# Patient Record
Sex: MALE | Race: WHITE | NOT HISPANIC OR LATINO | URBAN - METROPOLITAN AREA
[De-identification: names, ages, dates, MRNs, and addresses within clinical notes are randomized per-mention and may not be internally consistent; named-entity substitution may affect disease eponyms.]

---

## 2023-08-24 ENCOUNTER — APPOINTMENT (OUTPATIENT)
Dept: CT IMAGING | Facility: HOSPITAL | Age: 58
DRG: 871 | End: 2023-08-24
Payer: COMMERCIAL

## 2023-08-24 ENCOUNTER — APPOINTMENT (OUTPATIENT)
Dept: GENERAL RADIOLOGY | Facility: HOSPITAL | Age: 58
DRG: 871 | End: 2023-08-24
Payer: COMMERCIAL

## 2023-08-24 ENCOUNTER — HOSPITAL ENCOUNTER (INPATIENT)
Facility: HOSPITAL | Age: 58
LOS: 15 days | Discharge: NURSING, REHAB OR LONG-TERM CARE | DRG: 871 | End: 2023-09-08
Attending: EMERGENCY MEDICINE | Admitting: INTERNAL MEDICINE
Payer: COMMERCIAL

## 2023-08-24 DIAGNOSIS — T85.618A SHUNT MALFUNCTION: ICD-10-CM

## 2023-08-24 DIAGNOSIS — N17.9 SEPSIS WITH ACUTE RENAL FAILURE WITHOUT SEPTIC SHOCK, DUE TO UNSPECIFIED ORGANISM, UNSPECIFIED ACUTE RENAL FAILURE TYPE: ICD-10-CM

## 2023-08-24 DIAGNOSIS — Z09 FOLLOW-UP EXAM: Primary | ICD-10-CM

## 2023-08-24 DIAGNOSIS — R65.20 SEPSIS WITH ACUTE RENAL FAILURE WITHOUT SEPTIC SHOCK, DUE TO UNSPECIFIED ORGANISM, UNSPECIFIED ACUTE RENAL FAILURE TYPE: ICD-10-CM

## 2023-08-24 DIAGNOSIS — N17.9 ACUTE KIDNEY INJURY: ICD-10-CM

## 2023-08-24 DIAGNOSIS — E87.6 HYPOKALEMIA: ICD-10-CM

## 2023-08-24 DIAGNOSIS — G91.1 OBSTRUCTIVE HYDROCEPHALUS: ICD-10-CM

## 2023-08-24 DIAGNOSIS — J96.00 ACUTE RESPIRATORY FAILURE, UNSPECIFIED WHETHER WITH HYPOXIA OR HYPERCAPNIA: ICD-10-CM

## 2023-08-24 DIAGNOSIS — Q85.83 VON HIPPEL-LINDAU SYNDROME: ICD-10-CM

## 2023-08-24 DIAGNOSIS — A41.9 SEPSIS WITH ACUTE RENAL FAILURE WITHOUT SEPTIC SHOCK, DUE TO UNSPECIFIED ORGANISM, UNSPECIFIED ACUTE RENAL FAILURE TYPE: ICD-10-CM

## 2023-08-24 DIAGNOSIS — E87.0 HYPERNATREMIA: ICD-10-CM

## 2023-08-24 DIAGNOSIS — R13.12 OROPHARYNGEAL DYSPHAGIA: ICD-10-CM

## 2023-08-24 LAB
ABO GROUP BLD: NORMAL
ALBUMIN SERPL-MCNC: 2 G/DL (ref 3.5–5.2)
ALBUMIN/GLOB SERPL: 1.1 G/DL
ALP SERPL-CCNC: 43 U/L (ref 39–117)
ALT SERPL W P-5'-P-CCNC: 9 U/L (ref 1–41)
ANION GAP SERPL CALCULATED.3IONS-SCNC: 15 MMOL/L (ref 5–15)
APTT PPP: 25.5 SECONDS (ref 22.7–35.4)
ARTERIAL PATENCY WRIST A: POSITIVE
AST SERPL-CCNC: 20 U/L (ref 1–40)
ATMOSPHERIC PRESS: 748.6 MMHG
BACTERIA UR QL AUTO: ABNORMAL /HPF
BASE EXCESS BLDA CALC-SCNC: -6.3 MMOL/L (ref 0–2)
BASOPHILS # BLD AUTO: 0.01 10*3/MM3 (ref 0–0.2)
BASOPHILS NFR BLD AUTO: 0.1 % (ref 0–1.5)
BDY SITE: ABNORMAL
BILIRUB SERPL-MCNC: 0.2 MG/DL (ref 0–1.2)
BILIRUB UR QL STRIP: NEGATIVE
BLD GP AB SCN SERPL QL: NEGATIVE
BUN SERPL-MCNC: 27 MG/DL (ref 6–20)
BUN/CREAT SERPL: 8.9 (ref 7–25)
CALCIUM SPEC-SCNC: 4.9 MG/DL (ref 8.6–10.5)
CHLORIDE SERPL-SCNC: 125 MMOL/L (ref 98–107)
CK SERPL-CCNC: 500 U/L (ref 20–200)
CLARITY UR: CLEAR
CO2 SERPL-SCNC: 10 MMOL/L (ref 22–29)
COLOR UR: YELLOW
CREAT SERPL-MCNC: 3.05 MG/DL (ref 0.76–1.27)
D-LACTATE SERPL-SCNC: 6.8 MMOL/L (ref 0.5–2)
DEPRECATED RDW RBC AUTO: 41.2 FL (ref 37–54)
EGFRCR SERPLBLD CKD-EPI 2021: 23 ML/MIN/1.73
EOSINOPHIL # BLD AUTO: 0 10*3/MM3 (ref 0–0.4)
EOSINOPHIL NFR BLD AUTO: 0 % (ref 0.3–6.2)
ERYTHROCYTE [DISTWIDTH] IN BLOOD BY AUTOMATED COUNT: 13 % (ref 12.3–15.4)
GLOBULIN UR ELPH-MCNC: 1.9 GM/DL
GLUCOSE BLDC GLUCOMTR-MCNC: 115 MG/DL (ref 70–130)
GLUCOSE SERPL-MCNC: 92 MG/DL (ref 65–99)
GLUCOSE UR STRIP-MCNC: NEGATIVE MG/DL
HCO3 BLDA-SCNC: 18.5 MMOL/L (ref 22–28)
HCT VFR BLD AUTO: 51.4 % (ref 37.5–51)
HGB BLD-MCNC: 17.6 G/DL (ref 13–17.7)
HGB UR QL STRIP.AUTO: NEGATIVE
HYALINE CASTS UR QL AUTO: ABNORMAL /LPF
IMM GRANULOCYTES # BLD AUTO: 0.04 10*3/MM3 (ref 0–0.05)
IMM GRANULOCYTES NFR BLD AUTO: 0.3 % (ref 0–0.5)
INHALED O2 CONCENTRATION: 100 %
INR PPP: 1.38 (ref 0.9–1.1)
KETONES UR QL STRIP: ABNORMAL
LEUKOCYTE ESTERASE UR QL STRIP.AUTO: NEGATIVE
LIPASE SERPL-CCNC: 8 U/L (ref 13–60)
LYMPHOCYTES # BLD AUTO: 1.1 10*3/MM3 (ref 0.7–3.1)
LYMPHOCYTES NFR BLD AUTO: 8.7 % (ref 19.6–45.3)
MAGNESIUM SERPL-MCNC: 1.3 MG/DL (ref 1.6–2.6)
MCH RBC QN AUTO: 29.8 PG (ref 26.6–33)
MCHC RBC AUTO-ENTMCNC: 34.2 G/DL (ref 31.5–35.7)
MCV RBC AUTO: 87.1 FL (ref 79–97)
MODALITY: ABNORMAL
MONOCYTES # BLD AUTO: 1.17 10*3/MM3 (ref 0.1–0.9)
MONOCYTES NFR BLD AUTO: 9.3 % (ref 5–12)
NEUTROPHILS NFR BLD AUTO: 10.31 10*3/MM3 (ref 1.7–7)
NEUTROPHILS NFR BLD AUTO: 81.6 % (ref 42.7–76)
NITRITE UR QL STRIP: NEGATIVE
NRBC BLD AUTO-RTO: 0.1 /100 WBC (ref 0–0.2)
NT-PROBNP SERPL-MCNC: 7202 PG/ML (ref 0–900)
O2 A-A PPRESDIFF RESPIRATORY: 0.1 MMHG
PCO2 BLDA: 34.2 MM HG (ref 35–45)
PEEP RESPIRATORY: 5 CM[H2O]
PH BLDA: 7.34 PH UNITS (ref 7.35–7.45)
PH UR STRIP.AUTO: 6 [PH] (ref 5–8)
PLATELET # BLD AUTO: 306 10*3/MM3 (ref 140–450)
PMV BLD AUTO: 10.4 FL (ref 6–12)
PO2 BLDA: 100.2 MM HG (ref 80–100)
POTASSIUM SERPL-SCNC: 2.9 MMOL/L (ref 3.5–5.2)
PROCALCITONIN SERPL-MCNC: 6.08 NG/ML (ref 0–0.25)
PROT SERPL-MCNC: 3.9 G/DL (ref 6–8.5)
PROT UR QL STRIP: ABNORMAL
PROTHROMBIN TIME: 17.2 SECONDS (ref 11.7–14.2)
QT INTERVAL: 282 MS
RBC # BLD AUTO: 5.9 10*6/MM3 (ref 4.14–5.8)
RBC # UR STRIP: ABNORMAL /HPF
REF LAB TEST METHOD: ABNORMAL
RH BLD: POSITIVE
SAO2 % BLDCOA: 97.4 % (ref 92–99)
SET MECH RESP RATE: 18
SODIUM SERPL-SCNC: 150 MMOL/L (ref 136–145)
SP GR UR STRIP: 1.02 (ref 1–1.03)
SQUAMOUS #/AREA URNS HPF: ABNORMAL /HPF
T&S EXPIRATION DATE: NORMAL
TOTAL RATE: 21 BREATHS/MINUTE
TROPONIN T SERPL HS-MCNC: 27 NG/L
UROBILINOGEN UR QL STRIP: ABNORMAL
VENTILATOR MODE: AC
VT ON VENT VENT: 550 ML
WBC # UR STRIP: ABNORMAL /HPF
WBC NRBC COR # BLD: 12.63 10*3/MM3 (ref 3.4–10.8)

## 2023-08-24 PROCEDURE — 25010000002 PIPERACILLIN SOD-TAZOBACTAM PER 1 G: Performed by: EMERGENCY MEDICINE

## 2023-08-24 PROCEDURE — 87040 BLOOD CULTURE FOR BACTERIA: CPT | Performed by: EMERGENCY MEDICINE

## 2023-08-24 PROCEDURE — 83605 ASSAY OF LACTIC ACID: CPT | Performed by: EMERGENCY MEDICINE

## 2023-08-24 PROCEDURE — 83690 ASSAY OF LIPASE: CPT | Performed by: EMERGENCY MEDICINE

## 2023-08-24 PROCEDURE — 71045 X-RAY EXAM CHEST 1 VIEW: CPT

## 2023-08-24 PROCEDURE — 0 POTASSIUM CHLORIDE 10 MEQ/100ML SOLUTION: Performed by: INTERNAL MEDICINE

## 2023-08-24 PROCEDURE — 93010 ELECTROCARDIOGRAM REPORT: CPT | Performed by: INTERNAL MEDICINE

## 2023-08-24 PROCEDURE — 93005 ELECTROCARDIOGRAM TRACING: CPT | Performed by: EMERGENCY MEDICINE

## 2023-08-24 PROCEDURE — 85610 PROTHROMBIN TIME: CPT | Performed by: EMERGENCY MEDICINE

## 2023-08-24 PROCEDURE — 25010000002 PROPOFOL 10 MG/ML EMULSION: Performed by: INTERNAL MEDICINE

## 2023-08-24 PROCEDURE — 82948 REAGENT STRIP/BLOOD GLUCOSE: CPT

## 2023-08-24 PROCEDURE — 5A1935Z RESPIRATORY VENTILATION, LESS THAN 24 CONSECUTIVE HOURS: ICD-10-PCS | Performed by: HOSPITALIST

## 2023-08-24 PROCEDURE — 0BH17EZ INSERTION OF ENDOTRACHEAL AIRWAY INTO TRACHEA, VIA NATURAL OR ARTIFICIAL OPENING: ICD-10-PCS | Performed by: EMERGENCY MEDICINE

## 2023-08-24 PROCEDURE — 84484 ASSAY OF TROPONIN QUANT: CPT | Performed by: EMERGENCY MEDICINE

## 2023-08-24 PROCEDURE — 71250 CT THORAX DX C-: CPT

## 2023-08-24 PROCEDURE — 94799 UNLISTED PULMONARY SVC/PX: CPT

## 2023-08-24 PROCEDURE — 36600 WITHDRAWAL OF ARTERIAL BLOOD: CPT

## 2023-08-24 PROCEDURE — 81001 URINALYSIS AUTO W/SCOPE: CPT | Performed by: EMERGENCY MEDICINE

## 2023-08-24 PROCEDURE — 85730 THROMBOPLASTIN TIME PARTIAL: CPT | Performed by: EMERGENCY MEDICINE

## 2023-08-24 PROCEDURE — 25010000002 FENTANYL CITRATE (PF) 50 MCG/ML SOLUTION: Performed by: EMERGENCY MEDICINE

## 2023-08-24 PROCEDURE — 80053 COMPREHEN METABOLIC PANEL: CPT | Performed by: EMERGENCY MEDICINE

## 2023-08-24 PROCEDURE — 82550 ASSAY OF CK (CPK): CPT | Performed by: EMERGENCY MEDICINE

## 2023-08-24 PROCEDURE — 70450 CT HEAD/BRAIN W/O DYE: CPT

## 2023-08-24 PROCEDURE — 86901 BLOOD TYPING SEROLOGIC RH(D): CPT | Performed by: EMERGENCY MEDICINE

## 2023-08-24 PROCEDURE — 82803 BLOOD GASES ANY COMBINATION: CPT

## 2023-08-24 PROCEDURE — 94002 VENT MGMT INPAT INIT DAY: CPT

## 2023-08-24 PROCEDURE — 83735 ASSAY OF MAGNESIUM: CPT | Performed by: EMERGENCY MEDICINE

## 2023-08-24 PROCEDURE — 36415 COLL VENOUS BLD VENIPUNCTURE: CPT

## 2023-08-24 PROCEDURE — 99291 CRITICAL CARE FIRST HOUR: CPT

## 2023-08-24 PROCEDURE — 84145 PROCALCITONIN (PCT): CPT | Performed by: EMERGENCY MEDICINE

## 2023-08-24 PROCEDURE — 86900 BLOOD TYPING SEROLOGIC ABO: CPT | Performed by: EMERGENCY MEDICINE

## 2023-08-24 PROCEDURE — 31500 INSERT EMERGENCY AIRWAY: CPT

## 2023-08-24 PROCEDURE — 83880 ASSAY OF NATRIURETIC PEPTIDE: CPT | Performed by: EMERGENCY MEDICINE

## 2023-08-24 PROCEDURE — 25010000002 MIDAZOLAM PER 1 MG: Performed by: EMERGENCY MEDICINE

## 2023-08-24 PROCEDURE — 85025 COMPLETE CBC W/AUTO DIFF WBC: CPT | Performed by: EMERGENCY MEDICINE

## 2023-08-24 PROCEDURE — 74176 CT ABD & PELVIS W/O CONTRAST: CPT

## 2023-08-24 PROCEDURE — 86850 RBC ANTIBODY SCREEN: CPT | Performed by: EMERGENCY MEDICINE

## 2023-08-24 PROCEDURE — 87641 MR-STAPH DNA AMP PROBE: CPT | Performed by: INTERNAL MEDICINE

## 2023-08-24 RX ORDER — FAMOTIDINE 10 MG/ML
20 INJECTION, SOLUTION INTRAVENOUS 2 TIMES DAILY
Status: DISCONTINUED | OUTPATIENT
Start: 2023-08-24 | End: 2023-08-25

## 2023-08-24 RX ORDER — ONDANSETRON 2 MG/ML
4 INJECTION INTRAMUSCULAR; INTRAVENOUS EVERY 6 HOURS PRN
Status: DISCONTINUED | OUTPATIENT
Start: 2023-08-24 | End: 2023-09-08 | Stop reason: HOSPADM

## 2023-08-24 RX ORDER — ALUMINA, MAGNESIA, AND SIMETHICONE 2400; 2400; 240 MG/30ML; MG/30ML; MG/30ML
15 SUSPENSION ORAL EVERY 6 HOURS PRN
Status: DISCONTINUED | OUTPATIENT
Start: 2023-08-24 | End: 2023-08-29

## 2023-08-24 RX ORDER — FENTANYL CITRATE 50 UG/ML
100 INJECTION, SOLUTION INTRAMUSCULAR; INTRAVENOUS ONCE
Status: COMPLETED | OUTPATIENT
Start: 2023-08-24 | End: 2023-08-24

## 2023-08-24 RX ORDER — MAGNESIUM SULFATE HEPTAHYDRATE 40 MG/ML
4 INJECTION, SOLUTION INTRAVENOUS ONCE
Status: COMPLETED | OUTPATIENT
Start: 2023-08-24 | End: 2023-08-25

## 2023-08-24 RX ORDER — SODIUM CHLORIDE 0.9 % (FLUSH) 0.9 %
10 SYRINGE (ML) INJECTION EVERY 12 HOURS SCHEDULED
Status: DISCONTINUED | OUTPATIENT
Start: 2023-08-24 | End: 2023-09-08 | Stop reason: HOSPADM

## 2023-08-24 RX ORDER — POTASSIUM CHLORIDE 7.45 MG/ML
10 INJECTION INTRAVENOUS
Status: DISPENSED | OUTPATIENT
Start: 2023-08-24 | End: 2023-08-25

## 2023-08-24 RX ORDER — SODIUM CHLORIDE 9 MG/ML
40 INJECTION, SOLUTION INTRAVENOUS AS NEEDED
Status: DISCONTINUED | OUTPATIENT
Start: 2023-08-24 | End: 2023-09-08 | Stop reason: HOSPADM

## 2023-08-24 RX ORDER — CHLORHEXIDINE GLUCONATE 0.12 MG/ML
15 RINSE ORAL EVERY 12 HOURS SCHEDULED
Status: DISCONTINUED | OUTPATIENT
Start: 2023-08-24 | End: 2023-08-26

## 2023-08-24 RX ORDER — SODIUM CHLORIDE 0.9 % (FLUSH) 0.9 %
10 SYRINGE (ML) INJECTION AS NEEDED
Status: DISCONTINUED | OUTPATIENT
Start: 2023-08-24 | End: 2023-09-08 | Stop reason: HOSPADM

## 2023-08-24 RX ORDER — FENTANYL CITRATE 50 UG/ML
50 INJECTION, SOLUTION INTRAMUSCULAR; INTRAVENOUS
Status: DISCONTINUED | OUTPATIENT
Start: 2023-08-24 | End: 2023-08-26

## 2023-08-24 RX ORDER — MIDAZOLAM HYDROCHLORIDE 1 MG/ML
2 INJECTION INTRAMUSCULAR; INTRAVENOUS ONCE
Status: COMPLETED | OUTPATIENT
Start: 2023-08-24 | End: 2023-08-24

## 2023-08-24 RX ORDER — POLYETHYLENE GLYCOL 3350 17 G/17G
17 POWDER, FOR SOLUTION ORAL DAILY PRN
Status: DISCONTINUED | OUTPATIENT
Start: 2023-08-24 | End: 2023-09-01

## 2023-08-24 RX ORDER — BISACODYL 5 MG/1
5 TABLET, DELAYED RELEASE ORAL DAILY PRN
Status: DISCONTINUED | OUTPATIENT
Start: 2023-08-24 | End: 2023-09-01

## 2023-08-24 RX ORDER — ETOMIDATE 2 MG/ML
23 INJECTION INTRAVENOUS ONCE
Status: COMPLETED | OUTPATIENT
Start: 2023-08-24 | End: 2023-08-24

## 2023-08-24 RX ORDER — BISACODYL 10 MG
10 SUPPOSITORY, RECTAL RECTAL DAILY PRN
Status: DISCONTINUED | OUTPATIENT
Start: 2023-08-24 | End: 2023-09-01

## 2023-08-24 RX ORDER — AMOXICILLIN 250 MG
2 CAPSULE ORAL 2 TIMES DAILY
Status: DISCONTINUED | OUTPATIENT
Start: 2023-08-24 | End: 2023-09-01

## 2023-08-24 RX ORDER — NITROGLYCERIN 0.4 MG/1
0.4 TABLET SUBLINGUAL
Status: DISCONTINUED | OUTPATIENT
Start: 2023-08-24 | End: 2023-09-08 | Stop reason: HOSPADM

## 2023-08-24 RX ADMIN — PIPERACILLIN SODIUM AND TAZOBACTAM SODIUM 3.38 G: 3; .375 INJECTION, SOLUTION INTRAVENOUS at 18:50

## 2023-08-24 RX ADMIN — ETOMIDATE 23 MG: 2 INJECTION, SOLUTION INTRAVENOUS at 18:03

## 2023-08-24 RX ADMIN — FENTANYL CITRATE 100 MCG: 50 INJECTION, SOLUTION INTRAMUSCULAR; INTRAVENOUS at 18:34

## 2023-08-24 RX ADMIN — POTASSIUM CHLORIDE 10 MEQ: 7.46 INJECTION, SOLUTION INTRAVENOUS at 23:50

## 2023-08-24 RX ADMIN — MIDAZOLAM 2 MG: 1 INJECTION INTRAMUSCULAR; INTRAVENOUS at 18:34

## 2023-08-24 RX ADMIN — PROPOFOL INJECTABLE EMULSION 5 MCG/KG/MIN: 10 INJECTION, EMULSION INTRAVENOUS at 22:58

## 2023-08-24 RX ADMIN — SODIUM CHLORIDE, POTASSIUM CHLORIDE, SODIUM LACTATE AND CALCIUM CHLORIDE 1000 ML: 600; 310; 30; 20 INJECTION, SOLUTION INTRAVENOUS at 18:57

## 2023-08-24 RX ADMIN — SODIUM CHLORIDE, POTASSIUM CHLORIDE, SODIUM LACTATE AND CALCIUM CHLORIDE 1000 ML: 600; 310; 30; 20 INJECTION, SOLUTION INTRAVENOUS at 18:33

## 2023-08-24 RX ADMIN — POTASSIUM CHLORIDE 10 MEQ: 7.46 INJECTION, SOLUTION INTRAVENOUS at 22:58

## 2023-08-24 NOTE — ED PROVIDER NOTES
EMERGENCY DEPARTMENT ENCOUNTER    Room Number:  40/40  PCP: Provider, No Known  Historian: EMS      HPI:  Chief Complaint: Found down  A complete HPI/ROS/PMH/PSH/SH/FH are unobtainable due to: Patient is unresponsive  Context: Braxton Wolfe is a 57 y.o. male who presents to the ED from home by EMS after being found down.  It is unknown when the patient was last normal.  A friend went to check on him today and became concerned when the patient did not come to the door.  When EMS arrived on scene, the patient was found laying in his bed.  He was unresponsive and covered in dried vomitus.  Glucose was 147.  Patient was given Narcan without change.  In route to the ED, he was breathing spontaneously but was being bagged.  He was tachycardic.  Blood pressures were somewhat low.  He reportedly has a history of previous stroke.  Other past medical history medications are unknown.            PAST MEDICAL HISTORY  Active Ambulatory Problems     Diagnosis Date Noted    No Active Ambulatory Problems     Resolved Ambulatory Problems     Diagnosis Date Noted    No Resolved Ambulatory Problems     No Additional Past Medical History         PAST SURGICAL HISTORY  No past surgical history on file.      FAMILY HISTORY  No family history on file.      SOCIAL HISTORY  Social History     Socioeconomic History    Marital status: Unknown         ALLERGIES  Patient has no allergy information on record.    REVIEW OF SYSTEMS  Unobtainable    PHYSICAL EXAM  ED Triage Vitals   Temp Pulse Resp BP SpO2   -- -- -- -- --      Temp src Heart Rate Source Patient Position BP Location FiO2 (%)   -- -- -- -- --       Physical Exam      GENERAL: Well-developed male.  Unresponsive.  There is dried vomitus on his face and chest.  HENT: NCAT, nares patent, gag reflex is absent  EYES: Pupils are 2 mm and nonreactive to light  CV: regular rhythm, tachycardic  RESPIRATORY: Shallow respirations, clear to auscultation bilaterally  ABDOMEN: soft,  nondistended  MUSCULOSKELETAL: No obvious deformities of the extremities  NEURO: Unresponsive to painful stimuli  SKIN: Cool, feet are mottled    Vital signs and nursing notes reviewed.          LAB RESULTS  Recent Results (from the past 24 hour(s))   Comprehensive Metabolic Panel    Collection Time: 08/24/23  6:05 PM    Specimen: Blood   Result Value Ref Range    Glucose 92 65 - 99 mg/dL    BUN 27 (H) 6 - 20 mg/dL    Creatinine 3.05 (H) 0.76 - 1.27 mg/dL    Sodium 150 (H) 136 - 145 mmol/L    Potassium 2.9 (L) 3.5 - 5.2 mmol/L    Chloride 125 (H) 98 - 107 mmol/L    CO2 10.0 (L) 22.0 - 29.0 mmol/L    Calcium 4.9 (C) 8.6 - 10.5 mg/dL    Total Protein 3.9 (L) 6.0 - 8.5 g/dL    Albumin 2.0 (L) 3.5 - 5.2 g/dL    ALT (SGPT) 9 1 - 41 U/L    AST (SGOT) 20 1 - 40 U/L    Alkaline Phosphatase 43 39 - 117 U/L    Total Bilirubin 0.2 0.0 - 1.2 mg/dL    Globulin 1.9 gm/dL    A/G Ratio 1.1 g/dL    BUN/Creatinine Ratio 8.9 7.0 - 25.0    Anion Gap 15.0 5.0 - 15.0 mmol/L    eGFR 23.0 (L) >60.0 mL/min/1.73   Protime-INR    Collection Time: 08/24/23  6:05 PM    Specimen: Blood   Result Value Ref Range    Protime 17.2 (H) 11.7 - 14.2 Seconds    INR 1.38 (H) 0.90 - 1.10   aPTT    Collection Time: 08/24/23  6:05 PM    Specimen: Blood   Result Value Ref Range    PTT 25.5 22.7 - 35.4 seconds   Lipase    Collection Time: 08/24/23  6:05 PM    Specimen: Blood   Result Value Ref Range    Lipase 8 (L) 13 - 60 U/L   BNP    Collection Time: 08/24/23  6:05 PM    Specimen: Blood   Result Value Ref Range    proBNP 7,202.0 (H) 0.0 - 900.0 pg/mL   Single High Sensitivity Troponin T    Collection Time: 08/24/23  6:05 PM    Specimen: Blood   Result Value Ref Range    HS Troponin T 27 (H) <15 ng/L   Lactic Acid, Plasma    Collection Time: 08/24/23  6:05 PM    Specimen: Blood   Result Value Ref Range    Lactate 6.8 (C) 0.5 - 2.0 mmol/L   Procalcitonin    Collection Time: 08/24/23  6:05 PM    Specimen: Blood   Result Value Ref Range    Procalcitonin 6.08 (H)  0.00 - 0.25 ng/mL   CBC Auto Differential    Collection Time: 08/24/23  6:05 PM    Specimen: Blood   Result Value Ref Range    WBC 12.63 (H) 3.40 - 10.80 10*3/mm3    RBC 5.90 (H) 4.14 - 5.80 10*6/mm3    Hemoglobin 17.6 13.0 - 17.7 g/dL    Hematocrit 51.4 (H) 37.5 - 51.0 %    MCV 87.1 79.0 - 97.0 fL    MCH 29.8 26.6 - 33.0 pg    MCHC 34.2 31.5 - 35.7 g/dL    RDW 13.0 12.3 - 15.4 %    RDW-SD 41.2 37.0 - 54.0 fl    MPV 10.4 6.0 - 12.0 fL    Platelets 306 140 - 450 10*3/mm3    Neutrophil % 81.6 (H) 42.7 - 76.0 %    Lymphocyte % 8.7 (L) 19.6 - 45.3 %    Monocyte % 9.3 5.0 - 12.0 %    Eosinophil % 0.0 (L) 0.3 - 6.2 %    Basophil % 0.1 0.0 - 1.5 %    Immature Grans % 0.3 0.0 - 0.5 %    Neutrophils, Absolute 10.31 (H) 1.70 - 7.00 10*3/mm3    Lymphocytes, Absolute 1.10 0.70 - 3.10 10*3/mm3    Monocytes, Absolute 1.17 (H) 0.10 - 0.90 10*3/mm3    Eosinophils, Absolute 0.00 0.00 - 0.40 10*3/mm3    Basophils, Absolute 0.01 0.00 - 0.20 10*3/mm3    Immature Grans, Absolute 0.04 0.00 - 0.05 10*3/mm3    nRBC 0.1 0.0 - 0.2 /100 WBC   Magnesium    Collection Time: 08/24/23  6:05 PM    Specimen: Blood   Result Value Ref Range    Magnesium 1.3 (L) 1.6 - 2.6 mg/dL   CK    Collection Time: 08/24/23  6:05 PM    Specimen: Blood   Result Value Ref Range    Creatine Kinase 500 (H) 20 - 200 U/L   ECG 12 Lead Altered Mental Status    Collection Time: 08/24/23  6:16 PM   Result Value Ref Range    QT Interval 282 ms   Type & Screen    Collection Time: 08/24/23  6:42 PM    Specimen: Blood   Result Value Ref Range    ABO Type A     RH type Positive     Antibody Screen Negative     T&S Expiration Date 8/27/2023 11:59:59 PM    Urinalysis With Culture If Indicated - Indwelling Urethral Catheter    Collection Time: 08/24/23  6:43 PM    Specimen: Indwelling Urethral Catheter; Urine   Result Value Ref Range    Color, UA Yellow Yellow, Straw    Appearance, UA Clear Clear    pH, UA 6.0 5.0 - 8.0    Specific Gravity, UA 1.019 1.005 - 1.030    Glucose, UA  Negative Negative    Ketones, UA 40 mg/dL (2+) (A) Negative    Bilirubin, UA Negative Negative    Blood, UA Negative Negative    Protein,  mg/dL (2+) (A) Negative    Leuk Esterase, UA Negative Negative    Nitrite, UA Negative Negative    Urobilinogen, UA 1.0 E.U./dL 0.2 - 1.0 E.U./dL   Urinalysis, Microscopic Only - Indwelling Urethral Catheter    Collection Time: 08/24/23  6:43 PM    Specimen: Indwelling Urethral Catheter; Urine   Result Value Ref Range    RBC, UA 0-2 None Seen, 0-2 /HPF    WBC, UA 3-5 (A) None Seen, 0-2 /HPF    Bacteria, UA None Seen None Seen /HPF    Squamous Epithelial Cells, UA 3-6 (A) None Seen, 0-2 /HPF    Hyaline Casts, UA 3-6 None Seen /LPF    Methodology Manual Light Microscopy    Blood Gas, Arterial -    Collection Time: 08/24/23  7:00 PM    Specimen: Arterial Blood   Result Value Ref Range    Site Arterial: right radial     Juan's Test Positive     pH, Arterial 7.341 (L) 7.350 - 7.450 pH units    pCO2, Arterial 34.2 (L) 35.0 - 45.0 mm Hg    pO2, Arterial 100.2 (H) 80.0 - 100.0 mm Hg    HCO3, Arterial 18.5 (L) 22.0 - 28.0 mmol/L    Base Excess, Arterial -6.3 (L) 0.0 - 2.0 mmol/L    O2 Saturation Calculated 97.4 92.0 - 99.0 %    A-a DO2 0.1 mmHg    Barometric Pressure for Blood Gas 748.6 mmHg    Modality Adult Vent     FIO2 100 %    Ventilator Mode AC     Set Tidal Volume 550     Set Mech Resp Rate 18     Rate 21 Breaths/minute    PEEP 5        Ordered the above labs and reviewed the results.        RADIOLOGY  XR Chest 1 View    Result Date: 8/24/2023  CHEST SINGLE VIEW  HISTORY: Respiratory failure. Patient was found down.  COMPARISON: None.  FINDINGS: There has been intubation and the ET tube tip is 2.2 cm above the melanie and this could be withdrawn 2 cm for better position. The heart size appears normal. There is no evidence for perihilar edema or pneumothorax or focal airspace disease.  There is some limitation as the lung apices, particular on the right and the right  costophrenic angle, are not included on the field-of-view. There is shunt tubing overlying the right thorax.      Limited exam demonstrates intubation with ET tube tip 2.2 cm above the melanie and this could be withdrawn 2 cm for better position. No further evidence for active disease in the chest.  This report was finalized on 8/24/2023 7:10 PM by Dr. Marcelo De M.D.       Ordered the above noted radiological studies. Reviewed by me in PACS.            PROCEDURES  Intubation    Date/Time: 8/24/2023 6:39 PM  Performed by: Antolin Zapata MD  Authorized by: Antolin Zapata MD     Consent:     Consent obtained:  Emergent situation  Universal protocol:     Patient identity confirmed:  Anonymous protocol, patient vented/unresponsive  Pre-procedure details:     Indication: failure to protect airway      Patient status:  Unresponsive    Look externally: no concerns      Obstruction: none      Neck mobility: normal      Pharmacologic strategy: sedation      Induction agents:  Etomidate    Paralytics:  None  Procedure details:     Preoxygenation:  Bag valve mask    CPR in progress: no      Intubation method:  Oral    Intubation technique: video assisted      Laryngoscope blade:  Mac 3    Bougie used: no      Tube size (mm):  7.5    Tube type:  Cuffed    Number of attempts:  1    Tube visualized through cords: yes    Placement assessment:     ETT at teeth/gumline (cm):  24    Tube secured with:  ETT resendez    Breath sounds:  Equal and absent over the epigastrium    Placement verification: chest rise, colorimetric ETCO2, CXR verification, equal breath sounds and tube exhalation      CXR findings:  Appropriate position and low    Tube repositioned: yes    Post-procedure details:     Procedure completion:  Tolerated well, no immediate complications  Critical Care  Performed by: Antolin Zapata MD  Authorized by: Antolin Zapata MD     Critical care provider statement:     Critical care time (minutes):  50     Critical care time was exclusive of:  Separately billable procedures and treating other patients    Critical care was necessary to treat or prevent imminent or life-threatening deterioration of the following conditions:  Renal failure, respiratory failure and sepsis    Critical care was time spent personally by me on the following activities:  Development of treatment plan with patient or surrogate, discussions with consultants, evaluation of patient's response to treatment, examination of patient, obtaining history from patient or surrogate, ordering and performing treatments and interventions, ordering and review of laboratory studies, ordering and review of radiographic studies, pulse oximetry, re-evaluation of patient's condition and review of old charts    I assumed direction of critical care for this patient from another provider in my specialty: no      Care discussed with: admitting provider              MEDICATIONS GIVEN IN ER  Medications   sodium chloride 0.9 % flush 10 mL (has no administration in time range)   lactated ringers bolus 1,000 mL (0 mL Intravenous Stopped 8/24/23 1834)   etomidate (AMIDATE) injection 23 mg (23 mg Intravenous Given 8/24/23 1803)   piperacillin-tazobactam (ZOSYN) 3.375 g in iso-osmotic dextrose 50 ml (premix) (0 g Intravenous Stopped 8/24/23 2024)   midazolam (VERSED) injection 2 mg (2 mg Intravenous Given 8/24/23 1834)   fentaNYL citrate (PF) (SUBLIMAZE) injection 100 mcg (100 mcg Intravenous Given 8/24/23 1834)   lactated ringers bolus 2,277 mL (1,000 mL Intravenous New Bag 8/24/23 1857)                   MEDICAL DECISION MAKING, PROGRESS, and CONSULTS    All labs have been independently reviewed by me.  All radiology studies have been reviewed by me and I have also reviewed the radiology report.   EKG's independently viewed and interpreted by me.  Discussion below represents my analysis of pertinent findings related to patient's condition, differential diagnosis, treatment  plan and final disposition.      Additional sources:  - Discussed/ obtained information from independent historians: Mercedez, charge nurse, spoke with the patient's parents.  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  I personally spoke with the patient's ex-wife here in the ED.    - External (non-ED) record review: Patient had an MRI of the brain done in April 2022 at [facility].  This showed stable postoperative changes of the posterior fossa along with multiple small enhancing bilateral cerebellar hemisphere nodules.  He has had a previous suboccipital craniectomy.  Patient has a history of von Hippel-Lindau disease.  Patient does not have any prior ED visits or admissions here.    - Chronic or social conditions impacting care: N/A          Orders placed during this visit:  Orders Placed This Encounter   Procedures    Intubation    Critical Care    Blood Culture - Blood,    Blood Culture - Blood,    CT Head Without Contrast    XR Chest 1 View    Comprehensive Metabolic Panel    Protime-INR    aPTT    Lipase    Urinalysis With Microscopic If Indicated (No Culture) - Urine, Catheter    BNP    Single High Sensitivity Troponin T    Lactic Acid, Plasma    Procalcitonin    Blood Gas, Arterial -    CBC Auto Differential    Magnesium    Urinalysis With Culture If Indicated -    STAT Lactic Acid, Reflex    CK    Blood Gas, Arterial -    Urinalysis, Microscopic Only - Urine, Clean Catch    Ethanol    Urine Drug Screen - Urine, Clean Catch    Pulse Oximetry, Continuous    Monitor Blood Pressure    Insert Indwelling Urinary Catheter    Assess Need for Indwelling Urinary Catheter - Follow Removal Protocol    Urinary Catheter Care    Pulmonology (on-call MD unless specified)    ECG 12 Lead Altered Mental Status    Type & Screen    Insert Peripheral IV    Inpatient Admission    CBC & Differential         Additional orders considered but not  ordered:  N/A        Differential diagnosis:    Sepsis, bacteremia, pneumonia, UTI, dehydration, kidney failure, stroke, intracranial hemorrhage      Independent interpretation of labs, radiology studies, and discussions with consultants:  ED Course as of 08/24/23 2128   Thu Aug 24, 2023   1808 Patient presented to the ED after being found down for an unknown amount of time.  He was orally intubated by me.  See procedure note for detail.  He is tachycardic.  He is getting a 2 L bolus of IV fluids.  He is hypotensive. [WH]   1813 Heart rate is now in the 110s.  Blood pressure is 106/71.  Temperature is 101.3.  Blood and urine cultures are pending. [WH]   1817 Patient will be given IV Zosyn for treatment of sepsis. [WH]   1817 Chest x-ray personally interpreted by me.  My personal interpretation is: ET tube is in good position.  Heart size is normal.  There are perihilar infiltrates.  No pleural effusion. [WH]   1834 WBC(!): 12.63 [WH]   1834 Hemoglobin: 17.6 [WH]   1834 INR(!): 1.38 [WH]   1834 Patient will be given a dose of fentanyl and Versed for sedation.   [WH]   1834 EKG personally interpreted by me at 1818.  My personal interpretation is:          EKG time: 1816  Rhythm/Rate: Sinus tachycardia, rate 119  P waves and CA: LAE  QRS, axis: Normal  ST and T waves: Nonspecific ST/T wave changes in the lateral and inferior leads, no ST elevation or depression    Interpreted Contemporaneously by me, independently viewed  No prior available for comparison    [WH]   1839 Per the radiologist, chest x-ray is negative acute.  The tip of the ET tube is 2.2 cm above the melanie and could be withdrawn 2 cm for better positioning. [WH]   1845 Procalcitonin(!): 6.08 [WH]   1845 proBNP(!): 7,202.0 [WH]   1845 HS Troponin T(!): 27 [WH]   1845 Lactate(!!): 6.8 [WH]   1845 BUN(!): 27 [WH]   1846 Creatinine(!): 3.05 [WH]   1846 Sodium(!): 150 [WH]   1846 Potassium(!): 2.9 [WH]   1846 CO2(!): 10.0 [WH]   1846 Glucose: 92 [WH]   1846  Labs reviewed.  Patient has acute kidney injury.  He is hypokalemic and hyponatremic.  Lactic acid is elevated. Patient will be given additional IV fluids for a total of 30 cc/kg.   [WH]   1910 I spoke with the patient's ex-wife, who is here in the ED.  She reports that the patient's friend went to pick him up to take him to a class reunion.  When the patient would not answer the door, his friend broke into the house and found the patient lying in bed unresponsive.  The last time that she knows someone talk to the patient was 2 days ago. [WH]   1949 Case discussed with Dr. Castillo, who is here in the ED at bedside.  He agrees to admit the patient [WH]   2007 Creatine Kinase(!): 500 [WH]   2126 Patient presented to the ED after being found down for an unknown amount of time.  No one had talked talk to him in the last 48 hours or so.  When EMS arrived on scene, the patient was lying in bed and was unresponsive.  He was covered with vomitus.  Upon ED arrival, he was being bagged.  He was tachycardic and hypotensive.  He was intubated for airway protection.  He was found to be febrile.  He was given IV fluids.  Blood pressure and heart rate improved.  Chest x-ray was negative acute.  Patient was found to have acute kidney failure.  He was hyponatremic and hypokalemic.  CK was 500.  Head CT is pending.  Patient was started on antibiotics for sepsis.  I suspect he may have aspiration pneumonia.  Case was discussed with the intensivist.  Patient will be admitted to the ICU.  Critical care was performed on this patient. [WH]      ED Course User Index  [WH] Antolin Zapata MD               DIAGNOSIS  Final diagnoses:   Sepsis with acute renal failure without septic shock, due to unspecified organism, unspecified acute renal failure type   Acute respiratory failure, unspecified whether with hypoxia or hypercapnia   Acute kidney injury   Hypokalemia   Hypernatremia         DISPOSITION  ADMISSION    Discussed treatment  plan and reason for admission with pt/family and admitting physician.  Pt/family voiced understanding of the plan for admission for further testing/treatment as needed.               Latest Documented Vital Signs:  As of 21:28 EDT  BP- 117/89 HR- 100 Temp- (!) 101.3 °F (38.5 °C) (Rectal) O2 sat- 97%              --    Please note that portions of this were completed with a voice recognition program.       Note Disclaimer: At [facility], we believe that sharing information builds trust and better relationships. You are receiving this note because you are receiving care at [facility] or recently visited. It is possible you will see health information before a provider has talked with you about it. This kind of information can be easy to misunderstand. To help you fully understand what it means for your health, we urge you to discuss this note with your provider.             Antolin Zapata MD  08/24/23 [id number]

## 2023-08-25 ENCOUNTER — APPOINTMENT (OUTPATIENT)
Dept: CARDIOLOGY | Facility: HOSPITAL | Age: 58
DRG: 871 | End: 2023-08-25
Payer: COMMERCIAL

## 2023-08-25 ENCOUNTER — APPOINTMENT (OUTPATIENT)
Dept: GENERAL RADIOLOGY | Facility: HOSPITAL | Age: 58
DRG: 871 | End: 2023-08-25
Payer: COMMERCIAL

## 2023-08-25 ENCOUNTER — APPOINTMENT (OUTPATIENT)
Dept: NEUROLOGY | Facility: HOSPITAL | Age: 58
DRG: 871 | End: 2023-08-25
Payer: COMMERCIAL

## 2023-08-25 LAB
ALBUMIN SERPL-MCNC: 3.2 G/DL (ref 3.5–5.2)
ALBUMIN/GLOB SERPL: 1.2 G/DL
ALP SERPL-CCNC: 69 U/L (ref 39–117)
ALT SERPL W P-5'-P-CCNC: 15 U/L (ref 1–41)
AMPHET+METHAMPHET UR QL: NEGATIVE
ANION GAP SERPL CALCULATED.3IONS-SCNC: 16 MMOL/L (ref 5–15)
ANION GAP SERPL CALCULATED.3IONS-SCNC: 19 MMOL/L (ref 5–15)
AORTIC DIMENSIONLESS INDEX: 0.4 (DI)
ARTERIAL PATENCY WRIST A: POSITIVE
AST SERPL-CCNC: 34 U/L (ref 1–40)
ATMOSPHERIC PRESS: 752.6 MMHG
BARBITURATES UR QL SCN: NEGATIVE
BASE EXCESS BLDA CALC-SCNC: -3.6 MMOL/L (ref 0–2)
BASOPHILS # BLD AUTO: 0.02 10*3/MM3 (ref 0–0.2)
BASOPHILS NFR BLD AUTO: 0.2 % (ref 0–1.5)
BDY SITE: ABNORMAL
BENZODIAZ UR QL SCN: POSITIVE
BH CV ECHO MEAS - AO MAX PG: 2.8 MMHG
BH CV ECHO MEAS - AO MEAN PG: 1.39 MMHG
BH CV ECHO MEAS - AO V2 MAX: 83.8 CM/SEC
BH CV ECHO MEAS - AO V2 VTI: 11.9 CM
BH CV ECHO MEAS - AVA(I,D): 1.38 CM2
BH CV ECHO MEAS - EDV(CUBED): 60 ML
BH CV ECHO MEAS - IVS/LVPW: 1.41 CM
BH CV ECHO MEAS - IVSD: 1.06 CM
BH CV ECHO MEAS - LV MASS(C)D: 107.4 GRAMS
BH CV ECHO MEAS - LV MAX PG: 0.68 MMHG
BH CV ECHO MEAS - LV MEAN PG: 0.31 MMHG
BH CV ECHO MEAS - LV V1 MAX: 41.1 CM/SEC
BH CV ECHO MEAS - LV V1 VTI: 4.6 CM
BH CV ECHO MEAS - LVIDD: 3.9 CM
BH CV ECHO MEAS - LVOT AREA: 3.6 CM2
BH CV ECHO MEAS - LVOT DIAM: 2.13 CM
BH CV ECHO MEAS - LVPWD: 0.75 CM
BH CV ECHO MEAS - MV A MAX VEL: 54 CM/SEC
BH CV ECHO MEAS - MV DEC SLOPE: 418.7 CM/SEC2
BH CV ECHO MEAS - MV DEC TIME: 0.29 MSEC
BH CV ECHO MEAS - MV E MAX VEL: 48.4 CM/SEC
BH CV ECHO MEAS - MV E/A: 0.9
BH CV ECHO MEAS - MV MAX PG: 1.54 MMHG
BH CV ECHO MEAS - MV MEAN PG: 0.84 MMHG
BH CV ECHO MEAS - MV P1/2T: 39.1 MSEC
BH CV ECHO MEAS - MV V2 VTI: 10.7 CM
BH CV ECHO MEAS - MVA(P1/2T): 5.6 CM2
BH CV ECHO MEAS - MVA(VTI): 1.55 CM2
BH CV ECHO MEAS - RAP SYSTOLE: 3 MMHG
BH CV ECHO MEAS - RVSP: 26 MMHG
BH CV ECHO MEAS - SV(LVOT): 16.5 ML
BH CV ECHO MEAS - TR MAX PG: 23.5 MMHG
BH CV ECHO MEAS - TR MAX VEL: 242.2 CM/SEC
BH CV VAS BP RIGHT ARM: NORMAL MMHG
BILIRUB SERPL-MCNC: 0.5 MG/DL (ref 0–1.2)
BUN SERPL-MCNC: 47 MG/DL (ref 6–20)
BUN SERPL-MCNC: 52 MG/DL (ref 6–20)
BUN/CREAT SERPL: 10 (ref 7–25)
BUN/CREAT SERPL: 13.5 (ref 7–25)
CA-I BLD-MCNC: 4.6 MG/DL (ref 4.6–5.4)
CA-I SERPL ISE-MCNC: 1.14 MMOL/L (ref 1.15–1.35)
CALCIUM SPEC-SCNC: 8.6 MG/DL (ref 8.6–10.5)
CALCIUM SPEC-SCNC: 9.2 MG/DL (ref 8.6–10.5)
CANNABINOIDS SERPL QL: NEGATIVE
CHLORIDE SERPL-SCNC: 104 MMOL/L (ref 98–107)
CHLORIDE SERPL-SCNC: 108 MMOL/L (ref 98–107)
CHLORIDE UR-SCNC: <20 MMOL/L
CK SERPL-CCNC: 1023 U/L (ref 20–200)
CO2 SERPL-SCNC: 19 MMOL/L (ref 22–29)
CO2 SERPL-SCNC: 20 MMOL/L (ref 22–29)
COCAINE UR QL: NEGATIVE
CREAT SERPL-MCNC: 3.85 MG/DL (ref 0.76–1.27)
CREAT SERPL-MCNC: 4.7 MG/DL (ref 0.76–1.27)
CREAT UR-MCNC: 245.8 MG/DL
D-LACTATE SERPL-SCNC: 1.7 MMOL/L (ref 0.5–2)
D-LACTATE SERPL-SCNC: 2.4 MMOL/L (ref 0.5–2)
DEPRECATED RDW RBC AUTO: 41.9 FL (ref 37–54)
EGFRCR SERPLBLD CKD-EPI 2021: 13.7 ML/MIN/1.73
EGFRCR SERPLBLD CKD-EPI 2021: 17.4 ML/MIN/1.73
EOSINOPHIL # BLD AUTO: 0 10*3/MM3 (ref 0–0.4)
EOSINOPHIL NFR BLD AUTO: 0 % (ref 0.3–6.2)
ERYTHROCYTE [DISTWIDTH] IN BLOOD BY AUTOMATED COUNT: 13.2 % (ref 12.3–15.4)
ETHANOL BLD-MCNC: <10 MG/DL (ref 0–10)
ETHANOL UR QL: <0.01 %
FENTANYL UR-MCNC: POSITIVE NG/ML
GLOBULIN UR ELPH-MCNC: 2.6 GM/DL
GLUCOSE BLDC GLUCOMTR-MCNC: 107 MG/DL (ref 70–130)
GLUCOSE BLDC GLUCOMTR-MCNC: 112 MG/DL (ref 70–130)
GLUCOSE BLDC GLUCOMTR-MCNC: 125 MG/DL (ref 70–130)
GLUCOSE BLDC GLUCOMTR-MCNC: 126 MG/DL (ref 70–130)
GLUCOSE BLDC GLUCOMTR-MCNC: 127 MG/DL (ref 70–130)
GLUCOSE SERPL-MCNC: 137 MG/DL (ref 65–99)
GLUCOSE SERPL-MCNC: 140 MG/DL (ref 65–99)
HCO3 BLDA-SCNC: 18.8 MMOL/L (ref 22–28)
HCT VFR BLD AUTO: 47.1 % (ref 37.5–51)
HGB BLD-MCNC: 16.1 G/DL (ref 13–17.7)
IMM GRANULOCYTES # BLD AUTO: 0.05 10*3/MM3 (ref 0–0.05)
IMM GRANULOCYTES NFR BLD AUTO: 0.4 % (ref 0–0.5)
INHALED O2 CONCENTRATION: 80 %
INR PPP: 1.32 (ref 0.9–1.1)
LYMPHOCYTES # BLD AUTO: 1.19 10*3/MM3 (ref 0.7–3.1)
LYMPHOCYTES NFR BLD AUTO: 10.2 % (ref 19.6–45.3)
MAGNESIUM SERPL-MCNC: 1.9 MG/DL (ref 1.6–2.6)
MAGNESIUM SERPL-MCNC: 3.1 MG/DL (ref 1.6–2.6)
MCH RBC QN AUTO: 30.1 PG (ref 26.6–33)
MCHC RBC AUTO-ENTMCNC: 34.2 G/DL (ref 31.5–35.7)
MCV RBC AUTO: 88.2 FL (ref 79–97)
METHADONE UR QL SCN: NEGATIVE
MODALITY: ABNORMAL
MONOCYTES # BLD AUTO: 1 10*3/MM3 (ref 0.1–0.9)
MONOCYTES NFR BLD AUTO: 8.6 % (ref 5–12)
MRSA DNA SPEC QL NAA+PROBE: NORMAL
NEUTROPHILS NFR BLD AUTO: 80.6 % (ref 42.7–76)
NEUTROPHILS NFR BLD AUTO: 9.4 10*3/MM3 (ref 1.7–7)
NRBC BLD AUTO-RTO: 0 /100 WBC (ref 0–0.2)
O2 A-A PPRESDIFF RESPIRATORY: 0.1 MMHG
OPIATES UR QL: NEGATIVE
OXYCODONE UR QL SCN: NEGATIVE
PCO2 BLDA: 27.1 MM HG (ref 35–45)
PEEP RESPIRATORY: 5 CM[H2O]
PH BLDA: 7.45 PH UNITS (ref 7.35–7.45)
PHOSPHATE SERPL-MCNC: 2.9 MG/DL (ref 2.5–4.5)
PHOSPHATE SERPL-MCNC: 3.6 MG/DL (ref 2.5–4.5)
PLATELET # BLD AUTO: 246 10*3/MM3 (ref 140–450)
PMV BLD AUTO: 10.4 FL (ref 6–12)
PO2 BLDA: 69.3 MM HG (ref 80–100)
POTASSIUM SERPL-SCNC: 3.9 MMOL/L (ref 3.5–5.2)
POTASSIUM SERPL-SCNC: 4 MMOL/L (ref 3.5–5.2)
PROT ?TM UR-MCNC: 66.8 MG/DL
PROT SERPL-MCNC: 5.8 G/DL (ref 6–8.5)
PROT/CREAT UR: 271.8 MG/G CREA (ref 0–200)
PROTHROMBIN TIME: 16.6 SECONDS (ref 11.7–14.2)
RBC # BLD AUTO: 5.34 10*6/MM3 (ref 4.14–5.8)
SAO2 % BLDCOA: 94.8 % (ref 92–99)
SET MECH RESP RATE: 20
SODIUM SERPL-SCNC: 142 MMOL/L (ref 136–145)
SODIUM SERPL-SCNC: 144 MMOL/L (ref 136–145)
SODIUM UR-SCNC: 29 MMOL/L
TOTAL RATE: 23 BREATHS/MINUTE
URATE SERPL-MCNC: 8.5 MG/DL (ref 3.4–7)
VENTILATOR MODE: AC
WBC NRBC COR # BLD: 11.66 10*3/MM3 (ref 3.4–10.8)

## 2023-08-25 PROCEDURE — 93306 TTE W/DOPPLER COMPLETE: CPT

## 2023-08-25 PROCEDURE — 80307 DRUG TEST PRSMV CHEM ANLYZR: CPT | Performed by: EMERGENCY MEDICINE

## 2023-08-25 PROCEDURE — 84100 ASSAY OF PHOSPHORUS: CPT | Performed by: INTERNAL MEDICINE

## 2023-08-25 PROCEDURE — 36600 WITHDRAWAL OF ARTERIAL BLOOD: CPT

## 2023-08-25 PROCEDURE — 25010000002 PIPERACILLIN SOD-TAZOBACTAM PER 1 G: Performed by: INTERNAL MEDICINE

## 2023-08-25 PROCEDURE — 94003 VENT MGMT INPAT SUBQ DAY: CPT

## 2023-08-25 PROCEDURE — 0 MAGNESIUM SULFATE 4 GM/100ML SOLUTION: Performed by: INTERNAL MEDICINE

## 2023-08-25 PROCEDURE — 83605 ASSAY OF LACTIC ACID: CPT | Performed by: EMERGENCY MEDICINE

## 2023-08-25 PROCEDURE — 84300 ASSAY OF URINE SODIUM: CPT | Performed by: INTERNAL MEDICINE

## 2023-08-25 PROCEDURE — 82948 REAGENT STRIP/BLOOD GLUCOSE: CPT

## 2023-08-25 PROCEDURE — 94799 UNLISTED PULMONARY SVC/PX: CPT

## 2023-08-25 PROCEDURE — 87070 CULTURE OTHR SPECIMN AEROBIC: CPT | Performed by: INTERNAL MEDICINE

## 2023-08-25 PROCEDURE — 84156 ASSAY OF PROTEIN URINE: CPT | Performed by: INTERNAL MEDICINE

## 2023-08-25 PROCEDURE — 71045 X-RAY EXAM CHEST 1 VIEW: CPT

## 2023-08-25 PROCEDURE — 82330 ASSAY OF CALCIUM: CPT | Performed by: INTERNAL MEDICINE

## 2023-08-25 PROCEDURE — 82803 BLOOD GASES ANY COMBINATION: CPT

## 2023-08-25 PROCEDURE — 80053 COMPREHEN METABOLIC PANEL: CPT | Performed by: INTERNAL MEDICINE

## 2023-08-25 PROCEDURE — 95819 EEG AWAKE AND ASLEEP: CPT | Performed by: STUDENT IN AN ORGANIZED HEALTH CARE EDUCATION/TRAINING PROGRAM

## 2023-08-25 PROCEDURE — 93306 TTE W/DOPPLER COMPLETE: CPT | Performed by: INTERNAL MEDICINE

## 2023-08-25 PROCEDURE — 95819 EEG AWAKE AND ASLEEP: CPT

## 2023-08-25 PROCEDURE — 82550 ASSAY OF CK (CPK): CPT | Performed by: INTERNAL MEDICINE

## 2023-08-25 PROCEDURE — 83735 ASSAY OF MAGNESIUM: CPT | Performed by: INTERNAL MEDICINE

## 2023-08-25 PROCEDURE — 25510000001 PERFLUTREN (DEFINITY) 8.476 MG IN SODIUM CHLORIDE (PF) 0.9 % 10 ML INJECTION: Performed by: INTERNAL MEDICINE

## 2023-08-25 PROCEDURE — 94761 N-INVAS EAR/PLS OXIMETRY MLT: CPT

## 2023-08-25 PROCEDURE — 82436 ASSAY OF URINE CHLORIDE: CPT | Performed by: INTERNAL MEDICINE

## 2023-08-25 PROCEDURE — 82077 ASSAY SPEC XCP UR&BREATH IA: CPT | Performed by: INTERNAL MEDICINE

## 2023-08-25 PROCEDURE — 94760 N-INVAS EAR/PLS OXIMETRY 1: CPT

## 2023-08-25 PROCEDURE — 85610 PROTHROMBIN TIME: CPT | Performed by: INTERNAL MEDICINE

## 2023-08-25 PROCEDURE — 25010000002 ADENOSINE PER 6 MG

## 2023-08-25 PROCEDURE — 25010000002 PROPOFOL 10 MG/ML EMULSION: Performed by: INTERNAL MEDICINE

## 2023-08-25 PROCEDURE — 82570 ASSAY OF URINE CREATININE: CPT | Performed by: INTERNAL MEDICINE

## 2023-08-25 PROCEDURE — 99223 1ST HOSP IP/OBS HIGH 75: CPT | Performed by: PSYCHIATRY & NEUROLOGY

## 2023-08-25 PROCEDURE — 02HV33Z INSERTION OF INFUSION DEVICE INTO SUPERIOR VENA CAVA, PERCUTANEOUS APPROACH: ICD-10-PCS | Performed by: HOSPITALIST

## 2023-08-25 PROCEDURE — 0 POTASSIUM CHLORIDE 10 MEQ/100ML SOLUTION: Performed by: INTERNAL MEDICINE

## 2023-08-25 PROCEDURE — 85025 COMPLETE CBC W/AUTO DIFF WBC: CPT | Performed by: INTERNAL MEDICINE

## 2023-08-25 PROCEDURE — 84550 ASSAY OF BLOOD/URIC ACID: CPT | Performed by: INTERNAL MEDICINE

## 2023-08-25 PROCEDURE — 87205 SMEAR GRAM STAIN: CPT | Performed by: INTERNAL MEDICINE

## 2023-08-25 PROCEDURE — 25010000002 CALCIUM GLUCONATE PER 10 ML: Performed by: INTERNAL MEDICINE

## 2023-08-25 RX ORDER — FAMOTIDINE 10 MG/ML
20 INJECTION, SOLUTION INTRAVENOUS DAILY
Status: DISCONTINUED | OUTPATIENT
Start: 2023-08-26 | End: 2023-09-04

## 2023-08-25 RX ORDER — NOREPINEPHRINE BITARTRATE 0.03 MG/ML
INJECTION, SOLUTION INTRAVENOUS
Status: ACTIVE
Start: 2023-08-25 | End: 2023-08-25

## 2023-08-25 RX ORDER — NOREPINEPHRINE BITARTRATE 1 MG/ML
INJECTION, SOLUTION INTRAVENOUS
Status: ACTIVE
Start: 2023-08-25 | End: 2023-08-25

## 2023-08-25 RX ORDER — ADENOSINE 3 MG/ML
INJECTION, SOLUTION INTRAVENOUS
Status: COMPLETED
Start: 2023-08-25 | End: 2023-08-25

## 2023-08-25 RX ORDER — DILTIAZEM HYDROCHLORIDE 5 MG/ML
INJECTION INTRAVENOUS
Status: DISCONTINUED
Start: 2023-08-25 | End: 2023-08-25 | Stop reason: WASHOUT

## 2023-08-25 RX ORDER — TADALAFIL 5 MG/1
5 TABLET ORAL DAILY
COMMUNITY
End: 2023-09-08 | Stop reason: HOSPADM

## 2023-08-25 RX ORDER — SODIUM CHLORIDE, SODIUM LACTATE, POTASSIUM CHLORIDE, CALCIUM CHLORIDE 600; 310; 30; 20 MG/100ML; MG/100ML; MG/100ML; MG/100ML
150 INJECTION, SOLUTION INTRAVENOUS CONTINUOUS
Status: ACTIVE | OUTPATIENT
Start: 2023-08-25 | End: 2023-08-26

## 2023-08-25 RX ADMIN — MAGNESIUM SULFATE HEPTAHYDRATE 4 G: 40 INJECTION, SOLUTION INTRAVENOUS at 00:50

## 2023-08-25 RX ADMIN — Medication 10 ML: at 08:43

## 2023-08-25 RX ADMIN — Medication 10 ML: at 21:22

## 2023-08-25 RX ADMIN — CHLORHEXIDINE GLUCONATE 15 ML: 1.2 RINSE ORAL at 21:22

## 2023-08-25 RX ADMIN — CHLORHEXIDINE GLUCONATE 15 ML: 1.2 RINSE ORAL at 00:25

## 2023-08-25 RX ADMIN — POTASSIUM CHLORIDE 10 MEQ: 7.46 INJECTION, SOLUTION INTRAVENOUS at 02:06

## 2023-08-25 RX ADMIN — PERFLUTREN 2 ML: 6.52 INJECTION, SUSPENSION INTRAVENOUS at 09:07

## 2023-08-25 RX ADMIN — CALCIUM GLUCONATE 3000 MG: 98 INJECTION, SOLUTION INTRAVENOUS at 01:09

## 2023-08-25 RX ADMIN — PIPERACILLIN SODIUM AND TAZOBACTAM SODIUM 3.38 G: 3; .375 INJECTION, SOLUTION INTRAVENOUS at 14:10

## 2023-08-25 RX ADMIN — PIPERACILLIN SODIUM AND TAZOBACTAM SODIUM 3.38 G: 3; .375 INJECTION, SOLUTION INTRAVENOUS at 07:01

## 2023-08-25 RX ADMIN — SODIUM CHLORIDE, POTASSIUM CHLORIDE, SODIUM LACTATE AND CALCIUM CHLORIDE 150 ML/HR: 600; 310; 30; 20 INJECTION, SOLUTION INTRAVENOUS at 11:11

## 2023-08-25 RX ADMIN — CHLORHEXIDINE GLUCONATE 15 ML: 1.2 RINSE ORAL at 08:44

## 2023-08-25 RX ADMIN — PROPOFOL INJECTABLE EMULSION 5 MCG/KG/MIN: 10 INJECTION, EMULSION INTRAVENOUS at 06:51

## 2023-08-25 RX ADMIN — SODIUM CHLORIDE, POTASSIUM CHLORIDE, SODIUM LACTATE AND CALCIUM CHLORIDE 150 ML/HR: 600; 310; 30; 20 INJECTION, SOLUTION INTRAVENOUS at 17:54

## 2023-08-25 RX ADMIN — ADENOSINE 6 MG: 3 INJECTION INTRAVENOUS at 06:08

## 2023-08-25 RX ADMIN — FAMOTIDINE 20 MG: 10 INJECTION INTRAVENOUS at 00:24

## 2023-08-25 RX ADMIN — ADENOSINE 6 MG: 3 INJECTION INTRAVENOUS at 06:09

## 2023-08-25 RX ADMIN — FAMOTIDINE 20 MG: 10 INJECTION INTRAVENOUS at 08:43

## 2023-08-25 RX ADMIN — PIPERACILLIN SODIUM AND TAZOBACTAM SODIUM 3.38 G: 3; .375 INJECTION, SOLUTION INTRAVENOUS at 21:36

## 2023-08-25 NOTE — CONSULTS
"Neurology Consult Note    Consult Date: 8/25/2023    Referring MD: Meek Castillo*    Reason for Consult I have been asked to see the patient in neurological consultation to render advice and opinion regarding altered mental status    Braxton Wolfe is a 57 y.o. male with past medical history of von Hippel-Lindau, multiple brain hemangioblastomas, prior craniotomy and  shunt.  Patient reportedly has some baseline hemiparesis however is functionally independent normally.  He was found down by a friend and brought to the hospital with YUDITH and severe encephalopathy requiring endotracheal intubation.  He has remained somewhat comatose overnight but this morning was waking up per nursing staff and following commands.    Past medical history:  Von Hippel-Lindau  Cerebellar hemangioblastomas   shunt    Exam  /83   Pulse 116   Temp 98.2 °F (36.8 °C) (Oral)   Resp 18   Ht 177.8 cm (70\")   Wt 75.8 kg (167 lb)   SpO2 97%   BMI 23.96 kg/m²   Gen: NAD, vitals reviewed  MS: Opens eyes to voice, follows multiple commands  CN: Pupils 3 mm, reactive, conjugate gaze  Motor: Moving all 4 extremities, slightly less brisk on the left side  Reflexes: Right plantar downgoing, left plantar mute    DATA:    Lab Results   Component Value Date    GLUCOSE 137 (H) 08/25/2023    CALCIUM 9.2 08/25/2023     08/25/2023    K 3.9 08/25/2023    CO2 20.0 (L) 08/25/2023     (H) 08/25/2023    BUN 52 (H) 08/25/2023    CREATININE 3.85 (H) 08/25/2023    BCR 13.5 08/25/2023    ANIONGAP 16.0 (H) 08/25/2023     Lab Results   Component Value Date    WBC 11.66 (H) 08/25/2023    HGB 16.1 08/25/2023    HCT 47.1 08/25/2023    MCV 88.2 08/25/2023     08/25/2023       Lab review: BUN 52, creatinine 3.85, anion gap 16, WBC 11.6    Imaging review: MRI brain without contrast ordered    Routine EEG ordered    Diagnoses:  History of cerebellar hemangioblastoma status post resection   shunt  Acute encephalopathy  Acute kidney " injury    Comment: Found down with acute encephalopathy.  Etiology unclear.  We will obtain MRI and EEG    PLAN:  MRI brain without today  Routine EEG  Limit sedation and extubate if possible.    Management discussed with Dr. Albert and nursing staff

## 2023-08-25 NOTE — CASE MANAGEMENT/SOCIAL WORK
Continued Stay Note  [facility]     Patient Name: Braxton Wolfe  MRN: [id number]  Today's Date: 8/25/2023    Admit Date: 8/24/2023    Plan: Pending   Discharge Plan       Row Name 08/25/23 1227       Plan    Plan Pending    Plan Comments Patient remains on vent. CCP will continue to follow for dc planning needs pending clinical course. JEIMY PETTIT                   Discharge Codes    No documentation.                       JEIMY Read

## 2023-08-25 NOTE — PROGRESS NOTES
Clinical Pharmacy Services: Medication History    Braxton Wolfe is a 57 y.o. male presenting to [facility] for Hypokalemia [E87.6]  Hypernatremia [E87.0]  Acute kidney injury [N17.9]  Sepsis [A41.9]  Acute respiratory failure, unspecified whether with hypoxia or hypercapnia [J96.00]  Sepsis with acute renal failure without septic shock, due to unspecified organism, unspecified acute renal failure type [A41.9, R65.20, N17.9]    He  has no past medical history on file.    Allergies as of 08/24/2023    (Not on File)       Medication information was obtained from: [facility] records, pharmacy  Pharmacy and Phone Number: [facility] pharmacy    Prior to Admission Medications       Prescriptions Last Dose Informant Patient Reported? Taking?    belzutifan (WELIREG) 40 MG tablet   Yes No    Take 1 tablet by mouth Daily.    metoprolol tartrate (LOPRESSOR) 25 MG tablet  Pharmacy Yes No    Take 1 tablet by mouth 2 (Two) Times a Day.    tadalafil (CIALIS) 5 MG tablet  Pharmacy Yes No    Take 1 tablet by mouth Daily.          Medication notes:   Note pt unable to verify accuracy of above list, but all were recently filled by pharmacy.    This medication list is complete to the best of my knowledge as of 8/25/2023    Please call if questions.    Samra Lopez, PharmD  8/25/2023 13:38 EDT

## 2023-08-25 NOTE — PAYOR COMM NOTE
"David Wolfe (57 y.o. Ma                     ATTENTION; INITIAL CLINICALS PENDING CASE REF #[id number]                   REPLY TO [facility] DEPT  [phone] OR CALL    ARTHUR EPPERSON LPRAYRAY             Date of Birth   1965    Social Security Number       Address   [address] [place] [address]    Home Phone   [phone]    MRN   [id number]       Yarsanism   Confucianist    Marital Status                               Admission Date   8/24/23    Admission Type   Emergency    Admitting Provider   Meek Castillo MD    Attending Provider   Meek Castillo MD    Department, Room/Bed   [facility] CARDIAC INTENSIVE CARE, 3006/1       Discharge Date       Discharge Disposition       Discharge Destination                                 Attending Provider: Meek Castillo MD    Allergies: Not on File    Isolation: None   Infection: None   Code Status: CPR    Ht: 177.8 cm (70\")   Wt: 75.8 kg (167 lb)    Admission Cmt: None   Principal Problem: Sepsis [A41.9]                   Active Insurance as of 8/24/2023       Primary Coverage       Payor Plan Insurance Group Employer/Plan Group    [employer]HEM BLUE CROSS ANTHEM BLUE CROSS BLUE SHIELD PPO [id number]       Payor Plan Address Payor Plan Phone Number Payor Plan Fax Number Effective Dates    [address] [phone]  1/1/2023 - None Entered    [place] [address]         Subscriber Name Subscriber Birth Date Member ID       DAVID WOLFE 1965 [id number]                     Emergency Contacts        (Rel.) Home Phone Work Phone Mobile Phone    Aby Wolfe (Mother) [phone] -- [phone]    Faizan Wolfe (Father) [phone] -- [phone]                 History & Physical        Meek Castillo MD at 08/24/23 2130          [facility]    Patient Care Team:  Provider, No Known as PCP - General    CC: Found down    HPI: Patient is a pleasant " 57-year-old white male with a previous history of VHL syndrome, CNS hemangioblastomas, previous 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  Patient was unable to protect his airway and was intubated and placed on mechanical ventilator work-up showed evidence of acute renal failure severe acidosis and mechanical ventilator were adjusted.  He was briefly hypotensive initially and was noted to be dehydrated as well and responded well to volume resuscitation.  He was given Narcan with no response as well. we have been asked to assist in the management of the patient and admit him to the ICU.    During my evaluation patient is unresponsive on the mechanical ventilator still.  Patient's friend is at bedside and reportedly his parents and ex-wife are on their way to come in.  He provided the above history and states that at baseline he has been independent other than some mild hemiparesis since previous brain surgery which has been stable and denied any recent illnesses and was upbeat and wanted to meet him today and neighbors noted that he was not seen for couple days as well and his trash was not picked up as well.    I have reviewed (if any available) last discharge summaries as well as the outpatient notes from the patients other consultants available in the [facility] system as well previous notes from our group and summarized above    Objective    I have discussed the case with ED physician     Records Reviewed  Old medical records.  Nursing notes.  Previous radiology studies.    Review of Systems:  Unable to obtain.    No past medical history on  file.    No past surgical history on file.    Prior to Admission Medications       None            Patient has no allergy information on record.    No family history on file.    Social History     Socioeconomic History    Marital status: Unknown       Physical Exam  Temp:  [101.3 °F (38.5 °C)] 101.3 °F (38.5 °C)  Heart Rate:  [] 100  Resp:  [24-27] 24  BP: ()/(26-96) 117/89  FiO2 (%):  [100 %] 100 %       Vent Settings        Resp Rate (Set): 18     FiO2 (%): 100 %  PEEP/CPAP (cm H2O): 5 cm H20  Minute Ventilation (L/min) (Obs): 15 L/min  Resp Rate (Observed) Vent: 27     I:E Ratio (Obs): 1:1.3  PIP Observed (cm H2O): 16 cm H2O          PHYSICAL EXAM   Constitutional: Middle aged pt in bed, unresponsive while on the mechanical ventilator  Head: - NCAT  Eyes: No pallor, Anicteric conjunctiva, EOMI. mild constricted pupils  ENMT: Endotracheal tube in place no oral thrush. Moist MM.   NECK: Trachea midline, No thyromegaly, no palpable cervical LNpathy  Heart: RRR, tachycardic no murmur. No pedal edema   Lungs: RAJAN +, occasional rhonchi no wheezes/ crackles heard    Abdomen: Soft.  Nonspecific distention - no guarding or rigidity. No palpable masses  Extremities: Extremities warm and well perfused. No cyanosis/ clubbing  Neuro: Unresponsive to any stimuli, breathing over the vent  Psych: Mood and affect -unable to obtain    PPE recommended per [facility] [place] infectious disease Isolation protocol for the current clinical scenario(as mentioned below) was followed.     LABS:  Results from last 7 days   Lab Units 08/24/23  1805   SODIUM mmol/L 150*   POTASSIUM mmol/L 2.9*   CHLORIDE mmol/L 125*   CO2 mmol/L 10.0*   BUN mg/dL 27*   CREATININE mg/dL 3.05*   CALCIUM mg/dL 4.9*   BILIRUBIN mg/dL 0.2   ALK PHOS U/L 43   ALT (SGPT) U/L 9   AST (SGOT) U/L 20   GLUCOSE mg/dL 92   WBC 10*3/mm3 12.63*   HEMOGLOBIN g/dL 17.6   PLATELETS 10*3/mm3 306   INR  1.38*   PROBNP pg/mL 7,202.0*   PROCALCITONIN ng/mL 6.08*        Lab Results   Component Value Date    CALCIUM 4.9 (C) 08/24/2023             Results from last 7 days   Lab Units 08/24/23  1900   PH, ARTERIAL pH units 7.341*   PO2 ART mm Hg 100.2*   PCO2, ARTERIAL mm Hg 34.2*   HCO3 ART mmol/L 18.5*        Result Review     I have personally reviewed the results from the time of this admission to 8/24/2023 21:30 EDT and agree with these findings:  [x]  Laboratory  [x]  Microbiology  [x]  Radiology  []  EKG/Telemetry   [x]  Cardiology/Vascular   []  Pathology  []  Old records  []  Other:    Assessment  Acute encephalopathy; suspected HIE  Acute hypercapnic respiratory failure s/p mechanical ventilator  Suspected aspiration pneumonia  Vomiting with abdominal distention  Acute renal failure  Hypernatremia  Hypokalemia  Severe metabolic acidosis  Elevated BNP  S/p prior  shunt  VHL s/p previous Craney with ? right hemiparesis    PLAN:  Patient's acute decompensation is of unclear etiology especially given the last known normal is on Monday.  We will need a comprehensive work-up to rule out common etiologies.  CT head is ordered and pending.  We will add CT of chest abdomen and pelvis with oral contrast especially given suspected pneumonia as well as unexplained vomiting.  We will continue therapeutic antibiotics for suspected aspiration pneumonia and continue with volume resuscitation.  Appears to be clinically dehydrated  Continue with close monitoring of renal function and urine output.  Severe hypokalemia and hypocalcemia noted and we will replete accordingly  Unclear etiology for elevated BNP with no known previous cardiac issues.  We will get an echocardiogram  N.p.o. for now  Very guarded prognosis  Mechanical DVT/GI prophylaxis    CC-45-minute    I have discussed my findings and recommendations with family and nursing staff.     Meek Castillo MD  8/24/2023  21:30 EDT    Electronically signed by Meek Castillo MD at 08/24/23 2904          Emergency  Department Notes        Antolin Zapata MD at 08/24/23 1803        Procedure Orders    1. Intubation [[id number]] ordered by Antolin Zapata MD    2. Critical Care [[id number]] ordered by Antolin Zapata MD                  EMERGENCY DEPARTMENT ENCOUNTER    Room Number:  40/40  PCP: Provider, No Known  Historian: EMS      HPI:  Chief Complaint: Found down  A complete HPI/ROS/PMH/PSH/SH/FH are unobtainable due to: Patient is unresponsive  Context: Braxton Wolfe is a 57 y.o. male who presents to the ED from home by EMS after being found down.  It is unknown when the patient was last normal.  A friend went to check on him today and became concerned when the patient did not come to the door.  When EMS arrived on scene, the patient was found laying in his bed.  He was unresponsive and covered in dried vomitus.  Glucose was 147.  Patient was given Narcan without change.  In route to the ED, he was breathing spontaneously but was being bagged.  He was tachycardic.  Blood pressures were somewhat low.  He reportedly has a history of previous stroke.  Other past medical history medications are unknown.            PAST MEDICAL HISTORY  Active Ambulatory Problems     Diagnosis Date Noted    No Active Ambulatory Problems     Resolved Ambulatory Problems     Diagnosis Date Noted    No Resolved Ambulatory Problems     No Additional Past Medical History         PAST SURGICAL HISTORY  No past surgical history on file.      FAMILY HISTORY  No family history on file.      SOCIAL HISTORY  Social History     Socioeconomic History    Marital status: Unknown         ALLERGIES  Patient has no allergy information on record.    REVIEW OF SYSTEMS  Unobtainable    PHYSICAL EXAM  ED Triage Vitals   Temp Pulse Resp BP SpO2   -- -- -- -- --      Temp src Heart Rate Source Patient Position BP Location FiO2 (%)   -- -- -- -- --       Physical Exam      GENERAL: Well-developed male.  Unresponsive.  There is dried vomitus on his face and  chest.  HENT: NCAT, nares patent, gag reflex is absent  EYES: Pupils are 2 mm and nonreactive to light  CV: regular rhythm, tachycardic  RESPIRATORY: Shallow respirations, clear to auscultation bilaterally  ABDOMEN: soft, nondistended  MUSCULOSKELETAL: No obvious deformities of the extremities  NEURO: Unresponsive to painful stimuli  SKIN: Cool, feet are mottled    Vital signs and nursing notes reviewed.          LAB RESULTS  Recent Results (from the past 24 hour(s))   Comprehensive Metabolic Panel    Collection Time: 08/24/23  6:05 PM    Specimen: Blood   Result Value Ref Range    Glucose 92 65 - 99 mg/dL    BUN 27 (H) 6 - 20 mg/dL    Creatinine 3.05 (H) 0.76 - 1.27 mg/dL    Sodium 150 (H) 136 - 145 mmol/L    Potassium 2.9 (L) 3.5 - 5.2 mmol/L    Chloride 125 (H) 98 - 107 mmol/L    CO2 10.0 (L) 22.0 - 29.0 mmol/L    Calcium 4.9 (C) 8.6 - 10.5 mg/dL    Total Protein 3.9 (L) 6.0 - 8.5 g/dL    Albumin 2.0 (L) 3.5 - 5.2 g/dL    ALT (SGPT) 9 1 - 41 U/L    AST (SGOT) 20 1 - 40 U/L    Alkaline Phosphatase 43 39 - 117 U/L    Total Bilirubin 0.2 0.0 - 1.2 mg/dL    Globulin 1.9 gm/dL    A/G Ratio 1.1 g/dL    BUN/Creatinine Ratio 8.9 7.0 - 25.0    Anion Gap 15.0 5.0 - 15.0 mmol/L    eGFR 23.0 (L) >60.0 mL/min/1.73   Protime-INR    Collection Time: 08/24/23  6:05 PM    Specimen: Blood   Result Value Ref Range    Protime 17.2 (H) 11.7 - 14.2 Seconds    INR 1.38 (H) 0.90 - 1.10   aPTT    Collection Time: 08/24/23  6:05 PM    Specimen: Blood   Result Value Ref Range    PTT 25.5 22.7 - 35.4 seconds   Lipase    Collection Time: 08/24/23  6:05 PM    Specimen: Blood   Result Value Ref Range    Lipase 8 (L) 13 - 60 U/L   BNP    Collection Time: 08/24/23  6:05 PM    Specimen: Blood   Result Value Ref Range    proBNP 7,202.0 (H) 0.0 - 900.0 pg/mL   Single High Sensitivity Troponin T    Collection Time: 08/24/23  6:05 PM    Specimen: Blood   Result Value Ref Range    HS Troponin T 27 (H) <15 ng/L   Lactic Acid, Plasma    Collection Time:  08/24/23  6:05 PM    Specimen: Blood   Result Value Ref Range    Lactate 6.8 (C) 0.5 - 2.0 mmol/L   Procalcitonin    Collection Time: 08/24/23  6:05 PM    Specimen: Blood   Result Value Ref Range    Procalcitonin 6.08 (H) 0.00 - 0.25 ng/mL   CBC Auto Differential    Collection Time: 08/24/23  6:05 PM    Specimen: Blood   Result Value Ref Range    WBC 12.63 (H) 3.40 - 10.80 10*3/mm3    RBC 5.90 (H) 4.14 - 5.80 10*6/mm3    Hemoglobin 17.6 13.0 - 17.7 g/dL    Hematocrit 51.4 (H) 37.5 - 51.0 %    MCV 87.1 79.0 - 97.0 fL    MCH 29.8 26.6 - 33.0 pg    MCHC 34.2 31.5 - 35.7 g/dL    RDW 13.0 12.3 - 15.4 %    RDW-SD 41.2 37.0 - 54.0 fl    MPV 10.4 6.0 - 12.0 fL    Platelets 306 140 - 450 10*3/mm3    Neutrophil % 81.6 (H) 42.7 - 76.0 %    Lymphocyte % 8.7 (L) 19.6 - 45.3 %    Monocyte % 9.3 5.0 - 12.0 %    Eosinophil % 0.0 (L) 0.3 - 6.2 %    Basophil % 0.1 0.0 - 1.5 %    Immature Grans % 0.3 0.0 - 0.5 %    Neutrophils, Absolute 10.31 (H) 1.70 - 7.00 10*3/mm3    Lymphocytes, Absolute 1.10 0.70 - 3.10 10*3/mm3    Monocytes, Absolute 1.17 (H) 0.10 - 0.90 10*3/mm3    Eosinophils, Absolute 0.00 0.00 - 0.40 10*3/mm3    Basophils, Absolute 0.01 0.00 - 0.20 10*3/mm3    Immature Grans, Absolute 0.04 0.00 - 0.05 10*3/mm3    nRBC 0.1 0.0 - 0.2 /100 WBC   Magnesium    Collection Time: 08/24/23  6:05 PM    Specimen: Blood   Result Value Ref Range    Magnesium 1.3 (L) 1.6 - 2.6 mg/dL   CK    Collection Time: 08/24/23  6:05 PM    Specimen: Blood   Result Value Ref Range    Creatine Kinase 500 (H) 20 - 200 U/L   ECG 12 Lead Altered Mental Status    Collection Time: 08/24/23  6:16 PM   Result Value Ref Range    QT Interval 282 ms   Type & Screen    Collection Time: 08/24/23  6:42 PM    Specimen: Blood   Result Value Ref Range    ABO Type A     RH type Positive     Antibody Screen Negative     T&S Expiration Date 8/27/2023 11:59:59 PM    Urinalysis With Culture If Indicated - Indwelling Urethral Catheter    Collection Time: 08/24/23  6:43 PM     Specimen: Indwelling Urethral Catheter; Urine   Result Value Ref Range    Color, UA Yellow Yellow, Straw    Appearance, UA Clear Clear    pH, UA 6.0 5.0 - 8.0    Specific Gravity, UA 1.019 1.005 - 1.030    Glucose, UA Negative Negative    Ketones, UA 40 mg/dL (2+) (A) Negative    Bilirubin, UA Negative Negative    Blood, UA Negative Negative    Protein,  mg/dL (2+) (A) Negative    Leuk Esterase, UA Negative Negative    Nitrite, UA Negative Negative    Urobilinogen, UA 1.0 E.U./dL 0.2 - 1.0 E.U./dL   Urinalysis, Microscopic Only - Indwelling Urethral Catheter    Collection Time: 08/24/23  6:43 PM    Specimen: Indwelling Urethral Catheter; Urine   Result Value Ref Range    RBC, UA 0-2 None Seen, 0-2 /HPF    WBC, UA 3-5 (A) None Seen, 0-2 /HPF    Bacteria, UA None Seen None Seen /HPF    Squamous Epithelial Cells, UA 3-6 (A) None Seen, 0-2 /HPF    Hyaline Casts, UA 3-6 None Seen /LPF    Methodology Manual Light Microscopy    Blood Gas, Arterial -    Collection Time: 08/24/23  7:00 PM    Specimen: Arterial Blood   Result Value Ref Range    Site Arterial: right radial     Juan's Test Positive     pH, Arterial 7.341 (L) 7.350 - 7.450 pH units    pCO2, Arterial 34.2 (L) 35.0 - 45.0 mm Hg    pO2, Arterial 100.2 (H) 80.0 - 100.0 mm Hg    HCO3, Arterial 18.5 (L) 22.0 - 28.0 mmol/L    Base Excess, Arterial -6.3 (L) 0.0 - 2.0 mmol/L    O2 Saturation Calculated 97.4 92.0 - 99.0 %    A-a DO2 0.1 mmHg    Barometric Pressure for Blood Gas 748.6 mmHg    Modality Adult Vent     FIO2 100 %    Ventilator Mode AC     Set Tidal Volume 550     Set Mech Resp Rate 18     Rate 21 Breaths/minute    PEEP 5        Ordered the above labs and reviewed the results.        RADIOLOGY  XR Chest 1 View    Result Date: 8/24/2023  CHEST SINGLE VIEW  HISTORY: Respiratory failure. Patient was found down.  COMPARISON: None.  FINDINGS: There has been intubation and the ET tube tip is 2.2 cm above the melanie and this could be withdrawn 2 cm for  better position. The heart size appears normal. There is no evidence for perihilar edema or pneumothorax or focal airspace disease.  There is some limitation as the lung apices, particular on the right and the right costophrenic angle, are not included on the field-of-view. There is shunt tubing overlying the right thorax.      Limited exam demonstrates intubation with ET tube tip 2.2 cm above the melanie and this could be withdrawn 2 cm for better position. No further evidence for active disease in the chest.  This report was finalized on 8/24/2023 7:10 PM by Dr. Marcelo De M.D.       Ordered the above noted radiological studies. Reviewed by me in PACS.            PROCEDURES  Intubation    Date/Time: 8/24/2023 6:39 PM  Performed by: Antolin Zapata MD  Authorized by: Antolin Zapata MD     Consent:     Consent obtained:  Emergent situation  Universal protocol:     Patient identity confirmed:  Anonymous protocol, patient vented/unresponsive  Pre-procedure details:     Indication: failure to protect airway      Patient status:  Unresponsive    Look externally: no concerns      Obstruction: none      Neck mobility: normal      Pharmacologic strategy: sedation      Induction agents:  Etomidate    Paralytics:  None  Procedure details:     Preoxygenation:  Bag valve mask    CPR in progress: no      Intubation method:  Oral    Intubation technique: video assisted      Laryngoscope blade:  Mac 3    Bougie used: no      Tube size (mm):  7.5    Tube type:  Cuffed    Number of attempts:  1    Tube visualized through cords: yes    Placement assessment:     ETT at teeth/gumline (cm):  24    Tube secured with:  ETT resendez    Breath sounds:  Equal and absent over the epigastrium    Placement verification: chest rise, colorimetric ETCO2, CXR verification, equal breath sounds and tube exhalation      CXR findings:  Appropriate position and low    Tube repositioned: yes    Post-procedure details:     Procedure  completion:  Tolerated well, no immediate complications  Critical Care  Performed by: Antolin Zapata MD  Authorized by: Antolin Zapata MD     Critical care provider statement:     Critical care time (minutes):  50    Critical care time was exclusive of:  Separately billable procedures and treating other patients    Critical care was necessary to treat or prevent imminent or life-threatening deterioration of the following conditions:  Renal failure, respiratory failure and sepsis    Critical care was time spent personally by me on the following activities:  Development of treatment plan with patient or surrogate, discussions with consultants, evaluation of patient's response to treatment, examination of patient, obtaining history from patient or surrogate, ordering and performing treatments and interventions, ordering and review of laboratory studies, ordering and review of radiographic studies, pulse oximetry, re-evaluation of patient's condition and review of old charts    I assumed direction of critical care for this patient from another provider in my specialty: no      Care discussed with: admitting provider              MEDICATIONS GIVEN IN ER  Medications   sodium chloride 0.9 % flush 10 mL (has no administration in time range)   lactated ringers bolus 1,000 mL (0 mL Intravenous Stopped 8/24/23 1834)   etomidate (AMIDATE) injection 23 mg (23 mg Intravenous Given 8/24/23 1803)   piperacillin-tazobactam (ZOSYN) 3.375 g in iso-osmotic dextrose 50 ml (premix) (0 g Intravenous Stopped 8/24/23 2024)   midazolam (VERSED) injection 2 mg (2 mg Intravenous Given 8/24/23 1834)   fentaNYL citrate (PF) (SUBLIMAZE) injection 100 mcg (100 mcg Intravenous Given 8/24/23 1834)   lactated ringers bolus 2,277 mL (1,000 mL Intravenous New Bag 8/24/23 1857)                   MEDICAL DECISION MAKING, PROGRESS, and CONSULTS    All labs have been independently reviewed by me.  All radiology studies have been reviewed by me  and I have also reviewed the radiology report.   EKG's independently viewed and interpreted by me.  Discussion below represents my analysis of pertinent findings related to patient's condition, differential diagnosis, treatment plan and final disposition.      Additional sources:  - Discussed/ obtained information from independent historians: [place]ced[place], charge nurse, spoke with the patient's parents.  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  I personally spoke with the patient's ex-wife here in the ED.    - External (non-ED) record review: Patient had an MRI of the brain done in April 2022 at [facility].  This showed stable postoperative changes of the posterior fossa along with multiple small enhancing bilateral cerebellar hemisphere nodules.  He has had a previous suboccipital craniectomy.  Patient has a history of von Hippel-Lindau disease.  Patient does not have any prior ED visits or admissions here.    - Chronic or social conditions impacting care: N/A          Orders placed during this visit:  Orders Placed This Encounter   Procedures    Intubation    Critical Care    Blood Culture - Blood,    Blood Culture - Blood,    CT Head Without Contrast    XR Chest 1 View    Comprehensive Metabolic Panel    Protime-INR    aPTT    Lipase    Urinalysis With Microscopic If Indicated (No Culture) - Urine, Catheter    BNP    Single High Sensitivity Troponin T    Lactic Acid, Plasma    Procalcitonin    Blood Gas, Arterial -    CBC Auto Differential    Magnesium    Urinalysis With Culture If Indicated -    STAT Lactic Acid, Reflex    CK    Blood Gas, Arterial -    Urinalysis, Microscopic Only - Urine, Clean Catch    Ethanol    Urine Drug Screen - Urine, Clean Catch    Pulse Oximetry, Continuous    Monitor Blood Pressure    Insert Indwelling Urinary Catheter    Assess Need for Indwelling Urinary Catheter - Follow Removal Protocol    Urinary Catheter Care     Pulmonology (on-call MD unless specified)    ECG 12 Lead Altered Mental Status    Type & Screen    Insert Peripheral IV    Inpatient Admission    CBC & Differential         Additional orders considered but not ordered:  N/A        Differential diagnosis:    Sepsis, bacteremia, pneumonia, UTI, dehydration, kidney failure, stroke, intracranial hemorrhage      Independent interpretation of labs, radiology studies, and discussions with consultants:  ED Course as of 08/24/23 2128   Thu Aug 24, 2023   1808 Patient presented to the ED after being found down for an unknown amount of time.  He was orally intubated by me.  See procedure note for detail.  He is tachycardic.  He is getting a 2 L bolus of IV fluids.  He is hypotensive. [WH]   1813 Heart rate is now in the 110s.  Blood pressure is 106/71.  Temperature is 101.3.  Blood and urine cultures are pending. [WH]   1817 Patient will be given IV Zosyn for treatment of sepsis. [WH]   1817 Chest x-ray personally interpreted by me.  My personal interpretation is: ET tube is in good position.  Heart size is normal.  There are perihilar infiltrates.  No pleural effusion. [WH]   1834 WBC(!): 12.63 [WH]   1834 Hemoglobin: 17.6 [WH]   1834 INR(!): 1.38 [WH]   1834 Patient will be given a dose of fentanyl and Versed for sedation.   [WH]   1834 EKG personally interpreted by me at 1818.  My personal interpretation is:          EKG time: 1816  Rhythm/Rate: Sinus tachycardia, rate 119  P waves and [place]: LAE  QRS, axis: Normal  ST and T waves: Nonspecific ST/T wave changes in the lateral and inferior leads, no ST elevation or depression    Interpreted Contemporaneously by me, independently viewed  No prior available for comparison    [WH]   1839 Per the radiologist, chest x-ray is negative acute.  The tip of the ET tube is 2.2 cm above the melanie and could be withdrawn 2 cm for better positioning. [WH]   1845 Procalcitonin(!): 6.08 [WH]   1845 proBNP(!): 7,202.0 [WH]   1845 HS Troponin  T(!): 27 [WH]   1845 Lactate(!!): 6.8 [WH]   1845 BUN(!): 27 [WH]   1846 Creatinine(!): 3.05 [WH]   1846 Sodium(!): 150 [WH]   1846 Potassium(!): 2.9 [WH]   1846 CO2(!): 10.0 [WH]   1846 Glucose: 92 [WH]   1846 Labs reviewed.  Patient has acute kidney injury.  He is hypokalemic and hyponatremic.  Lactic acid is elevated. Patient will be given additional IV fluids for a total of 30 cc/kg.   [WH]   1910 I spoke with the patient's ex-wife, who is here in the ED.  She reports that the patient's friend went to pick him up to take him to a class reunion.  When the patient would not answer the door, his friend broke into the house and found the patient lying in bed unresponsive.  The last time that she knows someone talk to the patient was 2 days ago. [WH]   1949 Case discussed with Dr. Castillo, who is here in the ED at bedside.  He agrees to admit the patient [WH]   2007 Creatine Kinase(!): 500 [WH]   2126 Patient presented to the ED after being found down for an unknown amount of time.  No one had talked talk to him in the last 48 hours or so.  When EMS arrived on scene, the patient was lying in bed and was unresponsive.  He was covered with vomitus.  Upon ED arrival, he was being bagged.  He was tachycardic and hypotensive.  He was intubated for airway protection.  He was found to be febrile.  He was given IV fluids.  Blood pressure and heart rate improved.  Chest x-ray was negative acute.  Patient was found to have acute kidney failure.  He was hyponatremic and hypokalemic.  CK was 500.  Head CT is pending.  Patient was started on antibiotics for sepsis.  I suspect he may have aspiration pneumonia.  Case was discussed with the intensivist.  Patient will be admitted to the ICU.  Critical care was performed on this patient. [WH]      ED Course User Index  [WH] Antolin Zapata MD               DIAGNOSIS  Final diagnoses:   Sepsis with acute renal failure without septic shock, due to unspecified organism,  unspecified acute renal failure type   Acute respiratory failure, unspecified whether with hypoxia or hypercapnia   Acute kidney injury   Hypokalemia   Hypernatremia         DISPOSITION  ADMISSION    Discussed treatment plan and reason for admission with pt/family and admitting physician.  Pt/family voiced understanding of the plan for admission for further testing/treatment as needed.               Latest Documented Vital Signs:  As of 21:28 EDT  BP- 117/89 HR- 100 Temp- (!) 101.3 °F (38.5 °C) (Rectal) O2 sat- 97%              --    Please note that portions of this were completed with a voice recognition program.       Note Disclaimer: At [facility], we believe that sharing information builds trust and better relationships. You are receiving this note because you are receiving care at [facility] or recently visited. It is possible you will see health information before a provider has talked with you about it. This kind of information can be easy to misunderstand. To help you fully understand what it means for your health, we urge you to discuss this note with your provider.             Antolin Zapata MD  08/24/23 2128      Electronically signed by Antolin Zapata MD at 08/24/23 2128       Vital Signs (last day)       Date/Time Temp Temp src Pulse Resp BP Patient Position SpO2    08/25/23 1507 98.4 (36.9) -- -- -- -- -- --    08/25/23 1500 98.4 (36.9) -- 92 -- 115/94 -- 100    08/25/23 1400 98.8 (37.1) -- 112 -- 124/98 -- 96    08/25/23 1306 99 (37.2) -- 105 22 -- -- 94    08/25/23 1300 99 (37.2) -- 108 -- 113/89 -- 96    08/25/23 1200 99 (37.2) -- 104 -- 108/90 -- 96    08/25/23 1109 99 (37.2) -- -- -- -- -- --    08/25/23 1100 99 (37.2) -- 115 -- 115/93 -- 96    08/25/23 1038 99 (37.2) -- 105 18 -- -- 95    08/25/23 1000 99.1 (37.3) -- 105 -- 109/80 -- 95    08/25/23 0900 -- -- 109 -- 108/84 -- 96    08/25/23 0800 -- -- 116 -- 101/83 -- 97    08/25/23 0734 98.2 (36.8) Oral -- -- -- -- --     08/25/23 0700 -- -- 130 -- 102/82 -- 96 08/25/23 0644 -- -- 115 -- -- -- 96 08/25/23 0643 -- -- 115 -- -- -- 96 08/25/23 0642 -- -- 115 -- -- -- 96 08/25/23 0641 -- -- 113 -- -- -- --    08/25/23 0640 -- -- 118 -- 101/85 -- --    08/25/23 0639 -- -- 112 -- -- -- --    08/25/23 0638 -- -- 111 -- -- -- --    08/25/23 0637 -- -- 110 -- -- -- 96 08/25/23 0636 -- -- 110 -- -- -- 96 08/25/23 0635 -- -- 111 -- 113/89 -- 95 08/25/23 0634 -- -- 109 -- -- -- 96 08/25/23 0633 -- -- 111 -- -- -- 96 08/25/23 0632 -- -- 111 -- -- -- 96 08/25/23 0631 -- -- 112 -- -- -- --    08/25/23 0630 -- -- 109 -- 113/86 -- --    08/25/23 0629 -- -- 111 -- -- -- 97 08/25/23 0628 -- -- 106 -- -- -- 96 08/25/23 0627 -- -- 106 -- 104/84 -- 95    08/25/23 0626 -- -- 107 -- -- -- --    08/25/23 0625 -- -- 109 -- -- -- --    08/25/23 0624 -- -- 107 -- -- -- --    08/25/23 0623 -- -- 109 -- -- -- --    08/25/23 0622 -- -- 107 -- -- -- --    08/25/23 0621 -- -- 108 -- -- -- --    08/25/23 0620 -- -- 107 -- -- -- --    08/25/23 0619 -- -- 107 -- -- -- --    08/25/23 0618 -- -- 107 -- -- -- --    08/25/23 0617 -- -- 109 -- -- -- --    08/25/23 0616 -- -- 109 -- -- -- --    08/25/23 0615 -- -- 110 -- 114/84 -- --    08/25/23 0614 -- -- 108 -- -- -- --    08/25/23 0613 -- -- 109 -- -- -- --    08/25/23 0612 -- -- 109 -- -- -- --    08/25/23 0611 -- -- 110 -- -- -- --    08/25/23 0610 -- -- 107 -- 115/90 -- --    08/25/23 0609 -- -- 109 -- -- -- --    08/25/23 0608 -- -- 111 -- 115/93 -- --    08/25/23 0607 -- -- 108 -- -- -- --    08/25/23 0606 -- -- 108 -- -- -- --    08/25/23 0605 -- -- 108 -- -- -- --    08/25/23 0604 -- -- 108 -- -- -- --    08/25/23 0603 -- -- 106 -- 131/96 -- --    08/25/23 0602 -- -- 108 -- -- -- --    08/25/23 0601 -- -- 109 -- -- -- --    08/25/23 0600 -- -- 193 -- 84/67 -- --    08/25/23 0500 -- -- 114 -- 115/75 -- 98    08/25/23 0412 97.7 (36.5) Oral -- -- -- -- --    08/25/23 0400 -- -- 106  -- 117/88 -- 95    08/25/23 0341 -- -- 114 18 -- -- 97    08/25/23 0330 -- -- 114 -- 117/93 -- 98    08/25/23 0300 -- -- 109 -- -- -- 95    08/25/23 0200 -- -- 108 -- 92/75 -- 95    08/25/23 0100 -- -- 111 -- 93/70 -- 93    08/25/23 0000 -- -- 117 -- 106/89 -- 98    08/24/23 2324 -- -- 125 24 -- -- 97    08/24/23 2300 97.7 (36.5) Oral 117 -- 108/85 -- 95    08/24/23 2200 -- -- 105 -- -- -- --    08/24/23 2135 -- -- 103 -- -- -- 96    08/24/23 2131 -- -- 101 -- 114/96 -- 96    08/24/23 2126 -- -- 105 -- 122/94 -- 97    08/24/23 2123 -- -- 100 -- 117/89 -- 97    08/24/23 2100 -- -- 107 -- 123/102 -- 97    08/24/23 2045 -- -- 98 -- 135/96 -- 96    08/24/23 2040 -- -- 97 -- 131/92 -- 97    08/24/23 2035 -- -- 101 -- 122/92 -- 96    08/24/23 2019 -- -- 109 -- 112/90 -- 95    08/24/23 2000 -- -- 105 -- -- -- 94    08/24/23 1900 -- -- 116 -- -- -- 98    08/24/23 1856 -- -- 117 -- 122/89 -- 98    08/24/23 18:11:27 101.3 (38.5) Rectal 117 24 106/71 Lying 97    08/24/23 1806 -- -- 160 27 54/26 -- 97          Oxygen Therapy (last day)       Date/Time SpO2 Device (Oxygen Therapy) Flow (L/min) Oxygen Concentration (%) ETCO2 (mmHg)    08/25/23 1500 100 -- -- -- --    08/25/23 1400 96 -- -- -- --    08/25/23 1306 94 nasal cannula 4 -- --    08/25/23 1300 96 -- -- -- 19 08/25/23 1200 96 -- -- -- 20 08/25/23 1100 96 -- -- -- 19 08/25/23 1038 95 ventilator -- 40 18 08/25/23 1037 -- -- -- -- 19 08/25/23 1000 95 -- -- -- 18 08/25/23 0900 96 -- -- -- 18 08/25/23 0816 -- -- -- -- 18 08/25/23 0800 97 -- -- -- 18 08/25/23 0700 96 -- -- -- 17 08/25/23 0644 96 -- -- -- --    08/25/23 0643 96 -- -- -- --    08/25/23 0642 96 -- -- -- --    08/25/23 0637 96 -- -- -- --    08/25/23 0636 96 -- -- -- --    08/25/23 0635 95 -- -- -- --    08/25/23 0634 96 -- -- -- --    08/25/23 0633 96 -- -- -- --    08/25/23 0632 96 -- -- -- --    08/25/23 0629 97 -- -- -- --    08/25/23 0628 96 -- -- -- --    08/25/23 0627 95 -- --  -- --    08/25/23 0500 98 -- -- -- --    08/25/23 0400 95 ventilator -- 80 --    08/25/23 0341 97 ventilator -- 80 --    08/25/23 0330 98 -- -- -- --    08/25/23 0300 95 -- -- -- --    08/25/23 0200 95 -- -- -- --    08/25/23 0100 93 -- -- -- --    08/25/23 0000 98 ventilator -- 80 --    08/24/23 2324 97 ventilator -- 100 --    08/24/23 2300 95 -- -- -- --    08/24/23 2200 -- ventilator -- 60 --    08/24/23 2135 96 -- -- -- --    08/24/23 2131 96 -- -- -- --    08/24/23 2126 97 -- -- -- --    08/24/23 2123 97 -- -- -- --    08/24/23 2100 97 -- -- -- --    08/24/23 2045 96 -- -- -- --    08/24/23 2040 97 -- -- -- --    08/24/23 2035 96 -- -- -- --    08/24/23 2019 95 -- -- -- --    08/24/23 2000 94 -- -- -- --    08/24/23 1900 98 -- -- -- --    08/24/23 1856 98 -- -- -- --    08/24/23 18:11:27 97 ventilator -- -- --    08/24/23 1806 97 ventilator -- -- --          Lines, Drains & Airways       Active LDAs       Name Placement date Placement time Site Days    CVC Quadruple Lumen 08/25/23 Right Internal jugular 08/25/23  0626  Internal jugular  less than 1    Urethral Catheter 16 Fr. 08/24/23 2200  -- less than 1              Inactive LDAs       Name Placement date Placement time Removal date Removal time Site Days    [REMOVED] Peripheral IV 08/24/23 1755 Anterior;Left;Proximal Forearm 08/24/23  1755  08/25/23  0210  Forearm  less than 1    [REMOVED] Peripheral IV 08/24/23 1850 Right Hand 08/24/23  1850  08/25/23  0210  Hand  less than 1    [REMOVED] Peripheral IV 08/25/23 0020 Posterior;Right Forearm 08/25/23  0020  08/25/23  0210  Forearm  less than 1    [REMOVED] Peripheral IV 08/25/23 0325 Anterior;Distal;Right;Upper Antecubital 08/25/23  0325  08/25/23  0340  Antecubital  less than 1    [REMOVED] ETT  08/24/23  --  08/25/23  1305  -- 1                  Facility-Administered Medications as of 8/25/2023   Medication Dose Route Frequency Provider Last Rate Last Admin    [COMPLETED] adenosine (ADENOCARD) 6 MG/2ML  injection  - ADS Override Pull        6 mg at 08/25/23 0609    [COMPLETED] adenosine (ADENOCARD) 6 MG/2ML injection  - ADS Override Pull        6 mg at 08/25/23 0608    aluminum-magnesium hydroxide-simethicone (MAALOX MAX) 400-400-40 MG/5ML suspension 15 mL  15 mL Oral Q6H PRN Meek Castillo MD        sennosides-docusate (PERICOLACE) 8.6-50 MG per tablet 2 tablet  2 tablet Oral BID Meek Castillo MD        And    polyethylene glycol (MIRALAX) packet 17 g  17 g Oral Daily PRN Meek Castillo MD        And    bisacodyl (DULCOLAX) EC tablet 5 mg  5 mg Oral Daily PRN Meek Castillo MD        And    bisacodyl (DULCOLAX) suppository 10 mg  10 mg Rectal Daily PRN Meek Castillo MD        [COMPLETED] calcium gluconate 3,000 mg in sodium chloride 0.9 % 100 mL IVPB  3,000 mg Intravenous Once Meek Castillo MD   3,000 mg at 08/25/23 0109    chlorhexidine (PERIDEX) 0.12 % solution 15 mL  15 mL Mouth/Throat Q12H Meek Castillo MD   15 mL at 08/25/23 0844    [COMPLETED] etomidate (AMIDATE) injection 23 mg  23 mg Intravenous Once Antolin Zapata MD   23 mg at 08/24/23 1803    [START ON 8/26/2023] famotidine (PEPCID) injection 20 mg  20 mg Intravenous Daily Meek Castillo MD        [COMPLETED] fentaNYL citrate (PF) (SUBLIMAZE) injection 100 mcg  100 mcg Intravenous Once Antolin Zapata MD   100 mcg at 08/24/23 1834    fentaNYL citrate (PF) (SUBLIMAZE) injection 50 mcg  50 mcg Intravenous Q1H PRN Meek Castillo MD        [COMPLETED] lactated ringers bolus 1,000 mL  1,000 mL Intravenous Once Antolin Zapata MD   Stopped at 08/24/23 1834    [COMPLETED] lactated ringers bolus 2,277 mL  30 mL/kg Intravenous Once Antolin Zapata MD 2,277 mL/hr at 08/24/23 1857 1,000 mL at 08/24/23 1857    lactated ringers infusion  150 mL/hr Intravenous Continuous Bienvenido Albert  mL/hr at 08/25/23 1111 150 mL/hr at 08/25/23 1111     [COMPLETED] magnesium sulfate 4g/100mL (PREMIX) infusion  4 g Intravenous Once Meek Castillo MD   4 g at 23 0050    [COMPLETED] midazolam (VERSED) injection 2 mg  2 mg Intravenous Once Antolin Zapata MD   2 mg at 23 1834    nitroglycerin (NITROSTAT) SL tablet 0.4 mg  0.4 mg Sublingual Q5 Min PRN Meek Castillo MD        norepinephrine (LEVOPHED) 1 MG/ML injection  - ADS Override Pull             Norepinephrine-Sodium Chloride (LEVOPHED) 8-0.9 MG/250ML-% infusion solution  - ADS Override Pull             ondansetron (ZOFRAN) injection 4 mg  4 mg Intravenous Q6H PRN Meek Castillo MD        [COMPLETED] perflutren (DEFINITY) 8.476 mg in Sodium Chloride (PF) 0.9 % 10 mL injection  2 mL Intravenous Once in imaging Meek Castillo MD   2 mL at 23 0907    [COMPLETED] piperacillin-tazobactam (ZOSYN) 3.375 g in iso-osmotic dextrose 50 ml (premix)  3.375 g Intravenous Once Antolin Zapata MD   Stopped at 23 2024    piperacillin-tazobactam (ZOSYN) 3.375 g in iso-osmotic dextrose 50 ml (premix)  3.375 g Intravenous Q8H Meek Castillo MD   3.375 g at 23 1410    [] potassium chloride 10 mEq in 100 mL IVPB  10 mEq Intravenous Q1H Meek Castillo  mL/hr at 23 0206 10 mEq at 23 0206    propofol (DIPRIVAN) infusion 10 mg/mL 100 mL  5-50 mcg/kg/min Intravenous Titrated Meek Castillo MD   Stopped at 23 1015    sodium chloride 0.9 % flush 10 mL  10 mL Intravenous PRN Antolin Zapata MD        sodium chloride 0.9 % flush 10 mL  10 mL Intravenous Q12H Meek Castillo MD   10 mL at 23 0843    sodium chloride 0.9 % flush 10 mL  10 mL Intravenous PRN Meek Castillo MD        sodium chloride 0.9 % infusion 40 mL  40 mL Intravenous PRN Meek Castillo MD         Orders (last 24 hrs)        Start     Ordered    23 0900  famotidine (PEPCID) injection 20 mg  Daily          08/25/23 1509    08/26/23 0430  CBC & Differential  Morning Draw         08/25/23 0904    08/26/23 0430  Uric Acid  Morning Draw         08/25/23 0904    08/26/23 0430  Comprehensive Metabolic Panel  Morning Draw         08/25/23 0904    08/26/23 0430  CK  Morning Draw         08/25/23 0904    08/26/23 0430  Magnesium  Morning Draw         08/25/23 0904    08/26/23 0430  Phosphorus  Morning Draw         08/25/23 0904    08/25/23 1520  POC Glucose Once  PROCEDURE ONCE        Comments: Complete no more than 45 minutes prior to patient eating      08/25/23 1518    08/25/23 1313  Extubation  Once         08/25/23 1313    08/25/23 1139  POC Glucose Once  PROCEDURE ONCE        Comments: Complete no more than 45 minutes prior to patient eating      08/25/23 1137    08/25/23 1130  lactated ringers infusion  Continuous         08/25/23 1036    08/25/23 1000  perflutren (DEFINITY) 8.476 mg in Sodium Chloride (PF) 0.9 % 10 mL injection  Once in Imaging         08/25/23 0907    08/25/23 0905  Magnesium  Once         08/25/23 0904    08/25/23 0905  Phosphorus  Once         08/25/23 0904    08/25/23 0905  Uric Acid  Once         08/25/23 0904    08/25/23 0905  CK  Once         08/25/23 0904    08/25/23 0904  Comprehensive Metabolic Panel  Once         08/25/23 0904    08/25/23 0904  Protein / Creatinine Ratio, Urine - Indwelling Urethral Catheter  Once         08/25/23 0904    08/25/23 0904  Sodium, Urine, Random - Indwelling Urethral Catheter  Once         08/25/23 0904    08/25/23 0904  Chloride, Urine, Random - Indwelling Urethral Catheter  Once         08/25/23 0904    08/25/23 0800  Weaning Parameters  Every Morning,   Status:  Canceled       08/24/23 2139    08/25/23 0733  POC Glucose Once  PROCEDURE ONCE        Comments: Complete no more than 45 minutes prior to patient eating      08/25/23 0730    08/25/23 0656  MRI Brain Without Contrast  1 Time Imaging         08/25/23 0656    08/25/23 0656  EEG  Once          08/25/23 0656    08/25/23 0646  Inpatient Nephrology Consult  Once        Specialty:  Nephrology  Provider:  Angel Cerda MD    08/25/23 0645    08/25/23 0646  Target Arousal Level RASS 0 to -1  Continuous         08/25/23 0645    08/25/23 0644  Insert Central Line At Bedside  Once        Comments: This order was created via procedure documentation    08/25/23 0644    08/25/23 0602  XR Chest 1 View  1 Time Imaging         08/25/23 0602    08/25/23 0600  Basic Metabolic Panel  Daily       08/24/23 2139 08/25/23 0600  CBC & Differential  Daily       08/24/23 2139 08/25/23 0600  Magnesium  Daily       08/24/23 2139 08/25/23 0600  Phosphorus  Daily       08/24/23 2139 08/25/23 0600  Protime-INR  Daily       08/24/23 2139 08/25/23 0600  Blood Gas, Arterial -  Daily      Comments: While On Ventilator      08/24/23 2139 08/25/23 0600  XR Chest 1 View  Daily       08/24/23 2139 08/25/23 0600  CBC Auto Differential  PROCEDURE ONCE         08/24/23 2205 08/25/23 0600  Calcium, Ionized  Daily       08/24/23 2213 08/25/23 0558  dilTIAZem (CARDIZEM) 25 MG/5ML injection  - ADS Override Pull  Status:  Discontinued        Note to Pharmacy: Created by cabinet override    08/25/23 0558    08/25/23 0557  adenosine (ADENOCARD) 6 MG/2ML injection  - ADS Override Pull        Note to Pharmacy: Created by cabinet override    08/25/23 0557    08/25/23 0555  Norepinephrine-Sodium Chloride (LEVOPHED) 8-0.9 MG/250ML-% infusion solution  - ADS Override Pull        Note to Pharmacy: Created by cabinet override    08/25/23 0555    08/25/23 0554  adenosine (ADENOCARD) 6 MG/2ML injection  - ADS Override Pull        Note to Pharmacy: Created by cabinet override    08/25/23 0554    08/25/23 0553  norepinephrine (LEVOPHED) 1 MG/ML injection  - ADS Override Pull        Note to Pharmacy: Created by cabinet override    08/25/23 0553    08/25/23 0526  Per Pulmonary, Dr Thanh Ramirez for RN to attempt peripheral IV in  feet. RN attempted x2 and was unsuccessful.  Nursing Communication  Once        Comments: Per Pulmonary, Dr Moreno- Ok for RN to attempt peripheral IV in feet. RN attempted x2 and was unsuccessful.    08/25/23 0527    08/25/23 0430  POC Glucose Once  PROCEDURE ONCE        Comments: Complete no more than 45 minutes prior to patient eating      08/25/23 0428    08/25/23 0402  STAT Lactic Acid, Reflex  PROCEDURE ONCE         08/25/23 0200    08/25/23 0334  Blood Gas, Arterial -  PROCEDURE ONCE         08/25/23 0330    08/25/23 0200  Spontaneous Breathing Trial  Daily - SBT,   Status:  Canceled       08/24/23 2139 08/25/23 0000  Oral Care & Teeth Brushing  Every 4 Hours      Comments: [place] Teeth at Least 2x/day    08/24/23 2139 08/25/23 0000  POC Glucose Q4H  Every 4 Hours       08/24/23 2139 08/24/23 2315  propofol (DIPRIVAN) infusion 10 mg/mL 100 mL  Titrated         08/24/23 2221 08/24/23 2300  potassium chloride 10 mEq in 100 mL IVPB  Every 1 Hour         08/24/23 2211 08/24/23 2300  magnesium sulfate 4g/100mL (PREMIX) infusion  Once         08/24/23 2211 08/24/23 2300  calcium gluconate 3,000 mg in sodium chloride 0.9 % 100 mL IVPB  Once         08/24/23 2211 08/24/23 2300  lactated ringers bolus 2,000 mL  Once,   Status:  Discontinued         08/24/23 2211 08/24/23 2300  piperacillin-tazobactam (ZOSYN) 3.375 g in iso-osmotic dextrose 50 ml (premix)  Every 8 Hours         08/24/23 2214 08/24/23 2220  fentaNYL citrate (PF) (SUBLIMAZE) injection 50 mcg  Every 1 Hour PRN         08/24/23 2221 08/24/23 2215  MRSA Screen, PCR (Inpatient) - Swab, Nares  Once         08/24/23 2214 08/24/23 2214  Inpatient Neurology Consult General  Once        Specialty:  Neurology  Provider:  Ajit Sequeira MD    08/24/23 2213    08/24/23 2214  Insert Indwelling Urinary Catheter  Once        Comments: Follow Protocol As Outlined in Process Instructions (Open Order Report to View Full  Instructions)   See Hyperspace for full Linked Orders Report.    08/24/23 2213 08/24/23 2214  Assess Need for Indwelling Urinary Catheter - Follow Removal Protocol  Continuous        Comments: Follow Protocol As Outlined in Process Instructions (Open Order Report to View Full Instructions)   See Hyperspace for full Linked Orders Report.    08/24/23 2213 08/24/23 2214  Urinary Catheter Care  Every Shift      See Hyperspace for full Linked Orders Report.    08/24/23 2213 08/24/23 2213  Adult Transthoracic Echo Complete W/ Cont if Necessary Per Protocol  Once         08/24/23 2212 08/24/23 2213  Basic Metabolic Panel  Once,   Status:  Canceled         08/24/23 2212 08/24/23 2204  Restraints Non-Violent or Non-Self Destructive  Calendar Day,   Status:  Canceled         08/25/23 0247 08/24/23 2200  famotidine (PEPCID) injection 20 mg  2 Times Daily,   Status:  Discontinued         08/24/23 2139 08/24/23 2200  POC Glucose Once  PROCEDURE ONCE        Comments: Complete no more than 45 minutes prior to patient eating      08/24/23 2158 08/24/23 2155  chlorhexidine (PERIDEX) 0.12 % solution 15 mL  Every 12 Hours Scheduled         08/24/23 2139 08/24/23 2155  sodium chloride 0.9 % flush 10 mL  Every 12 Hours Scheduled         08/24/23 2139 08/24/23 2155  sennosides-docusate (PERICOLACE) 8.6-50 MG per tablet 2 tablet  2 Times Daily        See Hyperspace for full Linked Orders Report.    08/24/23 2139 08/24/23 2140  Daily Weights  Daily       08/24/23 2139 08/24/23 2140  Intake and Output  Every Hour       08/24/23 2139 08/24/23 2139  Code Status and Medical Interventions:  Continuous         08/24/23 2139 08/24/23 2139  Place Sequential Compression Device  Once         08/24/23 2139 08/24/23 2139  Maintain Sequential Compression Device  Continuous         08/24/23 2139 08/24/23 2135  Ethanol  Once         08/24/23 2134 08/24/23 2133  Ventilator - Vent Mode: AC/PC; FiO2:  Titrate Per SpO2; Titrate Oxygen for SpO2: 90 - 95%  Continuous,   Status:  Canceled         08/24/23 2139 08/24/23 2133  Capnography (ETCO2) Monitoring  Once,   Status:  Canceled         08/24/23 2139 08/24/23 2133  Elevate HOB  Continuous         08/24/23 2139 08/24/23 2133  Target Arousal Level RASS -1 to -2  Continuous,   Status:  Canceled         08/24/23 2139 08/24/23 2133  NPO Diet NPO Type: Strict NPO  Diet Effective Now         08/24/23 2139 08/24/23 2133  RN May Place Order For ABG As Needed for Respiratory Distress  Continuous         08/24/23 2139 08/24/23 2133  Vital Signs Every Hour and Per Hospital Policy Based on Patient Condition  Per Hospital Policy         08/24/23 2139 08/24/23 2133  Continuous Cardiac Monitoring  Continuous        Comments: Follow Standing Orders As Outlined in Process Instructions (Open Order Report to View Full Instructions)    08/24/23 2139 08/24/23 2133  Telemetry - Maintain IV Access  Continuous,   Status:  Canceled         08/24/23 2139 08/24/23 2133  Telemetry - Place Orders & Notify Provider of Results When Patient Experiences Acute Chest Pain, Dysrhythmia or Respiratory Distress  Until Discontinued         08/24/23 2139 08/24/23 2133  Continuous Pulse Oximetry  Continuous         08/24/23 2139 08/24/23 2133  Height & Weight  Once         08/24/23 2139 08/24/23 2133  Oral Care - Patient Not on NPPV & Not Intubated  Every Shift       08/24/23 2139 08/24/23 2133  Target Arousal Level RASS -1 to -2  Continuous,   Status:  Canceled         08/24/23 2139 08/24/23 2133  If Patient has BG Less Than 80 & is Symptomatic But Not on IV Insulin Protocol - Use Adult Hypoglycemia Treatment Orders  Continuous         08/24/23 2139 08/24/23 2133  POC Glucose Once  Once        Comments: On Arrival to Unit. If Greater Than 140, Call Admitting Provider for Orders      08/24/23 2139 08/24/23 2133  Tobacco Cessation Education  Once          08/24/23 2139 08/24/23 2133  Saline Lock  Continuous,   Status:  Canceled        Comments: For standing ICU/CCU standing orders    08/24/23 2139 08/24/23 2133  Place Order to Consult Intensivist For Critical Care Management (If Patient Not Admitted to Cardiology for Primary Cardiology Condition)  Continuous        Comments: For standing ICU/CCU standing orders    08/24/23 2139 08/24/23 2133  Notify All Physicians When Patient is Transferred  Once        Comments: For standing ICU/CCU standing orders    08/24/23 2139 08/24/23 2133  Strict Bed Rest  Until Discontinued         08/24/23 2139 08/24/23 2133  Use Mobility Guidelines for Advancement of Activity  Continuous         08/24/23 2139 08/24/23 2133  Urine Drug Screen -  STAT,   Status:  Canceled         08/24/23 2139 08/24/23 2133  Respiratory Culture - Sputum, Cough  Once         08/24/23 2139 08/24/23 2133  Insert Peripheral IV  Once         08/24/23 2139 08/24/23 2133  Saline Lock & Maintain IV Access  Continuous         08/24/23 2139 08/24/23 2132  sodium chloride 0.9 % flush 10 mL  As Needed         08/24/23 2139 08/24/23 2132  sodium chloride 0.9 % infusion 40 mL  As Needed         08/24/23 2139 08/24/23 2132  aluminum-magnesium hydroxide-simethicone (MAALOX MAX) 400-400-40 MG/5ML suspension 15 mL  Every 6 Hours PRN         08/24/23 2139 08/24/23 2132  polyethylene glycol (MIRALAX) packet 17 g  Daily PRN        See Hyperspace for full Linked Orders Report.    08/24/23 2139 08/24/23 2132  bisacodyl (DULCOLAX) EC tablet 5 mg  Daily PRN        See Hyperspace for full Linked Orders Report.    08/24/23 2139 08/24/23 2132  bisacodyl (DULCOLAX) suppository 10 mg  Daily PRN        See Hyperspace for full Linked Orders Report.    08/24/23 2139 08/24/23 2132  ondansetron (ZOFRAN) injection 4 mg  Every 6 Hours PRN         08/24/23 2139 08/24/23 2132  nitroglycerin (NITROSTAT) SL tablet 0.4 mg  Every 5 Minutes PRN          08/24/23 2139 08/24/23 2132  CT Chest Without Contrast Diagnostic  1 Time Imaging         08/24/23 2132 08/24/23 2132  CT Abdomen Pelvis Without Contrast  1 Time Imaging         08/24/23 2132 08/24/23 2130  I attest that I reassessed tissue perfusion after fluid resuscitation was completed  Once        Comments: I attest that I reassessed tissue perfusion after fluid resuscitation was completed    08/24/23 2129 08/24/23 2125  Critical Care  Once        Comments: This order was created via procedure documentation    08/24/23 2124 08/24/23 2105  STAT Lactic Acid, Reflex  PROCEDURE ONCE         08/24/23 1840    08/24/23 1951  Inpatient Admission  Once         08/24/23 1950    08/24/23 1951  Ethanol  Once,   Status:  Canceled         08/24/23 1950    08/24/23 1951  Urine Drug Screen - Urine, Clean Catch  Once         08/24/23 1950    08/24/23 1912  Urinalysis, Microscopic Only - Indwelling Urethral Catheter  Once         08/24/23 1911 08/24/23 1903  Blood Gas, Arterial -  PROCEDURE ONCE         08/24/23 1900    08/24/23 1901  lactated ringers bolus 2,277 mL  Once         08/24/23 1845    08/24/23 1856  Pulmonology (on-call MD unless specified)  Once        Specialty:  Pulmonary Disease  Provider:  (Not yet assigned)    08/24/23 1855    08/24/23 1856  CK  Once         08/24/23 1855    08/24/23 1844  midazolam (VERSED) injection 2 mg  Once         08/24/23 1828    08/24/23 1844  fentaNYL citrate (PF) (SUBLIMAZE) injection 100 mcg  Once         08/24/23 1828    08/24/23 1840  Intubation  Once        Comments: This order was created via procedure documentation    08/24/23 1839    08/24/23 1833  piperacillin-tazobactam (ZOSYN) 3.375 g in iso-osmotic dextrose 50 ml (premix)  Once         08/24/23 1817 08/24/23 1818  lactated ringers bolus 1,000 mL  Once         08/24/23 1802    08/24/23 1818  etomidate (AMIDATE) injection 23 mg  Once         08/24/23 1802    08/24/23 1814  Urinalysis With Culture If  Indicated - Indwelling Urethral Catheter  Once         08/24/23 1813    08/24/23 1812  Magnesium  Once         08/24/23 1812    08/24/23 1807  Type & Screen  STAT         08/24/23 1806    08/24/23 1804  XR Chest 1 View  1 Time Imaging         08/24/23 1803    08/24/23 1803  CBC & Differential  Once         08/24/23 1802    08/24/23 1803  Comprehensive Metabolic Panel  Once         08/24/23 1802    08/24/23 1803  Protime-INR  Once         08/24/23 1802    08/24/23 1803  aPTT  Once         08/24/23 1802    08/24/23 1803  Lipase  Once         08/24/23 1802    08/24/23 1803  Urinalysis With Microscopic If Indicated (No Culture) - Urine, Catheter  Once,   Status:  Canceled         08/24/23 1802    08/24/23 1803  BNP  Once         08/24/23 1802    08/24/23 1803  Single High Sensitivity Troponin T  Once         08/24/23 1802    08/24/23 1803  Blood Culture - Blood, Arm, Left  Once         08/24/23 1802    08/24/23 1803  Blood Culture - Blood, Arm, Right  Once         08/24/23 1802    08/24/23 1803  Lactic Acid, Plasma  Once         08/24/23 1802    08/24/23 1803  Procalcitonin  Once         08/24/23 1802    08/24/23 1803  ECG 12 Lead Altered Mental Status  Once         08/24/23 1802    08/24/23 1803  CT Head Without Contrast  1 Time Imaging         08/24/23 1802    08/24/23 1803  Insert Peripheral IV  Once        See Hyperspace for full Linked Orders Report.    08/24/23 1802    08/24/23 1803  Cardiac Monitoring  Continuous,   Status:  Canceled        Comments: Follow Standing Orders As Outlined in Process Instructions (Open Order Report to View Full Instructions)    08/24/23 1802    08/24/23 1803  Pulse Oximetry, Continuous  Continuous,   Status:  Canceled         08/24/23 1802    08/24/23 1803  Monitor Blood Pressure  Per Hospital Policy         08/24/23 1802    08/24/23 1803  Blood Gas, Arterial -  Once         08/24/23 1802    08/24/23 1803  CBC Auto Differential  PROCEDURE ONCE         08/24/23 1802    08/24/23 1806   Insert Indwelling Urinary Catheter  Once,   Status:  Canceled        Comments: Follow Protocol As Outlined in Process Instructions (Open Order Report to View Full Instructions)   See Hyperspace for full Linked Orders Report.    08/24/23 1803    08/24/23 1803  Assess Need for Indwelling Urinary Catheter - Follow Removal Protocol  Continuous,   Status:  Canceled        Comments: Follow Protocol As Outlined in Process Instructions (Open Order Report to View Full Instructions)   See Hyperspace for full Linked Orders Report.    08/24/23 1803    08/24/23 1803  Urinary Catheter Care  Every Shift,   Status:  Canceled      See Hyperspace for full Linked Orders Report.    08/24/23 1803    08/24/23 1802  sodium chloride 0.9 % flush 10 mL  As Needed        See Hyperspace for full Linked Orders Report.    08/24/23 1802    Unscheduled  Subglottic Suctioning Must Be Done Every 6 Hours  As Needed       08/24/23 2139    Unscheduled  Spontaneous Awakening Trial  Daily - SAT       08/24/23 2139    --  belzutifan (WELIREG) 40 MG tablet  Daily         08/25/23 1329    --  metoprolol tartrate (LOPRESSOR) 25 MG tablet  2 Times Daily         08/25/23 1329    --  tadalafil (CIALIS) 5 MG tablet  Daily         08/25/23 1329                     Physician Progress Notes (last 24 hours)        Bienvenido Albert MD at 08/25/23 0710            Daily Progress Note.   [facility] CARDIAC INTENSIVE CARE  8/25/2023    Patient:  Name:  Braxton Wolfe  MRN:  [id number]  1965  57 y.o.  male         CC: Found down    Interval History:  Intubated on mechanical ventilation sedated at present time however does awaken follow commands when sedation lifted per nursing report and discussion with neurologist    Physical Exam:  /85   Pulse 115   Temp 97.7 °F (36.5 °C) (Oral)   Resp 18   Wt 75.9 kg (167 lb 5.3 oz)   SpO2 96%   There is no height or weight on file to calculate BMI.    Intake/Output Summary (Last 24 hours) at  8/25/2023 0710  Last data filed at 8/24/2023 2024  Gross per 24 hour   Intake 1050 ml   Output --   Net 1050 ml     General appearance: Ill on mechanical ventilation sedated  Eyes: anicteric sclerae, moist conjunctivae;  HENT: Atraumatic; oropharynx limited by ET tube  Lungs: Bilateral air entry without wheeze or rhonchi, with normal respiratory effort and no intercostal retractions  CV: Tachycardia regular no rub, no rub   Abdomen: Nonrigid bowel sounds are positive  Extremities: No peripheral edema o  Skin: WWP  Psych/neuro sedated on mechanical ventilation    Data Review:  Notable Labs:  Results from last 7 days   Lab Units 08/25/23  0010 08/24/23  1805   WBC 10*3/mm3 11.66* 12.63*   HEMOGLOBIN g/dL 16.1 17.6   PLATELETS 10*3/mm3 246 306     Results from last 7 days   Lab Units 08/25/23  0010 08/24/23  1805   SODIUM mmol/L 142 150*   POTASSIUM mmol/L 4.0 2.9*   CHLORIDE mmol/L 104 125*   CO2 mmol/L 19.0* 10.0*   BUN mg/dL 47* 27*   CREATININE mg/dL 4.70* 3.05*   GLUCOSE mg/dL 140* 92   CALCIUM mg/dL 8.6 4.9*   MAGNESIUM mg/dL 1.9 1.3*   PHOSPHORUS mg/dL 2.9  --    CrCl cannot be calculated (Unknown ideal weight.).    Results from last 7 days   Lab Units 08/25/23  0102 08/25/23  0010 08/24/23  1805   AST (SGOT) U/L  --   --  20   ALT (SGPT) U/L  --   --  9   PROCALCITONIN ng/mL  --   --  6.08*   LACTATE mmol/L 2.4*  --  6.8*   PLATELETS 10*3/mm3  --  246 306       Results from last 7 days   Lab Units 08/25/23  0330 08/24/23  1900   PH, ARTERIAL pH units 7.449 7.341*   PCO2, ARTERIAL mm Hg 27.1* 34.2*   PO2 ART mm Hg 69.3* 100.2*   HCO3 ART mmol/L 18.8* 18.5*       Imaging:  Reviewed chest images personally from past 3 days    ASSESSMENT  /  PLAN:  Acute encephalopathy; suspected HIE  Acute hypercapnic respiratory failure s/p mechanical ventilator  Pneumonia  Vomiting with abdominal distention  Acute renal failure  Hypernatremia  Severe Hypokalemia  Severe metabolic acidosis  Hypocalcemia imrpoved  Lactic  acidosis  Elevated BNP  S/p prior  shunt  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  SVT s/p cnversion  +UDS for benzo and fentanly after these were given in hospital so less likely to be causative  8mm lung nodule - outpatient follow up with serial imaging recommended      Await tte  Abg vent reviewed adjustments as appropriate  Mental status improved sbt  Neurology consultation dw Dr Sequeira, MRI vs eeg,   Electrolyte repletion, ivfs  Nephrology consultation appreciated serial bmp  ivfs  +UDS for benzo and fentanly after these were given in hospital    per hour, cpk elevated - trend     Total critical care time was 40 minutes, excluding any separately billable procedure time.  Time did not overlap with any other provider.    Electronically signed by Bienvenido Albert MD, 08/25/23, 10:41 AM EDT.         Electronically signed by Bienvenido Albert MD at 08/25/23 1041       Meek Castillo MD at 08/25/23 3917                                      Meek Castillo MD                          [phone]            Patient ID:    Name:  Braxton Wolfe    MRN:  [id number]    1965   57 y.o.  male            Patient Care Team:  Provider, No Known as PCP - General    CC/ Reason for visit: Found down, aspiration pneumonia, acute and cephalopathy    Subjective: Pt seen and examined this AM.  Remains on the mechanical ventilator opening eyes and following commands but drowsy - having some fevers copious thick secretions aspirated through the ET tube.  Lost multiple IVs and unable to have any venous access at this point.  Electrolyte replacement had to be held due to loss of access.  Mother was contacted and consent obtained for central line.    Centerline done uneventfully but patient had episode of SVT post procedure and had heart rate in the 200s consistently with borderline hypotension at which point he was given adenosine 6 mg x 1 with no response followed by 12 mg with  resolution of SVT not requiring any pressor support.  Restarted electrolyte replacements and IV fluid hydration    ROS: uto    Objective     Vital Signs past 24hrs    BP range: BP: ()/() 115/75  Pulse range: Heart Rate:  [] 114  Resp rate range: Resp:  [18-27] 18  Temp range: Temp (24hrs), Av.9 °F (37.2 °C), Min:97.7 °F (36.5 °C), Max:101.3 °F (38.5 °C)      Ventilator/Non-Invasive Ventilation Settings (From admission, onward)       Start     Ordered    23  Ventilator - Vent Mode: AC/PC; FiO2: Titrate Per SpO2; Titrate Oxygen for SpO2: 90 - 95%  Continuous        Question Answer Comment   Vent Mode AC/PC    FiO2 Titrate Per SpO2    Titrate Oxygen for SpO2 90 - 95%        23                    Vent Settings        Resp Rate (Set): 16 (rate decreased due to ABG results)  Pressure Support (cm H2O): 0 cm H20  FiO2 (%): 80 %  PEEP/CPAP (cm H2O): 5 cm H20  Minute Ventilation (L/min) (Obs): 14 L/min  Resp Rate (Observed) Vent: 19  I:E Ratio (Set): 1:3.17  I:E Ratio (Obs): 1:3  PIP Observed (cm H2O): 21 cm H2O  Plateau Pressure (cm H2O): 0 cm H2O  Driving Pressure (cm H2O): -4.6 cm H2O  Device (Oxygen Therapy): ventilator FiO2 (%): 80 %     75.9 kg (167 lb 5.3 oz); There is no height or weight on file to calculate BMI.      Intake/Output Summary (Last 24 hours) at 2023 0635  Last data filed at 2023  Gross per 24 hour   Intake 1050 ml   Output --   Net 1050 ml       PHYSICAL EXAM   Constitutional: Middle aged pt in bed, responsive while on the mechanical ventilator  Head: - NCAT  Eyes: No pallor, Anicteric conjunctiva, EOMI. mild constricted pupils  ENMT: Endotracheal tube in place no oral thrush. Moist MM.   NECK: Trachea midline, No thyromegaly, no palpable cervical LNpathy  Heart: RRR, tachycardic no murmur. No pedal edema   Lungs: RAJAN +, occasional rhonchi no wheezes/ crackles heard    Abdomen: Soft.  Nonspecific distention - no guarding or rigidity. No palpable  masses  Extremities: Extremities warm and well perfused. No cyanosis/ clubbing  Neuro: Awake, drowsy, moving extremities to command  Psych: Mood and affect -unable to obtain    PPE recommended per [facility] infectious disease Isolation protocol for the current clinical scenario (as mentioned below) was followed.     Scheduled meds:  norepinephrine, , ,   Norepinephrine-Sodium Chloride, , ,   chlorhexidine, 15 mL, Mouth/Throat, Q12H  famotidine, 20 mg, Intravenous, BID  lactated ringers, 2,000 mL, Intravenous, Once  piperacillin-tazobactam, 3.375 g, Intravenous, Q8H  senna-docusate sodium, 2 tablet, Oral, BID  sodium chloride, 10 mL, Intravenous, Q12H        IV meds:                      propofol, 5-50 mcg/kg/min, Last Rate: Stopped (08/25/23 0210)        Data Review:      Results from last 7 days   Lab Units 08/25/23  0010 08/24/23  1805   SODIUM mmol/L 142 150*   POTASSIUM mmol/L 4.0 2.9*   CHLORIDE mmol/L 104 125*   CO2 mmol/L 19.0* 10.0*   BUN mg/dL 47* 27*   CREATININE mg/dL 4.70* 3.05*   CALCIUM mg/dL 8.6 4.9*   BILIRUBIN mg/dL  --  0.2   ALK PHOS U/L  --  43   ALT (SGPT) U/L  --  9   AST (SGOT) U/L  --  20   GLUCOSE mg/dL 140* 92   WBC 10*3/mm3 11.66* 12.63*   HEMOGLOBIN g/dL 16.1 17.6   PLATELETS 10*3/mm3 246 306   INR  1.32* 1.38*   PROBNP pg/mL  --  7,202.0*   PROCALCITONIN ng/mL  --  6.08*       Lab Results   Component Value Date    CALCIUM 8.6 08/25/2023    PHOS 2.9 08/25/2023             Results from last 7 days   Lab Units 08/25/23  0330 08/24/23  1900   PH, ARTERIAL pH units 7.449 7.341*   PO2 ART mm Hg 69.3* 100.2*   PCO2, ARTERIAL mm Hg 27.1* 34.2*   HCO3 ART mmol/L 18.8* 18.5*        I have personally reviewed the results from the time of this admission to 8/25/2023 06:35 EDT and agree with these findings:  [x]  Laboratory  [x]  Microbiology  [x]  Radiology  []  EKG/Telemetry   [x]  Cardiology/Vascular   []  Pathology  []  Old records    ASSESSMENT   Acute encephalopathy; suspected HIE  Acute  hypercapnic respiratory failure s/p mechanical ventilator  B/L Aspiration pneumonia  Vomiting with abdominal distention  Acute SVT  Acute renal failure  Hypernatremia  Hypokalemia  Severe hypocalcemia  Severe metabolic acidosis  Elevated BNP  Incidental lung nodules-needs outpatient follow  S/p prior  shunt  VHL s/p previous [place] with ? right hemiparesis     PLAN:  Patient remains on mechanical ventilator and settings have been adjusted as appropriate.  Improvement in oxygen support noted.  CT chest confirms severe bilateral lower lobe pneumonia suspected aspiration.  Continue broad-spectrum antibiotics and narrow down as tolerated.  We will send respiratory cultures.  Patient is encephalopathy is improving and he is opening eyes and following commands even though still drowsy.  CT of the head is pending and await neurology evaluation given previous baseline issues.  SVT felt to be related to severe electrolyte abnormalities which could not be corrected fully due to loss of IV access and would continue to work on the same.  Echocardiogram is already ordered.  No obvious etiology for vomiting based on the CT of the abdomen.  Would consider contrast evaluation if having any recurrent issues.  Renal function continues to get worse but patient does have some urine output.  Will get nephrology to assist in the management of the patient.  Suspect patient will respond to current medical management.  Unfortunately remains high risk for dialysis.  We will continue with IV fluid hydration as patient remains dehydrated.  N.p.o. for now.  Would start tube feeds  Very guarded prognosis  Mechanical DVT/GI prophylaxis     CC-45-minute not including procedures    Meek Castillo MD  8/25/2023    Electronically signed by Meek Castillo MD at 08/25/23 0643          Consult Notes (last 24 hours)        Ajit Sequeira MD at 08/25/23 1013        Consult Orders    1. Inpatient Neurology Consult General  "[[id number]] ordered by Meek Castillo MD at 08/24/23 2213                 Neurology Consult Note    Consult Date: 8/25/2023    Referring MD: Meek Castillo*    Reason for Consult I have been asked to see the patient in neurological consultation to render advice and opinion regarding altered mental status    Braxton Wolfe is a 57 y.o. male with past medical history of von Hippel-Lindau, multiple brain hemangioblastomas, prior craniotomy and  shunt.  Patient reportedly has some baseline hemiparesis however is functionally independent normally.  He was found down by a friend and brought to the hospital with YUDITH and severe encephalopathy requiring endotracheal intubation.  He has remained somewhat comatose overnight but this morning was waking up per nursing staff and following commands.    Past medical history:  Von Hippel-Lindau  Cerebellar hemangioblastomas   shunt    Exam  /83   Pulse 116   Temp 98.2 °F (36.8 °C) (Oral)   Resp 18   Ht 177.8 cm (70\")   Wt 75.8 kg (167 lb)   SpO2 97%   BMI 23.96 kg/m²   Gen: NAD, vitals reviewed  MS: Opens eyes to voice, follows multiple commands  CN: Pupils 3 mm, reactive, conjugate gaze  Motor: Moving all 4 extremities, slightly less brisk on the left side  Reflexes: Right plantar downgoing, left plantar mute    DATA:    Lab Results   Component Value Date    GLUCOSE 137 (H) 08/25/2023    CALCIUM 9.2 08/25/2023     08/25/2023    K 3.9 08/25/2023    CO2 20.0 (L) 08/25/2023     (H) 08/25/2023    BUN 52 (H) 08/25/2023    CREATININE 3.85 (H) 08/25/2023    BCR 13.5 08/25/2023    ANIONGAP 16.0 (H) 08/25/2023     Lab Results   Component Value Date    WBC 11.66 (H) 08/25/2023    HGB 16.1 08/25/2023    HCT 47.1 08/25/2023    MCV 88.2 08/25/2023     08/25/2023       Lab review: BUN 52, creatinine 3.85, anion gap 16, WBC 11.6    Imaging review: MRI brain without contrast ordered    Routine EEG ordered    Diagnoses:  History of cerebellar " hemangioblastoma status post resection   shunt  Acute encephalopathy  Acute kidney injury    Comment: Found down with acute encephalopathy.  Etiology unclear.  We will obtain MRI and EEG    PLAN:  MRI brain without today  Routine EEG  Limit sedation and extubate if possible.    Management discussed with Dr. Albert and nursing staff        Electronically signed by Ajit Sequeira MD at 23 1016       Angel Cerda MD at 23 0850        Consult Orders    1. Inpatient Nephrology Consult [[id number]] ordered by Meek Castillo MD at 23 0645                   [facility] Consult Note      Patient Name: Braxton Wolfe  : 1965  MRN: [id number]  Primary Care Physician:  Provider, No Known  Referring Physician: Meek Castillo,*  Date of admission: 2023    Subjective     Reason for Consult: Acute kidney injury    HPI:   Braxton Wolfe is a 57 y.o. male patient was admitted on 2023, he was found unresponsive at home he is currently on the ventilator he had transient drop in blood pressure but not required any pressors.  He has history of von Hippel-Lindau, and he had CNS hemangioblastoma with previous craniotomy he had apparently left with some right hemiparesis but is unable to assess at this point.  His CPK was 500 yesterday.  Upon arrival to the emergency department creatinine was 3.05 and it was up to 4.7 at midnight.  His prior creatinine in the chart and in Care Everywhere was within normal range but the last one was in .  He had CTA of the chest and abdomen revealed evidence of pulmonary infiltrate and his imaging of the kidneys and other organs other than diffuse cysts in the pancreas was within acceptable range.      Review of Systems:   14 point review of systems is otherwise negative except for mentioned above on HPI    Personal History     History reviewed. No pertinent past medical history.    Past Surgical  History:   Procedure Laterality Date    BRAIN SURGERY         Family History: family history is not on file.    Social History:  reports that he has never smoked. He has never used smokeless tobacco. He reports that he does not drink alcohol and does not use drugs.    Home Medications:  Prior to Admission medications    Not on File       Allergies:  Not on File    Objective     Vitals:   Temp:  [97.7 °F (36.5 °C)-101.3 °F (38.5 °C)] 98.2 °F (36.8 °C)  Heart Rate:  [] 116  Resp:  [18-27] 18  BP: ()/() 101/83  FiO2 (%):  [45 %-100 %] 45 %    Intake/Output Summary (Last 24 hours) at 8/25/2023 0850  Last data filed at 8/25/2023 0701  Gross per 24 hour   Intake 1050 ml   Output 500 ml   Net 550 ml       Physical Exam:   Constitutional: On the ventilator, sedated, appears to be chronically ill, no acute distress  HEENT: Sclera anicteric, no conjunctival injection, orally intubated  Neck: No JVD  Respiratory: Bilateral rhonchi, nonlabored respiration  Cardiovascular: RRR, no murmurs, no rubs or gallops, no carotid bruit  Gastrointestinal: Positive bowel sounds, abdomen is soft, no guarding and nondistended  : Valladares catheter anchored in  Musculoskeletal: No edema, no clubbing or cyanosis  Psychiatric: Unable to assess  Neurologic: Unable to assess  Skin: Warm and dry       Scheduled Meds:     chlorhexidine, 15 mL, Mouth/Throat, Q12H  famotidine, 20 mg, Intravenous, BID  lactated ringers, 2,000 mL, Intravenous, Once  norepinephrine, , ,   Norepinephrine-Sodium Chloride, , ,   piperacillin-tazobactam, 3.375 g, Intravenous, Q8H  senna-docusate sodium, 2 tablet, Oral, BID  sodium chloride, 10 mL, Intravenous, Q12H      IV Meds:   propofol, 5-50 mcg/kg/min, Last Rate: 15 mcg/kg/min (08/25/23 0714)        Results Reviewed:   I have personally reviewed the results from the time of this admission to 8/25/2023 08:50 EDT     Lab Results   Component Value Date    GLUCOSE 140 (H) 08/25/2023    CALCIUM 8.6 08/25/2023      08/25/2023    K 4.0 08/25/2023    CO2 19.0 (L) 08/25/2023     08/25/2023    BUN 47 (H) 08/25/2023    CREATININE 4.70 (H) 08/25/2023    BCR 10.0 08/25/2023    ANIONGAP 19.0 (H) 08/25/2023      Lab Results   Component Value Date    MG 1.9 08/25/2023    PHOS 2.9 08/25/2023    ALBUMIN 2.0 (L) 08/24/2023           Assessment / Plan       Sepsis      ASSESSMENT:  Acute kidney injury he had transient hypotension he was down at home for unknown period of time his CPK upon arrival was 500 does not explain evidence of rhabdomyolysis, I am not certain if the patient was on any medication we will try to obtain information to determine if he was on any medication would explain this picture.  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  Acute respiratory failure on the ventilator  Positive drug screen for fentanyl and benzodiazepines  Mixed metabolic acidosis and respiratory alkalosis    PLAN:  Check random urine for sodium, chloride and protein to creatinine ratio  Check CPK this morning and check chemistry and make adjustment in the treatment as needed  I currently agree with the present treatment and will make adjustment based on changes in his labs  No indication for dialysis at this time but would watch very closely  Surveillance labs    I discussed the case with the patient's nurse  I reviewed the chart and other providers note, I reviewed the imaging personally and lab data.      Thank you for involving us in the care of Braxton Wolfe.  Please feel free to call with any questions.    Angel Cerda MD  08/25/23  08:50 EDT    [facility]  [phone]      Please note that portions of this note were completed with a voice recognition program.    Electronically signed by Angel Cerda MD at 08/25/23 5409

## 2023-08-25 NOTE — PLAN OF CARE
Goal Outcome Evaluation:      Pt arrived to CICU last night. Pt lost IV access around 2am, multiple nurses attempted to gain access, but unsuccessful. MD made aware, CVL placed after obtaining consent. Electrolyte replacement restarted and propofol restarted.

## 2023-08-25 NOTE — H&P
[facility]    Patient Care Team:  Provider, No Known as PCP - General    CC: Found down    HPI: Patient is a pleasant 57-year-old white male with a previous history of VHL syndrome, CNS hemangioblastomas, previous 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  Patient was unable to protect his airway and was intubated and placed on mechanical ventilator work-up showed evidence of acute renal failure severe acidosis and mechanical ventilator were adjusted.  He was briefly hypotensive initially and was noted to be dehydrated as well and responded well to volume resuscitation.  He was given Narcan with no response as well. we have been asked to assist in the management of the patient and admit him to the ICU.    During my evaluation patient is unresponsive on the mechanical ventilator still.  Patient's friend is at bedside and reportedly his parents and ex-wife are on their way to come in.  He provided the above history and states that at baseline he has been independent other than some mild hemiparesis since previous brain surgery which has been stable and denied any recent illnesses and was upbeat and wanted to meet him today and neighbors noted that he was not seen for couple days as well and his trash was not picked up as well.    I have reviewed (if any available) last discharge summaries as well as the outpatient notes from the patients other consultants available in the [facility] system as well previous notes from our group and summarized above    Objective     I have discussed the case with ED physician     Records  Reviewed  Old medical records.  Nursing notes.  Previous radiology studies.    Review of Systems:  Unable to obtain.    No past medical history on file.    No past surgical history on file.    Prior to Admission Medications       None            Patient has no allergy information on record.    No family history on file.    Social History     Socioeconomic History    Marital status: Unknown       Physical Exam  Temp:  [101.3 °F (38.5 °C)] 101.3 °F (38.5 °C)  Heart Rate:  [] 100  Resp:  [24-27] 24  BP: ()/(26-96) 117/89  FiO2 (%):  [100 %] 100 %       Vent Settings        Resp Rate (Set): 18     FiO2 (%): 100 %  PEEP/CPAP (cm H2O): 5 cm H20  Minute Ventilation (L/min) (Obs): 15 L/min  Resp Rate (Observed) Vent: 27     I:E Ratio (Obs): 1:1.3  PIP Observed (cm H2O): 16 cm H2O          PHYSICAL EXAM   Constitutional: Middle aged pt in bed, unresponsive while on the mechanical ventilator  Head: - NCAT  Eyes: No pallor, Anicteric conjunctiva, EOMI. mild constricted pupils  ENMT: Endotracheal tube in place no oral thrush. Moist MM.   NECK: Trachea midline, No thyromegaly, no palpable cervical LNpathy  Heart: RRR, tachycardic no murmur. No pedal edema   Lungs: RAJAN +, occasional rhonchi no wheezes/ crackles heard    Abdomen: Soft.  Nonspecific distention - no guarding or rigidity. No palpable masses  Extremities: Extremities warm and well perfused. No cyanosis/ clubbing  Neuro: Unresponsive to any stimuli, breathing over the vent  Psych: Mood and affect -unable to obtain    PPE recommended per [facility] infectious disease Isolation protocol for the current clinical scenario(as mentioned below) was followed.     LABS:  Results from last 7 days   Lab Units 08/24/23  1805   SODIUM mmol/L 150*   POTASSIUM mmol/L 2.9*   CHLORIDE mmol/L 125*   CO2 mmol/L 10.0*   BUN mg/dL 27*   CREATININE mg/dL 3.05*   CALCIUM mg/dL 4.9*   BILIRUBIN mg/dL 0.2   ALK PHOS U/L 43   ALT (SGPT) U/L 9   AST (SGOT) U/L 20   GLUCOSE  mg/dL 92   WBC 10*3/mm3 12.63*   HEMOGLOBIN g/dL 17.6   PLATELETS 10*3/mm3 306   INR  1.38*   PROBNP pg/mL 7,202.0*   PROCALCITONIN ng/mL 6.08*       Lab Results   Component Value Date    CALCIUM 4.9 (C) 08/24/2023             Results from last 7 days   Lab Units 08/24/23  1900   PH, ARTERIAL pH units 7.341*   PO2 ART mm Hg 100.2*   PCO2, ARTERIAL mm Hg 34.2*   HCO3 ART mmol/L 18.5*        Result Review      I have personally reviewed the results from the time of this admission to 8/24/2023 21:30 EDT and agree with these findings:  [x]  Laboratory  [x]  Microbiology  [x]  Radiology  []  EKG/Telemetry   [x]  Cardiology/Vascular   []  Pathology  []  Old records  []  Other:    Assessment   Acute encephalopathy; suspected HIE  Acute hypercapnic respiratory failure s/p mechanical ventilator  Suspected aspiration pneumonia  Vomiting with abdominal distention  Acute renal failure  Hypernatremia  Hypokalemia  Severe metabolic acidosis  Elevated BNP  S/p prior  shunt  VHL s/p previous Craney with ? right hemiparesis    PLAN:  Patient's acute decompensation is of unclear etiology especially given the last known normal is on Monday.  We will need a comprehensive work-up to rule out common etiologies.  CT head is ordered and pending.  We will add CT of chest abdomen and pelvis with oral contrast especially given suspected pneumonia as well as unexplained vomiting.  We will continue therapeutic antibiotics for suspected aspiration pneumonia and continue with volume resuscitation.  Appears to be clinically dehydrated  Continue with close monitoring of renal function and urine output.  Severe hypokalemia and hypocalcemia noted and we will replete accordingly  Unclear etiology for elevated BNP with no known previous cardiac issues.  We will get an echocardiogram  N.p.o. for now  Very guarded prognosis  Mechanical DVT/GI prophylaxis    CC-45-minute    I have discussed my findings and recommendations with family and nursing staff.      Meek Castillo MD  8/24/2023  21:30 EDT

## 2023-08-25 NOTE — CONSULTS
[facility] Consult Note      Patient Name: Braxton Wolfe  : 1965  MRN: [id number]  Primary Care Physician:  Provider, No Known  Referring Physician: Meek Castillo,*  Date of admission: 2023    Subjective     Reason for Consult: Acute kidney injury    HPI:   Braxton Wolfe is a 57 y.o. male patient was admitted on 2023, he was found unresponsive at home he is currently on the ventilator he had transient drop in blood pressure but not required any pressors.  He has history of von Hippel-Lindau, and he had CNS hemangioblastoma with previous craniotomy he had apparently left with some right hemiparesis but is unable to assess at this point.  His CPK was 500 yesterday.  Upon arrival to the emergency department creatinine was 3.05 and it was up to 4.7 at midnight.  His prior creatinine in the chart and in Care Everywhere was within normal range but the last one was in .  He had CTA of the chest and abdomen revealed evidence of pulmonary infiltrate and his imaging of the kidneys and other organs other than diffuse cysts in the pancreas was within acceptable range.      Review of Systems:   14 point review of systems is otherwise negative except for mentioned above on HPI    Personal History     History reviewed. No pertinent past medical history.    Past Surgical History:   Procedure Laterality Date    BRAIN SURGERY         Family History: family history is not on file.    Social History:  reports that he has never smoked. He has never used smokeless tobacco. He reports that he does not drink alcohol and does not use drugs.    Home Medications:  Prior to Admission medications    Not on File       Allergies:  Not on File    Objective     Vitals:   Temp:  [97.7 °F (36.5 °C)-101.3 °F (38.5 °C)] 98.2 °F (36.8 °C)  Heart Rate:  [] 116  Resp:  [18-27] 18  BP: ()/() 101/83  FiO2 (%):  [45 %-100 %] 45 %    Intake/Output Summary (Last 24 hours) at  8/25/2023 0850  Last data filed at 8/25/2023 0701  Gross per 24 hour   Intake 1050 ml   Output 500 ml   Net 550 ml       Physical Exam:   Constitutional: On the ventilator, sedated, appears to be chronically ill, no acute distress  HEENT: Sclera anicteric, no conjunctival injection, orally intubated  Neck: No JVD  Respiratory: Bilateral rhonchi, nonlabored respiration  Cardiovascular: RRR, no murmurs, no rubs or gallops, no carotid bruit  Gastrointestinal: Positive bowel sounds, abdomen is soft, no guarding and nondistended  : Valladares catheter anchored in  Musculoskeletal: No edema, no clubbing or cyanosis  Psychiatric: Unable to assess  Neurologic: Unable to assess  Skin: Warm and dry       Scheduled Meds:     chlorhexidine, 15 mL, Mouth/Throat, Q12H  famotidine, 20 mg, Intravenous, BID  lactated ringers, 2,000 mL, Intravenous, Once  norepinephrine, , ,   Norepinephrine-Sodium Chloride, , ,   piperacillin-tazobactam, 3.375 g, Intravenous, Q8H  senna-docusate sodium, 2 tablet, Oral, BID  sodium chloride, 10 mL, Intravenous, Q12H      IV Meds:   propofol, 5-50 mcg/kg/min, Last Rate: 15 mcg/kg/min (08/25/23 0714)        Results Reviewed:   I have personally reviewed the results from the time of this admission to 8/25/2023 08:50 EDT     Lab Results   Component Value Date    GLUCOSE 140 (H) 08/25/2023    CALCIUM 8.6 08/25/2023     08/25/2023    K 4.0 08/25/2023    CO2 19.0 (L) 08/25/2023     08/25/2023    BUN 47 (H) 08/25/2023    CREATININE 4.70 (H) 08/25/2023    BCR 10.0 08/25/2023    ANIONGAP 19.0 (H) 08/25/2023      Lab Results   Component Value Date    MG 1.9 08/25/2023    PHOS 2.9 08/25/2023    ALBUMIN 2.0 (L) 08/24/2023           Assessment / Plan       Sepsis      ASSESSMENT:  Acute kidney injury he had transient hypotension he was down at home for unknown period of time his CPK upon arrival was 500 does not explain evidence of rhabdomyolysis, I am not certain if the patient was on any medication we  will try to obtain information to determine if he was on any medication would explain this picture.  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  Acute respiratory failure on the ventilator  Positive drug screen for fentanyl and benzodiazepines  Mixed metabolic acidosis and respiratory alkalosis    PLAN:  Check random urine for sodium, chloride and protein to creatinine ratio  Check CPK this morning and check chemistry and make adjustment in the treatment as needed  I currently agree with the present treatment and will make adjustment based on changes in his labs  No indication for dialysis at this time but would watch very closely  Surveillance labs    I discussed the case with the patient's nurse  I reviewed the chart and other providers note, I reviewed the imaging personally and lab data.      Thank you for involving us in the care of Braxton Wolfe.  Please feel free to call with any questions.    Angel Cerda MD  08/25/23  08:50 EDT    [facility]  [phone]      Please note that portions of this note were completed with a voice recognition program.

## 2023-08-25 NOTE — PROGRESS NOTES
Meek Castillo MD                          [phone]            Patient ID:    Name:  Braxton Wolfe    MRN:  [id number]    1965   57 y.o.  male            Patient Care Team:  Provider, No Known as PCP - General    CC/ Reason for visit: Found down, aspiration pneumonia, acute and cephalopathy    Subjective: Pt seen and examined this AM.  Remains on the mechanical ventilator opening eyes and following commands but drowsy - having some fevers copious thick secretions aspirated through the ET tube.  Lost multiple IVs and unable to have any venous access at this point.  Electrolyte replacement had to be held due to loss of access.  Mother was contacted and consent obtained for central line.    Centerline done uneventfully but patient had episode of SVT post procedure and had heart rate in the 200s consistently with borderline hypotension at which point he was given adenosine 6 mg x 1 with no response followed by 12 mg with resolution of SVT not requiring any pressor support.  Restarted electrolyte replacements and IV fluid hydration    ROS: uto    Objective     Vital Signs past 24hrs    BP range: BP: ()/() 115/75  Pulse range: Heart Rate:  [] 114  Resp rate range: Resp:  [18-27] 18  Temp range: Temp (24hrs), Av.9 °F (37.2 °C), Min:97.7 °F (36.5 °C), Max:101.3 °F (38.5 °C)      Ventilator/Non-Invasive Ventilation Settings (From admission, onward)       Start     Ordered    23  Ventilator - Vent Mode: AC/PC; FiO2: Titrate Per SpO2; Titrate Oxygen for SpO2: 90 - 95%  Continuous        Question Answer Comment   Vent Mode AC/PC    FiO2 Titrate Per SpO2    Titrate Oxygen for SpO2 90 - 95%        23                    Vent Settings        Resp Rate (Set): 16 (rate decreased due to ABG results)  Pressure Support (cm H2O): 0 cm H20  FiO2 (%): 80 %  PEEP/CPAP (cm H2O): 5 cm H20  Minute Ventilation (L/min) (Obs): 14 L/min  Resp Rate  (Observed) Vent: 19  I:E Ratio (Set): 1:3.17  I:E Ratio (Obs): 1:3  PIP Observed (cm H2O): 21 cm H2O  Plateau Pressure (cm H2O): 0 cm H2O  Driving Pressure (cm H2O): -4.6 cm H2O  Device (Oxygen Therapy): ventilator FiO2 (%): 80 %     75.9 kg (167 lb 5.3 oz); There is no height or weight on file to calculate BMI.      Intake/Output Summary (Last 24 hours) at 8/25/2023 0635  Last data filed at 8/24/2023 2024  Gross per 24 hour   Intake 1050 ml   Output --   Net 1050 ml       PHYSICAL EXAM   Constitutional: Middle aged pt in bed, responsive while on the mechanical ventilator  Head: - NCAT  Eyes: No pallor, Anicteric conjunctiva, EOMI. mild constricted pupils  ENMT: Endotracheal tube in place no oral thrush. Moist MM.   NECK: Trachea midline, No thyromegaly, no palpable cervical LNpathy  Heart: RRR, tachycardic no murmur. No pedal edema   Lungs: RAJAN +, occasional rhonchi no wheezes/ crackles heard    Abdomen: Soft.  Nonspecific distention - no guarding or rigidity. No palpable masses  Extremities: Extremities warm and well perfused. No cyanosis/ clubbing  Neuro: Awake, drowsy, moving extremities to command  Psych: Mood and affect -unable to obtain    PPE recommended per [facility] infectious disease Isolation protocol for the current clinical scenario (as mentioned below) was followed.     Scheduled meds:  norepinephrine, , ,   Norepinephrine-Sodium Chloride, , ,   chlorhexidine, 15 mL, Mouth/Throat, Q12H  famotidine, 20 mg, Intravenous, BID  lactated ringers, 2,000 mL, Intravenous, Once  piperacillin-tazobactam, 3.375 g, Intravenous, Q8H  senna-docusate sodium, 2 tablet, Oral, BID  sodium chloride, 10 mL, Intravenous, Q12H        IV meds:                      propofol, 5-50 mcg/kg/min, Last Rate: Stopped (08/25/23 0210)        Data Review:      Results from last 7 days   Lab Units 08/25/23  0010 08/24/23  1805   SODIUM mmol/L 142 150*   POTASSIUM mmol/L 4.0 2.9*   CHLORIDE mmol/L 104 125*   CO2 mmol/L 19.0* 10.0*    BUN mg/dL 47* 27*   CREATININE mg/dL 4.70* 3.05*   CALCIUM mg/dL 8.6 4.9*   BILIRUBIN mg/dL  --  0.2   ALK PHOS U/L  --  43   ALT (SGPT) U/L  --  9   AST (SGOT) U/L  --  20   GLUCOSE mg/dL 140* 92   WBC 10*3/mm3 11.66* 12.63*   HEMOGLOBIN g/dL 16.1 17.6   PLATELETS 10*3/mm3 246 306   INR  1.32* 1.38*   PROBNP pg/mL  --  7,202.0*   PROCALCITONIN ng/mL  --  6.08*       Lab Results   Component Value Date    CALCIUM 8.6 08/25/2023    PHOS 2.9 08/25/2023             Results from last 7 days   Lab Units 08/25/23  0330 08/24/23  1900   PH, ARTERIAL pH units 7.449 7.341*   PO2 ART mm Hg 69.3* 100.2*   PCO2, ARTERIAL mm Hg 27.1* 34.2*   HCO3 ART mmol/L 18.8* 18.5*        I have personally reviewed the results from the time of this admission to 8/25/2023 06:35 EDT and agree with these findings:  [x]  Laboratory  [x]  Microbiology  [x]  Radiology  []  EKG/Telemetry   [x]  Cardiology/Vascular   []  Pathology  []  Old records    ASSESSMENT   Acute encephalopathy; suspected HIE  Acute hypercapnic respiratory failure s/p mechanical ventilator  B/L Aspiration pneumonia  Vomiting with abdominal distention  Acute SVT  Acute renal failure  Hypernatremia  Hypokalemia  Severe hypocalcemia  Severe metabolic acidosis  Elevated BNP  Incidental lung nodules-needs outpatient follow  S/p prior  shunt  VHL s/p previous Craney with ? right hemiparesis     PLAN:  Patient remains on mechanical ventilator and settings have been adjusted as appropriate.  Improvement in oxygen support noted.  CT chest confirms severe bilateral lower lobe pneumonia suspected aspiration.  Continue broad-spectrum antibiotics and narrow down as tolerated.  We will send respiratory cultures.  Patient is encephalopathy is improving and he is opening eyes and following commands even though still drowsy.  CT of the head is pending and await neurology evaluation given previous baseline issues.  SVT felt to be related to severe electrolyte abnormalities which could not be  corrected fully due to loss of IV access and would continue to work on the same.  Echocardiogram is already ordered.  No obvious etiology for vomiting based on the CT of the abdomen.  Would consider contrast evaluation if having any recurrent issues.  Renal function continues to get worse but patient does have some urine output.  Will get nephrology to assist in the management of the patient.  Suspect patient will respond to current medical management.  Unfortunately remains high risk for dialysis.  We will continue with IV fluid hydration as patient remains dehydrated.  N.p.o. for now.  Would start tube feeds  Very guarded prognosis  Mechanical DVT/GI prophylaxis     CC-45-minute not including procedures    Meek Castillo MD  8/25/2023

## 2023-08-25 NOTE — PROCEDURES
"Insert Central Line At Bedside    Date/Time: 8/25/2023 6:44 AM  Performed by: Meek Castillo MD  Authorized by: Meek Castillo MD   Consent: Verbal consent obtained. Written consent obtained.  Risks and benefits: risks, benefits and alternatives were discussed  Consent given by: guardian  Procedure consent: procedure consent matches procedure scheduled  Relevant documents: relevant documents present and verified  Test results: test results available and properly labeled  Site marked: the operative site was marked  Imaging studies: imaging studies available  Required items: required blood products, implants, devices, and special equipment available  Patient identity confirmed: arm band and hospital-assigned identification number  Time out: Immediately prior to procedure a \"time out\" was called to verify the correct patient, procedure, equipment, support staff and site/side marked as required.  Indications: vascular access  Anesthesia: see MAR for details    Sedation:  Patient sedated: no    Preparation: skin prepped with ChloraPrep  Skin prep agent dried: skin prep agent completely dried prior to procedure  Sterile barriers: all five maximum sterile barriers used - cap, mask, sterile gown, sterile gloves, and large sterile sheet  Hand hygiene: hand hygiene performed prior to central venous catheter insertion  Location details: right internal jugular  Patient position: flat  Catheter type: triple lumen  Pre-procedure: landmarks identified  Ultrasound guidance: yes  Sterile ultrasound techniques: sterile gel and sterile probe covers were used  Number of attempts: 1  Successful placement: yes  Post-procedure: line sutured and dressing applied  Assessment: blood return through all ports, free fluid flow, placement verified by x-ray and no pneumothorax on x-ray  Patient tolerance: patient tolerated the procedure well with no immediate complications      "
decreased strength

## 2023-08-25 NOTE — PLAN OF CARE
Goal Outcome Evaluation:  Plan of Care Reviewed With: patient, family        Progress: improving  Outcome Evaluation: Pt extubated today, contiues to cough up thick brown secrections. Encourged to cough/deep breathe, frequent oral care provided. VSS, 400cc UOP. Generalized weakness,PT consulted for tomorrow. Family at bedside, supportive of pt.

## 2023-08-26 ENCOUNTER — APPOINTMENT (OUTPATIENT)
Dept: GENERAL RADIOLOGY | Facility: HOSPITAL | Age: 58
DRG: 871 | End: 2023-08-26
Payer: COMMERCIAL

## 2023-08-26 LAB
ALBUMIN SERPL-MCNC: 2.8 G/DL (ref 3.5–5.2)
ALBUMIN/GLOB SERPL: 1.1 G/DL
ALP SERPL-CCNC: 58 U/L (ref 39–117)
ALT SERPL W P-5'-P-CCNC: 16 U/L (ref 1–41)
ANION GAP SERPL CALCULATED.3IONS-SCNC: 11.3 MMOL/L (ref 5–15)
ANISOCYTOSIS BLD QL: ABNORMAL
AST SERPL-CCNC: 37 U/L (ref 1–40)
BILIRUB SERPL-MCNC: 0.7 MG/DL (ref 0–1.2)
BUN SERPL-MCNC: 44 MG/DL (ref 6–20)
BUN/CREAT SERPL: 17.4 (ref 7–25)
CA-I BLD-MCNC: 4.8 MG/DL (ref 4.6–5.4)
CA-I SERPL ISE-MCNC: 1.21 MMOL/L (ref 1.15–1.35)
CALCIUM SPEC-SCNC: 8.2 MG/DL (ref 8.6–10.5)
CHLORIDE SERPL-SCNC: 111 MMOL/L (ref 98–107)
CK SERPL-CCNC: 1208 U/L (ref 20–200)
CO2 SERPL-SCNC: 24.7 MMOL/L (ref 22–29)
CREAT SERPL-MCNC: 2.53 MG/DL (ref 0.76–1.27)
DEPRECATED RDW RBC AUTO: 41.3 FL (ref 37–54)
EGFRCR SERPLBLD CKD-EPI 2021: 28.8 ML/MIN/1.73
ERYTHROCYTE [DISTWIDTH] IN BLOOD BY AUTOMATED COUNT: 13 % (ref 12.3–15.4)
GLOBULIN UR ELPH-MCNC: 2.6 GM/DL
GLUCOSE BLDC GLUCOMTR-MCNC: 107 MG/DL (ref 70–130)
GLUCOSE BLDC GLUCOMTR-MCNC: 76 MG/DL (ref 70–130)
GLUCOSE BLDC GLUCOMTR-MCNC: 77 MG/DL (ref 70–130)
GLUCOSE BLDC GLUCOMTR-MCNC: 87 MG/DL (ref 70–130)
GLUCOSE SERPL-MCNC: 109 MG/DL (ref 65–99)
HCT VFR BLD AUTO: 34.5 % (ref 37.5–51)
HGB BLD-MCNC: 12 G/DL (ref 13–17.7)
INR PPP: 1.35 (ref 0.9–1.1)
LYMPHOCYTES # BLD MANUAL: 0.42 10*3/MM3 (ref 0.7–3.1)
LYMPHOCYTES NFR BLD MANUAL: 7.1 % (ref 5–12)
MAGNESIUM SERPL-MCNC: 2.8 MG/DL (ref 1.6–2.6)
MCH RBC QN AUTO: 30.4 PG (ref 26.6–33)
MCHC RBC AUTO-ENTMCNC: 34.8 G/DL (ref 31.5–35.7)
MCV RBC AUTO: 87.3 FL (ref 79–97)
MICROCYTES BLD QL: ABNORMAL
MONOCYTES # BLD: 0.75 10*3/MM3 (ref 0.1–0.9)
NEUTROPHILS # BLD AUTO: 9.41 10*3/MM3 (ref 1.7–7)
NEUTROPHILS NFR BLD MANUAL: 88.9 % (ref 42.7–76)
PHOSPHATE SERPL-MCNC: 2.2 MG/DL (ref 2.5–4.5)
PLAT MORPH BLD: NORMAL
PLATELET # BLD AUTO: 139 10*3/MM3 (ref 140–450)
PMV BLD AUTO: 10.3 FL (ref 6–12)
POIKILOCYTOSIS BLD QL SMEAR: ABNORMAL
POLYCHROMASIA BLD QL SMEAR: ABNORMAL
POTASSIUM SERPL-SCNC: 3.6 MMOL/L (ref 3.5–5.2)
PROCALCITONIN SERPL-MCNC: 1.48 NG/ML (ref 0–0.25)
PROT SERPL-MCNC: 5.4 G/DL (ref 6–8.5)
PROTHROMBIN TIME: 16.9 SECONDS (ref 11.7–14.2)
RBC # BLD AUTO: 3.95 10*6/MM3 (ref 4.14–5.8)
SODIUM SERPL-SCNC: 147 MMOL/L (ref 136–145)
URATE SERPL-MCNC: 4.8 MG/DL (ref 3.4–7)
VARIANT LYMPHS NFR BLD MANUAL: 4 % (ref 19.6–45.3)
WBC MORPH BLD: NORMAL
WBC NRBC COR # BLD: 10.59 10*3/MM3 (ref 3.4–10.8)

## 2023-08-26 PROCEDURE — 25010000002 CEFTRIAXONE PER 250 MG: Performed by: INTERNAL MEDICINE

## 2023-08-26 PROCEDURE — 82330 ASSAY OF CALCIUM: CPT | Performed by: INTERNAL MEDICINE

## 2023-08-26 PROCEDURE — 82550 ASSAY OF CK (CPK): CPT | Performed by: INTERNAL MEDICINE

## 2023-08-26 PROCEDURE — 99233 SBSQ HOSP IP/OBS HIGH 50: CPT | Performed by: PSYCHIATRY & NEUROLOGY

## 2023-08-26 PROCEDURE — 84550 ASSAY OF BLOOD/URIC ACID: CPT | Performed by: INTERNAL MEDICINE

## 2023-08-26 PROCEDURE — 83735 ASSAY OF MAGNESIUM: CPT | Performed by: INTERNAL MEDICINE

## 2023-08-26 PROCEDURE — 71045 X-RAY EXAM CHEST 1 VIEW: CPT

## 2023-08-26 PROCEDURE — 85610 PROTHROMBIN TIME: CPT | Performed by: INTERNAL MEDICINE

## 2023-08-26 PROCEDURE — 84100 ASSAY OF PHOSPHORUS: CPT | Performed by: INTERNAL MEDICINE

## 2023-08-26 PROCEDURE — 85025 COMPLETE CBC W/AUTO DIFF WBC: CPT | Performed by: INTERNAL MEDICINE

## 2023-08-26 PROCEDURE — 25010000002 PIPERACILLIN SOD-TAZOBACTAM PER 1 G: Performed by: INTERNAL MEDICINE

## 2023-08-26 PROCEDURE — 92610 EVALUATE SWALLOWING FUNCTION: CPT

## 2023-08-26 PROCEDURE — 80053 COMPREHEN METABOLIC PANEL: CPT | Performed by: INTERNAL MEDICINE

## 2023-08-26 PROCEDURE — 82948 REAGENT STRIP/BLOOD GLUCOSE: CPT

## 2023-08-26 PROCEDURE — 84145 PROCALCITONIN (PCT): CPT | Performed by: INTERNAL MEDICINE

## 2023-08-26 PROCEDURE — 97162 PT EVAL MOD COMPLEX 30 MIN: CPT

## 2023-08-26 PROCEDURE — 85007 BL SMEAR W/DIFF WBC COUNT: CPT | Performed by: INTERNAL MEDICINE

## 2023-08-26 RX ORDER — SODIUM CHLORIDE, SODIUM LACTATE, POTASSIUM CHLORIDE, CALCIUM CHLORIDE 600; 310; 30; 20 MG/100ML; MG/100ML; MG/100ML; MG/100ML
100 INJECTION, SOLUTION INTRAVENOUS CONTINUOUS
Status: DISCONTINUED | OUTPATIENT
Start: 2023-08-26 | End: 2023-08-27

## 2023-08-26 RX ORDER — DEXTROSE MONOHYDRATE 25 G/50ML
25 INJECTION, SOLUTION INTRAVENOUS
Status: DISCONTINUED | OUTPATIENT
Start: 2023-08-26 | End: 2023-09-08 | Stop reason: HOSPADM

## 2023-08-26 RX ORDER — IBUPROFEN 600 MG/1
1 TABLET ORAL
Status: DISCONTINUED | OUTPATIENT
Start: 2023-08-26 | End: 2023-09-08 | Stop reason: HOSPADM

## 2023-08-26 RX ORDER — NICOTINE POLACRILEX 4 MG
15 LOZENGE BUCCAL
Status: DISCONTINUED | OUTPATIENT
Start: 2023-08-26 | End: 2023-09-08 | Stop reason: HOSPADM

## 2023-08-26 RX ADMIN — CEFTRIAXONE SODIUM 1000 MG: 1 INJECTION, POWDER, FOR SOLUTION INTRAMUSCULAR; INTRAVENOUS at 12:09

## 2023-08-26 RX ADMIN — SODIUM CHLORIDE, POTASSIUM CHLORIDE, SODIUM LACTATE AND CALCIUM CHLORIDE 100 ML/HR: 600; 310; 30; 20 INJECTION, SOLUTION INTRAVENOUS at 19:59

## 2023-08-26 RX ADMIN — FAMOTIDINE 20 MG: 10 INJECTION INTRAVENOUS at 08:07

## 2023-08-26 RX ADMIN — PIPERACILLIN SODIUM AND TAZOBACTAM SODIUM 3.38 G: 3; .375 INJECTION, SOLUTION INTRAVENOUS at 05:55

## 2023-08-26 RX ADMIN — SODIUM CHLORIDE, POTASSIUM CHLORIDE, SODIUM LACTATE AND CALCIUM CHLORIDE 150 ML/HR: 600; 310; 30; 20 INJECTION, SOLUTION INTRAVENOUS at 00:42

## 2023-08-26 RX ADMIN — Medication 10 ML: at 08:08

## 2023-08-26 RX ADMIN — SODIUM CHLORIDE, POTASSIUM CHLORIDE, SODIUM LACTATE AND CALCIUM CHLORIDE 150 ML/HR: 600; 310; 30; 20 INJECTION, SOLUTION INTRAVENOUS at 07:35

## 2023-08-26 NOTE — THERAPY EVALUATION
Acute Care - Speech Language Pathology   Swallow Initial Evaluation [facility]     Patient Name: Braxton Wolfe  : 1965  MRN: [id number]  Today's Date: 2023               Admit Date: 2023    Visit Dx:     ICD-10-CM ICD-9-CM   1. Sepsis with acute renal failure without septic shock, due to unspecified organism, unspecified acute renal failure type  A41.9 038.9    R65.20 995.92    N17.9 584.9   2. Acute respiratory failure, unspecified whether with hypoxia or hypercapnia  J96.00 518.81   3. Acute kidney injury  N17.9 584.9   4. Hypokalemia  E87.6 276.8   5. Hypernatremia  E87.0 276.0     Patient Active Problem List   Diagnosis    Sepsis     History reviewed. No pertinent past medical history.  Past Surgical History:   Procedure Laterality Date    BRAIN SURGERY         SLP Recommendation and Plan     SLP Diet Recommendation: NPO (23)     SLP Rec. for Method of Medication Administration: meds via alternate route (23)        Recommended Diagnostics: VFSS (MBS) (23)     Anticipated Discharge Disposition (SLP): unknown (23)     Therapy Frequency (Swallow): PRN (23)  Predicted Duration Therapy Intervention (Days): until discharge (23)                                                     SWALLOW EVALUATION (last 72 hours)       SLP Adult Swallow Evaluation       Row Name 23                   Rehab Evaluation    Document Type evaluation  -LS        Patient Effort good  -LS           General Information    Patient Profile Reviewed yes  -LS        Pertinent History Of Current Problem Patient is a pleasant 57-year-old white male with a previous history of VHL syndrome, CNS hemangioblastomas, previous Craney with ?right hemiparesis who was last known to be normal on Monday which was reportedly his birthday when he spoke to his family and friend and was unable to be contacted ever since and his friend drove in from [place] to pick him  up and noted unanswered male and I am not opening the door at which point EMS was contacted and patient was found to be unresponsive and has place with old dried blood all over his body.  Patient was unable to protect his airway and was intubated and placed on mechanical ventilator work-up showed evidence of acute renal failure severe acidosis and mechanical ventilator were adjusted.  He was briefly hypotensive initially and was noted to be dehydrated as well and responded well to volume resuscitation.  He was given Narcan with no response as well. we have been asked to assist in the management of the patient and admit him to the ICU.  -LS        Current Method of Nutrition NPO  extubated 8.25.23.  -LS        Precautions/Limitations, Vision WFL;for purposes of eval  -LS        Precautions/Limitations, Hearing WFL;for purposes of eval  -LS        Prior Level of Function-Communication WFL  -LS        Prior Level of Function-Swallowing no diet consistency restrictions  -LS        Plans/Goals Discussed with patient  -LS        Barriers to Rehab none identified  -LS           Oral Motor Structure and Function    Oral Lesions or Structural Abnormalities and/or variants Pt presented with a hoarse vocal quality when SLP entered the room.  -LS        Dentition Assessment natural, present and adequate  -LS        Secretion Management WNL/WFL  -LS           Oral Musculature and Cranial Nerve Assessment    Oral Motor General Assessment generalized oral motor weakness  -LS           Clinical Swallow Eval    Oral Prep Phase WFL  -LS        Oral Transit impaired  -LS        Oral Residue WFL  -LS        Pharyngeal Phase suspected pharyngeal impairment  -LS        Clinical Swallow Evaluation Summary Clinical bedside evaluation completed this date.  Pt presented with a hoarse vocal quality when SLP entered.  Pt complained of burning in his throat wth all the consistencies presented ( thin, nectar , honey, and puree). Coughing noted  with thin liquids.  At this time pt is not a candidate for Po intake.  SLP recommends NPOat this time.  Alternate methods of feeding  and medication recommended at this time.  VFSS recommended.  -LS           Pharyngeal Phase Concerns    Pharyngeal Phase Concerns cough;other (see comments)  burning in throat during swallowing.  -LS        Cough thin  -LS        Pharyngeal Phase Concerns, Comment pt complained of burning in throat area upon swallowing.  -LS           Recommendations    Therapy Frequency (Swallow) PRN  -LS        Predicted Duration Therapy Intervention (Days) until discharge  -        SLP Diet Recommendation NPO  -LS        Recommended Diagnostics VFSS (MBS)  -        SLP Rec. for Method of Medication Administration meds via alternate route  -LS        Anticipated Discharge Disposition (SLP) unknown  -LS                  User Key  (r) = Recorded By, (t) = Taken By, (c) = Cosigned By      Initials Name Effective Dates    LS Claudio Valenzuela MS CCC-SLP 06/16/21 -                     EDUCATION  The patient has been educated in the following areas:   Dysphagia (Swallowing Impairment).              Time Calculation:       Therapy Charges for Today       Code Description Service Date Service Provider Modifiers Qty    [id number]  ST EVAL ORAL PHARYNG SWALLOW 5 8/26/2023 Claudio Valenzuela MS CCC-SLP GN 1                 MS LEVI WesleySLP  8/26/2023

## 2023-08-26 NOTE — PROGRESS NOTES
"DOS: 2023  NAME: Braxton Wolfe   : 1965  PCP: Provider, No Known  Chief Complaint   Patient presents with    Altered Mental Status       Chief complaint: altered mental status  Subjective: Extubated yesterday.  He complains of generalized weakness.  He reports his left hemiparesis is new but family members had suggested that this was a chronic deficit.  No witnessed seizures.    Patient has no memory of the events of the last several days and cannot tell me why he was down at home.  No history of epilepsy in the past.    Objective:  Vital signs: /85   Pulse 87   Temp 97.5 °F (36.4 °C)   Resp 22   Ht 177.8 cm (70\")   Wt 79.8 kg (175 lb 14.8 oz)   SpO2 98%   BMI 25.24 kg/m²    Gen: NAD, vitals reviewed  MS: Oriented, memory impaired, normal attention/concentration, language intact, neglect  CN: visual acuity grossly normal, PERRL, EOMI with bilateral direction changing nystagmus, mild left facial droop, moderate dysarthria  Motor: 4+/5 in lower extremity, 4/5 left upper and lower extremity, tone throughout    Laboratory results:  Lab Results   Component Value Date    GLUCOSE 109 (H) 2023    CALCIUM 8.2 (L) 2023     (H) 2023    K 3.6 2023    CO2 24.7 2023     (H) 2023    BUN 44 (H) 2023    CREATININE 2.53 (H) 2023    BCR 17.4 2023    ANIONGAP 11.3 2023     Lab Results   Component Value Date    WBC 10.59 2023    HGB 12.0 (L) 2023    HCT 34.5 (L) 2023    MCV 87.3 2023     (L) 2023     Lab Results   Component Value Date     (H) 2020    LDL 89 2019    LDL 88 2018            Review of labs: Sodium 147, Cr 2.5    Review and interpretation of imaging: Contrasted MRI of the brain ordered    EEG showed generalized slowing, triphasic waves    Diagnoses:  Encephalopathy, improved  Left hemiparesis  Von Hippel-Lindau with cerebellar hemangioblastoma    Comment: " Neurologically improving.  Still unclear why he was down.  Patient reports his left hemiparesis is new or worse than normal.  We will obtain contrasted brain MRI when able.  Respiratory status is likely to delay that    Plan:  1.  Contrasted brain MRI when able  2.  Check ammonia given EEG findings    Management discussed with Dr. Castillo

## 2023-08-26 NOTE — PLAN OF CARE
Problem: Adult Inpatient Plan of Care  Goal: Plan of Care Review  Outcome: Ongoing, Progressing   Goal Outcome Evaluation:         Clinical bedside evaluation completed this date. Pt presented with a hoarse vocal quality when SLP entered. Pt complained of burning in his throat wth all the consistencies presented ( thin, nectar , honey, and puree). Coughing noted with thin liquids. At this time pt is not a candidate for Po intake. SLP recommends NPO. Alternate methods of feeding and medication  is also recommended.  VFSS is recommended..

## 2023-08-26 NOTE — PROGRESS NOTES
Meek Castillo MD                          [phone]            Patient ID:    Name:  Braxton Wolfe    MRN:  [id number]    1965   57 y.o.  male            Patient Care Team:  Provider, No Known as PCP - General    CC/ Reason for visit: Found down, aspiration pneumonia, acute encephalopathy    Subjective: Pt seen and examined this AM.  Overnight events noted.  Patient successfully extubated and still little confused but overall able to answer questions states that he does not remember what exactly happened.  States that he had previous left-sided weakness which seems to be a little worse per his report.  Tells me that he had a plan to meet his friend and understands that he brought him to the hospital as well.  Appreciated the care    ROS: uto more than above    Objective     Vital Signs past 24hrs    BP range: BP: (108-135)/(73-98) 128/85  Pulse range: Heart Rate:  [] 87  Resp rate range: Resp:  [18-22] 22  Temp range: Temp (24hrs), Av.4 °F (36.9 °C), Min:97.2 °F (36.2 °C), Max:99 °F (37.2 °C)      Ventilator/Non-Invasive Ventilation Settings (From admission, onward)       Start     Ordered    23  Ventilator - Vent Mode: AC/PC; FiO2: Titrate Per SpO2; Titrate Oxygen for SpO2: 90 - 95%  Continuous        Question Answer Comment   Vent Mode AC/PC    FiO2 Titrate Per SpO2    Titrate Oxygen for SpO2 90 - 95%        23                    Vent Settings        Resp Rate (Set): 18  Pressure Support (cm H2O): 8 cm H20  FiO2 (%): 40 %  PEEP/CPAP (cm H2O): 5 cm H20  Minute Ventilation (L/min) (Obs): 12 L/min  Resp Rate (Observed) Vent: 16  I:E Ratio (Set): 1:2.7  I:E Ratio (Obs): 1:1.6  PIP Observed (cm H2O): 14 cm H2O  Plateau Pressure (cm H2O): 0 cm H2O  Driving Pressure (cm H2O): -4.5 cm H2O  Device (Oxygen Therapy): room air FiO2 (%): 40 %     79.8 kg (175 lb 14.8 oz); Body mass index is 25.24 kg/m².      Intake/Output Summary (Last 24 hours) at  8/26/2023 1026  Last data filed at 8/26/2023 0319  Gross per 24 hour   Intake 2850 ml   Output 925 ml   Net 1925 ml       PHYSICAL EXAM   Constitutional: Middle aged pt in bed, + accessory muscle use but no acute distress  Head: - NCAT  Eyes: No pallor, Anicteric conjunctiva, EOMI. mild constricted pupils  ENMT:  Mallampati 4 no oral thrush. Moist MM.   NECK: Trachea midline, No thyromegaly, no palpable cervical LNpathy  Heart: RRR, tachycardic no murmur. No pedal edema   Lungs: RAJAN +, occasional rhonchi no wheezes/ crackles heard    Abdomen: Soft.  Nonspecific distention - no guarding or rigidity. No palpable masses  Extremities: Extremities warm and well perfused. No cyanosis/ clubbing  Neuro: Awake, drowsy, mild left hemiparesis  Psych: Mood and affect -appropriate    PPE recommended per [facility] infectious disease Isolation protocol for the current clinical scenario (as mentioned below) was followed.     Scheduled meds:  famotidine, 20 mg, Intravenous, Daily  piperacillin-tazobactam, 3.375 g, Intravenous, Q8H  senna-docusate sodium, 2 tablet, Oral, BID  sodium chloride, 10 mL, Intravenous, Q12H        IV meds:                      lactated ringers, 150 mL/hr, Last Rate: 150 mL/hr (08/26/23 0735)  lactated ringers, 100 mL/hr        Data Review:      Results from last 7 days   Lab Units 08/26/23  0329 08/25/23  0917 08/25/23  0010 08/24/23  1805   SODIUM mmol/L 147* 144 142 150*   POTASSIUM mmol/L 3.6 3.9 4.0 2.9*   CHLORIDE mmol/L 111* 108* 104 125*   CO2 mmol/L 24.7 20.0* 19.0* 10.0*   BUN mg/dL 44* 52* 47* 27*   CREATININE mg/dL 2.53* 3.85* 4.70* 3.05*   CALCIUM mg/dL 8.2* 9.2 8.6 4.9*   BILIRUBIN mg/dL 0.7 0.5  --  0.2   ALK PHOS U/L 58 69  --  43   ALT (SGPT) U/L 16 15  --  9   AST (SGOT) U/L 37 34  --  20   GLUCOSE mg/dL 109* 137* 140* 92   WBC 10*3/mm3 10.59  --  11.66* 12.63*   HEMOGLOBIN g/dL 12.0*  --  16.1 17.6   PLATELETS 10*3/mm3 139*  --  246 306   INR  1.35*  --  1.32* 1.38*   PROBNP pg/mL   --   --   --  7,202.0*   PROCALCITONIN ng/mL  --   --   --  6.08*       Lab Results   Component Value Date    CALCIUM 8.2 (L) 08/26/2023    PHOS 2.2 (L) 08/26/2023       Results from last 7 days   Lab Units 08/24/23  1848 08/24/23  1842   BLOODCX  No growth at 24 hours No growth at 24 hours       Results from last 7 days   Lab Units 08/25/23  0330 08/24/23  1900   PH, ARTERIAL pH units 7.449 7.341*   PO2 ART mm Hg 69.3* 100.2*   PCO2, ARTERIAL mm Hg 27.1* 34.2*   HCO3 ART mmol/L 18.8* 18.5*        I have personally reviewed the results from the time of this admission to 8/26/2023 10:26 EDT and agree with these findings:  [x]  Laboratory  [x]  Microbiology  [x]  Radiology  []  EKG/Telemetry   [x]  Cardiology/Vascular   []  Pathology  []  Old records    ASSESSMENT   Acute encephalopathy; suspected HIE  Acute hypercapnic respiratory failure s/p vent till 8/25  B/L Aspiration pneumonia  Vomiting; unclear etiology  Acute SVT  Acute renal failure  Hypernatremia  Hypokalemia  Severe hypocalcemia  Severe metabolic acidosis  Elevated BNP  Incidental lung nodules-needs outpatient follow  S/p prior  shunt  VHL s/p previous Craney with ? Left hemiparesis     PLAN:   Patient successfully extubated and is not requiring any supplemental oxygen.  Tolerating current antibiotic plan for severe aspiration pneumonia.  Cultures are Negative so far.  Will narrow down as tolerated  Appreciate neurology input and noted plan for MRI given some concern for worsening left hemiparesis.  Defer management to them given previous complex cerebellar issues requiring surgery.  EEG noted.  Appreciate nephrology input and agree with the plan and patient making progress.  Defer all electrolyte abnormalities    SVT felt to be related to severe electrolyte abnormalities and echocardiogram does not show any acute concerns.  No obvious etiology for vomiting based on the CT of the abdomen -patient with no symptoms currently.  We will get SLP evaluation  and advance diet as tolerated  Out of bed and physical therapy/Occupational Therapy   Guarded prognosis  Mechanical DVT/GI prophylaxis     Patient will be transferred to the floor and we will consult hospitalist service to take over the patient's care as primary.  We will be available for any ongoing pulmonary as well as new critical care issues. Appreciate their assistance.    Meek Castillo MD  8/26/2023

## 2023-08-26 NOTE — PLAN OF CARE
Goal Outcome Evaluation:  Plan of Care Reviewed With: patient (family stepped out during session and pt gave permission to speak with them post session but did not see family)              Patient is a 57 y.o. male who was found down at home with AMS. He was admitted with sepsis, hypokalemia, hypernatremia, and acute respiratory failure (intubated from 8/24-8/26). PMHx includes  shunt, crani, von hippel lindau. Pt is Aox2 today with confusion noted. Patient is ind at baseline without use of AD per pt reports and lives alone per chart but tells me he lives with his parents.Today, patient performed supine<>sitting EOB requiring max-depA and then maxA-depA for sitting balance with R lateral lean. Pt unable to correct with VC and tactile cues. Balance, postural control, strength, activity tolerance deficits noted. Pt with L foot drop and L sided  strength decreased compared to right. Per chart review pt states this is new but family states chronic deficit. Patient may benefit from skilled PT services to address functional deficits and improve level of independence prior to discharge. Anticipate SNF vs acute rehab upon DC.        Anticipated Discharge Disposition (PT): sub acute care setting, skilled nursing facility, inpatient rehabilitation facility

## 2023-08-26 NOTE — THERAPY EVALUATION
Patient Name: Braxton Wolfe  : 1965    MRN: [id number]                              Today's Date: 2023       Admit Date: 2023    Visit Dx:     ICD-10-CM ICD-9-CM   1. Sepsis with acute renal failure without septic shock, due to unspecified organism, unspecified acute renal failure type  A41.9 038.9    R65.20 995.92    N17.9 584.9   2. Acute respiratory failure, unspecified whether with hypoxia or hypercapnia  J96.00 518.81   3. Acute kidney injury  N17.9 584.9   4. Hypokalemia  E87.6 276.8   5. Hypernatremia  E87.0 276.0     Patient Active Problem List   Diagnosis    Sepsis     History reviewed. No pertinent past medical history.  Past Surgical History:   Procedure Laterality Date    BRAIN SURGERY        General Information       Row Name 23 155          Physical Therapy Time and Intention    Document Type evaluation  -CB     Mode of Treatment individual therapy;physical therapy  -CB       Row Name 23 155          General Information    Patient Profile Reviewed yes  -CB     Prior Level of Function independent:;gait;transfer;bed mobility  pt denies use of AD but keeps asking for cane; family reports chronic L sided weakness but pt states is new per chart review  -CB     Existing Precautions/Restrictions fall  -CB     Barriers to Rehab medically complex  -CB       Row Name 23 155          Living Environment    People in Home alone  pt reports he lives with his mom and dad but per RN and chart review pt lives alone  -CB       Row Name 23 155          Home Main Entrance    Number of Stairs, Main Entrance two  -CB     Stair Railings, Main Entrance railings safe and in good condition  -CB       Row Name 23 922          Cognition    Orientation Status (Cognition) --  AOx2 with confusion noted throughout  -CB       Row Name 23 209          Safety Issues, Functional Mobility    Safety Issues Affecting Function (Mobility) awareness of need for assistance;insight  into deficits/self-awareness;judgment;problem-solving  -CB     Impairments Affecting Function (Mobility) balance;cognition;endurance/activity tolerance;grasp;strength;range of motion (ROM);postural/trunk control  -CB               User Key  (r) = Recorded By, (t) = Taken By, (c) = Cosigned By      Initials Name Provider Type    CB Luh Etienne PT Physical Therapist                   Mobility       Row Name 08/26/23 1554          Bed Mobility    Bed Mobility supine-sit;sit-supine;scooting/bridging  -CB     Scooting/Bridging [place] (Bed Mobility) dependent (less than 25% patient effort);2 person assist;verbal cues  -CB     Supine-Sit [place] (Bed Mobility) maximum assist (25% patient effort);dependent (less than 25% patient effort);verbal cues  -CB     Sit-Supine [place] (Bed Mobility) maximum assist (25% patient effort);dependent (less than 25% patient effort);verbal cues  -CB     Assistive Device (Bed Mobility) head of bed elevated;bed rails;leg   -CB       Row Name 08/26/23 1554          Bed-Chair Transfer    Bed-Chair [place] (Transfers) unable to assess  -CB       Row Name 08/26/23 1554          Sit-Stand Transfer    Sit-Stand [place] (Transfers) unable to assess  -CB       Row Name 08/26/23 1554          Gait/Stairs (Locomotion)    [place] Level (Gait) unable to assess  -CB               User Key  (r) = Recorded By, (t) = Taken By, (c) = Cosigned By      Initials Name Provider Type    CB Luh Etienne, PT Physical Therapist                   Obj/Interventions       Row Name 08/26/23 1554          Range of Motion Comprehensive    Comment, General Range of Motion BLE WFL except L ankle foot drop  -CB       Row Name 08/26/23 1554          Strength Comprehensive (MMT)    Comment, General Manual Muscle Testing (MMT) Assessment BLE 3+/5 and generalized whole body weakness noted; L  strength decreased; L DF 0/5  -CB       Row Name 08/26/23 1554          Balance    Balance  Assessment sitting static balance  -CB     Static Sitting Balance dependent;verbal cues  -CB     Position, Sitting Balance sitting edge of bed  -CB     Comment, Balance R lateral lean in sitting and despite VC and tacile cues pt unable to correct.  -CB               User Key  (r) = Recorded By, (t) = Taken By, (c) = Cosigned By      Initials Name Provider Type    CB Luh Etienne, PT Physical Therapist                   Goals/Plan       Row Name 08/26/23 9967          Bed Mobility Goal 1 (PT)    Activity/Assistive Device (Bed Mobility Goal 1, PT) bed mobility activities, all  -CB     [place] Level/Cues Needed (Bed Mobility Goal 1, PT) minimum assist (75% or more patient effort)  -CB     Time Frame (Bed Mobility Goal 1, PT) long term goal (LTG);2 weeks  -CB       Row Name 08/26/23 7477          Transfer Goal 1 (PT)    Activity/Assistive Device (Transfer Goal 1, PT) sit-to-stand/stand-to-sit;bed-to-chair/chair-to-bed;walker, rolling  -CB     [place] Level/Cues Needed (Transfer Goal 1, PT) minimum assist (75% or more patient effort)  -CB     Time Frame (Transfer Goal 1, PT) long term goal (LTG);2 weeks  -CB       Row Name 08/26/23 7089          Gait Training Goal 1 (PT)    Activity/Assistive Device (Gait Training Goal 1, PT) gait (walking locomotion);assistive device use;improve balance and speed;increase endurance/gait distance;decrease fall risk;diminish gait deviation;walker, rolling  -CB     [place] Level (Gait Training Goal 1, PT) moderate assist (50-74% patient effort)  -CB     Distance (Gait Training Goal 1, PT) 25ft  -CB     Time Frame (Gait Training Goal 1, PT) long term goal (LTG);2 weeks  -CB       Row Name 08/26/23 9703          Therapy Assessment/Plan (PT)    Planned Therapy Interventions (PT) balance training;bed mobility training;gait training;home exercise program;patient/family education;transfer training;strengthening  -CB               User Key  (r) = Recorded By, (t) = Taken By, (c)  = Cosigned By      Initials Name Provider Type    CB Luh Etienne, PT Physical Therapist                   Clinical Impression       Row Name 08/26/23 2190          Pain    Pretreatment Pain Rating 0/10 - no pain  -CB     Posttreatment Pain Rating 0/10 - no pain  -CB       Row Name 08/26/23 4054          Plan of Care Review    Plan of Care Reviewed With patient  family stepped out during session and pt gave permission to speak with them post session but did not see family  -CB     Outcome Evaluation Patient is a 57 y.o. male who was found down at home with AMS. He was admitted with sepsis, hypokalemia, hypernatremia, and acute respiratory failure (intubated from 8/24-8/26). PMHx includes  shunt, crani, von hippel lindau. Pt is Aox2 today with confusion noted. Patient is ind at baseline without use of AD per pt reports and lives alone per chart but tells me he lives with his parents.Today, patient performed supine<>sitting EOB requiring max-depA and then maxA for sitting balance with R lateral lean. Pt unable to correct with VC and tactile cues. Balance, postural control, strength, activity tolerance deficits noted. Pt with L foot drop and L sided  strength decreased compared to right. Per chart review pt states this is new but family states chronic deficit. Patient may benefit from skilled PT services to address functional deficits and improve level of independence prior to discharge. Anticipate SNF vs acute rehab upon DC.  -CB       Row Name 08/26/23 8370          Therapy Assessment/Plan (PT)    Rehab Potential (PT) good, to achieve stated therapy goals  -CB     Criteria for Skilled Interventions Met (PT) yes  -CB     Therapy Frequency (PT) 5 times/wk  -CB       Row Name 08/26/23 2098          Vital Signs    Post SpO2 (%) 97  -CB     O2 Delivery Post Treatment room air  -CB       Row Name 08/26/23 1555          Positioning and Restraints    Pre-Treatment Position in bed  -CB     Post Treatment Position bed   -CB     In Bed notified nsg;fowlers;call light within reach;encouraged to call for assist;exit alarm on;side rails up x3  -CB               User Key  (r) = Recorded By, (t) = Taken By, (c) = Cosigned By      Initials Name Provider Type    Luh Griffin, BRAD Physical Therapist                   Outcome Measures       Row Name 08/26/23 1558          How much help from another person do you currently need...    Turning from your back to your side while in flat bed without using bedrails? 2  -CB     Moving from lying on back to sitting on the side of a flat bed without bedrails? 1  -CB     Moving to and from a bed to a chair (including a wheelchair)? 1  -CB     Standing up from a chair using your arms (e.g., wheelchair, bedside chair)? 1  -CB     Climbing 3-5 steps with a railing? 1  -CB     To walk in hospital room? 1  -CB     AM-PAC 6 Clicks Score (PT) 7  -CB     Highest level of mobility 2 --> Bed activities/dependent transfer  -CB       Row Name 08/26/23 1558          Modified [place] Scale    Modified [place] Scale 5 - Severe disability.  Bedridden, incontinent, and requiring constant nursing care and attention.  -CB       Row Name 08/26/23 1558          Functional Assessment    Outcome Measure Options AM-PAC 6 Clicks Basic Mobility (PT);Modified [place]  -CB               User Key  (r) = Recorded By, (t) = Taken By, (c) = Cosigned By      Initials Name Provider Type    Luh Griffin, PT Physical Therapist                                 Physical Therapy Education       Title: PT OT SLP Therapies (In Progress)       Topic: Physical Therapy (In Progress)       Point: Mobility training (In Progress)       Learning Progress Summary             Patient Acceptance, E,TB, NR by DANGELO at 8/26/2023 1468                         Point: Home exercise program (Not Started)       Learner Progress:  Not documented in this visit.              Point: Body mechanics (In Progress)       Learning Progress Summary             Patient  Acceptance, E,TB, NR by CB at 8/26/2023 1558                         Point: Precautions (In Progress)       Learning Progress Summary             Patient Acceptance, E,TB, NR by  at 8/26/2023 1558                                         User Key       Initials Effective Dates Name Provider Type Discipline     10/22/21 -  Luh Etienne, PT Physical Therapist PT                  PT Recommendation and Plan  Planned Therapy Interventions (PT): balance training, bed mobility training, gait training, home exercise program, patient/family education, transfer training, strengthening  Plan of Care Reviewed With: patient (family stepped out during session and pt gave permission to speak with them post session but did not see family)  Outcome Evaluation: Patient is a 57 y.o. male who was found down at home with AMS. He was admitted with sepsis, hypokalemia, hypernatremia, and acute respiratory failure (intubated from 8/24-8/26). PMHx includes  shunt, crani, von hippel lindau. Pt is Aox2 today with confusion noted. Patient is ind at baseline without use of AD per pt reports and lives alone per chart but tells me he lives with his parents.Today, patient performed supine<>sitting EOB requiring max-depA and then maxA for sitting balance with R lateral lean. Pt unable to correct with VC and tactile cues. Balance, postural control, strength, activity tolerance deficits noted. Pt with L foot drop and L sided  strength decreased compared to right. Per chart review pt states this is new but family states chronic deficit. Patient may benefit from skilled PT services to address functional deficits and improve level of independence prior to discharge. Anticipate SNF vs acute rehab upon DC.     Time Calculation:         PT Charges       Row Name 08/26/23 1600             Time Calculation    Start Time 1537  -CB      Stop Time 1550  -CB      Time Calculation (min) 13 min  -CB      PT Received On 08/26/23  -CB      PT - Next  Appointment 08/27/23  -DANGELO      PT Goal Re-Cert Due Date 09/09/23  -DANGELO                User Key  (r) = Recorded By, (t) = Taken By, (c) = Cosigned By      Initials Name Provider Type    Luh Griffin, PT Physical Therapist                  Therapy Charges for Today       Code Description Service Date Service Provider Modifiers Qty    70263920254 HC PT EVAL MOD COMPLEXITY 4 8/26/2023 Luh Etienne, PT GP 1            PT G-Codes  Outcome Measure Options: AM-PAC 6 Clicks Basic Mobility (PT), Modified [place]  AM-PAC 6 Clicks Score (PT): 7  Modified [place] Scale: 5 - Severe disability.  Bedridden, incontinent, and requiring constant nursing care and attention.  PT Discharge Summary  Anticipated Discharge Disposition (PT): sub acute care setting, skilled nursing facility, inpatient rehabilitation facility    Luh Etienne PT  8/26/2023

## 2023-08-26 NOTE — PROGRESS NOTES
[facility] Progress Note      Patient Name: Braxton Wolfe  : 1965  MRN: [id number]  Primary Care Physician:  Provider, No Known  Date of admission: 2023    Subjective     Interval History:   F/u kelly  Found down  Awake and alert with expressive aphasia, denies discomfort, +thirsty, no soa    Review of Systems:   14 point review of systems is otherwise negative except for mentioned above on HPI    Objective     Vitals:   Temp:  [97.2 °F (36.2 °C)-99.1 °F (37.3 °C)] 97.5 °F (36.4 °C)  Heart Rate:  [] 69  Resp:  [18-22] 22  BP: (108-135)/(73-98) 117/94  Flow (L/min):  [2-4] 2  FiO2 (%):  [40 %] 40 %    Intake/Output Summary (Last 24 hours) at 2023 0927  Last data filed at 2023 0319  Gross per 24 hour   Intake 2850 ml   Output 925 ml   Net 1925 ml       Physical Exam:    General Appearance: alert, oriented x 3, no acute distress   Skin: warm and dry  HEENT: oral mucosa normal, nonicteric sclera  Neck: supple, no JVD  Lungs: CTA  Heart: RRR, normal S1 and S2  Abdomen: soft, nontender, nondistended  : no palpable bladder  Extremities: no edema, cyanosis or clubbing  Neuro: normal speech and mental status     Scheduled Meds:     famotidine, 20 mg, Intravenous, Daily  piperacillin-tazobactam, 3.375 g, Intravenous, Q8H  senna-docusate sodium, 2 tablet, Oral, BID  sodium chloride, 10 mL, Intravenous, Q12H      IV Meds:   lactated ringers, 150 mL/hr, Last Rate: 150 mL/hr (23 0735)  lactated ringers, 100 mL/hr        Results Reviewed:   I have personally reviewed the results from the time of this admission to 2023 09:27 EDT     Results from last 7 days   Lab Units 23  0329 23  0917 23  0010 23  1805   SODIUM mmol/L 147* 144 142 150*   POTASSIUM mmol/L 3.6 3.9 4.0 2.9*   CHLORIDE mmol/L 111* 108* 104 125*   CO2 mmol/L 24.7 20.0* 19.0* 10.0*   BUN mg/dL 44* 52* 47* 27*   CREATININE mg/dL 2.53* 3.85* 4.70* 3.05*   CALCIUM mg/dL 8.2*  9.2 8.6 4.9*   BILIRUBIN mg/dL 0.7 0.5  --  0.2   ALK PHOS U/L 58 69  --  43   ALT (SGPT) U/L 16 15  --  9   AST (SGOT) U/L 37 34  --  20   GLUCOSE mg/dL 109* 137* 140* 92     Estimated Creatinine Clearance: 36.4 mL/min (A) (by C-G formula based on SCr of 2.53 mg/dL (H)).  Results from last 7 days   Lab Units 08/26/23 0329 08/25/23  0917 08/25/23  0010   MAGNESIUM mg/dL 2.8* 3.1* 1.9   PHOSPHORUS mg/dL 2.2* 3.6 2.9     Results from last 7 days   Lab Units 08/26/23 0329 08/25/23  0917   URIC ACID mg/dL 4.8 8.5*     Results from last 7 days   Lab Units 08/26/23  0329 08/25/23  0010 08/24/23  1805   WBC 10*3/mm3 10.59 11.66* 12.63*   HEMOGLOBIN g/dL 12.0* 16.1 17.6   PLATELETS 10*3/mm3 139* 246 306     Results from last 7 days   Lab Units 08/26/23  0329 08/25/23  0010 08/24/23  1805   INR  1.35* 1.32* 1.38*       Assessment / Plan     ASSESSMENT:  Oziel-found down at home, brought here, hydrated, renal function improving with hydration and supportive care  Hypovolemia-due to decreased intake and third spacing  Metabolic acidosis-resolving nicely with supportive care  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  Please feel free to call with any questions.    Guanaco Llanes MD  08/26/23  09:27 EDT    [facility]  [phone]

## 2023-08-27 ENCOUNTER — APPOINTMENT (OUTPATIENT)
Dept: GENERAL RADIOLOGY | Facility: HOSPITAL | Age: 58
DRG: 871 | End: 2023-08-27
Payer: COMMERCIAL

## 2023-08-27 LAB
AMMONIA BLD-SCNC: <10 UMOL/L (ref 16–60)
ANION GAP SERPL CALCULATED.3IONS-SCNC: 9 MMOL/L (ref 5–15)
BACTERIA SPEC RESP CULT: NORMAL
BASOPHILS # BLD AUTO: 0.01 10*3/MM3 (ref 0–0.2)
BASOPHILS NFR BLD AUTO: 0.1 % (ref 0–1.5)
BUN SERPL-MCNC: 30 MG/DL (ref 6–20)
BUN/CREAT SERPL: 21 (ref 7–25)
CA-I BLD-MCNC: 4.7 MG/DL (ref 4.6–5.4)
CA-I SERPL ISE-MCNC: 1.18 MMOL/L (ref 1.15–1.35)
CALCIUM SPEC-SCNC: 8 MG/DL (ref 8.6–10.5)
CHLORIDE SERPL-SCNC: 112 MMOL/L (ref 98–107)
CO2 SERPL-SCNC: 27 MMOL/L (ref 22–29)
CREAT SERPL-MCNC: 1.43 MG/DL (ref 0.76–1.27)
DEPRECATED RDW RBC AUTO: 43.3 FL (ref 37–54)
EGFRCR SERPLBLD CKD-EPI 2021: 57.2 ML/MIN/1.73
EOSINOPHIL # BLD AUTO: 0.03 10*3/MM3 (ref 0–0.4)
EOSINOPHIL NFR BLD AUTO: 0.4 % (ref 0.3–6.2)
ERYTHROCYTE [DISTWIDTH] IN BLOOD BY AUTOMATED COUNT: 13.1 % (ref 12.3–15.4)
GLUCOSE BLDC GLUCOMTR-MCNC: 129 MG/DL (ref 70–130)
GLUCOSE BLDC GLUCOMTR-MCNC: 69 MG/DL (ref 70–130)
GLUCOSE BLDC GLUCOMTR-MCNC: 78 MG/DL (ref 70–130)
GLUCOSE BLDC GLUCOMTR-MCNC: 81 MG/DL (ref 70–130)
GLUCOSE BLDC GLUCOMTR-MCNC: 81 MG/DL (ref 70–130)
GLUCOSE BLDC GLUCOMTR-MCNC: 93 MG/DL (ref 70–130)
GLUCOSE SERPL-MCNC: 186 MG/DL (ref 65–99)
GRAM STN SPEC: NORMAL
HCT VFR BLD AUTO: 33.4 % (ref 37.5–51)
HGB BLD-MCNC: 11.3 G/DL (ref 13–17.7)
IMM GRANULOCYTES # BLD AUTO: 0.05 10*3/MM3 (ref 0–0.05)
IMM GRANULOCYTES NFR BLD AUTO: 0.6 % (ref 0–0.5)
INR PPP: 1.54 (ref 0.9–1.1)
LYMPHOCYTES # BLD AUTO: 1.19 10*3/MM3 (ref 0.7–3.1)
LYMPHOCYTES NFR BLD AUTO: 15 % (ref 19.6–45.3)
MAGNESIUM SERPL-MCNC: 2.8 MG/DL (ref 1.6–2.6)
MCH RBC QN AUTO: 30.5 PG (ref 26.6–33)
MCHC RBC AUTO-ENTMCNC: 33.8 G/DL (ref 31.5–35.7)
MCV RBC AUTO: 90.3 FL (ref 79–97)
MONOCYTES # BLD AUTO: 0.27 10*3/MM3 (ref 0.1–0.9)
MONOCYTES NFR BLD AUTO: 3.4 % (ref 5–12)
NEUTROPHILS NFR BLD AUTO: 6.38 10*3/MM3 (ref 1.7–7)
NEUTROPHILS NFR BLD AUTO: 80.5 % (ref 42.7–76)
NRBC BLD AUTO-RTO: 0 /100 WBC (ref 0–0.2)
PHOSPHATE SERPL-MCNC: 2.4 MG/DL (ref 2.5–4.5)
PLATELET # BLD AUTO: 131 10*3/MM3 (ref 140–450)
PMV BLD AUTO: 9.8 FL (ref 6–12)
POTASSIUM SERPL-SCNC: 3.2 MMOL/L (ref 3.5–5.2)
PROTHROMBIN TIME: 18.7 SECONDS (ref 11.7–14.2)
RBC # BLD AUTO: 3.7 10*6/MM3 (ref 4.14–5.8)
SODIUM SERPL-SCNC: 148 MMOL/L (ref 136–145)
WBC NRBC COR # BLD: 7.93 10*3/MM3 (ref 3.4–10.8)

## 2023-08-27 PROCEDURE — 25010000002 THIAMINE PER 100 MG: Performed by: HOSPITALIST

## 2023-08-27 PROCEDURE — 82948 REAGENT STRIP/BLOOD GLUCOSE: CPT

## 2023-08-27 PROCEDURE — 85025 COMPLETE CBC W/AUTO DIFF WBC: CPT | Performed by: INTERNAL MEDICINE

## 2023-08-27 PROCEDURE — 99233 SBSQ HOSP IP/OBS HIGH 50: CPT | Performed by: NURSE PRACTITIONER

## 2023-08-27 PROCEDURE — 85610 PROTHROMBIN TIME: CPT | Performed by: INTERNAL MEDICINE

## 2023-08-27 PROCEDURE — 80048 BASIC METABOLIC PNL TOTAL CA: CPT | Performed by: INTERNAL MEDICINE

## 2023-08-27 PROCEDURE — 83735 ASSAY OF MAGNESIUM: CPT | Performed by: INTERNAL MEDICINE

## 2023-08-27 PROCEDURE — 25010000002 CEFTRIAXONE PER 250 MG: Performed by: INTERNAL MEDICINE

## 2023-08-27 PROCEDURE — 82330 ASSAY OF CALCIUM: CPT | Performed by: INTERNAL MEDICINE

## 2023-08-27 PROCEDURE — 84100 ASSAY OF PHOSPHORUS: CPT | Performed by: INTERNAL MEDICINE

## 2023-08-27 PROCEDURE — 82140 ASSAY OF AMMONIA: CPT | Performed by: INTERNAL MEDICINE

## 2023-08-27 PROCEDURE — 36415 COLL VENOUS BLD VENIPUNCTURE: CPT | Performed by: INTERNAL MEDICINE

## 2023-08-27 PROCEDURE — 74018 RADEX ABDOMEN 1 VIEW: CPT

## 2023-08-27 PROCEDURE — 97530 THERAPEUTIC ACTIVITIES: CPT

## 2023-08-27 RX ORDER — DEXTROSE MONOHYDRATE 50 MG/ML
100 INJECTION, SOLUTION INTRAVENOUS CONTINUOUS
Status: ACTIVE | OUTPATIENT
Start: 2023-08-27 | End: 2023-08-27

## 2023-08-27 RX ORDER — BENZONATATE 100 MG/1
100 CAPSULE ORAL 3 TIMES DAILY PRN
Status: DISCONTINUED | OUTPATIENT
Start: 2023-08-27 | End: 2023-09-08 | Stop reason: HOSPADM

## 2023-08-27 RX ORDER — THIAMINE HYDROCHLORIDE 100 MG/ML
200 INJECTION, SOLUTION INTRAMUSCULAR; INTRAVENOUS DAILY
Status: DISCONTINUED | OUTPATIENT
Start: 2023-08-27 | End: 2023-08-28

## 2023-08-27 RX ADMIN — CEFTRIAXONE SODIUM 1000 MG: 1 INJECTION, POWDER, FOR SOLUTION INTRAMUSCULAR; INTRAVENOUS at 12:00

## 2023-08-27 RX ADMIN — DEXTROSE 100 ML/HR: 5 SOLUTION INTRAVENOUS at 12:00

## 2023-08-27 RX ADMIN — SODIUM CHLORIDE, POTASSIUM CHLORIDE, SODIUM LACTATE AND CALCIUM CHLORIDE 100 ML/HR: 600; 310; 30; 20 INJECTION, SOLUTION INTRAVENOUS at 04:35

## 2023-08-27 RX ADMIN — FAMOTIDINE 20 MG: 10 INJECTION INTRAVENOUS at 08:17

## 2023-08-27 RX ADMIN — THIAMINE HYDROCHLORIDE 200 MG: 100 INJECTION, SOLUTION INTRAMUSCULAR; INTRAVENOUS at 17:59

## 2023-08-27 RX ADMIN — DEXTROSE MONOHYDRATE 25 G: 25 INJECTION, SOLUTION INTRAVENOUS at 06:30

## 2023-08-27 NOTE — PLAN OF CARE
Goal Outcome Evaluation:      Pt alert and agreeable today and in the middle of attempt to get to bsc with CNA. Pt mod /max asst for sup to sit today. Right lean in sitting. Able to partially correct to min with sit balance with verbal cues. Pt transfer to to bsc with mod asst with LLE blocked. Pt with right lean in sitting on bsc. STS with mod asst again and stood with mod asst for CNA to perform hygiene and switch of bsc to recliner. Recliner repositioned and pt performed STS again for static standing with hand on bed rail. Min asst for standing balance occaisional mod with vc for midline. Pt appears improved today but remains with significant decline from baseline. Daughter present and will stay with pt until nsg asst back to bed. Pt requesting to ambulate but is not able to maintain midline with sitting balance consistently.

## 2023-08-27 NOTE — THERAPY TREATMENT NOTE
Patient Name: Braxton Wolfe  : 1965    MRN: [id number]                              Today's Date: 2023       Admit Date: 2023    Visit Dx:     ICD-10-CM ICD-9-CM   1. Sepsis with acute renal failure without septic shock, due to unspecified organism, unspecified acute renal failure type  A41.9 038.9    R65.20 995.92    N17.9 584.9   2. Acute respiratory failure, unspecified whether with hypoxia or hypercapnia  J96.00 518.81   3. Acute kidney injury  N17.9 584.9   4. Hypokalemia  E87.6 276.8   5. Hypernatremia  E87.0 276.0     Patient Active Problem List   Diagnosis    Sepsis     History reviewed. No pertinent past medical history.  Past Surgical History:   Procedure Laterality Date    BRAIN SURGERY        General Information       Row Name 23 1400          Physical Therapy Time and Intention    Document Type therapy note (daily note)  -SV     Mode of Treatment individual therapy;physical therapy  -SV       Row Name 23 1400          General Information    Patient Profile Reviewed yes  -SV               User Key  (r) = Recorded By, (t) = Taken By, (c) = Cosigned By      Initials Name Provider Type    SV Sujata Camarillo, PT Physical Therapist                   Mobility       Row Name 23          Bed Mobility    Supine-Sit [place] (Bed Mobility) moderate assist (50% patient effort);maximum assist (25% patient effort)  -SV     Sit-Supine [place] (Bed Mobility) not tested  -SV     Assistive Device (Bed Mobility) bed rails;draw sheet;head of bed elevated  -SV     Comment, (Bed Mobility) right lean during mobility, sit bal initially min/mod asst with partial correction with vc  -SV       Row Name 23          Bed-Chair Transfer    Bed-Chair [place] (Transfers) moderate assist (50% patient effort);1 person assist;1 person to manage equipment  to [facility] today  -SV     Comment, (Bed-Chair Transfer) JACQUELINE MNOTES blocked  -SV       Row Name 23 0298           Sit-Stand Transfer    Sit-Stand [place] (Transfers) moderate assist (50% patient effort)  -SV     Comment, (Sit-Stand Transfer) HHA from bs, LLE blocked, stood with min/mod asst vc for midline, for 1 -2 minutes for hygiene and switch from bsc to recliner  -SV       Row Name 08/27/23 1652          Gait/Stairs (Locomotion)    Comment, (Gait/Stairs) Pt requesting to ambulate but unable to sit erect without cues/asst  -SV               User Key  (r) = Recorded By, (t) = Taken By, (c) = Cosigned By      Initials Name Provider Type    SV Sujata Camarillo, PT Physical Therapist                   Obj/Interventions    No documentation.                  Goals/Plan    No documentation.                  Clinical Impression       Row Name 08/27/23 1656          Pain    Pretreatment Pain Rating 0/10 - no pain  -SV     Posttreatment Pain Rating 0/10 - no pain  -SV       Row Name 08/27/23 1656          Vital Signs    O2 Delivery Pre Treatment room air  -SV     O2 Delivery Intra Treatment room air  -SV     O2 Delivery Post Treatment room air  -SV     Pre Patient Position Supine  -SV     Intra Patient Position Standing  -SV     Post Patient Position Sitting  -SV       Row Name 08/27/23 1656          Positioning and Restraints    Pre-Treatment Position in bed  -SV     Post Treatment Position chair  -SV     In Chair reclined;call light within reach;with family/caregiver;exit alarm on;encouraged to call for assist  -SV               User Key  (r) = Recorded By, (t) = Taken By, (c) = Cosigned By      Initials Name Provider Type    SV Sujata Camarillo, PT Physical Therapist                   Outcome Measures       Row Name 08/27/23 1657          How much help from another person do you currently need...    Turning from your back to your side while in flat bed without using bedrails? 2  -SV     Moving from lying on back to sitting on the side of a flat bed without bedrails? 2  -SV     Moving to and from a bed to a chair (including a  wheelchair)? 2  -SV     Standing up from a chair using your arms (e.g., wheelchair, bedside chair)? 2  -SV     Climbing 3-5 steps with a railing? 1  -SV     To walk in hospital room? 1  -SV     AM-PAC 6 Clicks Score (PT) 10  -SV     Highest level of mobility 4 --> Transferred to chair/commode  -SV               User Key  (r) = Recorded By, (t) = Taken By, (c) = Cosigned By      Initials Name Provider Type    SV Sujata Camarillo, PT Physical Therapist                                 Physical Therapy Education       Title: PT OT SLP Therapies (In Progress)       Topic: Physical Therapy (In Progress)       Point: Mobility training (In Progress)       Learning Progress Summary             Patient Acceptance, E,TB, NR by CB at 8/26/2023 1558                         Point: Home exercise program (Not Started)       Learner Progress:  Not documented in this visit.              Point: Body mechanics (In Progress)       Learning Progress Summary             Patient Acceptance, E,TB, NR by CB at 8/26/2023 1558                         Point: Precautions (In Progress)       Learning Progress Summary             Patient Acceptance, E,TB, NR by CB at 8/26/2023 1558                                         User Key       Initials Effective Dates Name Provider Type Discipline    CB 10/22/21 -  Luh Etienne, PT Physical Therapist PT                  PT Recommendation and Plan           Time Calculation:         PT Charges       Row Name 08/27/23 1704             Time Calculation    Start Time 1400  -SV      Stop Time 1431  -SV      Time Calculation (min) 31 min  -SV      PT Received On 08/27/23  -SV      PT - Next Appointment 08/28/23  -SV                User Key  (r) = Recorded By, (t) = Taken By, (c) = Cosigned By      Initials Name Provider Type    Sujata Lopez PT Physical Therapist                  Therapy Charges for Today       Code Description Service Date Service Provider Modifiers Qty    73980463863  PT  THERAPEUTIC ACT EA 15 MIN 8/27/2023 Sujata Camarillo, PT GP 2    [id number]  PT THER SUPP EA 15 MIN 8/27/2023 Sujata Camarillo, PT GP 1            PT G-Codes  Outcome Measure Options: AM-PAC 6 Clicks Basic Mobility (PT), Modified [place]  AM-PAC 6 Clicks Score (PT): 10  Modified [place] Scale: 5 - Severe disability.  Bedridden, incontinent, and requiring constant nursing care and attention.       Sujata Camarillo, PT  8/27/2023

## 2023-08-27 NOTE — PROGRESS NOTES
[facility]     LOS: 3 days     Subjective:    CC:Altered Mental Status    DIET:  Diet Order   Procedures    NPO Diet NPO Type: Strict NPO     No cp  No soa    Objective:    Vital Signs:  Temp:  [97.5 °F (36.4 °C)-99.1 °F (37.3 °C)] 98.2 °F (36.8 °C)  Heart Rate:  [57-63] 58  Resp:  [18-20] 18  BP: (111-119)/(81-87) 111/87    SpO2:  [96 %-97 %] 96 %  on   ;   Device (Oxygen Therapy): room air  Body mass index is 25.24 kg/m².    Physical Exam  Constitutional:       Appearance: Normal appearance.   HENT:      Head: Normocephalic and atraumatic.   Cardiovascular:      Rate and Rhythm: Normal rate.      Heart sounds: No murmur heard.    No friction rub.   Pulmonary:      Effort: Pulmonary effort is normal.      Breath sounds: Normal breath sounds.   Abdominal:      General: Bowel sounds are normal. There is no distension.      Palpations: Abdomen is soft.      Tenderness: There is no abdominal tenderness.   Skin:     General: Skin is warm and dry.   Neurological:      Mental Status: He is alert.      Comments: Mild left sided weakness   Psychiatric:         Mood and Affect: Mood normal.         Behavior: Behavior normal.       Results Review:    Glucose   Date Value Ref Range Status   08/27/2023 186 (H) 65 - 99 mg/dL Final   08/26/2023 109 (H) 65 - 99 mg/dL Final   08/25/2023 137 (H) 65 - 99 mg/dL Final   08/25/2023 140 (H) 65 - 99 mg/dL Final   08/24/2023 92 65 - 99 mg/dL Final     Results from last 7 days   Lab Units 08/27/23  0648   WBC 10*3/mm3 7.93   HEMOGLOBIN g/dL 11.3*   HEMATOCRIT % 33.4*   PLATELETS 10*3/mm3 131*     Results from last 7 days   Lab Units 08/27/23  0648 08/26/23  0329   SODIUM mmol/L 148* 147*   POTASSIUM mmol/L 3.2* 3.6   CHLORIDE mmol/L 112* 111*   CO2 mmol/L 27.0 24.7   BUN mg/dL 30* 44*   CREATININE mg/dL 1.43* 2.53*   CALCIUM mg/dL 8.0* 8.2*   BILIRUBIN mg/dL  --  0.7   ALK PHOS U/L  --  58   ALT (SGPT) U/L  --  16   AST (SGOT) U/L  --  37   GLUCOSE mg/dL 186*  109*     Results from last 7 days   Lab Units 08/27/23  0648 08/25/23  0010 08/24/23  1805   INR  1.54*   < > 1.38*   APTT seconds  --   --  25.5    < > = values in this interval not displayed.     Results from last 7 days   Lab Units 08/27/23  0648   MAGNESIUM mg/dL 2.8*     Results from last 7 days   Lab Units 08/26/23  0329 08/25/23  0917 08/24/23  1805   CK TOTAL U/L 1,208* 1,023* 500*   HSTROP T ng/L  --   --  27*     Cultures:  Blood Culture   Date Value Ref Range Status   08/24/2023 No growth at 2 days  Preliminary   08/24/2023 No growth at 2 days  Preliminary     Respiratory Culture   Date Value Ref Range Status   08/25/2023   Final    Rare Normal respiratory arti. No S. aureus or Pseudomonas aeruginosa detected. Final report.       I have reviewed daily medications and changes in CPOE    Scheduled meds  cefTRIAXone, 1,000 mg, Intravenous, Q24H  famotidine, 20 mg, Intravenous, Daily  senna-docusate sodium, 2 tablet, Oral, BID  sodium chloride, 10 mL, Intravenous, Q12H        dextrose, 100 mL/hr      PRN meds    aluminum-magnesium hydroxide-simethicone    senna-docusate sodium **AND** polyethylene glycol **AND** bisacodyl **AND** bisacodyl    dextrose    dextrose    glucagon (human recombinant)    nitroglycerin    ondansetron    [COMPLETED] Insert Peripheral IV **AND** sodium chloride    sodium chloride    sodium chloride        Sepsis        Assessment/Plan:  Acute encephalopathy; suspected HIE  -Patient's mental status is dramatically improved from admission, patient's voice is stronger today versus yesterday according to the family     Acute hypercapnic respiratory failure s/p vent till 8/25  -Patient is currently on room air with excellent oxygen saturations    B/L Aspiration pneumonia  -Continue with Rocephin    Vomiting; unclear etiology  -He is tolerating oral intake and not having any more vomiting    Acute renal failure  -Creatinine on admission was 3.0 and it increased to 3.8 and today it is 1.4    -Patient is being followed by nephrology    hypernatremia  -improved    Hypokalemia  -replace    Severe hypocalcemia  -better    Severe metabolic acidosis  -better    Elevated BNP    Incidental lung nodules-needs outpatient follow  S/p prior  shunt  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  He has been off of this medication now for a week        Heath Hooper MD  08/27/23  15:09 EDT

## 2023-08-27 NOTE — PROGRESS NOTES
"DOS: 2023  NAME: Braxton Wolfe   : 1965  PCP: Provider, No Known  Chief Complaint   Patient presents with    Altered Mental Status     Neurology    Subjective: Patient is confused, nurse reports family was concerned that patient was hallucinating overnight.  He reports difficulty with his left side but is unsure if this is at baseline or worse than usual. No h/a. +cough.  Pt seen in follow up today, however the problem is new to the examiner.      Objective:  Vital signs: /81 (BP Location: Right arm, Patient Position: Lying)   Pulse 63   Temp 99.1 °F (37.3 °C) (Oral)   Resp 18   Ht 177.8 cm (70\")   Wt 79.8 kg (175 lb 14.8 oz)   SpO2 97%   BMI 25.24 kg/m²       General appearance: Well developed, well nourished, alert and cooperative.   HEENT: Normocephalic.   Cardiac: Regular rate and rhythm. No murmurs.   Peripheral Vasculature: Radial pulses are equal and symmetric.  Chest Exam: Clear to auscultation bilaterally, no wheezes, no rhonchi.  Extremities: Normal, no edema.   Skin: No rashes or birthmarks.     Higher integrative function: Awake and alert, [place] that [place] 2023.  Language intact.  No neglect.  Cranial nerves: Visual fields intact.  Extraocular movements intact, mildly disconjugate gaze.  Pupils equal, round, reactive to light.  Normal facial sensation, face symmetric.  Tongue midline.  No dysarthria.  Motor: Right arm and leg 5 out of 5, very mild weakness in the left upper extremity and with left dorsi and plantarflexion.  No fasciculations, rigidity, spasticity or abnormal movements.   Sensation: Intact/symmetric to light touch in all extremities.  Station and gait: Deferred.  Coordination: Dysmetria with left finger-to-nose,  No dysmetria on the right.    Scheduled Meds:cefTRIAXone, 1,000 mg, Intravenous, Q24H  famotidine, 20 mg, Intravenous, Daily  senna-docusate sodium, 2 tablet, Oral, BID  sodium chloride, 10 mL, Intravenous, " Q12H      Continuous Infusions:dextrose, 100 mL/hr      PRN Meds:.  aluminum-magnesium hydroxide-simethicone    senna-docusate sodium **AND** polyethylene glycol **AND** bisacodyl **AND** bisacodyl    dextrose    dextrose    glucagon (human recombinant)    nitroglycerin    ondansetron    [COMPLETED] Insert Peripheral IV **AND** sodium chloride    sodium chloride    sodium chloride    Laboratory results:  Lab Results   Component Value Date    GLUCOSE 186 (H) 08/27/2023    CALCIUM 8.0 (L) 08/27/2023     (H) 08/27/2023    K 3.2 (L) 08/27/2023    CO2 27.0 08/27/2023     (H) 08/27/2023    BUN 30 (H) 08/27/2023    CREATININE 1.43 (H) 08/27/2023    BCR 21.0 08/27/2023    ANIONGAP 9.0 08/27/2023     Lab Results   Component Value Date    WBC 7.93 08/27/2023    HGB 11.3 (L) 08/27/2023    HCT 33.4 (L) 08/27/2023    MCV 90.3 08/27/2023     (L) 08/27/2023     No results found for: CHOL  Lab Results   Component Value Date    HDL 45 06/03/2020    HDL 54 01/11/2019    HDL 43 04/17/2018     Lab Results   Component Value Date     (H) 06/03/2020    LDL 89 01/11/2019    LDL 88 04/17/2018     Lab Results   Component Value Date    TRIG 88 06/03/2020    TRIG 55 01/11/2019    TRIG 58 04/17/2018          Review and interpretation of imaging:  Adult Transthoracic Echo Complete W/ Cont if Necessary Per Protocol    Result Date: 8/25/2023    The study is technically difficult for diagnosis.   Left ventricular ejection fraction appears to be 56 - 60%.   Left ventricular diastolic function was indeterminate.   Estimated right ventricular systolic pressure from tricuspid regurgitation is normal (<35 mmHg).   There is a trivial pericardial effusion. There is no evidence of cardiac tamponade.     EEG    Result Date: 8/25/2023  Date of onset: 8/25/23 Date of offset: 8/25/23 Indication:Found down, comatose Technical description:  This is a 21 channel digital EEG recording that began on 0959 and ended on 1026.  Background:   The background consists of a posteriorly dominant rhythm that is notably absent.  Anteriorly, there is diffuse theta and delta activity.  There is fair spontaneous variability.  Hyperventilation was not performed and photic stimulation was performed and did not induce an abnormal driving response there is no focal slowing.  There are no interictal epileptiform discharges and no electrographic seizures noted during the study.  EKG demonstrates tachycardia between 100 and 120 bpm for majority of the study.  Several triphasic waves are seen during the study.  Impression: This is an abnormal routine EEG study due to the presence of diffuse delta and theta slowing.  There are no interictal epileptiform discharges and no electrographic seizures.  These electrophysiologic findings are indicative of moderately severe encephalopathy.  There are additionally triphasic waves seen. Triphasic waves are a nonspecific finding seen with encephalopathies, more often with hepatic and/or renal derangements.       CT Abdomen Pelvis Without Contrast    Result Date: 8/24/2023  CT OF THE CHEST WITHOUT CONTRAST; CT OF THE ABDOMEN AND PELVIS WITHOUT CONTRAST  HISTORY: Found down. Concern for aspiration.  COMPARISON: None available.  TECHNIQUE: Axial CT imaging was obtained from the thoracic inlet through the symphysis pubis. No IV contrast was administered.  FINDINGS: CT OF THE CHEST: Endotracheal tube terminates in satisfactory position. Thyroid gland is unremarkable. Thoracic aorta is normal in caliber. Mediastinal lymph nodes do not appear pathologically enlarged. There is a subpleural nodule noted within the right upper lobe, measuring up to 8 mm. There are some reticular nodular infiltrates which are noted within the right upper lobe. There is dense dependent consolidation with air bronchograms noted within both lower lobes. There may be trace bilateral pleural effusions. No acute osseous abnormalities are seen. The patient does have a  right-sided ventriculoperitoneal shunt.  CT OF THE ABDOMEN AND PELVIS: The stomach, duodenum, spleen, adrenal glands, and gallbladder are all grossly unremarkable. There are low-attenuation lesions which are identified within the pancreas. They are poorly assessed on this unenhanced exam. 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 They were described on prior report from June 17, 2020, but again are poorly assessed on this exam. No suspicious hepatic lesions are seen. No hydronephrosis is seen on either side. I do not see any renal masses on this unenhanced exam. No distal ureteral or bladder stones are seen. Prostate gland is within normal limits. There is no bowel obstruction. Ventricular peritoneal shunt terminates within the left lower quadrant. The appendix is normal. No acute osseous abnormalities are seen. There is mild colonic diverticulosis.       1. Dense dependent consolidation bilaterally., Given history, aspiration is a concern. Patient is also noted to have some mild reticulonodular infiltrates within the right upper lobe, as well as an 8 mm subpleural nodule within the right upper lobe. CT follow-up in 3 months is recommended. 2. 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 However, they are incompletely assessed in the absence of contrast material and prior studies for comparison.    Radiation dose reduction techniques were utilized, including automated exposure control and exposure modulation based on body size.   This report was finalized on 8/24/2023 10:34 PM by Dr. Charlee Medina M.D.      CT Head Without Contrast    Result Date: 8/24/2023  CT OF THE HEAD WITHOUT CONTRAST  HISTORY: Unresponsiveness  COMPARISON: None available.  TECHNIQUE: Axial CT imaging was obtained through the brain. No IV contrast was administered.  FINDINGS: Unfortunately, patient's prior imaging is  not available for comparison. The patient has a right frontal ventriculoperitoneal shunt, which terminates within the right lateral ventricle. There is encephalomalacia noted within the right frontal lobe, adjacent to the course of the catheter. There is ventricular dilatation. Patient may have some additional mild encephalomalacia within the right parietal lobe. There is some decreased attenuation surrounding the posterior and temporal horns of the lateral ventricles. Some of this may be related to small vessel disease. Given dilatation of the ventricular system, some transependymal flow of CSF is not excluded. There are areas of encephalomalacia noted within both cerebellar hemispheres. The patient is also noted to have marked dilatation of the fourth ventricle, which has been described on prior imaging. The patient is status post suboccipital craniectomy. There is partial opacification of the mastoid air cells bilaterally. Mucosal thickening is noted within the ethmoid and sphenoid sinuses. There are air-fluid levels within the sphenoid sinuses. Correlation with any evidence of sinusitis is recommended.       1. No acute intracranial hemorrhage. 2. Unfortunately, this patient's prior imaging is not available for comparison. There is ventriculomegaly. I'm uncertain if this has increased when compared to prior imaging. There is some decreased attenuation surrounding the posterior and temporal horns of the lateral ventricles bilaterally. Some transependymal flow of CSF cannot be excluded..  Radiation dose reduction techniques were utilized, including automated exposure control and exposure modulation based on body size.   This report was finalized on 8/24/2023 10:21 PM by Dr. Charlee Medina M.D.      CT Chest Without Contrast Diagnostic    Result Date: 8/24/2023  CT OF THE CHEST WITHOUT CONTRAST; CT OF THE ABDOMEN AND PELVIS WITHOUT CONTRAST  HISTORY: Found down. Concern for aspiration.  COMPARISON: None  available.  TECHNIQUE: Axial CT imaging was obtained from the thoracic inlet through the symphysis pubis. No IV contrast was administered.  FINDINGS: CT OF THE CHEST: Endotracheal tube terminates in satisfactory position. Thyroid gland is unremarkable. Thoracic aorta is normal in caliber. Mediastinal lymph nodes do not appear pathologically enlarged. There is a subpleural nodule noted within the right upper lobe, measuring up to 8 mm. There are some reticular nodular infiltrates which are noted within the right upper lobe. There is dense dependent consolidation with air bronchograms noted within both lower lobes. There may be trace bilateral pleural effusions. No acute osseous abnormalities are seen. The patient does have a right-sided ventriculoperitoneal shunt.  CT OF THE ABDOMEN AND PELVIS: The stomach, duodenum, spleen, adrenal glands, and gallbladder are all grossly unremarkable. There are low-attenuation lesions which are identified within the pancreas. They are poorly assessed on this unenhanced exam. 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 They were described on prior report from June 17, 2020, but again are poorly assessed on this exam. No suspicious hepatic lesions are seen. No hydronephrosis is seen on either side. I do not see any renal masses on this unenhanced exam. No distal ureteral or bladder stones are seen. Prostate gland is within normal limits. There is no bowel obstruction. Ventricular peritoneal shunt terminates within the left lower quadrant. The appendix is normal. No acute osseous abnormalities are seen. There is mild colonic diverticulosis.       1. Dense dependent consolidation bilaterally., Given history, aspiration is a concern. Patient is also noted to have some mild reticulonodular infiltrates within the right upper lobe, as well as an 8 mm subpleural nodule within the right upper lobe. CT follow-up in 3 months is recommended. 2. Multiple  low-attenuation lesions noted throughout the pancreas, favored to be pancreatic cysts, given history of von Hippel-Lindau. However, they are incompletely assessed in the absence of contrast material and prior studies for comparison.    Radiation dose reduction techniques were utilized, including automated exposure control and exposure modulation based on body size.   This report was finalized on 8/24/2023 10:34 PM by Dr. Charlee Medina M.D.      XR Chest 1 View    Result Date: 8/25/2023  XR CHEST 1 VW-8/25/2023  HISTORY: Central line placement.  Right internal jugular central venous catheter seen with its tip overlying the SVC. No pneumothorax is seen. Endotracheal tube is seen in good position. Heart size is within normal limits. There is some mild patchy atelectasis or infiltrate in the right lower lung. Left lung appears clear.      1. Central venous catheter tip overlying the SVC. 2. No pneumothorax is seen.   This report was finalized on 8/25/2023 6:41 AM by Dr. Sriram Ray M.D.      XR Chest 1 View    Result Date: 8/25/2023  SINGLE VIEW OF THE CHEST  HISTORY: Respiratory failure  COMPARISON: August 24, 2023  FINDINGS: Endotracheal tube terminates in satisfactory position. There is a right-sided ventriculoperitoneal shunt. Heart size is within normal limits. Bibasilar consolidation is noted. No pneumothorax is seen. Trace left pleural effusion is suspected.      Persistent bibasilar consolidation.  This report was finalized on 8/25/2023 5:17 AM by Dr. Charlee Medina M.D.      XR Chest 1 View    Result Date: 8/24/2023  CHEST SINGLE VIEW  HISTORY: Respiratory failure. Patient was found down.  COMPARISON: None.  FINDINGS: There has been intubation and the ET tube tip is 2.2 cm above the melanie and this could be withdrawn 2 cm for better position. The heart size appears normal. There is no evidence for perihilar edema or pneumothorax or focal airspace disease.  There is some limitation as the lung  apices, particular on the right and the right costophrenic angle, are not included on the field-of-view. There is shunt tubing overlying the right thorax.      Limited exam demonstrates intubation with ET tube tip 2.2 cm above the melanie and this could be withdrawn 2 cm for better position. No further evidence for active disease in the chest.  This report was finalized on 8/24/2023 7:10 PM by Dr. Marcelo De M.D.       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  He was admitted to the ICU on 8/24 after he was found down by a friend and brought to the hospital with YUDITH, severe encephalopathy requiring endotracheal intubation, and suspected aspiration pneumonia.  He was extubated and moved out of the ICU yesterday.  He continues to have confusion and has mild weakness on his left side and is unsure if this is baseline.    Work-up:  CT head: No acute intracranial hemorrhage.  Ventriculomegaly with decreased attenuation surrounding posterior temporal horns noted and transependymal flow of CSF cannot be excluded.  Labs: CK level 500 on admission, Blood culture negative to date, magnesium 1.3 on admission, alcohol level not elevated on admission, UDS positive for benzos and fentanyl however had received these here in the hospital prior to UDS.  CK level up to 1208 yesterday.ammonia level less than 10 today, sodium 148 today  EEG completed yesterday showed triphasic waves but no epileptiform abnormalities.    Diagnoses:  Acute encephalopathy  Left hemiparesis/ataxia  Von Hippel-Lindau with cerebellar hemangioblastoma  YUDITH (improving), now with hypernatremia  Bilateral aspiration pneumonia    Plan:  MRI brain with contrast when able to tolerate from a pulmonary/coughing standpoint  Recommend delirium precautions (see below)  Discussed with Dr. Sequeira today.  Neurology will  follow.

## 2023-08-27 NOTE — PLAN OF CARE
Goal Outcome Evaluation:  Plan of Care Reviewed With: patient        Progress: no change  Outcome Evaluation: Monitor pain,labs,and vitals. VSS. Room air. Frequent rounding. Bed alarm. Incontinent of urine-brief. No s/s or c/o pain noted on current shift. NPO. Will continue to monitor.

## 2023-08-27 NOTE — PROGRESS NOTES
[facility] Progress Note      Patient Name: Braxton Wolfe  : 1965  MRN: [id number]  Primary Care Physician:  Provider, No Known  Date of admission: 2023    Subjective     Interval History:   F/u kelly  Found down  Awake and alert with expressive aphasia, denies discomfort, still thirsty, no soa    Review of Systems:   14 point review of systems is otherwise negative except for mentioned above on HPI    Objective     Vitals:   Temp:  [97.4 °F (36.3 °C)-99.1 °F (37.3 °C)] 99.1 °F (37.3 °C)  Heart Rate:  [57-67] 63  Resp:  [18-20] 18  BP: (113-129)/(80-88) 119/81    Intake/Output Summary (Last 24 hours) at 2023 1019  Last data filed at 2023 0148  Gross per 24 hour   Intake 0 ml   Output 800 ml   Net -800 ml       Physical Exam:    General Appearance: alert, oriented x 3, no acute distress   Skin: warm and dry  HEENT: oral mucosa normal, nonicteric sclera  Neck: supple, no JVD  Lungs: CTA  Heart: RRR, normal S1 and S2  Abdomen: soft, nontender, nondistended  : no palpable bladder  Extremities: no edema, cyanosis or clubbing  Neuro: normal speech and mental status     Scheduled Meds:     cefTRIAXone, 1,000 mg, Intravenous, Q24H  famotidine, 20 mg, Intravenous, Daily  senna-docusate sodium, 2 tablet, Oral, BID  sodium chloride, 10 mL, Intravenous, Q12H      IV Meds:   lactated ringers, 100 mL/hr, Last Rate: 100 mL/hr (23 0435)        Results Reviewed:   I have personally reviewed the results from the time of this admission to 2023 10:19 EDT     Results from last 7 days   Lab Units 23  0648 23  0329 23  0917 23  0010 23  1805   SODIUM mmol/L 148* 147* 144   < > 150*   POTASSIUM mmol/L 3.2* 3.6 3.9   < > 2.9*   CHLORIDE mmol/L 112* 111* 108*   < > 125*   CO2 mmol/L 27.0 24.7 20.0*   < > 10.0*   BUN mg/dL 30* 44* 52*   < > 27*   CREATININE mg/dL 1.43* 2.53* 3.85*   < > 3.05*   CALCIUM mg/dL 8.0* 8.2* 9.2   < > 4.9*   BILIRUBIN  mg/dL  --  0.7 0.5  --  0.2   ALK PHOS U/L  --  58 69  --  43   ALT (SGPT) U/L  --  16 15  --  9   AST (SGOT) U/L  --  37 34  --  20   GLUCOSE mg/dL 186* 109* 137*   < > 92    < > = values in this interval not displayed.     Estimated Creatinine Clearance: 64.3 mL/min (A) (by C-G formula based on SCr of 1.43 mg/dL (H)).  Results from last 7 days   Lab Units 08/27/23  0648 08/26/23  0329 08/25/23  0917   MAGNESIUM mg/dL 2.8* 2.8* 3.1*   PHOSPHORUS mg/dL 2.4* 2.2* 3.6     Results from last 7 days   Lab Units 08/26/23  0329 08/25/23  0917   URIC ACID mg/dL 4.8 8.5*     Results from last 7 days   Lab Units 08/27/23  0648 08/26/23  0329 08/25/23  0010 08/24/23  1805   WBC 10*3/mm3 7.93 10.59 11.66* 12.63*   HEMOGLOBIN g/dL 11.3* 12.0* 16.1 17.6   PLATELETS 10*3/mm3 131* 139* 246 306     Results from last 7 days   Lab Units 08/27/23  0648 08/26/23  0329 08/25/23  0010 08/24/23  1805   INR  1.54* 1.35* 1.32* 1.38*       Assessment / Plan     ASSESSMENT:  Oziel-found down at home, brought here, hydrated, renal function improving with hydration and supportive care  Hypovolemia-due to decreased intake and third spacing  Metabolic acidosis-resolving nicely with supportive care  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  Please feel free to call with any questions.    Guanaco Llanes MD  08/27/23  10:19 EDT    [facility]  [phone]              Melolabial Transposition Flap Text: The defect edges were debeveled with a #15 scalpel blade.  Given the location of the defect and the proximity to free margins a melolabial flap was deemed most appropriate.  Using a sterile surgical marker, an appropriate melolabial transposition flap was drawn incorporating the defect.    The area thus outlined was incised deep to adipose tissue with a #15 scalpel blade.  The skin margins were undermined to an appropriate distance in all directions utilizing iris scissors.

## 2023-08-27 NOTE — PROGRESS NOTES
[facility]  [phone]  Dr. Mookie Osuna     Subjective:  LOS: 3    Chief Complaint:   Found down, aspiration pneumonia, acute encephalopathy     Patient was sitting up in chair on my evaluation.  Denies any concerns or complaints.  However he does appear to still have confusion and did have to reasked lots of questions.  Also not moving his left side which she states has been having difficulty with but unclear how long.  Also discussed with daughter at bedside and questions were answered.    Objective   Vital Signs past 24hrs  Temp range: Temp (24hrs), Av.1 °F (36.7 °C), Min:97.5 °F (36.4 °C), Max:99.1 °F (37.3 °C)    BP range: BP: (111-119)/(81-87) 111/87  Pulse range: Heart Rate:  [57-63] 58  Resp rate range: Resp:  [18-20] 18  Device (Oxygen Therapy): room air   Oxygen range:SpO2:  [96 %-97 %] 96 %     Physical Exam  Constitutional:       Appearance: Normal appearance.   HENT:      Head: Normocephalic and atraumatic.   Eyes:      Extraocular Movements: Extraocular movements intact.      Pupils: Pupils are equal, round, and reactive to light.   Cardiovascular:      Rate and Rhythm: Normal rate and regular rhythm.      Heart sounds: No murmur heard.  Pulmonary:      Effort: Pulmonary effort is normal. No respiratory distress.      Breath sounds: Normal breath sounds. No wheezing, rhonchi or rales.   Abdominal:      General: Abdomen is flat.      Palpations: Abdomen is soft.      Tenderness: There is no abdominal tenderness.   Musculoskeletal:         General: No swelling or tenderness.   Skin:     General: Skin is warm and dry.      Findings: No rash.   Neurological:      Mental Status: He is alert. He is disoriented and confused.   Psychiatric:         Mood and Affect: Mood normal.         Behavior: Behavior normal.     Results Review:    I have reviewed the laboratory and imaging data since the last note by [facility] physician.  My annotations are noted in assessment and plan.      Result  Review:  I have personally reviewed the results from last note by [facility] physician to 8/27/2023 15:22 EDT and agree with these findings:  [x]  Laboratory list / accordion  [x]  Microbiology  [x]  Radiology  [x]  EKG/Telemetry   [x]  Cardiology/Vascular   [x]  Pathology  [x]  Old records  []  Other:    Medication Review:  I have reviewed the current MAR.  My annotations are noted in assessment and plan.    cefTRIAXone, 1,000 mg, Intravenous, Q24H  famotidine, 20 mg, Intravenous, Daily  senna-docusate sodium, 2 tablet, Oral, BID  sodium chloride, 10 mL, Intravenous, Q12H        dextrose, 100 mL/hr      Lines, Drains & Airways       Active LDAs       Name Placement date Placement time Site Days    CVC Quadruple Lumen 08/25/23 Right Internal jugular 08/25/23  0626  Internal jugular  2                  No active isolations  Diet Orders (active) (From admission, onward)       Start     Ordered    08/26/23 1700  NPO Diet NPO Type: Strict NPO  Diet Effective Now         08/26/23 1700                      Assessment  Acute hypercapnic respiratory failure s/p vent till 8/25  B/L Aspiration pneumonia  Vomiting; unclear etiology  Acute encephalopathy; suspected HIE (resolved)  Acute SVT  Acute renal failure  Hypernatremia  Hypokalemia  Severe hypocalcemia and metabolic acidosis  Incidental lung nodules-needs outpatient follow  S/p prior  shunt  VHL syndrome with CNS hemangioblastomas  s/p previous Craney with ? Left hemiparesis    Plan  - overall clinical picture is still unclear why he was found down in old dried blood but he appears to be improving with just supportive care, fluids and antibiotics.   - intubated 8/24 PTA, extubated 8/25  - continue Rocephin for aspiration pna for 5 days total therapy  - oxygen weaned to room air  - hospitalist team consulted to assume primary  - neurology following and planning MRI brain   -Patient is okay from pulmonary perspective to go down for MRI.  I will add on as needed  antitussives.      THESE ARE NEW MEDICAL PROBLEMS TO ME.      Mookie Osuna DO   08/27/23  15:22 EDT      Part of this note may be an electronic transcription/translation of spoken language to printed text using the Dragon Dictation System.

## 2023-08-28 ENCOUNTER — APPOINTMENT (OUTPATIENT)
Dept: GENERAL RADIOLOGY | Facility: HOSPITAL | Age: 58
DRG: 871 | End: 2023-08-28
Payer: COMMERCIAL

## 2023-08-28 ENCOUNTER — APPOINTMENT (OUTPATIENT)
Dept: OTHER | Facility: HOSPITAL | Age: 58
DRG: 871 | End: 2023-08-28
Payer: COMMERCIAL

## 2023-08-28 ENCOUNTER — APPOINTMENT (OUTPATIENT)
Dept: MRI IMAGING | Facility: HOSPITAL | Age: 58
DRG: 871 | End: 2023-08-28
Payer: COMMERCIAL

## 2023-08-28 LAB
ANION GAP SERPL CALCULATED.3IONS-SCNC: 10 MMOL/L (ref 5–15)
BASOPHILS # BLD AUTO: 0.02 10*3/MM3 (ref 0–0.2)
BASOPHILS NFR BLD AUTO: 0.3 % (ref 0–1.5)
BUN SERPL-MCNC: 14 MG/DL (ref 6–20)
BUN/CREAT SERPL: 13.9 (ref 7–25)
CA-I BLD-MCNC: 4.4 MG/DL (ref 4.6–5.4)
CA-I SERPL ISE-MCNC: 1.09 MMOL/L (ref 1.15–1.35)
CALCIUM SPEC-SCNC: 8.2 MG/DL (ref 8.6–10.5)
CHLORIDE SERPL-SCNC: 111 MMOL/L (ref 98–107)
CO2 SERPL-SCNC: 26 MMOL/L (ref 22–29)
CREAT SERPL-MCNC: 1.01 MG/DL (ref 0.76–1.27)
DEPRECATED RDW RBC AUTO: 40.6 FL (ref 37–54)
EGFRCR SERPLBLD CKD-EPI 2021: 86.7 ML/MIN/1.73
EOSINOPHIL # BLD AUTO: 0.13 10*3/MM3 (ref 0–0.4)
EOSINOPHIL NFR BLD AUTO: 1.8 % (ref 0.3–6.2)
ERYTHROCYTE [DISTWIDTH] IN BLOOD BY AUTOMATED COUNT: 12.7 % (ref 12.3–15.4)
GLUCOSE BLDC GLUCOMTR-MCNC: 100 MG/DL (ref 70–130)
GLUCOSE BLDC GLUCOMTR-MCNC: 110 MG/DL (ref 70–130)
GLUCOSE BLDC GLUCOMTR-MCNC: 117 MG/DL (ref 70–130)
GLUCOSE BLDC GLUCOMTR-MCNC: 131 MG/DL (ref 70–130)
GLUCOSE BLDC GLUCOMTR-MCNC: 95 MG/DL (ref 70–130)
GLUCOSE BLDC GLUCOMTR-MCNC: 96 MG/DL (ref 70–130)
GLUCOSE SERPL-MCNC: 98 MG/DL (ref 65–99)
HCT VFR BLD AUTO: 37 % (ref 37.5–51)
HGB BLD-MCNC: 12.7 G/DL (ref 13–17.7)
IMM GRANULOCYTES # BLD AUTO: 0.11 10*3/MM3 (ref 0–0.05)
IMM GRANULOCYTES NFR BLD AUTO: 1.5 % (ref 0–0.5)
INR PPP: 1.45 (ref 0.9–1.1)
LYMPHOCYTES # BLD AUTO: 1.14 10*3/MM3 (ref 0.7–3.1)
LYMPHOCYTES NFR BLD AUTO: 15.8 % (ref 19.6–45.3)
MAGNESIUM SERPL-MCNC: 2.2 MG/DL (ref 1.6–2.6)
MCH RBC QN AUTO: 30.2 PG (ref 26.6–33)
MCHC RBC AUTO-ENTMCNC: 34.3 G/DL (ref 31.5–35.7)
MCV RBC AUTO: 88.1 FL (ref 79–97)
MONOCYTES # BLD AUTO: 0.74 10*3/MM3 (ref 0.1–0.9)
MONOCYTES NFR BLD AUTO: 10.2 % (ref 5–12)
NEUTROPHILS NFR BLD AUTO: 5.09 10*3/MM3 (ref 1.7–7)
NEUTROPHILS NFR BLD AUTO: 70.4 % (ref 42.7–76)
NRBC BLD AUTO-RTO: 0.1 /100 WBC (ref 0–0.2)
PHOSPHATE SERPL-MCNC: 2.1 MG/DL (ref 2.5–4.5)
PLATELET # BLD AUTO: 150 10*3/MM3 (ref 140–450)
PMV BLD AUTO: 9.5 FL (ref 6–12)
POTASSIUM SERPL-SCNC: 3.3 MMOL/L (ref 3.5–5.2)
PROTHROMBIN TIME: 17.9 SECONDS (ref 11.7–14.2)
RBC # BLD AUTO: 4.2 10*6/MM3 (ref 4.14–5.8)
SODIUM SERPL-SCNC: 147 MMOL/L (ref 136–145)
WBC NRBC COR # BLD: 7.23 10*3/MM3 (ref 3.4–10.8)

## 2023-08-28 PROCEDURE — 0 GADOBENATE DIMEGLUMINE 529 MG/ML SOLUTION: Performed by: INTERNAL MEDICINE

## 2023-08-28 PROCEDURE — 99232 SBSQ HOSP IP/OBS MODERATE 35: CPT

## 2023-08-28 PROCEDURE — 82948 REAGENT STRIP/BLOOD GLUCOSE: CPT

## 2023-08-28 PROCEDURE — 92611 MOTION FLUOROSCOPY/SWALLOW: CPT

## 2023-08-28 PROCEDURE — 82330 ASSAY OF CALCIUM: CPT | Performed by: INTERNAL MEDICINE

## 2023-08-28 PROCEDURE — 74018 RADEX ABDOMEN 1 VIEW: CPT

## 2023-08-28 PROCEDURE — 84100 ASSAY OF PHOSPHORUS: CPT | Performed by: INTERNAL MEDICINE

## 2023-08-28 PROCEDURE — 80048 BASIC METABOLIC PNL TOTAL CA: CPT | Performed by: INTERNAL MEDICINE

## 2023-08-28 PROCEDURE — 25010000002 THIAMINE HCL 200 MG/2ML SOLUTION: Performed by: HOSPITALIST

## 2023-08-28 PROCEDURE — 74230 X-RAY XM SWLNG FUNCJ C+: CPT

## 2023-08-28 PROCEDURE — 25010000002 CEFTRIAXONE PER 250 MG: Performed by: INTERNAL MEDICINE

## 2023-08-28 PROCEDURE — 25010000002 THIAMINE HCL 200 MG/2ML SOLUTION 2 ML VIAL: Performed by: HOSPITALIST

## 2023-08-28 PROCEDURE — 83735 ASSAY OF MAGNESIUM: CPT | Performed by: INTERNAL MEDICINE

## 2023-08-28 PROCEDURE — 70553 MRI BRAIN STEM W/O & W/DYE: CPT

## 2023-08-28 PROCEDURE — 94799 UNLISTED PULMONARY SVC/PX: CPT

## 2023-08-28 PROCEDURE — 36415 COLL VENOUS BLD VENIPUNCTURE: CPT | Performed by: INTERNAL MEDICINE

## 2023-08-28 PROCEDURE — 85610 PROTHROMBIN TIME: CPT | Performed by: INTERNAL MEDICINE

## 2023-08-28 PROCEDURE — 85025 COMPLETE CBC W/AUTO DIFF WBC: CPT | Performed by: INTERNAL MEDICINE

## 2023-08-28 PROCEDURE — A9577 INJ MULTIHANCE: HCPCS | Performed by: INTERNAL MEDICINE

## 2023-08-28 RX ORDER — DEXTROSE MONOHYDRATE 50 MG/ML
50 INJECTION, SOLUTION INTRAVENOUS CONTINUOUS
Status: DISCONTINUED | OUTPATIENT
Start: 2023-08-28 | End: 2023-08-31

## 2023-08-28 RX ORDER — THIAMINE HYDROCHLORIDE 100 MG/ML
200 INJECTION, SOLUTION INTRAMUSCULAR; INTRAVENOUS EVERY 8 HOURS SCHEDULED
Status: DISCONTINUED | OUTPATIENT
Start: 2023-08-31 | End: 2023-09-04

## 2023-08-28 RX ADMIN — BARIUM SULFATE 4 ML: 980 POWDER, FOR SUSPENSION ORAL at 09:30

## 2023-08-28 RX ADMIN — FAMOTIDINE 20 MG: 10 INJECTION INTRAVENOUS at 08:27

## 2023-08-28 RX ADMIN — DEXTROSE MONOHYDRATE 50 ML/HR: 50 INJECTION, SOLUTION INTRAVENOUS at 00:16

## 2023-08-28 RX ADMIN — Medication 10 ML: at 08:27

## 2023-08-28 RX ADMIN — FOLIC ACID 1 MG: 5 INJECTION, SOLUTION INTRAMUSCULAR; INTRAVENOUS; SUBCUTANEOUS at 15:03

## 2023-08-28 RX ADMIN — CEFTRIAXONE SODIUM 1000 MG: 1 INJECTION, POWDER, FOR SOLUTION INTRAMUSCULAR; INTRAVENOUS at 12:48

## 2023-08-28 RX ADMIN — THIAMINE HYDROCHLORIDE 200 MG: 100 INJECTION, SOLUTION INTRAMUSCULAR; INTRAVENOUS at 08:28

## 2023-08-28 RX ADMIN — BARIUM SULFATE 55 ML: 0.81 POWDER, FOR SUSPENSION ORAL at 09:30

## 2023-08-28 RX ADMIN — THIAMINE HYDROCHLORIDE 500 MG: 100 INJECTION, SOLUTION INTRAMUSCULAR; INTRAVENOUS at 16:44

## 2023-08-28 RX ADMIN — THIAMINE HYDROCHLORIDE 500 MG: 100 INJECTION, SOLUTION INTRAMUSCULAR; INTRAVENOUS at 21:57

## 2023-08-28 RX ADMIN — BARIUM SULFATE 50 ML: 400 SUSPENSION ORAL at 09:30

## 2023-08-28 RX ADMIN — BARIUM SULFATE 1 TEASPOON(S): 0.6 CREAM ORAL at 09:30

## 2023-08-28 RX ADMIN — GADOBENATE DIMEGLUMINE 16 ML: 529 INJECTION, SOLUTION INTRAVENOUS at 13:52

## 2023-08-28 RX ADMIN — Medication 10 ML: at 21:58

## 2023-08-28 NOTE — THERAPY EVALUATION
Acute Care - Speech Language Pathology   Swallow Initial Evaluation [facility]     Patient Name: Braxton Wolfe  : 1965  MRN: [id number]  Today's Date: 2023               Admit Date: 2023    Visit Dx:     ICD-10-CM ICD-9-CM   1. Follow-up exam  Z09 V67.9   2. Sepsis with acute renal failure without septic shock, due to unspecified organism, unspecified acute renal failure type  A41.9 038.9    R65.20 995.92    N17.9 584.9   3. Acute respiratory failure, unspecified whether with hypoxia or hypercapnia  J96.00 518.81   4. Acute kidney injury  N17.9 584.9   5. Hypokalemia  E87.6 276.8   6. Hypernatremia  E87.0 276.0     Patient Active Problem List   Diagnosis    Sepsis     History reviewed. No pertinent past medical history.  Past Surgical History:   Procedure Laterality Date    BRAIN SURGERY         SLP Recommendation and Plan  SLP Swallowing Diagnosis: moderate, oral dysphagia, pharyngeal dysphagia (23 1000)  SLP Diet Recommendation: NPO, temporary alternate methods of nutrition/hydration, other (see comments) (ice and water per free water protocol) (23 1000)  Recommended Precautions and Strategies: assist with feeding, general aspiration precautions, 1:1 supervision (23 1000)  SLP Rec. for Method of Medication Administration: meds via alternate route (23 1000)     Monitor for Signs of Aspiration: yes, notify SLP if any concerns (23 1000)     Swallow Criteria for Skilled Therapeutic Interventions Met: demonstrates skilled criteria (23 1000)  Anticipated Discharge Disposition (SLP): unknown (23 1000)  Rehab Potential/Prognosis, Swallowing: good, to achieve stated therapy goals (23 1000)  Therapy Frequency (Swallow): PRN (23 1000)  Predicted Duration Therapy Intervention (Days): until discharge (23 1000)  Oral Care Recommendations: Oral Care BID/PRN (23 1000)                                      Oral Care Recommendations: Oral Care  "BID/PRN (08/28/23 1000)    Plan of Care Reviewed With: patient  Outcome Evaluation: VFSS completed this date demonstrating impaired oropharyngeal swallow function and increased risk of aspiration with all consistencies. recommend NPO, good oral care, alternative means of nutrition/medication. Pt may have ice and water per free water protocol.      SWALLOW EVALUATION (last 72 hours)       SLP Adult Swallow Evaluation       Row Name 08/28/23 1000 08/26/23 1400                Rehab Evaluation    Document Type evaluation  -HF evaluation  -LS       Subjective Information no complaints  -HF --       Patient Observations alert;cooperative  -HF --       Patient Effort good  -HF good  -LS       Symptoms Noted During/After Treatment none  -HF --          General Information    Patient Profile Reviewed yes  -HF yes  -LS       Pertinent History Of Current Problem \"Patient is a pleasant 57-year-old white male with a previous history of VHL syndrome, CNS hemangioblastomas, previous 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  Patient was unable to protect his airway and was intubated and placed on mechanical ventilator work-up showed evidence of acute renal failure severe acidosis and mechanical ventilator were adjusted.  He was briefly hypotensive initially and was noted to be dehydrated as well and responded well to volume resuscitation.  He was given Narcan with no response as well. we have been asked to assist in the management of the patient and admit him to the ICU.\"  -HF Patient is a pleasant 57-year-old white male with a previous history of VHL " syndrome, CNS hemangioblastomas, previous 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  Patient was unable to protect his airway and was intubated and placed on mechanical ventilator work-up showed evidence of acute renal failure severe acidosis and mechanical ventilator were adjusted.  He was briefly hypotensive initially and was noted to be dehydrated as well and responded well to volume resuscitation.  He was given Narcan with no response as well. we have been asked to assist in the management of the patient and admit him to the ICU.  -LS       Current Method of Nutrition NPO  -HF NPO  extubated 8.25.23.  -LS       Precautions/Limitations, Vision -- WFL;for purposes of eval  -LS       Precautions/Limitations, Hearing -- WFL;for purposes of eval  -LS       Prior Level of Function-Communication -- WFL  -LS       Prior Level of Function-Swallowing -- no diet consistency restrictions  -LS       Plans/Goals Discussed with patient;agreed upon  -HF patient  -LS       Barriers to Rehab -- none identified  -LS          Pain    Additional Documentation Pain Scale: FACES Pre/Post-Treatment (Group)  -HF --          Pain Scale: FACES Pre/Post-Treatment    Pain: FACES Scale, Pretreatment 0-->no hurt  -HF --          Oral Motor Structure and Function    Oral Lesions or Structural Abnormalities and/or variants -- Pt presented with a hoarse vocal quality when SLP entered the room.  -LS       Dentition Assessment natural, present and adequate  -HF natural, present and adequate  -LS       Secretion Management wet vocal quality  requires verbal cues to cough,  mucus expectorated  -HF WNL/WFL  -LS          Oral Musculature and Cranial Nerve Assessment    Oral Motor General Assessment generalized oral motor weakness  -HF generalized oral motor weakness  -LS          Clinical Swallow Eval    Oral Prep Phase -- WFL  -LS       Oral Transit -- impaired  -LS       Oral Residue -- WFL  -LS       Pharyngeal Phase -- suspected pharyngeal impairment  -LS       Clinical Swallow Evaluation Summary -- Clinical bedside evaluation completed this date.  Pt presented with a hoarse vocal quality when SLP entered.  Pt complained of burning in his throat wth all the consistencies presented ( thin, nectar , honey, and puree). Coughing noted with thin liquids.  At this time pt is not a candidate for Po intake.  SLP recommends NPOat this time.  Alternate methods of feeding  and medication recommended at this time.  VFSS recommended.  -LS          Pharyngeal Phase Concerns    Pharyngeal Phase Concerns -- cough;other (see comments)  burning in throat during swallowing.  -LS       Cough -- thin  -LS       Pharyngeal Phase Concerns, Comment -- pt complained of burning in throat area upon swallowing.  -LS          MBS/VFSS Interpretation    VFSS Summary Pt seated 90 degrees upright and viewed in the lateral projection for VFSS. Pt able to self feed with intermittent assistance. Pt presents with wet vocal quality that clears with cued cough resulting in expectoration of mucus into emesis bag. He adequately accepted p.o. trials of barium coated thin liquid via straw, nectar thick liquid via straw, honey via cup, puree, mixed/soft solid, and regular solid. functional labial seal, no anterior spillage, able to extract liquid from straw. Prolonged and some decreased mastication marked by oral residue with peaches and solid. Poor bolus control and prolonged a-p transit up to 3-4 seconds. Impaired BOT retraction marked by significant BOT and vallecula residue with all consistencies unable to be fully  cleared with liquid wash and re-swallow. Premature spillage noted to the level of the pyriforms with thin liquid, just past the level of the vallecula with nectar and honey thick liquids, and to the vallecula with puree and solid. Occasional piecemeal deglutition noted. Upon swallow initiation, adequate anterior hyoid excursion, functional laryngeale elevation, and complete however sluggish epiglottic inversion impacting airway protection.  High nontranisent penetration with thin liquids during the swallow with rollover deep nontransient penetration to the cords during re-swallow, suspect material drops below the cords resulting in silent aspiration however view obstructed d/t pt movement. High nontransient penetration visualized w/ nectar thick liquid during the swallow, and during subsequent re swallow. Rollover trace nontransinet penetration with puree and honey thick liquids 2/2 BOT residual. Pharyngeal stripping wave and UES emptying WFL. Overall moderately impaired oropharyngeal swallow. Pt is at increased of aspiration with all consistencies 2/2 residual material that pt is unable to sense and successfully clear from BOT, vocal cords, and laryngeal vestibule. Pt also intermittently presents with wet vocal quality throughout study that he does not sense and requires cues to clear. Therefore would recommend NPO with alternative means of nutrition/ medication and good oral care. Pt may have ice and water via free water protocol. Suspect pt would benefit from repeat instrumental via FEES 2/2 hoarse and wet vocal quality. Will continue to f/u for treatment.  -HF --          SLP Communication to Radiology    Summary Statement Radiologist present Dr. Chavez. Deep nontransient penetration to the vocal cords with thin liquids. Nontransient penetration intermittently with nectar thick liquid, honey thick liquid, and puree.  -HF --          SLP Evaluation Clinical Impression    SLP Swallowing Diagnosis moderate;oral  dysphagia;pharyngeal dysphagia  -HF --       Functional Impact risk of aspiration/pneumonia;risk of malnutrition;risk of dehydration  -HF --       Rehab Potential/Prognosis, Swallowing good, to achieve stated therapy goals  -HF --       Swallow Criteria for Skilled Therapeutic Interventions Met demonstrates skilled criteria  -HF --          Recommendations    Therapy Frequency (Swallow) PRN  -HF PRN  -LS       Predicted Duration Therapy Intervention (Days) until discharge  -HF until discharge  -       SLP Diet Recommendation NPO;temporary alternate methods of nutrition/hydration;other (see comments)  ice and water per free water protocol  -HF NPO  -LS       Recommended Diagnostics -- VFSS (MBS)  -       Recommended Precautions and Strategies assist with feeding;general aspiration precautions;1:1 supervision  -HF --       Oral Care Recommendations Oral Care BID/PRN  -HF --       SLP Rec. for Method of Medication Administration meds via alternate route  -HF meds via alternate route  -       Monitor for Signs of Aspiration yes;notify SLP if any concerns  -HF --       Anticipated Discharge Disposition (SLP) -- unknown  -          Swallow Goals (SLP)    Swallow LTGs Patient will demonstrate functional swallow for  -HF --                 User Key  (r) = Recorded By, (t) = Taken By, (c) = Cosigned By      Initials Name Effective Dates     Claudio Valenzuela, MS CCC-SLP 06/16/21 -     Wanda Nesbitt SLP 08/01/23 -                     EDUCATION  The patient has been educated in the following areas:   Dysphagia (Swallowing Impairment) NPO rationale.              Time Calculation:    Time Calculation- SLP       Row Name 08/28/23 1047             Time Calculation- SLP    SLP Start Time 0900  -HF      SLP Received On 08/28/23  -HF         Untimed Charges    SLP Eval/Re-eval  ST Eval Oral Pharyng Swallow - 92775  -HF                User Key  (r) = Recorded By, (t) = Taken By, (c) = Cosigned By      Initials Name  Provider Type     Wanda Pressley SLP Speech and Language Pathologist                    Therapy Charges for Today       Code Description Service Date Service Provider Modifiers Qty    81111677015 HC ST MOTION FLUORO EVAL SWALLOW 5 8/28/2023 Wanda Pressley SLP GN 1                 JOSE Catherine  8/28/2023

## 2023-08-28 NOTE — CASE MANAGEMENT/SOCIAL WORK
Discharge Planning Assessment  [facility]     Patient Name: Braxton Wolfe  MRN: [id number]  Today's Date: 8/28/2023    Admit Date: 8/24/2023    Plan: Rehab   Discharge Needs Assessment       Row Name 08/28/23 1641       Living Environment    People in Home alone    Current Living Arrangements home    Potentially Unsafe Housing Conditions none    Primary Care Provided by self    Provides Primary Care For no one    Family Caregiver if Needed child(caitlyn), adult    Quality of Family Relationships supportive       Transition Planning    Patient/Family Anticipates Transition to long-term care facility       Discharge Needs Assessment    Discharge Facility/Level of Care Needs rehabilitation facility                   Discharge Plan       Row Name 08/28/23 1642       Plan    Plan Rehab    Patient/Family in Agreement with Plan yes    Plan Comments Met with pt and family at bedside. Introduced self, explained CCP role, facesheet verified. Prior to hospitalization, pt was living alone and independent with ADLs.  No known DME, HH, or SNF.  Will likely need rehab at discharge but level of care needed undetermined at this time.  List of rehab facilities provided to family.  CCP will follow for needs.  ZHOU Li RN                  Continued Care and Services - Admitted Since 8/24/2023    Coordination has not been started for this encounter.          Demographic Summary       Row Name 08/28/23 1630       General Information    Admission Type inpatient    Arrived From home    Referral Source admission list    Reason for Consult discharge planning    Preferred Language English       Contact Information    Permission Granted to Share Info With family/designee  Aby Alves (mother) [phone]                   Functional Status    No documentation.                  Psychosocial    No documentation.                  Abuse/Neglect    No documentation.                  Legal    No documentation.                  Substance  Abuse    No documentation.                  Patient Forms    No documentation.                     Yazmin Li RN

## 2023-08-28 NOTE — PROGRESS NOTES
"                                              LOS: 4 days   Patient Care Team:  Provider, No Known as PCP - General    Chief Complaint:  F/up aspiration pneumonia, respiratory failure.    Subjective   Interval History  I reviewed the admission note, progress notes, PMH, PSH, Family hx, social history, imagings and prior records on this admission, summarized the finding in my note and formulated a transition of care plan.      He pulled out his core track and central line last night.  He does not recall that.  His daughter was at bedside confirmed this.  He reported no cough but his daughter stated that he coughs intermittently but does not appear to be producing phlegm.  He is currently on RA.    REVIEW OF SYSTEMS:   Constitutional: No fever or chills  Gastrointestinal: No nausea, vomiting or diarrhea  Cardiovascular: No chest pain or palpitation.      Physical Exam:     Vital Signs  Temp:  [97.3 °F (36.3 °C)-99.6 °F (37.6 °C)] 98.3 °F (36.8 °C)  Heart Rate:  [51-79] 62  Resp:  [16-18] 17  BP: (128-149)/(75-97) 141/76    Intake/Output Summary (Last 24 hours) at 8/28/2023 1425  Last data filed at 8/28/2023 0828  Gross per 24 hour   Intake 410.33 ml   Output --   Net 410.33 ml     Flowsheet Rows      Flowsheet Row First Filed Value   Admission Height 177.8 cm (70\") Documented at 08/25/2023 0906   Admission Weight 75.9 kg (167 lb 5.3 oz) Documented at 08/24/2023 1802            PPE used per hospital policy    General Appearance:   Alert, cooperative, in no acute distress   ENMT:  Mallampati score 2, moist mucous membrane   Eyes:  Pupils equal and reactive to light. EOMI   Neck:   Trachea midline. No thyromegaly.   Lungs:   Bilateral rhonchi.  No wheezing.    Heart:   Regular rhythm and normal rate, normal S1 and S2, no         murmur   Skin:   No rash or ecchymosis   Abdomen:    Soft. No tenderness. No HSM.   Neuro/psych:  Conscious, alert, oriented x3. Strength 5/5 in upper and lower  ext.  Appropriate mood and affect "   Extremities:  No cyanosis, clubbing or edema.  Warm extremities and well-perfused          Results Review:        Results from last 7 days   Lab Units 08/27/23  0648 08/26/23 0329 08/25/23  0917   SODIUM mmol/L 148* 147* 144   POTASSIUM mmol/L 3.2* 3.6 3.9   CHLORIDE mmol/L 112* 111* 108*   CO2 mmol/L 27.0 24.7 20.0*   BUN mg/dL 30* 44* 52*   CREATININE mg/dL 1.43* 2.53* 3.85*   GLUCOSE mg/dL 186* 109* 137*   CALCIUM mg/dL 8.0* 8.2* 9.2     Results from last 7 days   Lab Units 08/26/23  0329 08/25/23  0917 08/24/23  1805   CK TOTAL U/L 1,208* 1,023* 500*   HSTROP T ng/L  --   --  27*     Results from last 7 days   Lab Units 08/27/23  0648 08/26/23  0329 08/25/23  0010   WBC 10*3/mm3 7.93 10.59 11.66*   HEMOGLOBIN g/dL 11.3* 12.0* 16.1   HEMATOCRIT % 33.4* 34.5* 47.1   PLATELETS 10*3/mm3 131* 139* 246     Results from last 7 days   Lab Units 08/27/23  0648 08/26/23  0329 08/25/23  0010 08/24/23  1805   INR  1.54* 1.35* 1.32* 1.38*   APTT seconds  --   --   --  25.5     Results from last 7 days   Lab Units 08/24/23  1805   PROBNP pg/mL 7,202.0*                   Results from last 7 days   Lab Units 08/25/23  0330 08/24/23  1900   PH, ARTERIAL pH units 7.449 7.341*   PCO2, ARTERIAL mm Hg 27.1* 34.2*   PO2 ART mm Hg 69.3* 100.2*   MODALITY  Adult Vent Adult Vent   O2 SATURATION CALC % 94.8 97.4         I reviewed the patient's new clinical results.        Medication Review:   cefTRIAXone, 1,000 mg, Intravenous, Q24H  famotidine, 20 mg, Intravenous, Daily  folic acid (FOLVITE) IVPB, 1 mg, Intravenous, Daily  senna-docusate sodium, 2 tablet, Oral, BID  sodium chloride, 10 mL, Intravenous, Q12H  thiamine (B-1) IV, 500 mg, Intravenous, Q8H   Followed by  [START ON 8/31/2023] thiamine (B-1) IV, 200 mg, Intravenous, Q8H   Followed by  [START ON 9/4/2023] thiamine, 100 mg, Oral, Daily        dextrose, 50 mL/hr, Last Rate: 50 mL/hr (08/28/23 0828)        Diagnostic imaging:  I personally and independently reviewed the  following images:  CXR 8/25/2023: Central line and ET tube in good position.  Increased interstitial pulmonary infiltrates on the right.    Assessment     Acute hypercapnic respiratory failure s/p vent 8/24-8/25  B/L Aspiration pneumonia  Vomiting; unclear etiology  Acute encephalopathy; suspected HIE (resolved)  Acute SVT  Acute renal failure  Hypernatremia  Hypokalemia  Severe hypocalcemia and metabolic acidosis  Incidental lung nodules-needs outpatient follow  S/p prior  shunt  VHL syndrome with CNS hemangioblastomas  s/p previous [place] with ? Left hemiparesis    All problems new to me    Plan     - continue Rocephin for aspiration pna for 5 days total therapy  - oxygen weaned to room air  - Initiate flutter  - D50 for hypoglycemia and hypernatremia.  - Enteral feeding per primary          Fran Carbone MD  08/28/23  14:25 EDT        This note was dictated utilizing [employer] dictation

## 2023-08-28 NOTE — PLAN OF CARE
Problem: Adult Inpatient Plan of Care  Goal: Plan of Care Review  Outcome: Ongoing, Progressing  Flowsheets (Taken 8/28/2023 1041)  Plan of Care Reviewed With: patient  Outcome Evaluation: VFSS completed this date demonstrating impaired oropharyngeal swallow function and increased risk of aspiration with all consistencies. recommend NPO, good oral care, alternative means of nutrition/medication. Pt may have ice and water per free water protocol.   Goal Outcome Evaluation:  Plan of Care Reviewed With: patient           Outcome Evaluation: VFSS completed this date demonstrating impaired oropharyngeal swallow function and increased risk of aspiration with all consistencies. recommend NPO, good oral care, alternative means of nutrition/medication. Pt may have ice and water per free water protocol.

## 2023-08-28 NOTE — NURSING NOTE
Patient has pulled out feeding tube twice in the last 5 hours despite wrist restraints.  Right AC 22g remains patent at present.  YAMILKA Winter updated.

## 2023-08-28 NOTE — PROGRESS NOTES
"DOS: 2023  NAME: Braxton Wolfe   : 1965  PCP: Provider, No Known  Chief Complaint   Patient presents with    Altered Mental Status     Stroke    Subjective:     Interval History  Pt seen in follow up today, however the problem is new to the examiner.  Patient Complaints:  No acute events overnight.  Patient has just returned from having swallow study completed.  No cough during exam.  No family at bedside.  History taken from: patient chart RN    Objective:  Vital signs: /97 (BP Location: Right arm, Patient Position: Lying)   Pulse 61   Temp 98.2 °F (36.8 °C) (Oral)   Resp 17   Ht 177.8 cm (70\")   Wt 76.6 kg (168 lb 14 oz)   SpO2 93%   BMI 24.23 kg/m²       Physical Exam:  GENERAL: NAD, alert and cooperative  HEENT: Normocephalic, atraumatic   Resp: Even and unlabored, no audible wheezing.  Extremities: No signs of distal embolization. Normal joint ROM  Skin: Warm and dry, no rashes or birth marks.   Psychiatric: Normal mood and affect.    Neurological:   MS: Awake and alert, says he is at [facility], says it is , does not know the month.  Normal attention/concentration, language intact, no neglect   CN: visual acuity grossly normal, PERRL, EOMI, no nystagmus, no facial droop, no dysarthria, hearing symmetric, tongue midline  Motor: 5/5 strength RUE/RLE. 4+/5 LUE/LLE.  strength decreased left side.  Normal tone.  No tremor  Sensory: Intact to light touch in all four ext.  Coordination:  Dysmetria finger to nose test on left, no dysmetria on right.  Rapid alternating movements: Normal finger to thumb tap  Gait and station: Deferred  Reflexes: 1+ patellar, achilles. Down-going toes    Results Review:    I reviewed the patient's new clinical results.  I reviewed the patient's new imaging results and agree with the interpretation.  I reviewed the patient's other test results and agree with the interpretation  Discussed with Dr. Clifford    Current Medications:  Scheduled " Medications:cefTRIAXone, 1,000 mg, Intravenous, Q24H  famotidine, 20 mg, Intravenous, Daily  folic acid (FOLVITE) IVPB, 1 mg, Intravenous, Daily  senna-docusate sodium, 2 tablet, Oral, BID  sodium chloride, 10 mL, Intravenous, Q12H  thiamine (B-1) IV, 500 mg, Intravenous, Q8H   Followed by  [START ON 8/31/2023] thiamine (B-1) IV, 200 mg, Intravenous, Q8H   Followed by  [START ON 9/4/2023] thiamine, 100 mg, Oral, Daily      Infusions: dextrose, 50 mL/hr, Last Rate: 50 mL/hr (08/28/23 0828)      PRN Medications:    aluminum-magnesium hydroxide-simethicone    benzonatate    senna-docusate sodium **AND** polyethylene glycol **AND** bisacodyl **AND** bisacodyl    dextrose    dextrose    glucagon (human recombinant)    nitroglycerin    ondansetron    [COMPLETED] Insert Peripheral IV **AND** sodium chloride    sodium chloride    sodium chloride    Laboratory results:  Lab Results   Component Value Date    GLUCOSE 186 (H) 08/27/2023    CALCIUM 8.0 (L) 08/27/2023     (H) 08/27/2023    K 3.2 (L) 08/27/2023    CO2 27.0 08/27/2023     (H) 08/27/2023    BUN 30 (H) 08/27/2023    CREATININE 1.43 (H) 08/27/2023    BCR 21.0 08/27/2023    ANIONGAP 9.0 08/27/2023     Lab Results   Component Value Date    WBC 7.93 08/27/2023    HGB 11.3 (L) 08/27/2023    HCT 33.4 (L) 08/27/2023    MCV 90.3 08/27/2023     (L) 08/27/2023          Lab Results   Component Value Date    INR 1.54 (H) 08/27/2023    INR 1.35 (H) 08/26/2023    INR 1.32 (H) 08/25/2023    PROTIME 18.7 (H) 08/27/2023    PROTIME 16.9 (H) 08/26/2023    PROTIME 16.6 (H) 08/25/2023     Lab Results   Component Value Date    TSH 1.540 06/03/2020     No components found for: LDLCALC  Lab Results   Component Value Date    HGBA1C 5.5 01/11/2019     No components found for: B12    Review and interpretation of imaging:   Adult Transthoracic Echo Complete W/ Cont if Necessary Per Protocol    Result Date: 8/25/2023    The study is technically difficult for diagnosis.   Left  ventricular ejection fraction appears to be 56 - 60%.   Left ventricular diastolic function was indeterminate.   Estimated right ventricular systolic pressure from tricuspid regurgitation is normal (<35 mmHg).   There is a trivial pericardial effusion. There is no evidence of cardiac tamponade.     EEG    Result Date: 8/25/2023  Date of onset: 8/25/23 Date of offset: 8/25/23 Indication:Found down, comatose Technical description:  This is a 21 channel digital EEG recording that began on 0959 and ended on 1026.  Background:  The background consists of a posteriorly dominant rhythm that is notably absent.  Anteriorly, there is diffuse theta and delta activity.  There is fair spontaneous variability.  Hyperventilation was not performed and photic stimulation was performed and did not induce an abnormal driving response there is no focal slowing.  There are no interictal epileptiform discharges and no electrographic seizures noted during the study.  EKG demonstrates tachycardia between 100 and 120 bpm for majority of the study.  Several triphasic waves are seen during the study.  Impression: This is an abnormal routine EEG study due to the presence of diffuse delta and theta slowing.  There are no interictal epileptiform discharges and no electrographic seizures.  These electrophysiologic findings are indicative of moderately severe encephalopathy.  There are additionally triphasic waves seen. Triphasic waves are a nonspecific finding seen with encephalopathies, more often with hepatic and/or renal derangements.       CT Abdomen Pelvis Without Contrast    Result Date: 8/24/2023  CT OF THE CHEST WITHOUT CONTRAST; CT OF THE ABDOMEN AND PELVIS WITHOUT CONTRAST  HISTORY: Found down. Concern for aspiration.  COMPARISON: None available.  TECHNIQUE: Axial CT imaging was obtained from the thoracic inlet through the symphysis pubis. No IV contrast was administered.  FINDINGS: CT OF THE CHEST: Endotracheal tube terminates in  satisfactory position. Thyroid gland is unremarkable. Thoracic aorta is normal in caliber. Mediastinal lymph nodes do not appear pathologically enlarged. There is a subpleural nodule noted within the right upper lobe, measuring up to 8 mm. There are some reticular nodular infiltrates which are noted within the right upper lobe. There is dense dependent consolidation with air bronchograms noted within both lower lobes. There may be trace bilateral pleural effusions. No acute osseous abnormalities are seen. The patient does have a right-sided ventriculoperitoneal shunt.  CT OF THE ABDOMEN AND PELVIS: The stomach, duodenum, spleen, adrenal glands, and gallbladder are all grossly unremarkable. There are low-attenuation lesions which are identified within the pancreas. They are poorly assessed on this unenhanced exam. 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 They were described on prior report from June 17, 2020, but again are poorly assessed on this exam. No suspicious hepatic lesions are seen. No hydronephrosis is seen on either side. I do not see any renal masses on this unenhanced exam. No distal ureteral or bladder stones are seen. Prostate gland is within normal limits. There is no bowel obstruction. Ventricular peritoneal shunt terminates within the left lower quadrant. The appendix is normal. No acute osseous abnormalities are seen. There is mild colonic diverticulosis.       1. Dense dependent consolidation bilaterally., Given history, aspiration is a concern. Patient is also noted to have some mild reticulonodular infiltrates within the right upper lobe, as well as an 8 mm subpleural nodule within the right upper lobe. CT follow-up in 3 months is recommended. 2. 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 However, they are incompletely assessed in the absence of contrast material and  prior studies for comparison.    Radiation dose reduction techniques were utilized, including automated exposure control and exposure modulation based on body size.   This report was finalized on 8/24/2023 10:34 PM by Dr. Charlee Medina M.D.      CT Head Without Contrast    Result Date: 8/24/2023  CT OF THE HEAD WITHOUT CONTRAST  HISTORY: Unresponsiveness  COMPARISON: None available.  TECHNIQUE: Axial CT imaging was obtained through the brain. No IV contrast was administered.  FINDINGS: Unfortunately, patient's prior imaging is not available for comparison. The patient has a right frontal ventriculoperitoneal shunt, which terminates within the right lateral ventricle. There is encephalomalacia noted within the right frontal lobe, adjacent to the course of the catheter. There is ventricular dilatation. Patient may have some additional mild encephalomalacia within the right parietal lobe. There is some decreased attenuation surrounding the posterior and temporal horns of the lateral ventricles. Some of this may be related to small vessel disease. Given dilatation of the ventricular system, some transependymal flow of CSF is not excluded. There are areas of encephalomalacia noted within both cerebellar hemispheres. The patient is also noted to have marked dilatation of the fourth ventricle, which has been described on prior imaging. The patient is status post suboccipital craniectomy. There is partial opacification of the mastoid air cells bilaterally. Mucosal thickening is noted within the ethmoid and sphenoid sinuses. There are air-fluid levels within the sphenoid sinuses. Correlation with any evidence of sinusitis is recommended.       1. No acute intracranial hemorrhage. 2. Unfortunately, this patient's prior imaging is not available for comparison. There is ventriculomegaly. I'm uncertain if this has increased when compared to prior imaging. There is some decreased attenuation surrounding the posterior and  temporal horns of the lateral ventricles bilaterally. Some transependymal flow of CSF cannot be excluded..  Radiation dose reduction techniques were utilized, including automated exposure control and exposure modulation based on body size.   This report was finalized on 8/24/2023 10:21 PM by Dr. Charlee Medina M.D.      CT Chest Without Contrast Diagnostic    Result Date: 8/24/2023  CT OF THE CHEST WITHOUT CONTRAST; CT OF THE ABDOMEN AND PELVIS WITHOUT CONTRAST  HISTORY: Found down. Concern for aspiration.  COMPARISON: None available.  TECHNIQUE: Axial CT imaging was obtained from the thoracic inlet through the symphysis pubis. No IV contrast was administered.  FINDINGS: CT OF THE CHEST: Endotracheal tube terminates in satisfactory position. Thyroid gland is unremarkable. Thoracic aorta is normal in caliber. Mediastinal lymph nodes do not appear pathologically enlarged. There is a subpleural nodule noted within the right upper lobe, measuring up to 8 mm. There are some reticular nodular infiltrates which are noted within the right upper lobe. There is dense dependent consolidation with air bronchograms noted within both lower lobes. There may be trace bilateral pleural effusions. No acute osseous abnormalities are seen. The patient does have a right-sided ventriculoperitoneal shunt.  CT OF THE ABDOMEN AND PELVIS: The stomach, duodenum, spleen, adrenal glands, and gallbladder are all grossly unremarkable. There are low-attenuation lesions which are identified within the pancreas. They are poorly assessed on this unenhanced exam. 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 They were described on prior report from June 17, 2020, but again are poorly assessed on this exam. No suspicious hepatic lesions are seen. No hydronephrosis is seen on either side. I do not see any renal masses on this unenhanced exam. No distal ureteral or bladder stones are seen. Prostate gland is within  normal limits. There is no bowel obstruction. Ventricular peritoneal shunt terminates within the left lower quadrant. The appendix is normal. No acute osseous abnormalities are seen. There is mild colonic diverticulosis.       1. Dense dependent consolidation bilaterally., Given history, aspiration is a concern. Patient is also noted to have some mild reticulonodular infiltrates within the right upper lobe, as well as an 8 mm subpleural nodule within the right upper lobe. CT follow-up in 3 months is recommended. 2. 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 However, they are incompletely assessed in the absence of contrast material and prior studies for comparison.    Radiation dose reduction techniques were utilized, including automated exposure control and exposure modulation based on body size.   This report was finalized on 8/24/2023 10:34 PM by Dr. Charlee Medina M.D.      XR Chest 1 View    Result Date: 8/25/2023  XR CHEST 1 VW-8/25/2023  HISTORY: Central line placement.  Right internal jugular central venous catheter seen with its tip overlying the SVC. No pneumothorax is seen. Endotracheal tube is seen in good position. Heart size is within normal limits. There is some mild patchy atelectasis or infiltrate in the right lower lung. Left lung appears clear.      1. Central venous catheter tip overlying the SVC. 2. No pneumothorax is seen.   This report was finalized on 8/25/2023 6:41 AM by Dr. Sriram Ray M.D.      XR Chest 1 View    Result Date: 8/25/2023  SINGLE VIEW OF THE CHEST  HISTORY: Respiratory failure  COMPARISON: August 24, 2023  FINDINGS: Endotracheal tube terminates in satisfactory position. There is a right-sided ventriculoperitoneal shunt. Heart size is within normal limits. Bibasilar consolidation is noted. No pneumothorax is seen. Trace left pleural effusion is suspected.      Persistent bibasilar  consolidation.  This report was finalized on 8/25/2023 5:17 AM by Dr. Charlee Medina M.D.      XR Chest 1 View    Result Date: 8/24/2023  CHEST SINGLE VIEW  HISTORY: Respiratory failure. Patient was found down.  COMPARISON: None.  FINDINGS: There has been intubation and the ET tube tip is 2.2 cm above the melanie and this could be withdrawn 2 cm for better position. The heart size appears normal. There is no evidence for perihilar edema or pneumothorax or focal airspace disease.  There is some limitation as the lung apices, particular on the right and the right costophrenic angle, are not included on the field-of-view. There is shunt tubing overlying the right thorax.      Limited exam demonstrates intubation with ET tube tip 2.2 cm above the melanie and this could be withdrawn 2 cm for better position. No further evidence for active disease in the chest.  This report was finalized on 8/24/2023 7:10 PM by Dr. Marcelo De M.D.      XR Abdomen KUB    Result Date: 8/28/2023  Patient: DAVID CERNA  Time Out: 05:41 Exam(s): XR ABDOMEN EXAM:   XR Abdomen, 2 Views CLINICAL HISTORY:    Reason for exam: FEEDING TUBE PLACEMENT. TECHNIQUE:   Frontal view of the abdomen pelvis with upright view of the abdomen. COMPARISON:   No relevant prior studies available. FINDINGS:   Lower thorax:  Bibasilar infiltrates noted.   Intraperitoneal space:  No free air.   Gastrointestinal tract:  Unremarkable.  No dilation.   Bones joints:  Unremarkable.   Tubes, lines and devices:  Distal end of an enteric tube tip overlies the region of the gastric antrum proximal duodenum.  The visualized bowel gas is unremarkable-without evidence of obstruction. IMPRESSION:     1.  Enteric tube tip at the region of the distal stomach proximal duodenum. 2.  Bibasilar infiltrates noted.     Electronically signed by Norma Jaramillo MD on 08-28-23 at 0541    XR Abdomen KUB    Result Date: 8/27/2023  KUB  HISTORY: Feeding tube placement.  COMPARISON: CT  abdomen and pelvis 08/24/2020.  FINDINGS: Feeding tube has been placed and the tip is in the gastric fundus. Bowel gas pattern in the upper abdomen is nonobstructive. The lower abdomen and pelvis are not included on the field-of-view.      Feeding tube tip is in the stomach.   This report was finalized on 8/27/2023 11:50 PM by Dr. Marcelo De M.D.       Work-up to date:  CTH: No acute intracranial hemorrhage.  Ventriculomegaly noted decreased attenuation surrounding posterior and temporal horns of lateral ventricles bilaterally.  Some transependymal flow of CSF cannot be excluded.  rEEG: Abnormal due to presence of diffuse delta and theta slowing.  Triphasic waves seen.  No interictal epileptiform discharges or electrographic seizures noted.  Findings indicative of moderately severe encephalopathy.  2D Echo: EF 56-60%, all left ventricular wall segments contract normally.  Left atrium not well visualized.  Quality of study limited due to limited views obtained and patient being intubated.    Lab Review: No labs from today to review. CK 1208 (8/26), Na+ 148, K+ 3.2, Ca 8.0, Mg 2.8 all from yesterday, Phos 2.1 today, ammonia <10, blood cx no growth at 3 days    Diagnoses:   Von Hippel-Lindau with cerebellar hemangioblastoma  Acute encephalopathy  Left hemiparesis/ataxia  Bilateral aspiration pneumonia  YUDITH (improving), now with hypernatremia     Impression: Mr. 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  He was originally admitted to [facility] 8/24/2023 to the ICU with YUDITH and severe encephalopathy requiring endotracheal intubation.  He was living independently at home and was found down by a friend.  He was extubated and moved out of the ICU on 8/26.  His confusion persists and he does have very mild weakness on his left side, unsure if this is baseline.  On exam today he does  not seem in any type of respiratory distress, there is no cough.  He remains in soft wrist restraints.  Apparently he has removed several IVs and his cortrak overnight.  He is on gentle hydration intravenously.  He is neurologically stable.  We will continue to follow with you for MRI results.  Call RRT for acute neurologic change or concern.    Plan:  Await brain MRI with contrast imaging  Delirium precautions  Therapies as written  CCP for discharge planning    I have discussed the above with the patient and Dr. Clifford who are in agreement with the plan.     Evidence-based delirium precautions include:     1. Frequent reorientation, reminding patient not questioning patient   2. Shades up during day/down at night   3. TV off except for soft music only   4. Familiar objects at bedside   5. Encourage friends/family to spend the night   6. Minimize anticholingeric medications   7. Minimize polypharmacy   8. Minimize restraints, includes lines and/or foleys and/or feeding tubes as able   9. Minimize overnight checks/vitals to encourage restful consistent sleep patterns   10. Out of bed during day as much as possible       JUD Scott  08/28/23  11:54 EDT    Diagnoses:    Sepsis

## 2023-08-28 NOTE — NURSING NOTE
Patient on edge of bed attempting to stand with sheets and leads tangled, despite repeated reminders to stay in bed and not pull at wires.  Cooperative but forgetful and unable to remember instructions.  Patient pulled out Central IV access at beginning of shift.  Wrist restraints initiated.

## 2023-08-28 NOTE — PROGRESS NOTES
[facility]     LOS: 4 days     Subjective:    CC:Altered Mental Status    DIET:  Diet Order   Procedures    NPO Diet NPO Type: Strict NPO   Confusion overnight  Failed swallow test  NG placed x2 and pulled them out  Pulled out his right IJ  Able to get peripheral IV    Objective:    Vital Signs:  Temp:  [97.3 °F (36.3 °C)-99.6 °F (37.6 °C)] 98.4 °F (36.9 °C)  Heart Rate:  [51-79] 61  Resp:  [16-18] 18  BP: (128-149)/(76-97) 141/85    SpO2:  [91 %-94 %] 92 %  on   ;   Device (Oxygen Therapy): room air  Body mass index is 24.23 kg/m².    Physical Exam  Constitutional:       Appearance: Normal appearance.   HENT:      Head: Normocephalic and atraumatic.   Cardiovascular:      Rate and Rhythm: Normal rate.      Heart sounds: No murmur heard.    No friction rub.   Pulmonary:      Effort: Pulmonary effort is normal.      Breath sounds: Normal breath sounds.   Abdominal:      General: Bowel sounds are normal. There is no distension.      Palpations: Abdomen is soft.      Tenderness: There is no abdominal tenderness.   Skin:     General: Skin is warm and dry.   Neurological:      Mental Status: He is alert.      Comments: Mild left sided weakness   Psychiatric:         Mood and Affect: Mood normal.         Behavior: Behavior normal.       Results Review:    Glucose   Date Value Ref Range Status   08/28/2023 98 65 - 99 mg/dL Final   08/27/2023 186 (H) 65 - 99 mg/dL Final   08/26/2023 109 (H) 65 - 99 mg/dL Final     Results from last 7 days   Lab Units 08/28/23  1626   WBC 10*3/mm3 7.23   HEMOGLOBIN g/dL 12.7*   HEMATOCRIT % 37.0*   PLATELETS 10*3/mm3 150       Results from last 7 days   Lab Units 08/28/23  1626 08/27/23  0648 08/26/23  0329   SODIUM mmol/L 147*   < > 147*   POTASSIUM mmol/L 3.3*   < > 3.6   CHLORIDE mmol/L 111*   < > 111*   CO2 mmol/L 26.0   < > 24.7   BUN mg/dL 14   < > 44*   CREATININE mg/dL 1.01   < > 2.53*   CALCIUM mg/dL 8.2*   < > 8.2*   BILIRUBIN mg/dL  --   --  0.7   ALK  PHOS U/L  --   --  58   ALT (SGPT) U/L  --   --  16   AST (SGOT) U/L  --   --  37   GLUCOSE mg/dL 98   < > 109*    < > = values in this interval not displayed.       Results from last 7 days   Lab Units 08/28/23  1626 08/25/23  0010 08/24/23  1805   INR  1.45*   < > 1.38*   APTT seconds  --   --  25.5    < > = values in this interval not displayed.       Results from last 7 days   Lab Units 08/28/23  1626   MAGNESIUM mg/dL 2.2       Results from last 7 days   Lab Units 08/26/23  0329 08/25/23  0917 08/24/23  1805   CK TOTAL U/L 1,208* 1,023* 500*   HSTROP T ng/L  --   --  27*       Cultures:  Blood Culture   Date Value Ref Range Status   08/24/2023 No growth at 2 days  Preliminary   08/24/2023 No growth at 2 days  Preliminary     Respiratory Culture   Date Value Ref Range Status   08/25/2023   Final    Rare Normal respiratory arti. No S. aureus or Pseudomonas aeruginosa detected. Final report.       I have reviewed daily medications and changes in CPOE    Scheduled meds  cefTRIAXone, 1,000 mg, Intravenous, Q24H  famotidine, 20 mg, Intravenous, Daily  folic acid (FOLVITE) IVPB, 1 mg, Intravenous, Daily  senna-docusate sodium, 2 tablet, Oral, BID  sodium chloride, 10 mL, Intravenous, Q12H  thiamine (B-1) IV, 500 mg, Intravenous, Q8H   Followed by  [START ON 8/31/2023] thiamine (B-1) IV, 200 mg, Intravenous, Q8H   Followed by  [START ON 9/4/2023] thiamine, 100 mg, Oral, Daily        dextrose, 50 mL/hr, Last Rate: 50 mL/hr (08/28/23 0828)      PRN meds    aluminum-magnesium hydroxide-simethicone    benzonatate    senna-docusate sodium **AND** polyethylene glycol **AND** bisacodyl **AND** bisacodyl    dextrose    dextrose    glucagon (human recombinant)    nitroglycerin    ondansetron    [COMPLETED] Insert Peripheral IV **AND** sodium chloride    sodium chloride    sodium chloride        Sepsis        Assessment/Plan:  Acute encephalopathy; suspected HIE  -Mental status continues to improve, voice is stronger today than  compared to yesterday    Acute hypercapnic respiratory failure s/p vent till 8/25  -Patient is currently on room air with excellent oxygen saturations    B/L Aspiration pneumonia  -Continue with Rocephin    Vomiting; unclear etiology  -Patient is currently n.p.o.    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  He has been off of this medication now for a week    Attempting to get information concerning his  shunt for possible MRI    Heath Hooper MD  08/28/23  17:12 EDT

## 2023-08-28 NOTE — PROGRESS NOTES
Pulmonary/critical care cross coverage note    Called overnight because patient pulled out his central line.  Nursing staff and IV therapy attempted to obtain access multiple times without success.    Reviewed patient's chart, central line was placed due to patient's poor vascular access.  Patient is currently requiring vascular access for dextrose containing IV fluids (hypoglycemia) and antibiotics.    Until able to attain vascular access, Prasanna ordered to administer dextrose for possible hypoglycemia. Frequency of blood sugar checks increased.    Went to bedside to assess patient- PIV access attempted x 3, with eventual success in attaining 22 gauge in RAC.  Recommended frequent checks of IV site due to frequent losses of PIV access.  Patient placed in bilateral upper extremity restraints to prevent pulling of lines/ tubes.    D5W order modified to 50mL/hr.  Q4hour accu-checks ordered.  KUB confirmed small bowel feeding tube placement- okay to give dextrose via tube if necessary.    Will need to figure out plan for more stable vascular access.

## 2023-08-28 NOTE — PLAN OF CARE
Goal Outcome Evaluation:  Plan of Care Reviewed With: patient      Progress: improving  Outcome Evaluation: VSS, denies pain, VFSS completed, DC restraints, MRI brain completed, pt slept well during shift, family at bedside, pt more alert & orientated x 2 as day progressed, verbalized needs to leave IV alone

## 2023-08-28 NOTE — PLAN OF CARE
Goal Outcome Evaluation:   Wrist restraints remain to protect IV with d5w drip.  Unable to keep feeding tube in place despite restraints.  Patient is calm but confused, forgetful and unable to remember instructions. Encouraging cough and deep breathing.

## 2023-08-28 NOTE — NURSING NOTE
[facility] medical records dept who reports placement of  shunt March 2012 is past their retention records. Unable to verify if pt's shunt is programmable or not.

## 2023-08-28 NOTE — PAYOR COMM NOTE
"Braxton Wolfe (57 y.o. Male)     PLEASE SEE ATTACHED FOR CONTINUED INPT STAY DAYS    REF #   [id number]     PLEASE CALL VILMA PICHARDO RN/ DEPT @ [phone]   OR [phone]    THANK YOU  VILMA PICHARDO RN  [facility]        Date of Birth   1965    Social Security Number       Address   [address] [place] [address]    Home Phone   [phone]    MRN   [id number]       Anabaptism   Hoahaoism    Marital Status                               Admission Date   8/24/23    Admission Type   Emergency    Admitting Provider   Meek Castillo MD    Attending Provider   Heath Hooper MD    Department, Room/Bed   [address], E568/1       Discharge Date       Discharge Disposition       Discharge Destination                                 Attending Provider: Heath Hooper MD    Allergies: Not on File    Isolation: None   Infection: None   Code Status: CPR    Ht: 177.8 cm (70\")   Wt: 76.6 kg (168 lb 14 oz)    Admission Cmt: None   Principal Problem: Sepsis [A41.9]                   Active Insurance as of 8/24/2023       Primary Coverage       Payor Plan Insurance Group Employer/Plan Group    ANTHEM BLUE CROSS ANTHEM BLUE CROSS BLUE SHIELD PPO [id number]       Payor Plan Address Payor Plan Phone Number Payor Plan Fax Number Effective Dates    [address] [phone]  1/1/2023 - None Entered    [place] [address]         Subscriber Name Subscriber Birth Date Member ID       BRAXTON WOLFE 1965 [id number]                     Emergency Contacts        (Rel.) Home Phone Work Phone Mobile Phone    Aby Wolfe (Mother) [phone] -- [phone]    Faizan Wolfe (Father) [phone] -- [phone]    Yamilet Wolfe (Daughter) -- -- [phone]              [place]: NPI [id number]  Tax ID [id number]     Physician Progress Notes (last 72 hours)        Heath Hooper MD at 08/28/23 1712            "   [facility]     LOS: 4 days     Subjective:    CC:Altered Mental Status    DIET:  Diet Order   Procedures    NPO Diet NPO Type: Strict NPO   Confusion overnight  Failed swallow test  NG placed x2 and pulled them out  Pulled out his right IJ  Able to get peripheral IV    Objective:    Vital Signs:  Temp:  [97.3 °F (36.3 °C)-99.6 °F (37.6 °C)] 98.4 °F (36.9 °C)  Heart Rate:  [51-79] 61  Resp:  [16-18] 18  BP: (128-149)/(76-97) 141/85    SpO2:  [91 %-94 %] 92 %  on   ;   Device (Oxygen Therapy): room air  Body mass index is 24.23 kg/m².    Physical Exam  Constitutional:       Appearance: Normal appearance.   HENT:      Head: Normocephalic and atraumatic.   Cardiovascular:      Rate and Rhythm: Normal rate.      Heart sounds: No murmur heard.    No friction rub.   Pulmonary:      Effort: Pulmonary effort is normal.      Breath sounds: Normal breath sounds.   Abdominal:      General: Bowel sounds are normal. There is no distension.      Palpations: Abdomen is soft.      Tenderness: There is no abdominal tenderness.   Skin:     General: Skin is warm and dry.   Neurological:      Mental Status: He is alert.      Comments: Mild left sided weakness   Psychiatric:         Mood and Affect: Mood normal.         Behavior: Behavior normal.       Results Review:    Glucose   Date Value Ref Range Status   08/28/2023 98 65 - 99 mg/dL Final   08/27/2023 186 (H) 65 - 99 mg/dL Final   08/26/2023 109 (H) 65 - 99 mg/dL Final     Results from last 7 days   Lab Units 08/28/23  1626   WBC 10*3/mm3 7.23   HEMOGLOBIN g/dL 12.7*   HEMATOCRIT % 37.0*   PLATELETS 10*3/mm3 150       Results from last 7 days   Lab Units 08/28/23  1626 08/27/23  0648 08/26/23  0329   SODIUM mmol/L 147*   < > 147*   POTASSIUM mmol/L 3.3*   < > 3.6   CHLORIDE mmol/L 111*   < > 111*   CO2 mmol/L 26.0   < > 24.7   BUN mg/dL 14   < > 44*   CREATININE mg/dL 1.01   < > 2.53*   CALCIUM mg/dL 8.2*   < > 8.2*   BILIRUBIN mg/dL  --   --  0.7   ALK  PHOS U/L  --   --  58   ALT (SGPT) U/L  --   --  16   AST (SGOT) U/L  --   --  37   GLUCOSE mg/dL 98   < > 109*    < > = values in this interval not displayed.       Results from last 7 days   Lab Units 08/28/23  1626 08/25/23  0010 08/24/23  1805   INR  1.45*   < > 1.38*   APTT seconds  --   --  25.5    < > = values in this interval not displayed.       Results from last 7 days   Lab Units 08/28/23  1626   MAGNESIUM mg/dL 2.2       Results from last 7 days   Lab Units 08/26/23  0329 08/25/23  0917 08/24/23  1805   CK TOTAL U/L 1,208* 1,023* 500*   HSTROP T ng/L  --   --  27*       Cultures:  Blood Culture   Date Value Ref Range Status   08/24/2023 No growth at 2 days  Preliminary   08/24/2023 No growth at 2 days  Preliminary     Respiratory Culture   Date Value Ref Range Status   08/25/2023   Final    Rare Normal respiratory arti. No S. aureus or Pseudomonas aeruginosa detected. Final report.       I have reviewed daily medications and changes in CPOE    Scheduled meds  cefTRIAXone, 1,000 mg, Intravenous, Q24H  famotidine, 20 mg, Intravenous, Daily  folic acid (FOLVITE) IVPB, 1 mg, Intravenous, Daily  senna-docusate sodium, 2 tablet, Oral, BID  sodium chloride, 10 mL, Intravenous, Q12H  thiamine (B-1) IV, 500 mg, Intravenous, Q8H   Followed by  [START ON 8/31/2023] thiamine (B-1) IV, 200 mg, Intravenous, Q8H   Followed by  [START ON 9/4/2023] thiamine, 100 mg, Oral, Daily        dextrose, 50 mL/hr, Last Rate: 50 mL/hr (08/28/23 0828)      PRN meds    aluminum-magnesium hydroxide-simethicone    benzonatate    senna-docusate sodium **AND** polyethylene glycol **AND** bisacodyl **AND** bisacodyl    dextrose    dextrose    glucagon (human recombinant)    nitroglycerin    ondansetron    [COMPLETED] Insert Peripheral IV **AND** sodium chloride    sodium chloride    sodium chloride        Sepsis        Assessment/Plan:  Acute encephalopathy; suspected HIE  -Mental status continues to improve, voice is stronger today than  compared to yesterday    Acute hypercapnic respiratory failure s/p vent till 8/25  -Patient is currently on room air with excellent oxygen saturations    B/L Aspiration pneumonia  -Continue with Rocephin    Vomiting; unclear etiology  -Patient is currently n.p.o.    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  He has been off of this medication now for a week    Attempting to get information concerning his  shunt for possible MRI    Heath Hooper MD  08/28/23  17:12 EDT        Electronically signed by Heath Hooper MD at 08/28/23 4687       Fran Carbone MD at 08/28/23 1428                                                        LOS: 4 days   Patient Care Team:  Provider, No Known as PCP - General    Chief Complaint:  F/up aspiration pneumonia, respiratory failure.    Subjective   Interval History  I reviewed the admission note, progress notes, PMH, PSH, Family hx, social history, imagings and prior records on this admission, summarized the finding in my note and formulated a transition of care plan.      He pulled out his core track and central line last night.  He does not recall that.  His daughter was at bedside confirmed this.  He reported no cough but his daughter stated that he coughs intermittently but does not appear to be producing phlegm.  He is currently on RA.    REVIEW OF SYSTEMS:   Constitutional: No fever or chills  Gastrointestinal: No nausea, vomiting or diarrhea  Cardiovascular: No chest pain or palpitation.      Physical Exam:     Vital  "Signs  Temp:  [97.3 °F (36.3 °C)-99.6 °F (37.6 °C)] 98.3 °F (36.8 °C)  Heart Rate:  [51-79] 62  Resp:  [16-18] 17  BP: (128-149)/(75-97) 141/76    Intake/Output Summary (Last 24 hours) at 8/28/2023 1425  Last data filed at 8/28/2023 0828  Gross per 24 hour   Intake 410.33 ml   Output --   Net 410.33 ml     Flowsheet Rows      Flowsheet Row First Filed Value   Admission Height 177.8 cm (70\") Documented at 08/25/2023 0906   Admission Weight 75.9 kg (167 lb 5.3 oz) Documented at 08/24/2023 1802            PPE used per hospital policy    General Appearance:   Alert, cooperative, in no acute distress   ENMT:  Mallampati score 2, moist mucous membrane   Eyes:  Pupils equal and reactive to light. EOMI   Neck:   Trachea midline. No thyromegaly.   Lungs:   Bilateral rhonchi.  No wheezing.    Heart:   Regular rhythm and normal rate, normal S1 and S2, no         murmur   Skin:   No rash or ecchymosis   Abdomen:    Soft. No tenderness. No HSM.   Neuro/psych:  Conscious, alert, oriented x3. Strength 5/5 in upper and lower  ext.  Appropriate mood and affect   Extremities:  No cyanosis, clubbing or edema.  Warm extremities and well-perfused          Results Review:        Results from last 7 days   Lab Units 08/27/23  0648 08/26/23  0329 08/25/23  0917   SODIUM mmol/L 148* 147* 144   POTASSIUM mmol/L 3.2* 3.6 3.9   CHLORIDE mmol/L 112* 111* 108*   CO2 mmol/L 27.0 24.7 20.0*   BUN mg/dL 30* 44* 52*   CREATININE mg/dL 1.43* 2.53* 3.85*   GLUCOSE mg/dL 186* 109* 137*   CALCIUM mg/dL 8.0* 8.2* 9.2     Results from last 7 days   Lab Units 08/26/23  0329 08/25/23  0917 08/24/23  1805   CK TOTAL U/L 1,208* 1,023* 500*   HSTROP T ng/L  --   --  27*     Results from last 7 days   Lab Units 08/27/23  0648 08/26/23  0329 08/25/23  0010   WBC 10*3/mm3 7.93 10.59 11.66*   HEMOGLOBIN g/dL 11.3* 12.0* 16.1   HEMATOCRIT % 33.4* 34.5* 47.1   PLATELETS 10*3/mm3 131* 139* 246     Results from last 7 days   Lab Units 08/27/23  0648 08/26/23  0329 " 08/25/23  0010 08/24/23  1805   INR  1.54* 1.35* 1.32* 1.38*   APTT seconds  --   --   --  25.5     Results from last 7 days   Lab Units 08/24/23  1805   PROBNP pg/mL 7,202.0*                   Results from last 7 days   Lab Units 08/25/23  0330 08/24/23  1900   PH, ARTERIAL pH units 7.449 7.341*   PCO2, ARTERIAL mm Hg 27.1* 34.2*   PO2 ART mm Hg 69.3* 100.2*   MODALITY  Adult Vent Adult Vent   O2 SATURATION CALC % 94.8 97.4         I reviewed the patient's new clinical results.        Medication Review:   cefTRIAXone, 1,000 mg, Intravenous, Q24H  famotidine, 20 mg, Intravenous, Daily  folic acid (FOLVITE) IVPB, 1 mg, Intravenous, Daily  senna-docusate sodium, 2 tablet, Oral, BID  sodium chloride, 10 mL, Intravenous, Q12H  thiamine (B-1) IV, 500 mg, Intravenous, Q8H   Followed by  [START ON 8/31/2023] thiamine (B-1) IV, 200 mg, Intravenous, Q8H   Followed by  [START ON 9/4/2023] thiamine, 100 mg, Oral, Daily        dextrose, 50 mL/hr, Last Rate: 50 mL/hr (08/28/23 0828)        Diagnostic imaging:  I personally and independently reviewed the following images:  CXR 8/25/2023: Central line and ET tube in good position.  Increased interstitial pulmonary infiltrates on the right.    Assessment     Acute hypercapnic respiratory failure s/p vent 8/24-8/25  B/L Aspiration pneumonia  Vomiting; unclear etiology  Acute encephalopathy; suspected HIE (resolved)  Acute SVT  Acute renal failure  Hypernatremia  Hypokalemia  Severe hypocalcemia and metabolic acidosis  Incidental lung nodules-needs outpatient follow  S/p prior  shunt  VHL syndrome with CNS hemangioblastomas  s/p previous [place] with ? Left hemiparesis    All problems new to me    Plan     - continue Rocephin for aspiration pna for 5 days total therapy  - oxygen weaned to room air  - Initiate flutter  - D50 for hypoglycemia and hypernatremia.  - Enteral feeding per primary          Fran Carbone MD  08/28/23  14:25 EDT        This note was dictated utilizing Stormy  "dictation     Electronically signed by Fran Carbone MD at 23 1454       Donna Chavarria APRN at 23 0837          DOS: 2023  NAME: Braxton Wolfe   : 1965  PCP: Provider, No Known  Chief Complaint   Patient presents with    Altered Mental Status     Stroke    Subjective:     Interval History  Pt seen in follow up today, however the problem is new to the examiner.  Patient Complaints:  No acute events overnight.  Patient has just returned from having swallow study completed.  No cough during exam.  No family at bedside.  History taken from: patient chart RN    Objective:  Vital signs: /97 (BP Location: Right arm, Patient Position: Lying)   Pulse 61   Temp 98.2 °F (36.8 °C) (Oral)   Resp 17   Ht 177.8 cm (70\")   Wt 76.6 kg (168 lb 14 oz)   SpO2 93%   BMI 24.23 kg/m²       Physical Exam:  GENERAL: NAD, alert and cooperative  HEENT: Normocephalic, atraumatic   Resp: Even and unlabored, no audible wheezing.  Extremities: No signs of distal embolization. Normal joint ROM  Skin: Warm and dry, no rashes or birth marks.   Psychiatric: Normal mood and affect.    Neurological:   MS: Awake and alert, says he is at [facility], says it is , does not know the month.  Normal attention/concentration, language intact, no neglect   CN: visual acuity grossly normal, PERRL, EOMI, no nystagmus, no facial droop, no dysarthria, hearing symmetric, tongue midline  Motor: 5/5 strength RUE/RLE. 4+/5 LUE/LLE.  strength decreased left side.  Normal tone.  No tremor  Sensory: Intact to light touch in all four ext.  Coordination:  Dysmetria finger to nose test on left, no dysmetria on right.  Rapid alternating movements: Normal finger to thumb tap  Gait and station: Deferred  Reflexes: 1+ patellar, achilles. Down-going toes    Results Review:    I reviewed the patient's new clinical results.  I reviewed the patient's new imaging results and agree with the interpretation.  I reviewed the " patient's other test results and agree with the interpretation  Discussed with Dr. Clifford    Current Medications:  Scheduled Medications:cefTRIAXone, 1,000 mg, Intravenous, Q24H  famotidine, 20 mg, Intravenous, Daily  folic acid (FOLVITE) IVPB, 1 mg, Intravenous, Daily  senna-docusate sodium, 2 tablet, Oral, BID  sodium chloride, 10 mL, Intravenous, Q12H  thiamine (B-1) IV, 500 mg, Intravenous, Q8H   Followed by  [START ON 8/31/2023] thiamine (B-1) IV, 200 mg, Intravenous, Q8H   Followed by  [START ON 9/4/2023] thiamine, 100 mg, Oral, Daily      Infusions: dextrose, 50 mL/hr, Last Rate: 50 mL/hr (08/28/23 0828)      PRN Medications:    aluminum-magnesium hydroxide-simethicone    benzonatate    senna-docusate sodium **AND** polyethylene glycol **AND** bisacodyl **AND** bisacodyl    dextrose    dextrose    glucagon (human recombinant)    nitroglycerin    ondansetron    [COMPLETED] Insert Peripheral IV **AND** sodium chloride    sodium chloride    sodium chloride    Laboratory results:  Lab Results   Component Value Date    GLUCOSE 186 (H) 08/27/2023    CALCIUM 8.0 (L) 08/27/2023     (H) 08/27/2023    K 3.2 (L) 08/27/2023    CO2 27.0 08/27/2023     (H) 08/27/2023    BUN 30 (H) 08/27/2023    CREATININE 1.43 (H) 08/27/2023    BCR 21.0 08/27/2023    ANIONGAP 9.0 08/27/2023     Lab Results   Component Value Date    WBC 7.93 08/27/2023    HGB 11.3 (L) 08/27/2023    HCT 33.4 (L) 08/27/2023    MCV 90.3 08/27/2023     (L) 08/27/2023          Lab Results   Component Value Date    INR 1.54 (H) 08/27/2023    INR 1.35 (H) 08/26/2023    INR 1.32 (H) 08/25/2023    PROTIME 18.7 (H) 08/27/2023    PROTIME 16.9 (H) 08/26/2023    PROTIME 16.6 (H) 08/25/2023     Lab Results   Component Value Date    TSH 1.540 06/03/2020     No components found for: LDLCALC  Lab Results   Component Value Date    HGBA1C 5.5 01/11/2019     No components found for: B12    Review and interpretation of imaging:   Adult Transthoracic Echo  Complete W/ Cont if Necessary Per Protocol    Result Date: 8/25/2023    The study is technically difficult for diagnosis.   Left ventricular ejection fraction appears to be 56 - 60%.   Left ventricular diastolic function was indeterminate.   Estimated right ventricular systolic pressure from tricuspid regurgitation is normal (<35 mmHg).   There is a trivial pericardial effusion. There is no evidence of cardiac tamponade.     EEG    Result Date: 8/25/2023  Date of onset: 8/25/23 Date of offset: 8/25/23 Indication:Found down, comatose Technical description:  This is a 21 channel digital EEG recording that began on 0959 and ended on 1026.  Background:  The background consists of a posteriorly dominant rhythm that is notably absent.  Anteriorly, there is diffuse theta and delta activity.  There is fair spontaneous variability.  Hyperventilation was not performed and photic stimulation was performed and did not induce an abnormal driving response there is no focal slowing.  There are no interictal epileptiform discharges and no electrographic seizures noted during the study.  EKG demonstrates tachycardia between 100 and 120 bpm for majority of the study.  Several triphasic waves are seen during the study.  Impression: This is an abnormal routine EEG study due to the presence of diffuse delta and theta slowing.  There are no interictal epileptiform discharges and no electrographic seizures.  These electrophysiologic findings are indicative of moderately severe encephalopathy.  There are additionally triphasic waves seen. Triphasic waves are a nonspecific finding seen with encephalopathies, more often with hepatic and/or renal derangements.       CT Abdomen Pelvis Without Contrast    Result Date: 8/24/2023  CT OF THE CHEST WITHOUT CONTRAST; CT OF THE ABDOMEN AND PELVIS WITHOUT CONTRAST  HISTORY: Found down. Concern for aspiration.  COMPARISON: None available.  TECHNIQUE: Axial CT imaging was obtained from the thoracic  inlet through the symphysis pubis. No IV contrast was administered.  FINDINGS: CT OF THE CHEST: Endotracheal tube terminates in satisfactory position. Thyroid gland is unremarkable. Thoracic aorta is normal in caliber. Mediastinal lymph nodes do not appear pathologically enlarged. There is a subpleural nodule noted within the right upper lobe, measuring up to 8 mm. There are some reticular nodular infiltrates which are noted within the right upper lobe. There is dense dependent consolidation with air bronchograms noted within both lower lobes. There may be trace bilateral pleural effusions. No acute osseous abnormalities are seen. The patient does have a right-sided ventriculoperitoneal shunt.  CT OF THE ABDOMEN AND PELVIS: The stomach, duodenum, spleen, adrenal glands, and gallbladder are all grossly unremarkable. There are low-attenuation lesions which are identified within the pancreas. They are poorly assessed on this unenhanced exam. 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 They were described on prior report from June 17, 2020, but again are poorly assessed on this exam. No suspicious hepatic lesions are seen. No hydronephrosis is seen on either side. I do not see any renal masses on this unenhanced exam. No distal ureteral or bladder stones are seen. Prostate gland is within normal limits. There is no bowel obstruction. Ventricular peritoneal shunt terminates within the left lower quadrant. The appendix is normal. No acute osseous abnormalities are seen. There is mild colonic diverticulosis.       1. Dense dependent consolidation bilaterally., Given history, aspiration is a concern. Patient is also noted to have some mild reticulonodular infiltrates within the right upper lobe, as well as an 8 mm subpleural nodule within the right upper lobe. CT follow-up in 3 months is recommended. 2. 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 However, they are incompletely assessed in the absence of contrast material and prior studies for comparison.    Radiation dose reduction techniques were utilized, including automated exposure control and exposure modulation based on body size.   This report was finalized on 8/24/2023 10:34 PM by Dr. Charlee Medina M.D.      CT Head Without Contrast    Result Date: 8/24/2023  CT OF THE HEAD WITHOUT CONTRAST  HISTORY: Unresponsiveness  COMPARISON: None available.  TECHNIQUE: Axial CT imaging was obtained through the brain. No IV contrast was administered.  FINDINGS: Unfortunately, patient's prior imaging is not available for comparison. The patient has a right frontal ventriculoperitoneal shunt, which terminates within the right lateral ventricle. There is encephalomalacia noted within the right frontal lobe, adjacent to the course of the catheter. There is ventricular dilatation. Patient may have some additional mild encephalomalacia within the right parietal lobe. There is some decreased attenuation surrounding the posterior and temporal horns of the lateral ventricles. Some of this may be related to small vessel disease. Given dilatation of the ventricular system, some transependymal flow of CSF is not excluded. There are areas of encephalomalacia noted within both cerebellar hemispheres. The patient is also noted to have marked dilatation of the fourth ventricle, which has been described on prior imaging. The patient is status post suboccipital craniectomy. There is partial opacification of the mastoid air cells bilaterally. Mucosal thickening is noted within the ethmoid and sphenoid sinuses. There are air-fluid levels within the sphenoid sinuses. Correlation with any evidence of sinusitis is recommended.       1. No acute intracranial hemorrhage. 2. Unfortunately, this patient's prior imaging is not available for comparison. There is ventriculomegaly. I'm uncertain  if this has increased when compared to prior imaging. There is some decreased attenuation surrounding the posterior and temporal horns of the lateral ventricles bilaterally. Some transependymal flow of CSF cannot be excluded..  Radiation dose reduction techniques were utilized, including automated exposure control and exposure modulation based on body size.   This report was finalized on 8/24/2023 10:21 PM by Dr. Charlee Medina M.D.      CT Chest Without Contrast Diagnostic    Result Date: 8/24/2023  CT OF THE CHEST WITHOUT CONTRAST; CT OF THE ABDOMEN AND PELVIS WITHOUT CONTRAST  HISTORY: Found down. Concern for aspiration.  COMPARISON: None available.  TECHNIQUE: Axial CT imaging was obtained from the thoracic inlet through the symphysis pubis. No IV contrast was administered.  FINDINGS: CT OF THE CHEST: Endotracheal tube terminates in satisfactory position. Thyroid gland is unremarkable. Thoracic aorta is normal in caliber. Mediastinal lymph nodes do not appear pathologically enlarged. There is a subpleural nodule noted within the right upper lobe, measuring up to 8 mm. There are some reticular nodular infiltrates which are noted within the right upper lobe. There is dense dependent consolidation with air bronchograms noted within both lower lobes. There may be trace bilateral pleural effusions. No acute osseous abnormalities are seen. The patient does have a right-sided ventriculoperitoneal shunt.  CT OF THE ABDOMEN AND PELVIS: The stomach, duodenum, spleen, adrenal glands, and gallbladder are all grossly unremarkable. There are low-attenuation lesions which are identified within the pancreas. They are poorly assessed on this unenhanced exam. 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 They were described on prior report from June 17, 2020, but again are poorly assessed on this exam. No suspicious hepatic lesions are seen. No hydronephrosis is seen on either side. I do not see  any renal masses on this unenhanced exam. No distal ureteral or bladder stones are seen. Prostate gland is within normal limits. There is no bowel obstruction. Ventricular peritoneal shunt terminates within the left lower quadrant. The appendix is normal. No acute osseous abnormalities are seen. There is mild colonic diverticulosis.       1. Dense dependent consolidation bilaterally., Given history, aspiration is a concern. Patient is also noted to have some mild reticulonodular infiltrates within the right upper lobe, as well as an 8 mm subpleural nodule within the right upper lobe. CT follow-up in 3 months is recommended. 2. 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 However, they are incompletely assessed in the absence of contrast material and prior studies for comparison.    Radiation dose reduction techniques were utilized, including automated exposure control and exposure modulation based on body size.   This report was finalized on 8/24/2023 10:34 PM by Dr. Charlee Medina M.D.      XR Chest 1 View    Result Date: 8/25/2023  XR CHEST 1 VW-8/25/2023  HISTORY: Central line placement.  Right internal jugular central venous catheter seen with its tip overlying the SVC. No pneumothorax is seen. Endotracheal tube is seen in good position. Heart size is within normal limits. There is some mild patchy atelectasis or infiltrate in the right lower lung. Left lung appears clear.      1. Central venous catheter tip overlying the SVC. 2. No pneumothorax is seen.   This report was finalized on 8/25/2023 6:41 AM by Dr. Sriram Ray M.D.      XR Chest 1 View    Result Date: 8/25/2023  SINGLE VIEW OF THE CHEST  HISTORY: Respiratory failure  COMPARISON: August 24, 2023  FINDINGS: Endotracheal tube terminates in satisfactory position. There is a right-sided ventriculoperitoneal shunt. Heart size is within normal limits. Bibasilar  consolidation is noted. No pneumothorax is seen. Trace left pleural effusion is suspected.      Persistent bibasilar consolidation.  This report was finalized on 8/25/2023 5:17 AM by Dr. Charlee Medina M.D.      XR Chest 1 View    Result Date: 8/24/2023  CHEST SINGLE VIEW  HISTORY: Respiratory failure. Patient was found down.  COMPARISON: None.  FINDINGS: There has been intubation and the ET tube tip is 2.2 cm above the melanie and this could be withdrawn 2 cm for better position. The heart size appears normal. There is no evidence for perihilar edema or pneumothorax or focal airspace disease.  There is some limitation as the lung apices, particular on the right and the right costophrenic angle, are not included on the field-of-view. There is shunt tubing overlying the right thorax.      Limited exam demonstrates intubation with ET tube tip 2.2 cm above the melanie and this could be withdrawn 2 cm for better position. No further evidence for active disease in the chest.  This report was finalized on 8/24/2023 7:10 PM by Dr. Marcelo De M.D.      XR Abdomen KUB    Result Date: 8/28/2023  Patient: DAVID CERNA  Time Out: 05:41 Exam(s): XR ABDOMEN EXAM:   XR Abdomen, 2 Views CLINICAL HISTORY:    Reason for exam: FEEDING TUBE PLACEMENT. TECHNIQUE:   Frontal view of the abdomen pelvis with upright view of the abdomen. COMPARISON:   No relevant prior studies available. FINDINGS:   Lower thorax:  Bibasilar infiltrates noted.   Intraperitoneal space:  No free air.   Gastrointestinal tract:  Unremarkable.  No dilation.   Bones joints:  Unremarkable.   Tubes, lines and devices:  Distal end of an enteric tube tip overlies the region of the gastric antrum proximal duodenum.  The visualized bowel gas is unremarkable-without evidence of obstruction. IMPRESSION:     1.  Enteric tube tip at the region of the distal stomach proximal duodenum. 2.  Bibasilar infiltrates noted.     Electronically signed by Norma Jaramillo MD on  08-28-23 at 0541    XR Abdomen KUB    Result Date: 8/27/2023  KUB  HISTORY: Feeding tube placement.  COMPARISON: CT abdomen and pelvis 08/24/2020.  FINDINGS: Feeding tube has been placed and the tip is in the gastric fundus. Bowel gas pattern in the upper abdomen is nonobstructive. The lower abdomen and pelvis are not included on the field-of-view.      Feeding tube tip is in the stomach.   This report was finalized on 8/27/2023 11:50 PM by Dr. Marcelo De M.D.       Work-up to date:  CTH: No acute intracranial hemorrhage.  Ventriculomegaly noted decreased attenuation surrounding posterior and temporal horns of lateral ventricles bilaterally.  Some transependymal flow of CSF cannot be excluded.  rEEG: Abnormal due to presence of diffuse delta and theta slowing.  Triphasic waves seen.  No interictal epileptiform discharges or electrographic seizures noted.  Findings indicative of moderately severe encephalopathy.  2D Echo: EF 56-60%, all left ventricular wall segments contract normally.  Left atrium not well visualized.  Quality of study limited due to limited views obtained and patient being intubated.    Lab Review: No labs from today to review. CK 1208 (8/26), Na+ 148, K+ 3.2, Ca 8.0, Mg 2.8 all from yesterday, Phos 2.1 today, ammonia <10, blood cx no growth at 3 days    Diagnoses:   Von Hippel-Lindau with cerebellar hemangioblastoma  Acute encephalopathy  Left hemiparesis/ataxia  Bilateral aspiration pneumonia  YUDITH (improving), now with hypernatremia     Impression: Mr. 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  He was originally admitted to [facility] 8/24/2023 to the ICU with YUDITH and severe encephalopathy requiring endotracheal intubation.  He was living independently at home and was found down by a friend.  He was extubated and moved out of the ICU on 8/26.  His  confusion persists and he does have very mild weakness on his left side, unsure if this is baseline.  On exam today he does not seem in any type of respiratory distress, there is no cough.  He remains in soft wrist restraints.  Apparently he has removed several IVs and his cortrak overnight.  He is on gentle hydration intravenously.  He is neurologically stable.  We will continue to follow with you for MRI results.  Call RRT for acute neurologic change or concern.    Plan:  Await brain MRI with contrast imaging  Delirium precautions  Therapies as written  CCP for discharge planning    I have discussed the above with the patient and Dr. Clifford who are in agreement with the plan.     Evidence-based delirium precautions include:     1. Frequent reorientation, reminding patient not questioning patient   2. Shades up during day/down at night   3. TV off except for soft music only   4. Familiar objects at bedside   5. Encourage friends/family to spend the night   6. Minimize anticholingeric medications   7. Minimize polypharmacy   8. Minimize restraints, includes lines and/or foleys and/or feeding tubes as able   9. Minimize overnight checks/vitals to encourage restful consistent sleep patterns   10. Out of bed during day as much as possible       JUD Scott  23  11:54 EDT    Diagnoses:    Sepsis      Electronically signed by Donna Chavarria APRN at 23 2029       Joel Gilliam MD at 23 0683              [facility] Progress Note      Patient Name: Braxton Wolfe  : 1965  MRN: [id number]  Primary Care Physician:  Provider, No Known  Date of admission: 2023    Subjective     Interval History:   F/u YUDITH    Review of Systems:   Lost IV access and seen by PULM overnight, ultimately able to place PIV  Cortrack ordered  D5W reduced 50 cc/hr   UOP NR (void x3)  Mentation clearer this AM, denies dyspnea or nausea    Objective     Vitals:   Temp:   [97.3 °F (36.3 °C)-99.6 °F (37.6 °C)] 99.6 °F (37.6 °C)  Heart Rate:  [51-69] 69  Resp:  [16-18] 16  BP: (111-149)/(75-97) 149/85    Intake/Output Summary (Last 24 hours) at 8/28/2023 0625  Last data filed at 8/28/2023 0500  Gross per 24 hour   Intake 237 ml   Output --   Net 237 ml       Physical Exam:    General Appearance: ill appearing pleasant WM getting labs drawn, alert, conversant, no distress  Neck: supple, no JVD  Lungs: CTA bilat no rales  Heart: RRR, normal S1 and S2  Abdomen: soft, nontender, nondistended  Extremities: no edema, cyanosis or clubbing    Scheduled Meds:     cefTRIAXone, 1,000 mg, Intravenous, Q24H  famotidine, 20 mg, Intravenous, Daily  senna-docusate sodium, 2 tablet, Oral, BID  sodium chloride, 10 mL, Intravenous, Q12H  thiamine (B-1) IV, 200 mg, Intravenous, Daily      IV Meds:   dextrose, 50 mL/hr, Last Rate: 50 mL/hr (08/28/23 0016)        Results Reviewed:   I have personally reviewed the results from the time of this admission to 8/28/2023 06:25 EDT     Results from last 7 days   Lab Units 08/27/23  0648 08/26/23  0329 08/25/23  0917 08/25/23  0010 08/24/23  1805   SODIUM mmol/L 148* 147* 144   < > 150*   POTASSIUM mmol/L 3.2* 3.6 3.9   < > 2.9*   CHLORIDE mmol/L 112* 111* 108*   < > 125*   CO2 mmol/L 27.0 24.7 20.0*   < > 10.0*   BUN mg/dL 30* 44* 52*   < > 27*   CREATININE mg/dL 1.43* 2.53* 3.85*   < > 3.05*   CALCIUM mg/dL 8.0* 8.2* 9.2   < > 4.9*   BILIRUBIN mg/dL  --  0.7 0.5  --  0.2   ALK PHOS U/L  --  58 69  --  43   ALT (SGPT) U/L  --  16 15  --  9   AST (SGOT) U/L  --  37 34  --  20   GLUCOSE mg/dL 186* 109* 137*   < > 92    < > = values in this interval not displayed.     Estimated Creatinine Clearance: 61.8 mL/min (A) (by C-G formula based on SCr of 1.43 mg/dL (H)).  Results from last 7 days   Lab Units 08/27/23  0648 08/26/23  0329 08/25/23  0917   MAGNESIUM mg/dL 2.8* 2.8* 3.1*   PHOSPHORUS mg/dL 2.4* 2.2* 3.6     Results from last 7 days   Lab Units 08/26/23  0329  08/25/23  0917   URIC ACID mg/dL 4.8 8.5*     Results from last 7 days   Lab Units 08/27/23  0648 08/26/23  0329 08/25/23  0010 08/24/23  1805   WBC 10*3/mm3 7.93 10.59 11.66* 12.63*   HEMOGLOBIN g/dL 11.3* 12.0* 16.1 17.6   PLATELETS 10*3/mm3 131* 139* 246 306     Results from last 7 days   Lab Units 08/27/23  0648 08/26/23  0329 08/25/23  0010 08/24/23  1805   INR  1.54* 1.35* 1.32* 1.38*       Assessment / Plan     ASSESSMENT:  Non olig YUDITH - prerenal azotemia due to vol depletion, improving with IVF, Cr down to 1.4 yesterday, labs pending today  Hypokalemia - mild, K 3.2 yesterday, replaced   Hypernatremia - Na 148 yesterday, poor oral intake recently (and NPO now pending swallow eval), on D5W, rate reduced 50 cc/hr.  Alb low 2.8  Von Hippel Lindau syndrome with previous craniotogy  Acute encephalopathy - neurology following; note plan for MRI   Aspiration PNA - on abx, PULM following     PLAN:  Continue D5W 50 cc/hr pending repeat labs and swallow eval - if passes this and diet resumed can stop IVF and encourage oral intake       Joel Gilliam MD  08/28/23  06:25 EDT    [facility]  [phone]    Electronically signed by Joel Gilliam MD at 08/28/23 0636       Roseline Fong APRN at 08/28/23 0017          Pulmonary/critical care cross coverage note    Called overnight because patient pulled out his central line.  Nursing staff and IV therapy attempted to obtain access multiple times without success.    Reviewed patient's chart, central line was placed due to patient's poor vascular access.  Patient is currently requiring vascular access for dextrose containing IV fluids (hypoglycemia) and antibiotics.    Until able to attain vascular access, Prasanna ordered to administer dextrose for possible hypoglycemia. Frequency of blood sugar checks increased.    Went to bedside to assess patient- PIV access attempted x 3, with eventual success in attaining 22 gauge in  RAC.  Recommended frequent checks of IV site due to frequent losses of PIV access.  Patient placed in bilateral upper extremity restraints to prevent pulling of lines/ tubes.    D5W order modified to 50mL/hr.  Q4hour accu-checks ordered.  KUB confirmed small bowel feeding tube placement- okay to give dextrose via tube if necessary.    Will need to figure out plan for more stable vascular access.       Electronically signed by Roseline Fong APRN at 08/28/23 5170       Heath Hooper MD at 08/27/23 0148              [facility]     LOS: 3 days     Subjective:    CC:Altered Mental Status    DIET:  Diet Order   Procedures    NPO Diet NPO Type: Strict NPO     No cp  No soa    Objective:    Vital Signs:  Temp:  [97.5 °F (36.4 °C)-99.1 °F (37.3 °C)] 98.2 °F (36.8 °C)  Heart Rate:  [57-63] 58  Resp:  [18-20] 18  BP: (111-119)/(81-87) 111/87    SpO2:  [96 %-97 %] 96 %  on   ;   Device (Oxygen Therapy): room air  Body mass index is 25.24 kg/m².    Physical Exam  Constitutional:       Appearance: Normal appearance.   HENT:      Head: Normocephalic and atraumatic.   Cardiovascular:      Rate and Rhythm: Normal rate.      Heart sounds: No murmur heard.    No friction rub.   Pulmonary:      Effort: Pulmonary effort is normal.      Breath sounds: Normal breath sounds.   Abdominal:      General: Bowel sounds are normal. There is no distension.      Palpations: Abdomen is soft.      Tenderness: There is no abdominal tenderness.   Skin:     General: Skin is warm and dry.   Neurological:      Mental Status: He is alert.      Comments: Mild left sided weakness   Psychiatric:         Mood and Affect: Mood normal.         Behavior: Behavior normal.       Results Review:    Glucose   Date Value Ref Range Status   08/27/2023 186 (H) 65 - 99 mg/dL Final   08/26/2023 109 (H) 65 - 99 mg/dL Final   08/25/2023 137 (H) 65 - 99 mg/dL Final   08/25/2023 140 (H) 65 - 99 mg/dL Final   08/24/2023 92 65 - 99 mg/dL Final      Results from last 7 days   Lab Units 08/27/23  0648   WBC 10*3/mm3 7.93   HEMOGLOBIN g/dL 11.3*   HEMATOCRIT % 33.4*   PLATELETS 10*3/mm3 131*     Results from last 7 days   Lab Units 08/27/23  0648 08/26/23  0329   SODIUM mmol/L 148* 147*   POTASSIUM mmol/L 3.2* 3.6   CHLORIDE mmol/L 112* 111*   CO2 mmol/L 27.0 24.7   BUN mg/dL 30* 44*   CREATININE mg/dL 1.43* 2.53*   CALCIUM mg/dL 8.0* 8.2*   BILIRUBIN mg/dL  --  0.7   ALK PHOS U/L  --  58   ALT (SGPT) U/L  --  16   AST (SGOT) U/L  --  37   GLUCOSE mg/dL 186* 109*     Results from last 7 days   Lab Units 08/27/23  0648 08/25/23  0010 08/24/23  1805   INR  1.54*   < > 1.38*   APTT seconds  --   --  25.5    < > = values in this interval not displayed.     Results from last 7 days   Lab Units 08/27/23  0648   MAGNESIUM mg/dL 2.8*     Results from last 7 days   Lab Units 08/26/23  0329 08/25/23  0917 08/24/23  1805   CK TOTAL U/L 1,208* 1,023* 500*   HSTROP T ng/L  --   --  27*     Cultures:  Blood Culture   Date Value Ref Range Status   08/24/2023 No growth at 2 days  Preliminary   08/24/2023 No growth at 2 days  Preliminary     Respiratory Culture   Date Value Ref Range Status   08/25/2023   Final    Rare Normal respiratory arti. No S. aureus or Pseudomonas aeruginosa detected. Final report.       I have reviewed daily medications and changes in CPOE    Scheduled meds  cefTRIAXone, 1,000 mg, Intravenous, Q24H  famotidine, 20 mg, Intravenous, Daily  senna-docusate sodium, 2 tablet, Oral, BID  sodium chloride, 10 mL, Intravenous, Q12H        dextrose, 100 mL/hr      PRN meds    aluminum-magnesium hydroxide-simethicone    senna-docusate sodium **AND** polyethylene glycol **AND** bisacodyl **AND** bisacodyl    dextrose    dextrose    glucagon (human recombinant)    nitroglycerin    ondansetron    [COMPLETED] Insert Peripheral IV **AND** sodium chloride    sodium chloride    sodium chloride        Sepsis        Assessment/Plan:  Acute encephalopathy; suspected  HIE  -Patient's mental status is dramatically improved from admission, patient's voice is stronger today versus yesterday according to the family     Acute hypercapnic respiratory failure s/p vent till   -Patient is currently on room air with excellent oxygen saturations    B/L Aspiration pneumonia  -Continue with Rocephin    Vomiting; unclear etiology  -He is tolerating oral intake and not having any more vomiting    Acute renal failure  -Creatinine on admission was 3.0 and it increased to 3.8 and today it is 1.4   -Patient is being followed by nephrology    hypernatremia  -improved    Hypokalemia  -replace    Severe hypocalcemia  -better    Severe metabolic acidosis  -better    Elevated BNP    Incidental lung nodules-needs outpatient follow  S/p prior  shunt  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  He has been off of this medication now for a week        Heath Hooper MD  23  15:09 EDT        Electronically signed by Heath Hooper MD at 23 2116       Mookie Osuna DO at 23 1942              [facility]  [phone]  Dr. Mookie Osuna     Subjective:  LOS: 3    Chief Complaint:   Found down, aspiration pneumonia, acute encephalopathy     Patient was sitting up in chair on my evaluation.  Denies any concerns or complaints.  However he does appear to still have confusion and did have to reasked lots of questions.  Also not moving his left side which she states has been having difficulty with but unclear how long.  Also discussed with daughter at bedside and questions were answered.    Objective   Vital Signs past 24hrs  Temp range: Temp (24hrs), Av.1 °F (36.7 °C), Min:97.5 °F (36.4 °C), Max:99.1 °F (37.3 °C)    BP range: BP: (111-119)/(81-87) 111/87  Pulse range: Heart Rate:  [57-63] 58  Resp rate range: Resp:  [18-20] 18  Device (Oxygen Therapy): room air    Oxygen range:SpO2:  [96 %-97 %] 96 %     Physical Exam  Constitutional:       Appearance: Normal appearance.   HENT:      Head: Normocephalic and atraumatic.   Eyes:      Extraocular Movements: Extraocular movements intact.      Pupils: Pupils are equal, round, and reactive to light.   Cardiovascular:      Rate and Rhythm: Normal rate and regular rhythm.      Heart sounds: No murmur heard.  Pulmonary:      Effort: Pulmonary effort is normal. No respiratory distress.      Breath sounds: Normal breath sounds. No wheezing, rhonchi or rales.   Abdominal:      General: Abdomen is flat.      Palpations: Abdomen is soft.      Tenderness: There is no abdominal tenderness.   Musculoskeletal:         General: No swelling or tenderness.   Skin:     General: Skin is warm and dry.      Findings: No rash.   Neurological:      Mental Status: He is alert. He is disoriented and confused.   Psychiatric:         Mood and Affect: Mood normal.         Behavior: Behavior normal.     Results Review:    I have reviewed the laboratory and imaging data since the last note by [facility] physician.  My annotations are noted in assessment and plan.      Result Review:  I have personally reviewed the results from last note by [facility] physician to 8/27/2023 15:22 EDT and agree with these findings:  [x]  Laboratory list / accordion  [x]  Microbiology  [x]  Radiology  [x]  EKG/Telemetry   [x]  Cardiology/Vascular   [x]  Pathology  [x]  Old records  []  Other:    Medication Review:  I have reviewed the current MAR.  My annotations are noted in assessment and plan.    cefTRIAXone, 1,000 mg, Intravenous, Q24H  famotidine, 20 mg, Intravenous, Daily  senna-docusate sodium, 2 tablet, Oral, BID  sodium chloride, 10 mL, Intravenous, Q12H        dextrose, 100 mL/hr      Lines, Drains & Airways       Active LDAs       Name Placement date Placement time Site Days    CVC Quadruple Lumen 08/25/23 Right Internal jugular 08/25/23  0626  Internal jugular  2                   No active isolations  Diet Orders (active) (From admission, onward)       Start     Ordered    23  NPO Diet NPO Type: Strict NPO  Diet Effective Now         23                      Assessment  Acute hypercapnic respiratory failure s/p vent till   B/L Aspiration pneumonia  Vomiting; unclear etiology  Acute encephalopathy; suspected HIE (resolved)  Acute SVT  Acute renal failure  Hypernatremia  Hypokalemia  Severe hypocalcemia and metabolic acidosis  Incidental lung nodules-needs outpatient follow  S/p prior  shunt  VHL syndrome with CNS hemangioblastomas  s/p previous [place] with ? Left hemiparesis    Plan  - overall clinical picture is still unclear why he was found down in old dried blood but he appears to be improving with just supportive care, fluids and antibiotics.   - intubated  PTA, extubated   - continue Rocephin for aspiration pna for 5 days total therapy  - oxygen weaned to room air  - hospitalist team consulted to assume primary  - neurology following and planning MRI brain   -Patient is okay from pulmonary perspective to go down for MRI.  I will add on as needed antitussives.      THESE ARE NEW MEDICAL PROBLEMS TO ME.      Mookie Osuna DO   23  15:22 EDT      Part of this note may be an electronic transcription/translation of spoken language to printed text using the Dragon Dictation System.      Electronically signed by Mookie Osuna DO at 23 1522       Jewell Carrillo APRN at 23 1040          DOS: 2023  NAME: Braxton Wolfe   : 1965  PCP: Provider, No Known  Chief Complaint   Patient presents with    Altered Mental Status     Neurology    Subjective: Patient is confused, nurse reports family was concerned that patient was hallucinating overnight.  He reports difficulty with his left side but is unsure if this is at baseline or worse than usual. No h/a. +cough.  Pt seen in follow up today, however the problem is  "new to the examiner.      Objective:  Vital signs: /81 (BP Location: Right arm, Patient Position: Lying)   Pulse 63   Temp 99.1 °F (37.3 °C) (Oral)   Resp 18   Ht 177.8 cm (70\")   Wt 79.8 kg (175 lb 14.8 oz)   SpO2 97%   BMI 25.24 kg/m²       General appearance: Well developed, well nourished, alert and cooperative.   HEENT: Normocephalic.   Cardiac: Regular rate and rhythm. No murmurs.   Peripheral Vasculature: Radial pulses are equal and symmetric.  Chest Exam: Clear to auscultation bilaterally, no wheezes, no rhonchi.  Extremities: Normal, no edema.   Skin: No rashes or birthmarks.     Higher integrative function: Awake and alert, [place] that [place] is January 2023.  Language intact.  No neglect.  Cranial nerves: Visual fields intact.  Extraocular movements intact, mildly disconjugate gaze.  Pupils equal, round, reactive to light.  Normal facial sensation, face symmetric.  Tongue midline.  No dysarthria.  Motor: Right arm and leg 5 out of 5, very mild weakness in the left upper extremity and with left dorsi and plantarflexion.  No fasciculations, rigidity, spasticity or abnormal movements.   Sensation: Intact/symmetric to light touch in all extremities.  Station and gait: Deferred.  Coordination: Dysmetria with left finger-to-nose,  No dysmetria on the right.    Scheduled Meds:cefTRIAXone, 1,000 mg, Intravenous, Q24H  famotidine, 20 mg, Intravenous, Daily  senna-docusate sodium, 2 tablet, Oral, BID  sodium chloride, 10 mL, Intravenous, Q12H      Continuous Infusions:dextrose, 100 mL/hr      PRN Meds:.  aluminum-magnesium hydroxide-simethicone    senna-docusate sodium **AND** polyethylene glycol **AND** bisacodyl **AND** bisacodyl    dextrose    dextrose    glucagon (human recombinant)    nitroglycerin    ondansetron    [COMPLETED] Insert Peripheral IV **AND** sodium chloride    sodium chloride    sodium chloride    Laboratory results:  Lab Results   Component Value Date "    GLUCOSE 186 (H) 08/27/2023    CALCIUM 8.0 (L) 08/27/2023     (H) 08/27/2023    K 3.2 (L) 08/27/2023    CO2 27.0 08/27/2023     (H) 08/27/2023    BUN 30 (H) 08/27/2023    CREATININE 1.43 (H) 08/27/2023    BCR 21.0 08/27/2023    ANIONGAP 9.0 08/27/2023     Lab Results   Component Value Date    WBC 7.93 08/27/2023    HGB 11.3 (L) 08/27/2023    HCT 33.4 (L) 08/27/2023    MCV 90.3 08/27/2023     (L) 08/27/2023     No results found for: CHOL  Lab Results   Component Value Date    HDL 45 06/03/2020    HDL 54 01/11/2019    HDL 43 04/17/2018     Lab Results   Component Value Date     (H) 06/03/2020    LDL 89 01/11/2019    LDL 88 04/17/2018     Lab Results   Component Value Date    TRIG 88 06/03/2020    TRIG 55 01/11/2019    TRIG 58 04/17/2018          Review and interpretation of imaging:  Adult Transthoracic Echo Complete W/ Cont if Necessary Per Protocol    Result Date: 8/25/2023    The study is technically difficult for diagnosis.   Left ventricular ejection fraction appears to be 56 - 60%.   Left ventricular diastolic function was indeterminate.   Estimated right ventricular systolic pressure from tricuspid regurgitation is normal (<35 mmHg).   There is a trivial pericardial effusion. There is no evidence of cardiac tamponade.     EEG    Result Date: 8/25/2023  Date of onset: 8/25/23 Date of offset: 8/25/23 Indication:Found down, comatose Technical description:  This is a 21 channel digital EEG recording that began on 0959 and ended on 1026.  Background:  The background consists of a posteriorly dominant rhythm that is notably absent.  Anteriorly, there is diffuse theta and delta activity.  There is fair spontaneous variability.  Hyperventilation was not performed and photic stimulation was performed and did not induce an abnormal driving response there is no focal slowing.  There are no interictal epileptiform discharges and no electrographic seizures noted during the study.  EKG  demonstrates tachycardia between 100 and 120 bpm for majority of the study.  Several triphasic waves are seen during the study.  Impression: This is an abnormal routine EEG study due to the presence of diffuse delta and theta slowing.  There are no interictal epileptiform discharges and no electrographic seizures.  These electrophysiologic findings are indicative of moderately severe encephalopathy.  There are additionally triphasic waves seen. Triphasic waves are a nonspecific finding seen with encephalopathies, more often with hepatic and/or renal derangements.       CT Abdomen Pelvis Without Contrast    Result Date: 8/24/2023  CT OF THE CHEST WITHOUT CONTRAST; CT OF THE ABDOMEN AND PELVIS WITHOUT CONTRAST  HISTORY: Found down. Concern for aspiration.  COMPARISON: None available.  TECHNIQUE: Axial CT imaging was obtained from the thoracic inlet through the symphysis pubis. No IV contrast was administered.  FINDINGS: CT OF THE CHEST: Endotracheal tube terminates in satisfactory position. Thyroid gland is unremarkable. Thoracic aorta is normal in caliber. Mediastinal lymph nodes do not appear pathologically enlarged. There is a subpleural nodule noted within the right upper lobe, measuring up to 8 mm. There are some reticular nodular infiltrates which are noted within the right upper lobe. There is dense dependent consolidation with air bronchograms noted within both lower lobes. There may be trace bilateral pleural effusions. No acute osseous abnormalities are seen. The patient does have a right-sided ventriculoperitoneal shunt.  CT OF THE ABDOMEN AND PELVIS: The stomach, duodenum, spleen, adrenal glands, and gallbladder are all grossly unremarkable. There are low-attenuation lesions which are identified within the pancreas. They are poorly assessed on this unenhanced exam. 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 They were described on prior report from June 17, 2020, but again  are poorly assessed on this exam. No suspicious hepatic lesions are seen. No hydronephrosis is seen on either side. I do not see any renal masses on this unenhanced exam. No distal ureteral or bladder stones are seen. Prostate gland is within normal limits. There is no bowel obstruction. Ventricular peritoneal shunt terminates within the left lower quadrant. The appendix is normal. No acute osseous abnormalities are seen. There is mild colonic diverticulosis.       1. Dense dependent consolidation bilaterally., Given history, aspiration is a concern. Patient is also noted to have some mild reticulonodular infiltrates within the right upper lobe, as well as an 8 mm subpleural nodule within the right upper lobe. CT follow-up in 3 months is recommended. 2. 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 However, they are incompletely assessed in the absence of contrast material and prior studies for comparison.    Radiation dose reduction techniques were utilized, including automated exposure control and exposure modulation based on body size.   This report was finalized on 8/24/2023 10:34 PM by Dr. Charlee eMdina M.D.      CT Head Without Contrast    Result Date: 8/24/2023  CT OF THE HEAD WITHOUT CONTRAST  HISTORY: Unresponsiveness  COMPARISON: None available.  TECHNIQUE: Axial CT imaging was obtained through the brain. No IV contrast was administered.  FINDINGS: Unfortunately, patient's prior imaging is not available for comparison. The patient has a right frontal ventriculoperitoneal shunt, which terminates within the right lateral ventricle. There is encephalomalacia noted within the right frontal lobe, adjacent to the course of the catheter. There is ventricular dilatation. Patient may have some additional mild encephalomalacia within the right parietal lobe. There is some decreased attenuation surrounding the posterior and  temporal horns of the lateral ventricles. Some of this may be related to small vessel disease. Given dilatation of the ventricular system, some transependymal flow of CSF is not excluded. There are areas of encephalomalacia noted within both cerebellar hemispheres. The patient is also noted to have marked dilatation of the fourth ventricle, which has been described on prior imaging. The patient is status post suboccipital craniectomy. There is partial opacification of the mastoid air cells bilaterally. Mucosal thickening is noted within the ethmoid and sphenoid sinuses. There are air-fluid levels within the sphenoid sinuses. Correlation with any evidence of sinusitis is recommended.       1. No acute intracranial hemorrhage. 2. Unfortunately, this patient's prior imaging is not available for comparison. There is ventriculomegaly. I'm uncertain if this has increased when compared to prior imaging. There is some decreased attenuation surrounding the posterior and temporal horns of the lateral ventricles bilaterally. Some transependymal flow of CSF cannot be excluded..  Radiation dose reduction techniques were utilized, including automated exposure control and exposure modulation based on body size.   This report was finalized on 8/24/2023 10:21 PM by Dr. Charlee Medina M.D.      CT Chest Without Contrast Diagnostic    Result Date: 8/24/2023  CT OF THE CHEST WITHOUT CONTRAST; CT OF THE ABDOMEN AND PELVIS WITHOUT CONTRAST  HISTORY: Found down. Concern for aspiration.  COMPARISON: None available.  TECHNIQUE: Axial CT imaging was obtained from the thoracic inlet through the symphysis pubis. No IV contrast was administered.  FINDINGS: CT OF THE CHEST: Endotracheal tube terminates in satisfactory position. Thyroid gland is unremarkable. Thoracic aorta is normal in caliber. Mediastinal lymph nodes do not appear pathologically enlarged. There is a subpleural nodule noted within the right upper lobe, measuring up to 8 mm.  There are some reticular nodular infiltrates which are noted within the right upper lobe. There is dense dependent consolidation with air bronchograms noted within both lower lobes. There may be trace bilateral pleural effusions. No acute osseous abnormalities are seen. The patient does have a right-sided ventriculoperitoneal shunt.  CT OF THE ABDOMEN AND PELVIS: The stomach, duodenum, spleen, adrenal glands, and gallbladder are all grossly unremarkable. There are low-attenuation lesions which are identified within the pancreas. They are poorly assessed on this unenhanced exam. 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 They were described on prior report from June 17, 2020, but again are poorly assessed on this exam. No suspicious hepatic lesions are seen. No hydronephrosis is seen on either side. I do not see any renal masses on this unenhanced exam. No distal ureteral or bladder stones are seen. Prostate gland is within normal limits. There is no bowel obstruction. Ventricular peritoneal shunt terminates within the left lower quadrant. The appendix is normal. No acute osseous abnormalities are seen. There is mild colonic diverticulosis.       1. Dense dependent consolidation bilaterally., Given history, aspiration is a concern. Patient is also noted to have some mild reticulonodular infiltrates within the right upper lobe, as well as an 8 mm subpleural nodule within the right upper lobe. CT follow-up in 3 months is recommended. 2. 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 However, they are incompletely assessed in the absence of contrast material and prior studies for comparison.    Radiation dose reduction techniques were utilized, including automated exposure control and exposure modulation based on body size.   This report was finalized on 8/24/2023 10:34 PM by Dr. Charlee Medina M.D.       XR Chest 1 View    Result Date: 8/25/2023  XR CHEST 1 VW-8/25/2023  HISTORY: Central line placement.  Right internal jugular central venous catheter seen with its tip overlying the SVC. No pneumothorax is seen. Endotracheal tube is seen in good position. Heart size is within normal limits. There is some mild patchy atelectasis or infiltrate in the right lower lung. Left lung appears clear.      1. Central venous catheter tip overlying the SVC. 2. No pneumothorax is seen.   This report was finalized on 8/25/2023 6:41 AM by Dr. Sriram Ray M.D.      XR Chest 1 View    Result Date: 8/25/2023  SINGLE VIEW OF THE CHEST  HISTORY: Respiratory failure  COMPARISON: August 24, 2023  FINDINGS: Endotracheal tube terminates in satisfactory position. There is a right-sided ventriculoperitoneal shunt. Heart size is within normal limits. Bibasilar consolidation is noted. No pneumothorax is seen. Trace left pleural effusion is suspected.      Persistent bibasilar consolidation.  This report was finalized on 8/25/2023 5:17 AM by Dr. Charlee Medina M.D.      XR Chest 1 View    Result Date: 8/24/2023  CHEST SINGLE VIEW  HISTORY: Respiratory failure. Patient was found down.  COMPARISON: None.  FINDINGS: There has been intubation and the ET tube tip is 2.2 cm above the melanie and this could be withdrawn 2 cm for better position. The heart size appears normal. There is no evidence for perihilar edema or pneumothorax or focal airspace disease.  There is some limitation as the lung apices, particular on the right and the right costophrenic angle, are not included on the field-of-view. There is shunt tubing overlying the right thorax.      Limited exam demonstrates intubation with ET tube tip 2.2 cm above the melanie and this could be withdrawn 2 cm for better position. No further evidence for active disease in the chest.  This report was finalized on 8/24/2023 7:10 PM by Dr. Marcelo De M.D.       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  He was admitted to the ICU on  after he was found down by a friend and brought to the hospital with YUDITH, severe encephalopathy requiring endotracheal intubation, and suspected aspiration pneumonia.  He was extubated and moved out of the ICU yesterday.  He continues to have confusion and has mild weakness on his left side and is unsure if this is baseline.    Work-up:  CT head: No acute intracranial hemorrhage.  Ventriculomegaly with decreased attenuation surrounding posterior temporal horns noted and transependymal flow of CSF cannot be excluded.  Labs: CK level 500 on admission, Blood culture negative to date, magnesium 1.3 on admission, alcohol level not elevated on admission, UDS positive for benzos and fentanyl however had received these here in the hospital prior to UDS.  CK level up to 1208 yesterday.ammonia level less than 10 today, sodium 148 today  EEG completed yesterday showed triphasic waves but no epileptiform abnormalities.    Diagnoses:  Acute encephalopathy  Left hemiparesis/ataxia  Von Hippel-Lindau with cerebellar hemangioblastoma  YUDITH (improving), now with hypernatremia  Bilateral aspiration pneumonia    Plan:  MRI brain with contrast when able to tolerate from a pulmonary/coughing standpoint  Recommend delirium precautions (see below)  Discussed with Dr. Sequeira today.  Neurology will follow.      Electronically signed by Jewell Carrillo APRN at 23 1127       Guanaco Llanes MD at 23 1018              [facility] Progress Note      Patient Name: Braxton Wolfe  : 1965  MRN: [id number]  Primary Care Physician:  Provider, No Known  Date of admission: 2023    Subjective     Interval History:   F/u yudith  Found down  Awake and alert with expressive aphasia, denies discomfort,  still thirsty, no soa    Review of Systems:   14 point review of systems is otherwise negative except for mentioned above on HPI    Objective     Vitals:   Temp:  [97.4 °F (36.3 °C)-99.1 °F (37.3 °C)] 99.1 °F (37.3 °C)  Heart Rate:  [57-67] 63  Resp:  [18-20] 18  BP: (113-129)/(80-88) 119/81    Intake/Output Summary (Last 24 hours) at 8/27/2023 1019  Last data filed at 8/27/2023 0148  Gross per 24 hour   Intake 0 ml   Output 800 ml   Net -800 ml       Physical Exam:    General Appearance: alert, oriented x 3, no acute distress   Skin: warm and dry  HEENT: oral mucosa normal, nonicteric sclera  Neck: supple, no JVD  Lungs: CTA  Heart: RRR, normal S1 and S2  Abdomen: soft, nontender, nondistended  : no palpable bladder  Extremities: no edema, cyanosis or clubbing  Neuro: normal speech and mental status     Scheduled Meds:     cefTRIAXone, 1,000 mg, Intravenous, Q24H  famotidine, 20 mg, Intravenous, Daily  senna-docusate sodium, 2 tablet, Oral, BID  sodium chloride, 10 mL, Intravenous, Q12H      IV Meds:   lactated ringers, 100 mL/hr, Last Rate: 100 mL/hr (08/27/23 0435)        Results Reviewed:   I have personally reviewed the results from the time of this admission to 8/27/2023 10:19 EDT     Results from last 7 days   Lab Units 08/27/23  0648 08/26/23  0329 08/25/23  0917 08/25/23  0010 08/24/23  1805   SODIUM mmol/L 148* 147* 144   < > 150*   POTASSIUM mmol/L 3.2* 3.6 3.9   < > 2.9*   CHLORIDE mmol/L 112* 111* 108*   < > 125*   CO2 mmol/L 27.0 24.7 20.0*   < > 10.0*   BUN mg/dL 30* 44* 52*   < > 27*   CREATININE mg/dL 1.43* 2.53* 3.85*   < > 3.05*   CALCIUM mg/dL 8.0* 8.2* 9.2   < > 4.9*   BILIRUBIN mg/dL  --  0.7 0.5  --  0.2   ALK PHOS U/L  --  58 69  --  43   ALT (SGPT) U/L  --  16 15  --  9   AST (SGOT) U/L  --  37 34  --  20   GLUCOSE mg/dL 186* 109* 137*   < > 92    < > = values in this interval not displayed.     Estimated Creatinine Clearance: 64.3 mL/min (A) (by C-G formula based on SCr of 1.43 mg/dL  (H)).  Results from last 7 days   Lab Units 23  0648 23  0329 23  0917   MAGNESIUM mg/dL 2.8* 2.8* 3.1*   PHOSPHORUS mg/dL 2.4* 2.2* 3.6     Results from last 7 days   Lab Units 23  0329 23  0917   URIC ACID mg/dL 4.8 8.5*     Results from last 7 days   Lab Units 23  0648 23  0329 23  0010 23  1805   WBC 10*3/mm3 7.93 10.59 11.66* 12.63*   HEMOGLOBIN g/dL 11.3* 12.0* 16.1 17.6   PLATELETS 10*3/mm3 131* 139* 246 306     Results from last 7 days   Lab Units 23  0648 23  0329 23  0010 23  1805   INR  1.54* 1.35* 1.32* 1.38*       Assessment / Plan     ASSESSMENT:  Oziel-found down at home, brought here, hydrated, renal function improving with hydration and supportive care  Hypovolemia-due to decreased intake and third spacing  Metabolic acidosis-resolving nicely with supportive care  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  Please feel free to call with any questions.    Guanaco Llanes MD  23  10:19 EDT    [facility]  [phone]               Electronically signed by Guanaco Llanes MD at 23 1020       Ajit Sequeira MD at 23 1054          DOS: 2023  NAME: Braxton Wolfe   : 1965  PCP: Provider, No Known  Chief Complaint   Patient presents with    Altered Mental Status       Chief complaint: altered mental status  Subjective: Extubated yesterday.  He complains of generalized weakness.  He reports his left hemiparesis is new but family members had suggested that this was a chronic deficit.  No witnessed seizures.    Patient has no memory of the events of the last several days and cannot tell me why he was down at home.  No history of  "epilepsy in the past.    Objective:  Vital signs: /85   Pulse 87   Temp 97.5 °F (36.4 °C)   Resp 22   Ht 177.8 cm (70\")   Wt 79.8 kg (175 lb 14.8 oz)   SpO2 98%   BMI 25.24 kg/m²    Gen: NAD, vitals reviewed  MS: Oriented, memory impaired, normal attention/concentration, language intact, neglect  CN: visual acuity grossly normal, PERRL, EOMI with bilateral direction changing nystagmus, mild left facial droop, moderate dysarthria  Motor: 4+/5 in lower extremity, 4/5 left upper and lower extremity, tone throughout    Laboratory results:  Lab Results   Component Value Date    GLUCOSE 109 (H) 08/26/2023    CALCIUM 8.2 (L) 08/26/2023     (H) 08/26/2023    K 3.6 08/26/2023    CO2 24.7 08/26/2023     (H) 08/26/2023    BUN 44 (H) 08/26/2023    CREATININE 2.53 (H) 08/26/2023    BCR 17.4 08/26/2023    ANIONGAP 11.3 08/26/2023     Lab Results   Component Value Date    WBC 10.59 08/26/2023    HGB 12.0 (L) 08/26/2023    HCT 34.5 (L) 08/26/2023    MCV 87.3 08/26/2023     (L) 08/26/2023     Lab Results   Component Value Date     (H) 06/03/2020    LDL 89 01/11/2019    LDL 88 04/17/2018            Review of labs: Sodium 147, Cr 2.5    Review and interpretation of imaging: Contrasted MRI of the brain ordered    EEG showed generalized slowing, triphasic waves    Diagnoses:  Encephalopathy, improved  Left hemiparesis  Von Hippel-Lindau with cerebellar hemangioblastoma    Comment: Neurologically improving.  Still unclear why he was down.  Patient reports his left hemiparesis is new or worse than normal.  We will obtain contrasted brain MRI when able.  Respiratory status is likely to delay that    Plan:  1.  Contrasted brain MRI when able  2.  Check ammonia given EEG findings    Management discussed with Dr. Casitllo      Electronically signed by Ajit Sequeira MD at 08/26/23 110       Meek Castillo MD at 08/26/23 1028                                      Meek Reed" MD Anna                          [phone]            Patient ID:    Name:  Braxton Wolfe    MRN:  [id number]    1965   57 y.o.  male            Patient Care Team:  Provider, No Known as PCP - General    CC/ Reason for visit: Found down, aspiration pneumonia, acute encephalopathy    Subjective: Pt seen and examined this AM.  Overnight events noted.  Patient successfully extubated and still little confused but overall able to answer questions states that he does not remember what exactly happened.  States that he had previous left-sided weakness which seems to be a little worse per his report.  Tells me that he had a plan to meet his friend and understands that he brought him to the hospital as well.  Appreciated the care    ROS: uto more than above    Objective     Vital Signs past 24hrs    BP range: BP: (108-135)/(73-98) 128/85  Pulse range: Heart Rate:  [] 87  Resp rate range: Resp:  [18-22] 22  Temp range: Temp (24hrs), Av.4 °F (36.9 °C), Min:97.2 °F (36.2 °C), Max:99 °F (37.2 °C)      Ventilator/Non-Invasive Ventilation Settings (From admission, onward)       Start     Ordered    23  Ventilator - Vent Mode: AC/PC; FiO2: Titrate Per SpO2; Titrate Oxygen for SpO2: 90 - 95%  Continuous        Question Answer Comment   Vent Mode AC/PC    FiO2 Titrate Per SpO2    Titrate Oxygen for SpO2 90 - 95%        23                    Vent Settings        Resp Rate (Set): 18  Pressure Support (cm H2O): 8 cm H20  FiO2 (%): 40 %  PEEP/CPAP (cm H2O): 5 cm H20  Minute Ventilation (L/min) (Obs): 12 L/min  Resp Rate (Observed) Vent: 16  I:E Ratio (Set): 1:2.7  I:E Ratio (Obs): 1:1.6  PIP Observed (cm H2O): 14 cm H2O  Plateau Pressure (cm H2O): 0 cm H2O  Driving Pressure (cm H2O): -4.5 cm H2O  Device (Oxygen Therapy): room air FiO2 (%): 40 %     79.8 kg (175 lb 14.8 oz); Body mass index is 25.24 kg/m².      Intake/Output Summary (Last 24 hours) at 2023 1026  Last data filed at  8/26/2023 0319  Gross per 24 hour   Intake 2850 ml   Output 925 ml   Net 1925 ml       PHYSICAL EXAM   Constitutional: Middle aged pt in bed, + accessory muscle use but no acute distress  Head: - NCAT  Eyes: No pallor, Anicteric conjunctiva, EOMI. mild constricted pupils  ENMT:  Mallampati 4 no oral thrush. Moist MM.   NECK: Trachea midline, No thyromegaly, no palpable cervical LNpathy  Heart: RRR, tachycardic no murmur. No pedal edema   Lungs: RAJAN +, occasional rhonchi no wheezes/ crackles heard    Abdomen: Soft.  Nonspecific distention - no guarding or rigidity. No palpable masses  Extremities: Extremities warm and well perfused. No cyanosis/ clubbing  Neuro: Awake, drowsy, mild left hemiparesis  Psych: Mood and affect -appropriate    PPE recommended per [facility] infectious disease Isolation protocol for the current clinical scenario (as mentioned below) was followed.     Scheduled meds:  famotidine, 20 mg, Intravenous, Daily  piperacillin-tazobactam, 3.375 g, Intravenous, Q8H  senna-docusate sodium, 2 tablet, Oral, BID  sodium chloride, 10 mL, Intravenous, Q12H        IV meds:                      lactated ringers, 150 mL/hr, Last Rate: 150 mL/hr (08/26/23 0735)  lactated ringers, 100 mL/hr        Data Review:      Results from last 7 days   Lab Units 08/26/23  0329 08/25/23  0917 08/25/23  0010 08/24/23  1805   SODIUM mmol/L 147* 144 142 150*   POTASSIUM mmol/L 3.6 3.9 4.0 2.9*   CHLORIDE mmol/L 111* 108* 104 125*   CO2 mmol/L 24.7 20.0* 19.0* 10.0*   BUN mg/dL 44* 52* 47* 27*   CREATININE mg/dL 2.53* 3.85* 4.70* 3.05*   CALCIUM mg/dL 8.2* 9.2 8.6 4.9*   BILIRUBIN mg/dL 0.7 0.5  --  0.2   ALK PHOS U/L 58 69  --  43   ALT (SGPT) U/L 16 15  --  9   AST (SGOT) U/L 37 34  --  20   GLUCOSE mg/dL 109* 137* 140* 92   WBC 10*3/mm3 10.59  --  11.66* 12.63*   HEMOGLOBIN g/dL 12.0*  --  16.1 17.6   PLATELETS 10*3/mm3 139*  --  246 306   INR  1.35*  --  1.32* 1.38*   PROBNP pg/mL  --   --   --  7,202.0*    PROCALCITONIN ng/mL  --   --   --  6.08*       Lab Results   Component Value Date    CALCIUM 8.2 (L) 08/26/2023    PHOS 2.2 (L) 08/26/2023       Results from last 7 days   Lab Units 08/24/23  1848 08/24/23  1842   BLOODCX  No growth at 24 hours No growth at 24 hours       Results from last 7 days   Lab Units 08/25/23  0330 08/24/23  1900   PH, ARTERIAL pH units 7.449 7.341*   PO2 ART mm Hg 69.3* 100.2*   PCO2, ARTERIAL mm Hg 27.1* 34.2*   HCO3 ART mmol/L 18.8* 18.5*        I have personally reviewed the results from the time of this admission to 8/26/2023 10:26 EDT and agree with these findings:  [x]  Laboratory  [x]  Microbiology  [x]  Radiology  []  EKG/Telemetry   [x]  Cardiology/Vascular   []  Pathology  []  Old records    ASSESSMENT   Acute encephalopathy; suspected HIE  Acute hypercapnic respiratory failure s/p vent till 8/25  B/L Aspiration pneumonia  Vomiting; unclear etiology  Acute SVT  Acute renal failure  Hypernatremia  Hypokalemia  Severe hypocalcemia  Severe metabolic acidosis  Elevated BNP  Incidental lung nodules-needs outpatient follow  S/p prior  shunt  VHL s/p previous [place] with ? Left hemiparesis     PLAN:   Patient successfully extubated and is not requiring any supplemental oxygen.  Tolerating current antibiotic plan for severe aspiration pneumonia.  Cultures are Negative so far.  Will narrow down as tolerated  Appreciate neurology input and noted plan for MRI given some concern for worsening left hemiparesis.  Defer management to them given previous complex cerebellar issues requiring surgery.  EEG noted.  Appreciate nephrology input and agree with the plan and patient making progress.  Defer all electrolyte abnormalities    SVT felt to be related to severe electrolyte abnormalities and echocardiogram does not show any acute concerns.  No obvious etiology for vomiting based on the CT of the abdomen -patient with no symptoms currently.  We will get SLP evaluation and advance diet as  tolerated  Out of bed and physical therapy/Occupational Therapy   Guarded prognosis  Mechanical DVT/GI prophylaxis     Patient will be transferred to the floor and we will consult hospitalist service to take over the patient's care as primary.  We will be available for any ongoing pulmonary as well as new critical care issues. Appreciate their assistance.    Meek Castillo MD  2023    Electronically signed by Meek Castillo MD at 23 1100       Guanaco Llanes MD at 23 0927              [facility] Progress Note      Patient Name: Braxton Wolfe  : 1965  MRN: [id number]  Primary Care Physician:  Provider, No Known  Date of admission: 2023    Subjective     Interval History:   F/u kelly  Found down  Awake and alert with expressive aphasia, denies discomfort, +thirsty, no soa    Review of Systems:   14 point review of systems is otherwise negative except for mentioned above on HPI    Objective     Vitals:   Temp:  [97.2 °F (36.2 °C)-99.1 °F (37.3 °C)] 97.5 °F (36.4 °C)  Heart Rate:  [] 69  Resp:  [18-22] 22  BP: (108-135)/(73-98) 117/94  Flow (L/min):  [2-4] 2  FiO2 (%):  [40 %] 40 %    Intake/Output Summary (Last 24 hours) at 2023 09  Last data filed at 2023 0319  Gross per 24 hour   Intake 2850 ml   Output 925 ml   Net 1925 ml       Physical Exam:    General Appearance: alert, oriented x 3, no acute distress   Skin: warm and dry  HEENT: oral mucosa normal, nonicteric sclera  Neck: supple, no JVD  Lungs: CTA  Heart: RRR, normal S1 and S2  Abdomen: soft, nontender, nondistended  : no palpable bladder  Extremities: no edema, cyanosis or clubbing  Neuro: normal speech and mental status     Scheduled Meds:     famotidine, 20 mg, Intravenous, Daily  piperacillin-tazobactam, 3.375 g, Intravenous, Q8H  senna-docusate sodium, 2 tablet, Oral, BID  sodium chloride, 10 mL, Intravenous, Q12H      IV Meds:   lactated ringers, 150  mL/hr, Last Rate: 150 mL/hr (08/26/23 0735)  lactated ringers, 100 mL/hr        Results Reviewed:   I have personally reviewed the results from the time of this admission to 8/26/2023 09:27 EDT     Results from last 7 days   Lab Units 08/26/23 0329 08/25/23  0917 08/25/23  0010 08/24/23  1805   SODIUM mmol/L 147* 144 142 150*   POTASSIUM mmol/L 3.6 3.9 4.0 2.9*   CHLORIDE mmol/L 111* 108* 104 125*   CO2 mmol/L 24.7 20.0* 19.0* 10.0*   BUN mg/dL 44* 52* 47* 27*   CREATININE mg/dL 2.53* 3.85* 4.70* 3.05*   CALCIUM mg/dL 8.2* 9.2 8.6 4.9*   BILIRUBIN mg/dL 0.7 0.5  --  0.2   ALK PHOS U/L 58 69  --  43   ALT (SGPT) U/L 16 15  --  9   AST (SGOT) U/L 37 34  --  20   GLUCOSE mg/dL 109* 137* 140* 92     Estimated Creatinine Clearance: 36.4 mL/min (A) (by C-G formula based on SCr of 2.53 mg/dL (H)).  Results from last 7 days   Lab Units 08/26/23 0329 08/25/23  0917 08/25/23  0010   MAGNESIUM mg/dL 2.8* 3.1* 1.9   PHOSPHORUS mg/dL 2.2* 3.6 2.9     Results from last 7 days   Lab Units 08/26/23 0329 08/25/23  0917   URIC ACID mg/dL 4.8 8.5*     Results from last 7 days   Lab Units 08/26/23 0329 08/25/23  0010 08/24/23  1805   WBC 10*3/mm3 10.59 11.66* 12.63*   HEMOGLOBIN g/dL 12.0* 16.1 17.6   PLATELETS 10*3/mm3 139* 246 306     Results from last 7 days   Lab Units 08/26/23 0329 08/25/23  0010 08/24/23  1805   INR  1.35* 1.32* 1.38*       Assessment / Plan     ASSESSMENT:  Oziel-found down at home, brought here, hydrated, renal function improving with hydration and supportive care  Hypovolemia-due to decreased intake and third spacing  Metabolic acidosis-resolving nicely with supportive care  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  Please feel free to call with any questions.    Guanaco Llanes MD  08/26/23  09:27 EDT    [facility]  [phone]               Electronically signed by Guanaco Llanes MD at 08/26/23 5231

## 2023-08-28 NOTE — PROGRESS NOTES
[facility] Progress Note      Patient Name: Braxton Wolfe  : 1965  MRN: [id number]  Primary Care Physician:  Provider, No Known  Date of admission: 2023    Subjective     Interval History:   F/u YUDITH    Review of Systems:   Lost IV access and seen by PULM overnight, ultimately able to place PIV  Cortrack ordered  D5W reduced 50 cc/hr   UOP NR (void x3)  Mentation clearer this AM, denies dyspnea or nausea    Objective     Vitals:   Temp:  [97.3 °F (36.3 °C)-99.6 °F (37.6 °C)] 99.6 °F (37.6 °C)  Heart Rate:  [51-69] 69  Resp:  [16-18] 16  BP: (111-149)/(75-97) 149/85    Intake/Output Summary (Last 24 hours) at 2023 0625  Last data filed at 2023 0500  Gross per 24 hour   Intake 237 ml   Output --   Net 237 ml       Physical Exam:    General Appearance: ill appearing pleasant WM getting labs drawn, alert, conversant, no distress  Neck: supple, no JVD  Lungs: CTA bilat no rales  Heart: RRR, normal S1 and S2  Abdomen: soft, nontender, nondistended  Extremities: no edema, cyanosis or clubbing    Scheduled Meds:     cefTRIAXone, 1,000 mg, Intravenous, Q24H  famotidine, 20 mg, Intravenous, Daily  senna-docusate sodium, 2 tablet, Oral, BID  sodium chloride, 10 mL, Intravenous, Q12H  thiamine (B-1) IV, 200 mg, Intravenous, Daily      IV Meds:   dextrose, 50 mL/hr, Last Rate: 50 mL/hr (23 0016)        Results Reviewed:   I have personally reviewed the results from the time of this admission to 2023 06:25 EDT     Results from last 7 days   Lab Units 23  0648 23  0329 23  0917 23  0010 23  1805   SODIUM mmol/L 148* 147* 144   < > 150*   POTASSIUM mmol/L 3.2* 3.6 3.9   < > 2.9*   CHLORIDE mmol/L 112* 111* 108*   < > 125*   CO2 mmol/L 27.0 24.7 20.0*   < > 10.0*   BUN mg/dL 30* 44* 52*   < > 27*   CREATININE mg/dL 1.43* 2.53* 3.85*   < > 3.05*   CALCIUM mg/dL 8.0* 8.2* 9.2   < > 4.9*   BILIRUBIN mg/dL  --  0.7 0.5  --  0.2   ALK PHOS U/L   --  58 69  --  43   ALT (SGPT) U/L  --  16 15  --  9   AST (SGOT) U/L  --  37 34  --  20   GLUCOSE mg/dL 186* 109* 137*   < > 92    < > = values in this interval not displayed.     Estimated Creatinine Clearance: 61.8 mL/min (A) (by C-G formula based on SCr of 1.43 mg/dL (H)).  Results from last 7 days   Lab Units 08/27/23  0648 08/26/23  0329 08/25/23  0917   MAGNESIUM mg/dL 2.8* 2.8* 3.1*   PHOSPHORUS mg/dL 2.4* 2.2* 3.6     Results from last 7 days   Lab Units 08/26/23  0329 08/25/23  0917   URIC ACID mg/dL 4.8 8.5*     Results from last 7 days   Lab Units 08/27/23  0648 08/26/23  0329 08/25/23  0010 08/24/23  1805   WBC 10*3/mm3 7.93 10.59 11.66* 12.63*   HEMOGLOBIN g/dL 11.3* 12.0* 16.1 17.6   PLATELETS 10*3/mm3 131* 139* 246 306     Results from last 7 days   Lab Units 08/27/23  0648 08/26/23  0329 08/25/23  0010 08/24/23  1805   INR  1.54* 1.35* 1.32* 1.38*       Assessment / Plan     ASSESSMENT:  Non olig YUDITH - prerenal azotemia due to vol depletion, improving with IVF, Cr down to 1.4 yesterday, labs pending today  Hypokalemia - mild, K 3.2 yesterday, replaced   Hypernatremia - Na 148 yesterday, poor oral intake recently (and NPO now pending swallow eval), on D5W, rate reduced 50 cc/hr.  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

## 2023-08-28 NOTE — PLAN OF CARE
Goal Outcome Evaluation:      Restraints DC at 1200. Pt's daughter at bedside. Pt has been intermittently sleeping  upon rounding & per daughter report, has not attempted to remove IV. Pt continues to be re-educated re importance off not removing IV.

## 2023-08-29 ENCOUNTER — APPOINTMENT (OUTPATIENT)
Dept: GENERAL RADIOLOGY | Facility: HOSPITAL | Age: 58
DRG: 871 | End: 2023-08-29
Payer: COMMERCIAL

## 2023-08-29 ENCOUNTER — APPOINTMENT (OUTPATIENT)
Dept: CT IMAGING | Facility: HOSPITAL | Age: 58
DRG: 871 | End: 2023-08-29
Payer: COMMERCIAL

## 2023-08-29 ENCOUNTER — ANESTHESIA (OUTPATIENT)
Dept: PERIOP | Facility: HOSPITAL | Age: 58
DRG: 871 | End: 2023-08-29
Payer: COMMERCIAL

## 2023-08-29 ENCOUNTER — ANESTHESIA EVENT (OUTPATIENT)
Dept: PERIOP | Facility: HOSPITAL | Age: 58
DRG: 871 | End: 2023-08-29
Payer: COMMERCIAL

## 2023-08-29 PROBLEM — R53.83 LETHARGY: Status: ACTIVE | Noted: 2023-08-29

## 2023-08-29 PROBLEM — G93.89 CEREBRAL VENTRICULOMEGALY: Status: ACTIVE | Noted: 2023-08-29

## 2023-08-29 PROBLEM — T85.618A SHUNT MALFUNCTION: Status: ACTIVE | Noted: 2023-08-29

## 2023-08-29 PROBLEM — R41.0 CONFUSION: Status: ACTIVE | Noted: 2023-08-29

## 2023-08-29 LAB
ANION GAP SERPL CALCULATED.3IONS-SCNC: 9 MMOL/L (ref 5–15)
BACTERIA SPEC AEROBE CULT: NORMAL
BACTERIA SPEC AEROBE CULT: NORMAL
BASOPHILS # BLD AUTO: 0.02 10*3/MM3 (ref 0–0.2)
BASOPHILS NFR BLD AUTO: 0.3 % (ref 0–1.5)
BUN SERPL-MCNC: 11 MG/DL (ref 6–20)
BUN/CREAT SERPL: 11.3 (ref 7–25)
CA-I BLD-MCNC: 4.4 MG/DL (ref 4.6–5.4)
CA-I SERPL ISE-MCNC: 1.11 MMOL/L (ref 1.15–1.35)
CALCIUM SPEC-SCNC: 8 MG/DL (ref 8.6–10.5)
CHLORIDE SERPL-SCNC: 109 MMOL/L (ref 98–107)
CHOLEST SERPL-MCNC: 102 MG/DL (ref 0–200)
CO2 SERPL-SCNC: 26 MMOL/L (ref 22–29)
CREAT SERPL-MCNC: 0.97 MG/DL (ref 0.76–1.27)
DEPRECATED RDW RBC AUTO: 41.2 FL (ref 37–54)
EGFRCR SERPLBLD CKD-EPI 2021: 91.1 ML/MIN/1.73
EOSINOPHIL # BLD AUTO: 0.29 10*3/MM3 (ref 0–0.4)
EOSINOPHIL NFR BLD AUTO: 4.2 % (ref 0.3–6.2)
ERYTHROCYTE [DISTWIDTH] IN BLOOD BY AUTOMATED COUNT: 12.7 % (ref 12.3–15.4)
GLUCOSE BLDC GLUCOMTR-MCNC: 73 MG/DL (ref 70–130)
GLUCOSE BLDC GLUCOMTR-MCNC: 77 MG/DL (ref 70–130)
GLUCOSE BLDC GLUCOMTR-MCNC: 82 MG/DL (ref 70–130)
GLUCOSE BLDC GLUCOMTR-MCNC: 82 MG/DL (ref 70–130)
GLUCOSE BLDC GLUCOMTR-MCNC: 85 MG/DL (ref 70–130)
GLUCOSE BLDC GLUCOMTR-MCNC: 89 MG/DL (ref 70–130)
GLUCOSE SERPL-MCNC: 96 MG/DL (ref 65–99)
HBA1C MFR BLD: 5.8 % (ref 4.8–5.6)
HCT VFR BLD AUTO: 37.7 % (ref 37.5–51)
HDLC SERPL-MCNC: 28 MG/DL (ref 40–60)
HGB BLD-MCNC: 12.7 G/DL (ref 13–17.7)
INR PPP: 1.41 (ref 0.9–1.1)
LDLC SERPL CALC-MCNC: 54 MG/DL (ref 0–100)
LDLC/HDLC SERPL: 1.89 {RATIO}
LYMPHOCYTES # BLD AUTO: 1.46 10*3/MM3 (ref 0.7–3.1)
LYMPHOCYTES NFR BLD AUTO: 21.1 % (ref 19.6–45.3)
MAGNESIUM SERPL-MCNC: 3.4 MG/DL (ref 1.6–2.6)
MCH RBC QN AUTO: 30.2 PG (ref 26.6–33)
MCHC RBC AUTO-ENTMCNC: 33.7 G/DL (ref 31.5–35.7)
MCV RBC AUTO: 89.8 FL (ref 79–97)
MONOCYTES # BLD AUTO: 0.84 10*3/MM3 (ref 0.1–0.9)
MONOCYTES NFR BLD AUTO: 12.1 % (ref 5–12)
NEUTROPHILS NFR BLD AUTO: 4.18 10*3/MM3 (ref 1.7–7)
NEUTROPHILS NFR BLD AUTO: 60.4 % (ref 42.7–76)
PHOSPHATE SERPL-MCNC: 2.6 MG/DL (ref 2.5–4.5)
PLAT MORPH BLD: NORMAL
PLATELET # BLD AUTO: 157 10*3/MM3 (ref 140–450)
PMV BLD AUTO: 9.8 FL (ref 6–12)
POTASSIUM SERPL-SCNC: 3.1 MMOL/L (ref 3.5–5.2)
PROTHROMBIN TIME: 17.5 SECONDS (ref 11.7–14.2)
RBC # BLD AUTO: 4.2 10*6/MM3 (ref 4.14–5.8)
RBC MORPH BLD: NORMAL
SODIUM SERPL-SCNC: 144 MMOL/L (ref 136–145)
TRIGL SERPL-MCNC: 105 MG/DL (ref 0–150)
VLDLC SERPL-MCNC: 20 MG/DL (ref 5–40)
WBC MORPH BLD: NORMAL
WBC NRBC COR # BLD: 6.92 10*3/MM3 (ref 3.4–10.8)

## 2023-08-29 PROCEDURE — 94799 UNLISTED PULMONARY SVC/PX: CPT

## 2023-08-29 PROCEDURE — 0 POTASSIUM CHLORIDE 10 MEQ/100ML SOLUTION: Performed by: HOSPITALIST

## 2023-08-29 PROCEDURE — 80061 LIPID PANEL: CPT | Performed by: PSYCHIATRY & NEUROLOGY

## 2023-08-29 PROCEDURE — 25010000002 PHENYLEPHRINE 10 MG/ML SOLUTION: Performed by: STUDENT IN AN ORGANIZED HEALTH CARE EDUCATION/TRAINING PROGRAM

## 2023-08-29 PROCEDURE — 25010000002 FENTANYL CITRATE (PF) 50 MCG/ML SOLUTION: Performed by: STUDENT IN AN ORGANIZED HEALTH CARE EDUCATION/TRAINING PROGRAM

## 2023-08-29 PROCEDURE — 87070 CULTURE OTHR SPECIMN AEROBIC: CPT | Performed by: HOSPITALIST

## 2023-08-29 PROCEDURE — C1892 INTRO/SHEATH,FIXED,PEEL-AWAY: HCPCS | Performed by: NEUROLOGICAL SURGERY

## 2023-08-29 PROCEDURE — C1894 INTRO/SHEATH, NON-LASER: HCPCS | Performed by: NEUROLOGICAL SURGERY

## 2023-08-29 PROCEDURE — 25010000002 ONDANSETRON PER 1 MG: Performed by: INTERNAL MEDICINE

## 2023-08-29 PROCEDURE — 0JWS0JZ REVISION OF SYNTHETIC SUBSTITUTE IN HEAD AND NECK SUBCUTANEOUS TISSUE AND FASCIA, OPEN APPROACH: ICD-10-PCS | Performed by: NEUROLOGICAL SURGERY

## 2023-08-29 PROCEDURE — 70450 CT HEAD/BRAIN W/O DYE: CPT

## 2023-08-29 PROCEDURE — 74018 RADEX ABDOMEN 1 VIEW: CPT

## 2023-08-29 PROCEDURE — 25010000002 THIAMINE HCL 200 MG/2ML SOLUTION 2 ML VIAL: Performed by: HOSPITALIST

## 2023-08-29 PROCEDURE — 83735 ASSAY OF MAGNESIUM: CPT | Performed by: INTERNAL MEDICINE

## 2023-08-29 PROCEDURE — 25010000002 CEFAZOLIN PER 500 MG: Performed by: NEUROLOGICAL SURGERY

## 2023-08-29 PROCEDURE — 99222 1ST HOSP IP/OBS MODERATE 55: CPT

## 2023-08-29 PROCEDURE — 25010000002 CEFTRIAXONE PER 250 MG: Performed by: INTERNAL MEDICINE

## 2023-08-29 PROCEDURE — 85007 BL SMEAR W/DIFF WBC COUNT: CPT | Performed by: INTERNAL MEDICINE

## 2023-08-29 PROCEDURE — 25010000002 THIAMINE HCL 200 MG/2ML SOLUTION 2 ML VIAL: Performed by: NEUROLOGICAL SURGERY

## 2023-08-29 PROCEDURE — 82330 ASSAY OF CALCIUM: CPT | Performed by: INTERNAL MEDICINE

## 2023-08-29 PROCEDURE — 62230 REPLACE/REVISE BRAIN SHUNT: CPT

## 2023-08-29 PROCEDURE — 83036 HEMOGLOBIN GLYCOSYLATED A1C: CPT | Performed by: PSYCHIATRY & NEUROLOGY

## 2023-08-29 PROCEDURE — 25010000002 PROPOFOL 10 MG/ML EMULSION: Performed by: STUDENT IN AN ORGANIZED HEALTH CARE EDUCATION/TRAINING PROGRAM

## 2023-08-29 PROCEDURE — C1729 CATH, DRAINAGE: HCPCS | Performed by: NEUROLOGICAL SURGERY

## 2023-08-29 PROCEDURE — 85610 PROTHROMBIN TIME: CPT | Performed by: INTERNAL MEDICINE

## 2023-08-29 PROCEDURE — 82948 REAGENT STRIP/BLOOD GLUCOSE: CPT

## 2023-08-29 PROCEDURE — 25010000002 CEFAZOLIN IN DEXTROSE 2000 MG/ 100 ML SOLUTION: Performed by: NEUROLOGICAL SURGERY

## 2023-08-29 PROCEDURE — 80048 BASIC METABOLIC PNL TOTAL CA: CPT | Performed by: INTERNAL MEDICINE

## 2023-08-29 PROCEDURE — 71046 X-RAY EXAM CHEST 2 VIEWS: CPT

## 2023-08-29 PROCEDURE — C1889 IMPLANT/INSERT DEVICE, NOC: HCPCS | Performed by: NEUROLOGICAL SURGERY

## 2023-08-29 PROCEDURE — 62230 REPLACE/REVISE BRAIN SHUNT: CPT | Performed by: NEUROLOGICAL SURGERY

## 2023-08-29 PROCEDURE — 76000 FLUOROSCOPY <1 HR PHYS/QHP: CPT

## 2023-08-29 PROCEDURE — 94761 N-INVAS EAR/PLS OXIMETRY MLT: CPT

## 2023-08-29 PROCEDURE — 99232 SBSQ HOSP IP/OBS MODERATE 35: CPT | Performed by: PSYCHIATRY & NEUROLOGY

## 2023-08-29 PROCEDURE — 70250 X-RAY EXAM OF SKULL: CPT

## 2023-08-29 PROCEDURE — 87205 SMEAR GRAM STAIN: CPT | Performed by: HOSPITALIST

## 2023-08-29 PROCEDURE — 25010000002 SUGAMMADEX 200 MG/2ML SOLUTION: Performed by: ANESTHESIOLOGY

## 2023-08-29 PROCEDURE — 85025 COMPLETE CBC W/AUTO DIFF WBC: CPT | Performed by: INTERNAL MEDICINE

## 2023-08-29 PROCEDURE — 84100 ASSAY OF PHOSPHORUS: CPT | Performed by: INTERNAL MEDICINE

## 2023-08-29 DEVICE — POLARIS SPV ADJUSTABLE VALVE/ANTICHAMBER
Type: IMPLANTABLE DEVICE | Site: SCALP | Status: FUNCTIONAL
Brand: SOPHYSA

## 2023-08-29 RX ORDER — OXYCODONE HYDROCHLORIDE 5 MG/1
5 TABLET ORAL ONCE AS NEEDED
Status: DISCONTINUED | OUTPATIENT
Start: 2023-08-29 | End: 2023-08-29 | Stop reason: HOSPADM

## 2023-08-29 RX ORDER — BUPIVACAINE HYDROCHLORIDE AND EPINEPHRINE 5; 5 MG/ML; UG/ML
INJECTION, SOLUTION EPIDURAL; INTRACAUDAL; PERINEURAL AS NEEDED
Status: DISCONTINUED | OUTPATIENT
Start: 2023-08-29 | End: 2023-08-29 | Stop reason: HOSPADM

## 2023-08-29 RX ORDER — PHENYLEPHRINE HYDROCHLORIDE 10 MG/ML
INJECTION INTRAVENOUS AS NEEDED
Status: DISCONTINUED | OUTPATIENT
Start: 2023-08-29 | End: 2023-08-29 | Stop reason: SURG

## 2023-08-29 RX ORDER — MAGNESIUM HYDROXIDE 1200 MG/15ML
LIQUID ORAL AS NEEDED
Status: DISCONTINUED | OUTPATIENT
Start: 2023-08-29 | End: 2023-08-29 | Stop reason: HOSPADM

## 2023-08-29 RX ORDER — POTASSIUM CHLORIDE 7.45 MG/ML
10 INJECTION INTRAVENOUS
Status: DISPENSED | OUTPATIENT
Start: 2023-08-29 | End: 2023-08-29

## 2023-08-29 RX ORDER — SODIUM CHLORIDE, SODIUM LACTATE, POTASSIUM CHLORIDE, CALCIUM CHLORIDE 600; 310; 30; 20 MG/100ML; MG/100ML; MG/100ML; MG/100ML
INJECTION, SOLUTION INTRAVENOUS CONTINUOUS PRN
Status: DISCONTINUED | OUTPATIENT
Start: 2023-08-29 | End: 2023-08-29 | Stop reason: SURG

## 2023-08-29 RX ORDER — CEFAZOLIN SODIUM 2 G/100ML
2000 INJECTION, SOLUTION INTRAVENOUS EVERY 8 HOURS
Status: COMPLETED | OUTPATIENT
Start: 2023-08-30 | End: 2023-08-31

## 2023-08-29 RX ORDER — SODIUM CHLORIDE 9 MG/ML
INJECTION, SOLUTION INTRAVENOUS AS NEEDED
Status: DISCONTINUED | OUTPATIENT
Start: 2023-08-29 | End: 2023-08-29 | Stop reason: HOSPADM

## 2023-08-29 RX ORDER — DIPHENHYDRAMINE HYDROCHLORIDE 50 MG/ML
12.5 INJECTION INTRAMUSCULAR; INTRAVENOUS
Status: DISCONTINUED | OUTPATIENT
Start: 2023-08-29 | End: 2023-08-29 | Stop reason: HOSPADM

## 2023-08-29 RX ORDER — DIPHENHYDRAMINE HYDROCHLORIDE 50 MG/ML
12.5 INJECTION INTRAMUSCULAR; INTRAVENOUS ONCE AS NEEDED
Status: DISCONTINUED | OUTPATIENT
Start: 2023-08-29 | End: 2023-08-29 | Stop reason: HOSPADM

## 2023-08-29 RX ORDER — PROPOFOL 10 MG/ML
VIAL (ML) INTRAVENOUS AS NEEDED
Status: DISCONTINUED | OUTPATIENT
Start: 2023-08-29 | End: 2023-08-29 | Stop reason: SURG

## 2023-08-29 RX ORDER — HYDROCODONE BITARTRATE AND ACETAMINOPHEN 5; 325 MG/1; MG/1
1 TABLET ORAL EVERY 6 HOURS PRN
Status: DISPENSED | OUTPATIENT
Start: 2023-08-29 | End: 2023-09-05

## 2023-08-29 RX ORDER — POTASSIUM CHLORIDE 7.45 MG/ML
10 INJECTION INTRAVENOUS
Status: COMPLETED | OUTPATIENT
Start: 2023-08-29 | End: 2023-08-29

## 2023-08-29 RX ORDER — CEFAZOLIN SODIUM 2 G/100ML
2000 INJECTION, SOLUTION INTRAVENOUS ONCE
Status: COMPLETED | OUTPATIENT
Start: 2023-08-29 | End: 2023-08-29

## 2023-08-29 RX ORDER — LABETALOL HYDROCHLORIDE 5 MG/ML
5 INJECTION, SOLUTION INTRAVENOUS
Status: DISCONTINUED | OUTPATIENT
Start: 2023-08-29 | End: 2023-08-29 | Stop reason: HOSPADM

## 2023-08-29 RX ORDER — TRANEXAMIC ACID 100 MG/ML
INJECTION, SOLUTION INTRAVENOUS AS NEEDED
Status: DISCONTINUED | OUTPATIENT
Start: 2023-08-29 | End: 2023-08-29 | Stop reason: SURG

## 2023-08-29 RX ORDER — FENTANYL CITRATE 50 UG/ML
25 INJECTION, SOLUTION INTRAMUSCULAR; INTRAVENOUS
Status: DISCONTINUED | OUTPATIENT
Start: 2023-08-29 | End: 2023-08-29 | Stop reason: HOSPADM

## 2023-08-29 RX ORDER — LIDOCAINE HYDROCHLORIDE 20 MG/ML
INJECTION, SOLUTION INFILTRATION; PERINEURAL AS NEEDED
Status: DISCONTINUED | OUTPATIENT
Start: 2023-08-29 | End: 2023-08-29 | Stop reason: SURG

## 2023-08-29 RX ORDER — IPRATROPIUM BROMIDE AND ALBUTEROL SULFATE 2.5; .5 MG/3ML; MG/3ML
3 SOLUTION RESPIRATORY (INHALATION) ONCE AS NEEDED
Status: DISCONTINUED | OUTPATIENT
Start: 2023-08-29 | End: 2023-08-29 | Stop reason: HOSPADM

## 2023-08-29 RX ORDER — SODIUM CHLORIDE 0.9 % (FLUSH) 0.9 %
3-10 SYRINGE (ML) INJECTION AS NEEDED
Status: DISCONTINUED | OUTPATIENT
Start: 2023-08-29 | End: 2023-08-29 | Stop reason: HOSPADM

## 2023-08-29 RX ORDER — SODIUM CHLORIDE, SODIUM LACTATE, POTASSIUM CHLORIDE, CALCIUM CHLORIDE 600; 310; 30; 20 MG/100ML; MG/100ML; MG/100ML; MG/100ML
9 INJECTION, SOLUTION INTRAVENOUS CONTINUOUS
Status: DISCONTINUED | OUTPATIENT
Start: 2023-08-29 | End: 2023-09-08 | Stop reason: HOSPADM

## 2023-08-29 RX ORDER — LIDOCAINE HYDROCHLORIDE 10 MG/ML
0.5 INJECTION, SOLUTION INFILTRATION; PERINEURAL ONCE AS NEEDED
Status: DISCONTINUED | OUTPATIENT
Start: 2023-08-29 | End: 2023-08-29 | Stop reason: HOSPADM

## 2023-08-29 RX ORDER — NALOXONE HCL 0.4 MG/ML
0.4 VIAL (ML) INJECTION AS NEEDED
Status: DISCONTINUED | OUTPATIENT
Start: 2023-08-29 | End: 2023-08-29 | Stop reason: HOSPADM

## 2023-08-29 RX ORDER — FENTANYL CITRATE 50 UG/ML
INJECTION, SOLUTION INTRAMUSCULAR; INTRAVENOUS AS NEEDED
Status: DISCONTINUED | OUTPATIENT
Start: 2023-08-29 | End: 2023-08-29 | Stop reason: SURG

## 2023-08-29 RX ORDER — ONDANSETRON 2 MG/ML
4 INJECTION INTRAMUSCULAR; INTRAVENOUS ONCE AS NEEDED
Status: DISCONTINUED | OUTPATIENT
Start: 2023-08-29 | End: 2023-08-29 | Stop reason: HOSPADM

## 2023-08-29 RX ORDER — ENALAPRILAT 1.25 MG/ML
2.5 INJECTION INTRAVENOUS ONCE AS NEEDED
Status: DISCONTINUED | OUTPATIENT
Start: 2023-08-29 | End: 2023-08-29 | Stop reason: HOSPADM

## 2023-08-29 RX ORDER — ASPIRIN 81 MG/1
81 TABLET, CHEWABLE ORAL DAILY
Status: DISCONTINUED | OUTPATIENT
Start: 2023-08-29 | End: 2023-08-29

## 2023-08-29 RX ORDER — DOCUSATE SODIUM 100 MG/1
100 CAPSULE, LIQUID FILLED ORAL 2 TIMES DAILY
Status: DISCONTINUED | OUTPATIENT
Start: 2023-08-29 | End: 2023-09-07

## 2023-08-29 RX ORDER — ROCURONIUM BROMIDE 10 MG/ML
INJECTION, SOLUTION INTRAVENOUS AS NEEDED
Status: DISCONTINUED | OUTPATIENT
Start: 2023-08-29 | End: 2023-08-29 | Stop reason: SURG

## 2023-08-29 RX ORDER — SODIUM CHLORIDE 0.9 % (FLUSH) 0.9 %
3 SYRINGE (ML) INJECTION EVERY 12 HOURS SCHEDULED
Status: DISCONTINUED | OUTPATIENT
Start: 2023-08-29 | End: 2023-08-29 | Stop reason: HOSPADM

## 2023-08-29 RX ORDER — HYDROMORPHONE HYDROCHLORIDE 1 MG/ML
0.5 INJECTION, SOLUTION INTRAMUSCULAR; INTRAVENOUS; SUBCUTANEOUS
Status: DISCONTINUED | OUTPATIENT
Start: 2023-08-29 | End: 2023-08-29 | Stop reason: HOSPADM

## 2023-08-29 RX ADMIN — SODIUM CHLORIDE, POTASSIUM CHLORIDE, SODIUM LACTATE AND CALCIUM CHLORIDE 9 ML/HR: 600; 310; 30; 20 INJECTION, SOLUTION INTRAVENOUS at 18:18

## 2023-08-29 RX ADMIN — CEFAZOLIN SODIUM 2000 MG: 2 INJECTION, SOLUTION INTRAVENOUS at 18:18

## 2023-08-29 RX ADMIN — PHENYLEPHRINE HYDROCHLORIDE 200 MCG: 10 INJECTION INTRAVENOUS at 18:43

## 2023-08-29 RX ADMIN — POTASSIUM CHLORIDE 10 MEQ: 7.46 INJECTION, SOLUTION INTRAVENOUS at 11:17

## 2023-08-29 RX ADMIN — FENTANYL CITRATE 50 MCG: 50 INJECTION, SOLUTION INTRAMUSCULAR; INTRAVENOUS at 18:29

## 2023-08-29 RX ADMIN — ROCURONIUM BROMIDE 50 MG: 10 INJECTION, SOLUTION INTRAVENOUS at 18:30

## 2023-08-29 RX ADMIN — THIAMINE HYDROCHLORIDE 500 MG: 100 INJECTION, SOLUTION INTRAMUSCULAR; INTRAVENOUS at 21:38

## 2023-08-29 RX ADMIN — FAMOTIDINE 20 MG: 10 INJECTION INTRAVENOUS at 08:08

## 2023-08-29 RX ADMIN — FOLIC ACID 1 MG: 5 INJECTION, SOLUTION INTRAMUSCULAR; INTRAVENOUS; SUBCUTANEOUS at 06:00

## 2023-08-29 RX ADMIN — Medication 10 ML: at 21:38

## 2023-08-29 RX ADMIN — POTASSIUM CHLORIDE 10 MEQ: 7.46 INJECTION, SOLUTION INTRAVENOUS at 15:32

## 2023-08-29 RX ADMIN — SODIUM CHLORIDE, POTASSIUM CHLORIDE, SODIUM LACTATE AND CALCIUM CHLORIDE: 600; 310; 30; 20 INJECTION, SOLUTION INTRAVENOUS at 18:22

## 2023-08-29 RX ADMIN — PROPOFOL 150 MG: 10 INJECTION, EMULSION INTRAVENOUS at 18:29

## 2023-08-29 RX ADMIN — CEFTRIAXONE SODIUM 1000 MG: 1 INJECTION, POWDER, FOR SOLUTION INTRAMUSCULAR; INTRAVENOUS at 12:28

## 2023-08-29 RX ADMIN — ONDANSETRON 4 MG: 2 INJECTION INTRAMUSCULAR; INTRAVENOUS at 19:26

## 2023-08-29 RX ADMIN — LIDOCAINE HYDROCHLORIDE 100 MG: 20 INJECTION, SOLUTION INFILTRATION; PERINEURAL at 18:29

## 2023-08-29 RX ADMIN — POTASSIUM CHLORIDE 10 MEQ: 7.46 INJECTION, SOLUTION INTRAVENOUS at 01:45

## 2023-08-29 RX ADMIN — PHENYLEPHRINE HYDROCHLORIDE 150 MCG: 10 INJECTION INTRAVENOUS at 19:23

## 2023-08-29 RX ADMIN — SUGAMMADEX 200 MG: 100 INJECTION, SOLUTION INTRAVENOUS at 19:37

## 2023-08-29 RX ADMIN — POTASSIUM CHLORIDE 10 MEQ: 7.46 INJECTION, SOLUTION INTRAVENOUS at 10:04

## 2023-08-29 RX ADMIN — THIAMINE HYDROCHLORIDE 500 MG: 100 INJECTION, SOLUTION INTRAMUSCULAR; INTRAVENOUS at 06:00

## 2023-08-29 RX ADMIN — TRANEXAMIC ACID 1000 MG: 100 INJECTION INTRAVENOUS at 19:10

## 2023-08-29 RX ADMIN — POTASSIUM CHLORIDE 10 MEQ: 7.46 INJECTION, SOLUTION INTRAVENOUS at 08:45

## 2023-08-29 RX ADMIN — Medication 10 ML: at 08:44

## 2023-08-29 RX ADMIN — DEXTROSE MONOHYDRATE 50 ML/HR: 50 INJECTION, SOLUTION INTRAVENOUS at 20:55

## 2023-08-29 RX ADMIN — PHENYLEPHRINE HYDROCHLORIDE 200 MCG: 10 INJECTION INTRAVENOUS at 18:54

## 2023-08-29 RX ADMIN — POTASSIUM CHLORIDE 10 MEQ: 7.46 INJECTION, SOLUTION INTRAVENOUS at 13:10

## 2023-08-29 RX ADMIN — POTASSIUM CHLORIDE 10 MEQ: 7.46 INJECTION, SOLUTION INTRAVENOUS at 02:28

## 2023-08-29 RX ADMIN — THIAMINE HYDROCHLORIDE 500 MG: 100 INJECTION, SOLUTION INTRAMUSCULAR; INTRAVENOUS at 14:14

## 2023-08-29 NOTE — PROGRESS NOTES
[facility] Progress Note      Patient Name: Braxton Wolfe  : 1965  MRN: [id number]  Primary Care Physician:  Provider, No Known  Date of admission: 2023    Subjective     Interval History:   Follow-up YUDITH and hypernatremia.  Patient remains on D5W.  N.p.o. because of failing swallow study.  Neurosurgery is currently seeing the patient due to increase in lateral and third ventricle size.        Objective     Vitals:   Temp:  [97.2 °F (36.2 °C)-98.8 °F (37.1 °C)] 97.8 °F (36.6 °C)  Heart Rate:  [49-92] 68  Resp:  [16-18] 16  BP: (127-143)/(66-89) 137/89  Flow (L/min):  [2] 2    Intake/Output Summary (Last 24 hours) at 2023 1603  Last data filed at 2023 1058  Gross per 24 hour   Intake 1310.67 ml   Output 1000 ml   Net 310.67 ml         Physical Exam:    Pt in CT.     Scheduled Meds:     famotidine, 20 mg, Intravenous, Daily  folic acid (FOLVITE) IVPB, 1 mg, Intravenous, Daily  senna-docusate sodium, 2 tablet, Oral, BID  sodium chloride, 10 mL, Intravenous, Q12H  thiamine (B-1) IV, 500 mg, Intravenous, Q8H   Followed by  [START ON 2023] thiamine (B-1) IV, 200 mg, Intravenous, Q8H   Followed by  [START ON 2023] thiamine, 100 mg, Oral, Daily      IV Meds:   dextrose, 50 mL/hr, Last Rate: 50 mL/hr (23 0641)        Results Reviewed:   I have personally reviewed the results from the time of this admission to 2023 16:03 EDT     Results from last 7 days   Lab Units 23  0641 23  1626 23  0648 23  0329 23  0917 23  0010 23  1805   SODIUM mmol/L 144 147* 148* 147* 144   < > 150*   POTASSIUM mmol/L 3.1* 3.3* 3.2* 3.6 3.9   < > 2.9*   CHLORIDE mmol/L 109* 111* 112* 111* 108*   < > 125*   CO2 mmol/L 26.0 26.0 27.0 24.7 20.0*   < > 10.0*   BUN mg/dL 11 14 30* 44* 52*   < > 27*   CREATININE mg/dL 0.97 1.01 1.43* 2.53* 3.85*   < > 3.05*   CALCIUM mg/dL 8.0* 8.2* 8.0* 8.2* 9.2   < > 4.9*   BILIRUBIN mg/dL  --   --   --   0.7 0.5  --  0.2   ALK PHOS U/L  --   --   --  58 69  --  43   ALT (SGPT) U/L  --   --   --  16 15  --  9   AST (SGOT) U/L  --   --   --  37 34  --  20   GLUCOSE mg/dL 96 98 186* 109* 137*   < > 92    < > = values in this interval not displayed.       Estimated Creatinine Clearance: 93.9 mL/min (by C-G formula based on SCr of 0.97 mg/dL).  Results from last 7 days   Lab Units 08/29/23  0641 08/28/23  1626 08/28/23  0652 08/27/23  0648   MAGNESIUM mg/dL 3.4* 2.2  --  2.8*   PHOSPHORUS mg/dL 2.6  --  2.1* 2.4*       Results from last 7 days   Lab Units 08/26/23  0329 08/25/23  0917   URIC ACID mg/dL 4.8 8.5*       Results from last 7 days   Lab Units 08/29/23  0636 08/28/23  1626 08/27/23  0648 08/26/23  0329 08/25/23  0010   WBC 10*3/mm3 6.92 7.23 7.93 10.59 11.66*   HEMOGLOBIN g/dL 12.7* 12.7* 11.3* 12.0* 16.1   PLATELETS 10*3/mm3 157 150 131* 139* 246       Results from last 7 days   Lab Units 08/29/23  0636 08/28/23  1626 08/27/23  0648 08/26/23  0329 08/25/23  0010   INR  1.41* 1.45* 1.54* 1.35* 1.32*         Assessment / Plan     ASSESSMENT:  Non olig YUDITH - prerenal azotemia due to volume depletion, resolved with IVF. Hypernatremia -sodium now normal on D5W which will continue as long as he is NPO.  Hypocalcemia.  His ionized calcium is mildly low.  We will check a vitamin D 25 hydroxy level and PTH..  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  Neurosurgery has evaluated today because of the increase in his lateral and third ventricle sizes.  Plan for shunt revision today.    Acute encephalopathy    Aspiration PNA .  Completed antibiotic therapy.    PLAN:  Continue D5W while he is n.p.o.  Calcium supplementation via protocol.  3.  Check 25-hydroxy vitamin D level and PTH.    Sweta Luu MD  08/29/23  16:03 EDT    [facility]  [phone]

## 2023-08-29 NOTE — NURSING NOTE
"Per report Mikaela, ICU RN, pt has had a \"stare like gaze\" and this has been his baseline. Pt has been alert and oriented with delays. Opens eyes spontaneously. Did discuss the \"gaze\" with mother and daughter at bedside and they stated it has been baseline since he has been here/awake. Informed Dr. Ritchie as well. This was not a new finding this shift.     Report given to preop at this time.     Consent signed per mother.   "

## 2023-08-29 NOTE — PLAN OF CARE
Goal Outcome Evaluation:           Progress: improving  Outcome Evaluation: had to restart restraints at 0430 for pt climbing out of bed and pulling off all of his equipment, of note he dropped his saturation down with sleep and had to restart some nasal cannula at 2L.

## 2023-08-29 NOTE — PROGRESS NOTES
"Nutrition Services    Patient Name:  Braxton Wolfe  YOB: 1965  MRN: [id number]  Admit Date:  8/24/2023    Assessment Date:  08/29/23    Comment:  Nutrition assessment initiated due to NPO status (5 days).  He is here with Acute encephalopathy. Asp PNA, vomiting, ARF, S/p prior  shunt ,VHL syndrome with CNS hemangioblastomas.  He failed his VFSS yesterday and SLP plans to repeat eval tomorrow. Apparently had a feeding tube placed a few nights ago but he kept pulling it out. Hopefully he passes for a diet tomorrow and we can avoid trying to place another Cortrak.    RD to follow clinical course, nutrition needs.      CLINICAL NUTRITION ASSESSMENT      Reason for Assessment NPO/Clear Liquid Status     Diagnosis/Problem   Acute encephalopathy. Asp PNA, vomiting, ARF, S/p prior  shunt ,VHL syndrome with CNS hemangioblastomas    Medical/Surgical History History reviewed. No pertinent past medical history.    Past Surgical History:   Procedure Laterality Date    BRAIN SURGERY          Encounter Information        Nutrition History:     Food Preferences:    Supplements:    Factors Affecting Intake: altered mental status, swallow impairment     Anthropometrics        Current Height  Current Weight  BMI kg/m2 Height: 177.8 cm (70\")  Weight: 79 kg (174 lb 2.6 oz) (08/29/23 0441)  Body mass index is 24.99 kg/m².   Adjusted BMI (if applicable)    BMI Category Overweight (25 - 29.9)       Admission Weight 75.9kg       Ideal Body Weight (IBW) 73kg   Adjusted IBW (if applicable)        Usual Body Weight (UBW) unkown   Weight Trend Unknown       Weight History Wt Readings from Last 30 Encounters:   08/29/23 0441 79 kg (174 lb 2.6 oz)   08/28/23 0350 76.6 kg (168 lb 14 oz)   08/26/23 0319 79.8 kg (175 lb 14.8 oz)   08/25/23 0906 75.8 kg (167 lb)   08/24/23 1802 75.9 kg (167 lb 5.3 oz)   08/25/23 1852 75.8 kg (167 lb)      --  Tests/Procedures        Tests/Procedures Clinical Swallow Evaluation, VFSS, X-Ray "     Labs       Pertinent Labs    Results from last 7 days   Lab Units 08/29/23  0641 08/28/23  1626 08/27/23  0648 08/26/23  0329 08/25/23  0917 08/25/23  0010 08/24/23  1805   SODIUM mmol/L 144 147* 148* 147* 144   < > 150*   POTASSIUM mmol/L 3.1* 3.3* 3.2* 3.6 3.9   < > 2.9*   CHLORIDE mmol/L 109* 111* 112* 111* 108*   < > 125*   CO2 mmol/L 26.0 26.0 27.0 24.7 20.0*   < > 10.0*   BUN mg/dL 11 14 30* 44* 52*   < > 27*   CREATININE mg/dL 0.97 1.01 1.43* 2.53* 3.85*   < > 3.05*   CALCIUM mg/dL 8.0* 8.2* 8.0* 8.2* 9.2   < > 4.9*   BILIRUBIN mg/dL  --   --   --  0.7 0.5  --  0.2   ALK PHOS U/L  --   --   --  58 69  --  43   ALT (SGPT) U/L  --   --   --  16 15  --  9   AST (SGOT) U/L  --   --   --  37 34  --  20   GLUCOSE mg/dL 96 98 186* 109* 137*   < > 92    < > = values in this interval not displayed.     Results from last 7 days   Lab Units 08/29/23  0641 08/29/23  0636 08/28/23  1626 08/28/23  0652 08/27/23  0648 08/26/23  0329   MAGNESIUM mg/dL 3.4*  --  2.2  --  2.8* 2.8*   PHOSPHORUS mg/dL 2.6  --   --    < > 2.4* 2.2*   HEMOGLOBIN g/dL  --  12.7* 12.7*  --  11.3* 12.0*   HEMATOCRIT %  --  37.7 37.0*  --  33.4* 34.5*   WBC 10*3/mm3  --  6.92 7.23  --  7.93 10.59   ALBUMIN g/dL  --   --   --   --   --  2.8*    < > = values in this interval not displayed.     Results from last 7 days   Lab Units 08/29/23  0636 08/28/23  1626 08/27/23  0648 08/26/23  0329 08/25/23  0010 08/24/23  1805   INR  1.41* 1.45* 1.54* 1.35* 1.32* 1.38*   APTT seconds  --   --   --   --   --  25.5   PLATELETS 10*3/mm3 157 150 131* 139* 246 306     No results found for: COVID19  Lab Results   Component Value Date    HGBA1C 5.5 01/11/2019          Medications           Scheduled Medications cefTRIAXone, 1,000 mg, Intravenous, Q24H  famotidine, 20 mg, Intravenous, Daily  folic acid (FOLVITE) IVPB, 1 mg, Intravenous, Daily  potassium chloride, 10 mEq, Intravenous, Q1H  senna-docusate sodium, 2 tablet, Oral, BID  sodium chloride, 10 mL,  Intravenous, Q12H  thiamine (B-1) IV, 500 mg, Intravenous, Q8H   Followed by  [START ON 8/31/2023] thiamine (B-1) IV, 200 mg, Intravenous, Q8H   Followed by  [START ON 9/4/2023] thiamine, 100 mg, Oral, Daily       Infusions dextrose, 50 mL/hr, Last Rate: 50 mL/hr (08/29/23 0641)       PRN Medications   aluminum-magnesium hydroxide-simethicone    benzonatate    senna-docusate sodium **AND** polyethylene glycol **AND** bisacodyl **AND** bisacodyl    dextrose    dextrose    glucagon (human recombinant)    nitroglycerin    ondansetron    Potassium Replacement - Follow Nurse / BPA Driven Protocol    [COMPLETED] Insert Peripheral IV **AND** sodium chloride    sodium chloride    sodium chloride     Physical Findings          Physical Appearance alert, oriented   Oral/Mouth Cavity WNL   Edema  1+ (trace)   Gastrointestinal last bowel movement: 8/29   Skin  skin intact   Tubes/Drains/Lines none   NFPE No clinical signs of muscle wasting or fat loss   --  Current Nutrition Orders & Evaluation of Intake       Oral Nutrition     Food Allergies NKFA   Current PO Diet NPO Diet NPO Type: Strict NPO   Supplement n/a   PO Evaluation     % PO Intake/# of Days 0   --  PES STATEMENT / NUTRITION DIAGNOSIS      Nutrition Dx Problem  Problem: Inadequate Oral Intake  Etiology: Medical Diagnosis - encephalopathy    Signs/Symptoms: Report of Minimal PO Intake    Comment:    --  NUTRITION INTERVENTION / PLAN OF CARE      Intervention Goal(s) Maintain nutrition status, Reduce/improve symptoms, Meet estimated needs, Disease management/therapy, Initiate feeding/diet, Tolerate PO , and No significant weight loss         RD Intervention/Action Await begin PO diet, Follow Tx Progress, and Care plan reviewed         Prescription/Orders:       PO Diet       Supplements       Snacks       Enteral Nutrition       Parenteral Nutrition    New Prescription Ordered? No changes at this time   --      Monitor/Evaluation Per protocol   Discharge Plan/Needs  Pending clinical course   Education Will instruct as appropriate   --    RD to follow per protocol.    Electronically signed by:  Chiquita Klein RD  08/29/23 12:39 EDT

## 2023-08-29 NOTE — CONSULTS
[facility] NEUROSURGERY CONSULT NOTE    Patient name: Braxton Wolfe  Referring Provider: Charmaine Clifford MD  Reason for Consultation: Ventriculomegaly, lethargy, confusion    Patient Care Team:  Provider, No Known as PCP - General    Chief complaint: Lethargy, confusion    Subjective .     History of present illness:    Patient is a 57 y.o.  male with von Hippel-Lindau syndrome, CNS hemangioblastomas, previous left cerebellar craniotomy for tumor resection with baseline left-sided weakness with  shunt who was found down on Thursday (last known normal Monday).  Patient was found unresponsive and found to be in metabolic acidosis along with acute renal failure and was emergently intubated.  Further work-up showed that EEG was negative however, MRI of the brain showed acute/subacute infarct in the right paracentral lobule as well as ventriculomegaly.  Patient has continued to exhibit lethargy and confusion as well as some bilateral lid retraction consistent characteristic of Parinaud's syndrome.  Denies vision change, headaches.  Patient is a poor historian due to not remembering issues that caused him to be found unresponsive.    Review of Systems  Review of Systems   Constitutional:  Negative for fever.   Eyes:  Negative for photophobia and visual disturbance.   Gastrointestinal:         No change in bowel habits   Genitourinary:  Negative for enuresis.   Musculoskeletal:  Gait problem: Unknown.   Neurological:  Negative for dizziness, seizures, speech difficulty, weakness and headaches.   Psychiatric/Behavioral:  Positive for confusion and hallucinations.      History  PAST MEDICAL HISTORY  History reviewed. No pertinent past medical history.    PAST SURGICAL HISTORY  Past Surgical History:   Procedure Laterality Date    BRAIN SURGERY         FAMILY HISTORY  History reviewed. No pertinent family history.    SOCIAL HISTORY  Social History     Tobacco Use    Smoking status: Never    Smokeless tobacco: Never    Vaping Use    Vaping Use: Never used   Substance Use Topics    Alcohol use: Never    Drug use: Never         Allergies:  Patient has no known allergies.    MEDICATIONS:  Medications Prior to Admission   Medication Sig Dispense Refill Last Dose    metoprolol tartrate (LOPRESSOR) 25 MG tablet Take 1 tablet by mouth 2 (Two) Times a Day.       tadalafil (CIALIS) 5 MG tablet Take 1 tablet by mouth Daily.       belzutifan (WELIREG) 40 MG tablet Take 1 tablet by mouth Daily.          Objective     Results Review:  LABS:    No results displayed because visit has over 200 results.          DIAGNOSTICS:  MRI brain with and without contrast 8/28/2023:  There is new punctate focus of diffusion restriction associated with acute to subacute right paracentral lobule infarct.  There is a stable right frontal approach ventriculostomy catheter with tip intact in the right lateral ventricle near the foramen of Monro.  Lateral and third ventricles have increased in size from MRI of the brain obtained in 2022 but unchanged from CT head obtained on 8/24/2023.  No new hyperintensity noticed around catheter.  No midline shift or herniation is seen.  There is unchanged ex vacuo rotation of the fourth ventricle.  There is a previous left suboccipital craniectomy in the left cerebellar region for previous tumor resection with unchanged encephalomalacia.  There are also unchanged multiple bilateral enhancing intra-axial cerebellar nodules with the largest measuring up to 1.1 x 0.8 cm.    CT head without contrast 8/24/2023:  No acute intracranial hemorrhage.  There is ventriculomegaly but it is uncertain whether this is increased when compared to prior imaging.  There is some decreased attenuation surrounding posterior and temporal horns of lateral ventricles bilaterally.  Transepidermal flow cannot be excluded.    Results Review:   I reviewed the patient's new clinical results.  I personally viewed and interpreted the patient's labs, imaging  studies, medications and chart.    Vital Signs   Temp:  [97.2 °F (36.2 °C)-98.8 °F (37.1 °C)] 98.3 °F (36.8 °C)  Heart Rate:  [49-92] 72  Resp:  [16-18] 16  BP: (127-143)/(66-93) 127/93    Physical Exam:  Physical Exam  Vitals reviewed.   HENT:      Head: Normocephalic.   Eyes:      Extraocular Movements: EOM normal.      Pupils: Pupils are equal, round, and reactive to light.   Neurological:      Mental Status: He is alert and oriented to person, place, and time.      Coordination: Finger-Nose-Finger Test abnormal (LUE) and Heel to Shin Test abnormal (LLE).   Psychiatric:         Speech: Speech normal.         Behavior: Behavior is cooperative.     Neurologic Exam     Mental Status   Oriented to person, place, and time.   Attention: normal. Concentration: normal.   Speech: speech is normal   Level of consciousness: alert    Overall confused about situation and what led to his hospitalization.     Cranial Nerves     CN II   Visual fields full to confrontation.     CN III, IV, VI   Pupils are equal, round, and reactive to light.  Extraocular motions are normal.   Right pupil: Size: 3 mm. Shape: regular. Reactivity: brisk.   Left pupil: Size: 3 mm. Shape: regular. Reactivity: brisk.   Diplopia: none  Upgaze: normal  Downgaze: normal    CN V   Facial sensation intact.     CN VII   Right facial weakness: none  Left facial weakness: none    CN VIII   Hearing: intact    CN IX, X   CN IX normal.     Bilateral lid retraction     Motor Exam   Overall muscle tone: normal  Right arm pronator drift: absent  Left arm pronator drift: absent    Sensory Exam   Light touch normal.     Gait, Coordination, and Reflexes     Gait  Gait: (Gait deferred)    Coordination   Finger to nose coordination: abnormal (LUE)  Heel to shin coordination: abnormal (LLE)    Assessment & Plan       Sepsis    Shunt malfunction    Cerebral ventriculomegaly    Lethargy    Confusion      Patient with a history of being found down with subsequent acute renal  "failure, acidosis, acute/subacute right infarct as well as ventriculomegaly with lethargy and confusion.  I am not able to find any information about when this shunt was placed or what type it is.  I was unable to obtain this information from the patient.  Plan for shunt revision today.  Patient has been n.p.o. and will remain.hold aspirin.    PLAN:     Hold aspirin  N.p.o.  OR 8/29 for shunt revision      I discussed the patient's findings and my recommendations with patient, family, nursing staff, and Dr. Ritchie.    During patient visit, I utilized appropriate personal protective equipment including mask and gloves.  Mask used was standard procedure mask. Appropriate PPE was worn during the entire visit.  Hand hygiene was completed before and after.     Maverick Truong, APRN  08/29/23  16:23 EDT    \"Dictated utilizing Dragon dictation\".    "

## 2023-08-29 NOTE — PLAN OF CARE
Goal Outcome Evaluation:  Plan of Care Reviewed With: mother, father           Outcome Evaluation: Pt alert and oriented x4. Restraints remain in place. 2L NC, VSS. Pt remains NPO and will have a speech re-evaluation tomorrow. Potassium 3.1, protocol in place. Family remains and bedside and safety precautions remain in place.

## 2023-08-29 NOTE — NURSING NOTE
Called speech therapy regarding re-evaluation, stated she would contact Dr. Hooper to discuss. Spoke to Trudy.       Spoke with Dr. Hooper regarding therapists concerns. Ok to continue with madi tomorrow.

## 2023-08-29 NOTE — SIGNIFICANT NOTE
08/29/23 1634   OTHER   Discipline physical therapist   Rehab Time/Intention   Session Not Performed unable to treat, medical status change  (Going to CT, possible OR tomorrow per RN)   Recommendation   PT - Next Appointment 08/30/23

## 2023-08-29 NOTE — ANESTHESIA PROCEDURE NOTES
Airway  Urgency: elective    Date/Time: 8/29/2023 6:31 PM  Airway not difficult    General Information and Staff    Patient location during procedure: OR  Anesthesiologist: Surjit Khalil MD    Indications and Patient Condition  Indications for airway management: airway protection    Preoxygenated: yes  Mask difficulty assessment: 1 - vent by mask    Final Airway Details  Final airway type: endotracheal airway      Successful airway: Microcuff Subglottic Suctioning ETT  Cuffed: yes   Successful intubation technique: direct laryngoscopy  Facilitating devices/methods: intubating stylet and OPA  Endotracheal tube insertion site: oral  Blade: Minh  Blade size: 4  Cormack-Lehane Classification: grade IIb - view of arytenoids or posterior of glottis only  Placement verified by: chest auscultation   Measured from: teeth  ETT/EBT  to teeth (cm): 24  Number of attempts at approach: 1  Assessment: lips, teeth, and gum same as pre-op

## 2023-08-29 NOTE — NURSING NOTE
To CT scan and Xray for shunt series. Family at bedside with patients. Fluids running and restraints on as per request per pre-op RN. Consent signed and placed in chart.     Pt aware of surgery and alert and oriented at this time. Tearful.     To surgery after exams are completed.

## 2023-08-29 NOTE — ANESTHESIA PREPROCEDURE EVALUATION
Anesthesia Evaluation     Patient summary reviewed and Nursing notes reviewed   NPO Solid Status: > 8 hours             Airway   Mallampati: II  TM distance: >3 FB  Neck ROM: full  Dental      Comment: Denies any chipped, cracked, or missing teeth     Pulmonary - normal exam     ROS comment: Acute hypercapnic respiratory failure during this admission  Cardiovascular - normal exam    (+) dysrhythmias (SVT)    ROS comment: ECHO 8/25/23  Interpretation Summary  ú  The study is technically difficult for diagnosis.  ú  Left ventricular ejection fraction appears to be 56 - 60%.  ú  Left ventricular diastolic function was indeterminate.  ú  Estimated right ventricular systolic pressure from tricuspid regurgitation is normal (<35 mmHg).  ú  There is a trivial pericardial effusion. There is no evidence of cardiac tamponade.         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 Recent ICU admission with  persistent confusion and very mild weakness on the left side.   COMPARISON: MR brain 4/21/2022. CT head 8/24/2023.     IMPRESSION:  1. New punctate focus of diffusion restriction/FLAIR hyperintensity in  the right paracentral lobule most suggestive of acute/subacute infarct.  No hemorrhagic transformation.  2. Stable right frontal approach ventriculostomy catheter with tip in  the right lateral ventricle near the foramen of San. Lateral and  third ventricles have increased in size from 2022 MRI. Mildly increased  pericatheter T2/FLAIR hyperintensity.  3. Left suboccipital craniectomy for left cerebellar tumor resection.  Multiple unchanged small enhancing intra-axial cerebellar nodules  measuring up to 1.1 cm.    GI/Hepatic/Renal/Endo      Musculoskeletal     Abdominal    Substance History       "OB/GYN          Other        ROS/Med Hx Other: \"57-year-old male with history of Von Hippel-Lindau and CNS hemangioblastoma with previous craniotomy and  shunt and baseline left-sided weakness.  Patient was admitted for encephalopathy and was intubated.  Patient was found down at his home.\"    1. Acute hypercapnic respiratory failure s/p vent 8/24-8/25  2. B/L Aspiration pneumonia  3. Vomiting; unclear etiology  4. Acute encephalopathy; suspected HIE (resolved)  5. Acute SVT  6. Acute renal failure  7. Hypernatremia  8. Hypokalemia  9. Severe hypocalcemia and metabolic acidosis  10. Incidental lung nodules-needs outpatient follow  11. S/p prior  shunt  12. VHL syndrome with CNS hemangioblastomas  s/p previous [place] with ? Left hemiparesis        Phys Exam Other: No gross deficits                Anesthesia Plan    ASA 4 - emergent     (Complications include but not limited to awareness under anesthesia, nausea, vomiting, sore throat, hoarseness, chipped or cracked teeth, MI, CVA, or serious allergic reaction. )    Anesthetic plan, risks, benefits, and alternatives have been provided, discussed and informed consent has been obtained with: patient.      CODE STATUS:    Code Status (Patient has no pulse and is not breathing): CPR (Attempt to Resuscitate)  Medical Interventions (Patient has pulse or is breathing): Full Support      "

## 2023-08-29 NOTE — PROGRESS NOTES
[facility]     LOS: 5 days     Subjective:    CC:Altered Mental Status    DIET:  Diet Order   Procedures    NPO Diet NPO Type: Strict NPO   Patient remains lethargic, still having confusion at time, MRI shows increase ventricle size compared to prior scans going for shunt revision    Objective:    Vital Signs:  Temp:  [97.2 °F (36.2 °C)-98.8 °F (37.1 °C)] 97.8 °F (36.6 °C)  Heart Rate:  [49-92] 68  Resp:  [16-18] 16  BP: (127-143)/(66-89) 137/89    SpO2:  [90 %-100 %] 94 %  on  Flow (L/min):  [2] 2;   Device (Oxygen Therapy): room air  Body mass index is 24.99 kg/m².    Physical Exam  Constitutional:       Appearance: Normal appearance.   HENT:      Head: Normocephalic and atraumatic.   Cardiovascular:      Rate and Rhythm: Normal rate.      Heart sounds: No murmur heard.    No friction rub.   Pulmonary:      Effort: Pulmonary effort is normal.      Breath sounds: Normal breath sounds.   Abdominal:      General: Bowel sounds are normal. There is no distension.      Palpations: Abdomen is soft.      Tenderness: There is no abdominal tenderness.   Skin:     General: Skin is warm and dry.   Neurological:      Mental Status: He is alert.      Comments: Mild left sided weakness   Psychiatric:         Mood and Affect: Mood normal.         Behavior: Behavior normal.       Results Review:    Glucose   Date Value Ref Range Status   08/29/2023 96 65 - 99 mg/dL Final   08/28/2023 98 65 - 99 mg/dL Final   08/27/2023 186 (H) 65 - 99 mg/dL Final     Results from last 7 days   Lab Units 08/29/23  0636   WBC 10*3/mm3 6.92   HEMOGLOBIN g/dL 12.7*   HEMATOCRIT % 37.7   PLATELETS 10*3/mm3 157       Results from last 7 days   Lab Units 08/29/23  0641 08/27/23  0648 08/26/23  0329   SODIUM mmol/L 144   < > 147*   POTASSIUM mmol/L 3.1*   < > 3.6   CHLORIDE mmol/L 109*   < > 111*   CO2 mmol/L 26.0   < > 24.7   BUN mg/dL 11   < > 44*   CREATININE mg/dL 0.97   < > 2.53*   CALCIUM mg/dL 8.0*   < > 8.2*    BILIRUBIN mg/dL  --   --  0.7   ALK PHOS U/L  --   --  58   ALT (SGPT) U/L  --   --  16   AST (SGOT) U/L  --   --  37   GLUCOSE mg/dL 96   < > 109*    < > = values in this interval not displayed.       Results from last 7 days   Lab Units 08/29/23  0636 08/25/23  0010 08/24/23  1805   INR  1.41*   < > 1.38*   APTT seconds  --   --  25.5    < > = values in this interval not displayed.       Results from last 7 days   Lab Units 08/29/23  0641   MAGNESIUM mg/dL 3.4*       Results from last 7 days   Lab Units 08/26/23  0329 08/25/23  0917 08/24/23  1805   CK TOTAL U/L 1,208* 1,023* 500*   HSTROP T ng/L  --   --  27*       Cultures:  Blood Culture   Date Value Ref Range Status   08/24/2023 No growth at 2 days  Preliminary   08/24/2023 No growth at 2 days  Preliminary     Respiratory Culture   Date Value Ref Range Status   08/25/2023   Final    Rare Normal respiratory arti. No S. aureus or Pseudomonas aeruginosa detected. Final report.       I have reviewed daily medications and changes in CPOE    Scheduled meds  famotidine, 20 mg, Intravenous, Daily  folic acid (FOLVITE) IVPB, 1 mg, Intravenous, Daily  potassium chloride, 10 mEq, Intravenous, Q1H  senna-docusate sodium, 2 tablet, Oral, BID  sodium chloride, 10 mL, Intravenous, Q12H  thiamine (B-1) IV, 500 mg, Intravenous, Q8H   Followed by  [START ON 8/31/2023] thiamine (B-1) IV, 200 mg, Intravenous, Q8H   Followed by  [START ON 9/4/2023] thiamine, 100 mg, Oral, Daily        dextrose, 50 mL/hr, Last Rate: 50 mL/hr (08/29/23 0641)      PRN meds    aluminum-magnesium hydroxide-simethicone    benzonatate    senna-docusate sodium **AND** polyethylene glycol **AND** bisacodyl **AND** bisacodyl    dextrose    dextrose    glucagon (human recombinant)    nitroglycerin    ondansetron    Potassium Replacement - Follow Nurse / BPA Driven Protocol    [COMPLETED] Insert Peripheral IV **AND** sodium chloride    sodium chloride    sodium chloride         Sepsis        Assessment/Plan:  Acute encephalopathy; suspected HIE  -Mental status continues to improve, voice is stronger today than compared to yesterday  -But the patient still remains lethargic  -Going for shunt revision    Acute hypercapnic respiratory failure s/p vent till 8/25  -Patient is currently on room air with excellent oxygen saturations    B/L Aspiration pneumonia  -Continue with Rocephin    Vomiting; unclear etiology  -Patient is currently n.p.o.    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  He has been off of this medication now for a week      Heath Hooper MD  08/29/23  13:06 EDT

## 2023-08-29 NOTE — OP NOTE
Ventricle to Peritoneal CSF Shunt revision procedure Note    Braxton Wolfe  8/29/2023  [id number]    CO-SURGEONS  Roberto Ritchie MD    ASSISTANT: Pita Wheat CSA was present throughout the entire surgery to provide intraoperative suction, retraction, suturing, and irrigation to promote visualization by the operative surgeon and assist with opening and closure.  The skilled assistance was necessary for the success of this case.  Our practice does not have a relationship with neurosurgical residents.    INDICATION:  Hydrocephalus    Pre-op Diagnosis:   Shunt malfunction    Post-Op Diagnosis Codes:  Shunt malfunction, valve malfunction    PROCEDURE PERFORMED:    1.  Revision of ventriculoperitoneal shunt valve    Anesthesia: General    Staff:   Circulator: Sonal Ordoñez RN  Scrub Person: Shannon Holloway Latonia; Nixon, Yamungu  Assistant: Pita Wheat CSA    Estimated Blood Loss:minimal     Specimens:   Order Name Source Comment Collection Info Order Time   CULTURE, CSF  Shunt Aspirate  Collected By: Roberto Ritchie MD 8/29/2023  7:24 PM     Release to patient   Routine Release        POTASSIUM    8/29/2023  5:43 PM     Release to patient   Routine Release        TYPE AND SCREEN    8/29/2023  5:43 PM     Release to patient   Routine Release              Drains: None    Findings: Increased intracranial pressure    Complications: None    Narrative Description:     Positioning: After operative consent the patient was taken to the OR and placed under general endotracheal intubation without event. In the supine position they were placed in a gel donut. A shoulder roll was placed beneath the shoulders to allow slight extension of the neck to facilitate passage of the shunt tunneler through the neck to the abdomen. Next, the head, neck, right chest and abdomen were all prepped and draped in the usual sterile fashion. On the back table the Sophysa Polaris SPVA-200 valve was set to 110 and then  flushed with saline.    Cranial revision: A hook shaped incision was made just posterior to the palpable shunt valve.  The proximal catheter and distal catheter were exposed.  The silk ties securing the proximal catheter as well as the distal catheters were carefully removed.  We tested the proximal catheter by disconnecting it from the valve, we immediately saw clear spinal fluid egress under high-pressure.  We clamped this off carefully using rubberized hemostat and then disconnected the distal catheter from the distal end of the shunt valve.  We then used a manometer and confirmed that flow through the distal portion of the valve was spontaneous with gravity.  No obvious obstruction was noted in either the proximal or distal catheter.  There was some obfuscation of the valve internals and we presume that the valve itself had malfunctioned.  We replaced this with the SPVA-200 valve and secured the distal and proximal catheters using 2-0 silk ties.     Closure:  The cranial wound was copiously irrigated with Betadine irrigation and then closed in appropriate layer skin reapproximated with 3-O Vicryl and 3-0 monocryl suture.  Patient was then awakened from general anesthesia extubated and transferred to the recovery room in stable and good condition.    Roberto Ritchie MD     Date: 8/29/2023  Time: 19:34 EDT

## 2023-08-29 NOTE — PROGRESS NOTES
"                                              LOS: 5 days   Patient Care Team:  Provider, No Known as PCP - General    Chief Complaint:  F/up aspiration pneumonia, respiratory failure.    Subjective   Interval History  Mild cough.  No significant phlegm.  Using the flutter intermittently.    REVIEW OF SYSTEMS:   Constitutional: No fever or chills  Gastrointestinal: No nausea, vomiting or diarrhea  Cardiovascular: No chest pain or palpitation.      Physical Exam:     Vital Signs  Temp:  [97.2 °F (36.2 °C)-98.8 °F (37.1 °C)] 97.8 °F (36.6 °C)  Heart Rate:  [49-92] 68  Resp:  [16-18] 16  BP: (127-143)/(66-89) 137/89    Intake/Output Summary (Last 24 hours) at 8/29/2023 1518  Last data filed at 8/29/2023 1058  Gross per 24 hour   Intake 1310.67 ml   Output 1000 ml   Net 310.67 ml       Flowsheet Rows      Flowsheet Row First Filed Value   Admission Height 177.8 cm (70\") Documented at 08/25/2023 0906   Admission Weight 75.9 kg (167 lb 5.3 oz) Documented at 08/24/2023 1802            PPE used per hospital policy    General Appearance:   Alert, cooperative, in no acute distress   ENMT:  Mallampati score 2, moist mucous membrane   Eyes:  Pupils equal and reactive to light. EOMI   Neck:   Trachea midline. No thyromegaly.   Lungs:   Bilateral rhonchi.  No wheezing.  No labored breathing.    Heart:   Regular rhythm and normal rate, normal S1 and S2, no         murmur   Skin:   No rash or ecchymosis   Abdomen:    Soft. No tenderness. No HSM.   Neuro/psych:  Conscious, alert, oriented x3. Strength 5/5 in upper and lower  ext.  Appropriate mood and affect   Extremities:  No cyanosis, clubbing or edema.  Warm extremities and well-perfused          Results Review:        Results from last 7 days   Lab Units 08/29/23  0641 08/28/23  1626 08/27/23  0648   SODIUM mmol/L 144 147* 148*   POTASSIUM mmol/L 3.1* 3.3* 3.2*   CHLORIDE mmol/L 109* 111* 112*   CO2 mmol/L 26.0 26.0 27.0   BUN mg/dL 11 14 30*   CREATININE mg/dL 0.97 1.01 1.43* "   GLUCOSE mg/dL 96 98 186*   CALCIUM mg/dL 8.0* 8.2* 8.0*       Results from last 7 days   Lab Units 08/26/23  0329 08/25/23  0917 08/24/23  1805   CK TOTAL U/L 1,208* 1,023* 500*   HSTROP T ng/L  --   --  27*       Results from last 7 days   Lab Units 08/29/23  0636 08/28/23  1626 08/27/23  0648   WBC 10*3/mm3 6.92 7.23 7.93   HEMOGLOBIN g/dL 12.7* 12.7* 11.3*   HEMATOCRIT % 37.7 37.0* 33.4*   PLATELETS 10*3/mm3 157 150 131*       Results from last 7 days   Lab Units 08/29/23  0636 08/28/23  1626 08/27/23  0648 08/25/23  0010 08/24/23  1805   INR  1.41* 1.45* 1.54*   < > 1.38*   APTT seconds  --   --   --   --  25.5    < > = values in this interval not displayed.       Results from last 7 days   Lab Units 08/24/23  1805   PROBNP pg/mL 7,202.0*                     Results from last 7 days   Lab Units 08/25/23  0330 08/24/23  1900   PH, ARTERIAL pH units 7.449 7.341*   PCO2, ARTERIAL mm Hg 27.1* 34.2*   PO2 ART mm Hg 69.3* 100.2*   MODALITY  Adult Vent Adult Vent   O2 SATURATION CALC % 94.8 97.4           I reviewed the patient's new clinical results.        Medication Review:   famotidine, 20 mg, Intravenous, Daily  folic acid (FOLVITE) IVPB, 1 mg, Intravenous, Daily  senna-docusate sodium, 2 tablet, Oral, BID  sodium chloride, 10 mL, Intravenous, Q12H  thiamine (B-1) IV, 500 mg, Intravenous, Q8H   Followed by  [START ON 8/31/2023] thiamine (B-1) IV, 200 mg, Intravenous, Q8H   Followed by  [START ON 9/4/2023] thiamine, 100 mg, Oral, Daily        dextrose, 50 mL/hr, Last Rate: 50 mL/hr (08/29/23 0641)        Diagnostic imaging:  I personally and independently reviewed the following images:  CXR 8/25/2023: Central line and ET tube in good position.  Increased interstitial pulmonary infiltrates on the right.    Assessment     Acute hypercapnic respiratory failure s/p vent 8/24-8/25  B/L Aspiration pneumonia  Vomiting; unclear etiology  Acute encephalopathy; suspected HIE (resolved)  Acute SVT  Acute renal  failure  Hypernatremia  Hypokalemia  Severe hypocalcemia and metabolic acidosis  Incidental lung nodules-needs outpatient follow  S/p prior  shunt  VHL syndrome with CNS hemangioblastomas  s/p previous [place] with ? Left hemiparesis        Plan     -Finished 5 days antibiotic therapy.  No need to continue further.  -Oxygen as needed to keep SPO2 >90%  - continue flutter and I-S.  Encouraged to use it.  - D50 for hypoglycemia and hypernatremia.  Thiamine and B12 supplement    Discussed with his daughter.          Fran Carbone MD  08/29/23  15:18 EDT        This note was dictated utilizing Dragon dictation

## 2023-08-29 NOTE — PROGRESS NOTES
"NEUROLOGY FOLLOW-UP     DOS: 2023  NAME: Braxton Wolfe   : 1965  PCP: Provider, No Known  CC: Stroke  Referring MD: Meek Castillo*  Neurological Problem and Interval History: Patient feels like he is at his neurologic baseline however family at bedside note that he has a little bit of confusion as well as some drowsiness.    Medications On Admission  Medications Prior to Admission   Medication Sig Dispense Refill Last Dose    metoprolol tartrate (LOPRESSOR) 25 MG tablet Take 1 tablet by mouth 2 (Two) Times a Day.       tadalafil (CIALIS) 5 MG tablet Take 1 tablet by mouth Daily.       belzutifan (WELIREG) 40 MG tablet Take 1 tablet by mouth Daily.            Laboratory Results:   Lab Results   Component Value Date    GLUCOSE 96 2023    CALCIUM 8.0 (L) 2023     2023    K 3.1 (L) 2023    CO2 26.0 2023     (H) 2023    BUN 11 2023    CREATININE 0.97 2023    BCR 11.3 2023    ANIONGAP 9.0 2023     Lab Results   Component Value Date    WBC 6.92 2023    HGB 12.7 (L) 2023    HCT 37.7 2023    MCV 89.8 2023     2023     No results found for: CHOL  Lab Results   Component Value Date    HDL 45 2020    HDL 54 2019    HDL 43 2018     Lab Results   Component Value Date     (H) 2020    LDL 89 2019    LDL 88 2018     Lab Results   Component Value Date    TRIG 88 2020    TRIG 55 2019    TRIG 58 2018     Lab Results   Component Value Date    HGBA1C 5.5 2019     Lab Results   Component Value Date    INR 1.41 (H) 2023    INR 1.45 (H) 2023    INR 1.54 (H) 2023    PROTIME 17.5 (H) 2023    PROTIME 17.9 (H) 2023    PROTIME 18.7 (H) 2023         Physical Examination:   /89 (BP Location: Left arm, Patient Position: Lying)   Pulse 68   Temp 97.8 °F (36.6 °C) (Oral)   Resp 16   Ht 177.8 cm (70\")   Wt " 79 kg (174 lb 2.6 oz)   SpO2 94%   BMI 24.99 kg/m²       Neurological examination:    Patient is awake alert oriented follows commands no cranial nerve deficits has a slight downward gaze with bilateral eyes patient has subtle left upper extremity and left lower extremity drift no sensory deficits.        Impression: 57-year-old male with history of Von Hippel-Lindau and CNS hemangioblastoma with previous craniotomy and  shunt and baseline left-sided weakness.  Patient was admitted for encephalopathy and was intubated.  Patient was found down at his home.    Plan:  -Neurosurgical consult for enlarged ventricles compared to previous with shunt in place  -Shunt series  -EEG unremarkable  -TTE unremarkable  Delirium precautions  -MRI:  1. New punctate focus of diffusion restriction/FLAIR hyperintensity in  the right paracentral lobule most suggestive of acute/subacute infarct.  No hemorrhagic transformation.  2. Stable right frontal approach ventriculostomy catheter with tip in  the right lateral ventricle near the foramen of San. Lateral and  third ventricles have increased in size from 2022 MRI. Mildly increased  pericatheter T2/FLAIR hyperintensity.  3. Left suboccipital craniectomy for left cerebellar tumor resection.  Multiple unchanged small enhancing intra-axial cerebellar nodules  measuring up to 1.1 cm.  -CUS  -A1c  -Lipids  -ASA      I have discussed the above with the patient   Time spent with patient: 30min    MDM  Reviewed: previous chart, nursing note and vitals  Reviewed previous: labs, MRI and CT scan  Interpretation: labs, CT scan and MRI  Total time providing critical care: 30-74 minutes. This excludes time spent performing separately reportable procedures and services.  Consults: neurology       Charmaine Clifford MD  Neurology

## 2023-08-29 NOTE — SIGNIFICANT NOTE
08/29/23 4326   OTHER   Discipline speech language pathologist   Rehab Time/Intention   Session Not Performed other (see comments)  (VFSS completed previous date, at that time patient alert/oriented per treating therapist. Repeat instrumental warranted prior to PO diet recommendations/initiation. SLP to schedule FEES/VFSS next date. Discussed with RN.)

## 2023-08-30 ENCOUNTER — ANCILLARY PROCEDURE (OUTPATIENT)
Dept: SPEECH THERAPY | Facility: HOSPITAL | Age: 58
DRG: 871 | End: 2023-08-30
Payer: COMMERCIAL

## 2023-08-30 ENCOUNTER — APPOINTMENT (OUTPATIENT)
Dept: INTERVENTIONAL RADIOLOGY/VASCULAR | Facility: HOSPITAL | Age: 58
DRG: 871 | End: 2023-08-30
Payer: COMMERCIAL

## 2023-08-30 ENCOUNTER — APPOINTMENT (OUTPATIENT)
Dept: CARDIOLOGY | Facility: HOSPITAL | Age: 58
DRG: 871 | End: 2023-08-30
Payer: COMMERCIAL

## 2023-08-30 ENCOUNTER — APPOINTMENT (OUTPATIENT)
Dept: CT IMAGING | Facility: HOSPITAL | Age: 58
DRG: 871 | End: 2023-08-30
Payer: COMMERCIAL

## 2023-08-30 PROBLEM — Z98.2 S/P VP SHUNT: Status: ACTIVE | Noted: 2023-08-30

## 2023-08-30 LAB
25(OH)D3 SERPL-MCNC: 12.2 NG/ML (ref 30–100)
ANION GAP SERPL CALCULATED.3IONS-SCNC: 10 MMOL/L (ref 5–15)
BASOPHILS # BLD AUTO: 0.02 10*3/MM3 (ref 0–0.2)
BASOPHILS NFR BLD AUTO: 0.3 % (ref 0–1.5)
BH CV XLRA MEAS LEFT DIST CCA EDV: 20.3 CM/SEC
BH CV XLRA MEAS LEFT DIST CCA PSV: 89.5 CM/SEC
BH CV XLRA MEAS LEFT DIST ICA EDV: -25.6 CM/SEC
BH CV XLRA MEAS LEFT DIST ICA PSV: -69.8 CM/SEC
BH CV XLRA MEAS LEFT ICA/CCA RATIO: -0.99
BH CV XLRA MEAS LEFT MID ICA EDV: -23.6 CM/SEC
BH CV XLRA MEAS LEFT MID ICA PSV: -88.8 CM/SEC
BH CV XLRA MEAS LEFT PROX CCA EDV: 20.3 CM/SEC
BH CV XLRA MEAS LEFT PROX CCA PSV: 82.3 CM/SEC
BH CV XLRA MEAS LEFT PROX ECA EDV: -12.6 CM/SEC
BH CV XLRA MEAS LEFT PROX ECA PSV: -73.5 CM/SEC
BH CV XLRA MEAS LEFT PROX ICA EDV: -22.4 CM/SEC
BH CV XLRA MEAS LEFT PROX ICA PSV: -78.6 CM/SEC
BH CV XLRA MEAS LEFT PROX SCLA PSV: 139.5 CM/SEC
BH CV XLRA MEAS LEFT VERTEBRAL A EDV: -8.4 CM/SEC
BH CV XLRA MEAS LEFT VERTEBRAL A PSV: -31.2 CM/SEC
BH CV XLRA MEAS RIGHT DIST CCA EDV: 18 CM/SEC
BH CV XLRA MEAS RIGHT DIST CCA PSV: 86.9 CM/SEC
BH CV XLRA MEAS RIGHT DIST ICA EDV: -29.8 CM/SEC
BH CV XLRA MEAS RIGHT DIST ICA PSV: -95.1 CM/SEC
BH CV XLRA MEAS RIGHT ICA/CCA RATIO: -1.09
BH CV XLRA MEAS RIGHT MID ICA EDV: -30.2 CM/SEC
BH CV XLRA MEAS RIGHT MID ICA PSV: -84 CM/SEC
BH CV XLRA MEAS RIGHT PROX CCA EDV: -12.6 CM/SEC
BH CV XLRA MEAS RIGHT PROX CCA PSV: -139.5 CM/SEC
BH CV XLRA MEAS RIGHT PROX ECA EDV: -4.2 CM/SEC
BH CV XLRA MEAS RIGHT PROX ECA PSV: -50.4 CM/SEC
BH CV XLRA MEAS RIGHT PROX ICA EDV: -14.4 CM/SEC
BH CV XLRA MEAS RIGHT PROX ICA PSV: -59.2 CM/SEC
BH CV XLRA MEAS RIGHT PROX SCLA PSV: 137.8 CM/SEC
BH CV XLRA MEAS RIGHT VERTEBRAL A EDV: -8.6 CM/SEC
BH CV XLRA MEAS RIGHT VERTEBRAL A PSV: -41.2 CM/SEC
BUN SERPL-MCNC: 9 MG/DL (ref 6–20)
BUN/CREAT SERPL: 9.5 (ref 7–25)
CA-I BLD-MCNC: 4.5 MG/DL (ref 4.6–5.4)
CA-I SERPL ISE-MCNC: 1.12 MMOL/L (ref 1.15–1.35)
CALCIUM SPEC-SCNC: 8 MG/DL (ref 8.6–10.5)
CHLORIDE SERPL-SCNC: 106 MMOL/L (ref 98–107)
CO2 SERPL-SCNC: 27 MMOL/L (ref 22–29)
CREAT SERPL-MCNC: 0.95 MG/DL (ref 0.76–1.27)
DEPRECATED RDW RBC AUTO: 40.8 FL (ref 37–54)
EGFRCR SERPLBLD CKD-EPI 2021: 93.4 ML/MIN/1.73
EOSINOPHIL # BLD AUTO: 0.34 10*3/MM3 (ref 0–0.4)
EOSINOPHIL NFR BLD AUTO: 5.2 % (ref 0.3–6.2)
ERYTHROCYTE [DISTWIDTH] IN BLOOD BY AUTOMATED COUNT: 12.9 % (ref 12.3–15.4)
GLUCOSE BLDC GLUCOMTR-MCNC: 87 MG/DL (ref 70–130)
GLUCOSE BLDC GLUCOMTR-MCNC: 92 MG/DL (ref 70–130)
GLUCOSE BLDC GLUCOMTR-MCNC: 95 MG/DL (ref 70–130)
GLUCOSE BLDC GLUCOMTR-MCNC: 97 MG/DL (ref 70–130)
GLUCOSE SERPL-MCNC: 101 MG/DL (ref 65–99)
HCT VFR BLD AUTO: 37.2 % (ref 37.5–51)
HGB BLD-MCNC: 12.7 G/DL (ref 13–17.7)
IMM GRANULOCYTES # BLD AUTO: 0.13 10*3/MM3 (ref 0–0.05)
IMM GRANULOCYTES NFR BLD AUTO: 2 % (ref 0–0.5)
INR PPP: 1.33 (ref 0.9–1.1)
LYMPHOCYTES # BLD AUTO: 1.37 10*3/MM3 (ref 0.7–3.1)
LYMPHOCYTES NFR BLD AUTO: 21 % (ref 19.6–45.3)
MAGNESIUM SERPL-MCNC: 5.5 MG/DL (ref 1.6–2.6)
MCH RBC QN AUTO: 30.2 PG (ref 26.6–33)
MCHC RBC AUTO-ENTMCNC: 34.1 G/DL (ref 31.5–35.7)
MCV RBC AUTO: 88.4 FL (ref 79–97)
MONOCYTES # BLD AUTO: 0.78 10*3/MM3 (ref 0.1–0.9)
MONOCYTES NFR BLD AUTO: 11.9 % (ref 5–12)
NEUTROPHILS NFR BLD AUTO: 3.89 10*3/MM3 (ref 1.7–7)
NEUTROPHILS NFR BLD AUTO: 59.6 % (ref 42.7–76)
NRBC BLD AUTO-RTO: 0.2 /100 WBC (ref 0–0.2)
PHOSPHATE SERPL-MCNC: 2.6 MG/DL (ref 2.5–4.5)
PLATELET # BLD AUTO: 206 10*3/MM3 (ref 140–450)
PMV BLD AUTO: 9.3 FL (ref 6–12)
POTASSIUM SERPL-SCNC: 3.2 MMOL/L (ref 3.5–5.2)
POTASSIUM SERPL-SCNC: 4 MMOL/L (ref 3.5–5.2)
PROTHROMBIN TIME: 16.7 SECONDS (ref 11.7–14.2)
PTH-INTACT SERPL-MCNC: 97.8 PG/ML (ref 15–65)
RBC # BLD AUTO: 4.21 10*6/MM3 (ref 4.14–5.8)
SODIUM SERPL-SCNC: 143 MMOL/L (ref 136–145)
WBC NRBC COR # BLD: 6.53 10*3/MM3 (ref 3.4–10.8)

## 2023-08-30 PROCEDURE — 83970 ASSAY OF PARATHORMONE: CPT | Performed by: NEUROLOGICAL SURGERY

## 2023-08-30 PROCEDURE — 94799 UNLISTED PULMONARY SVC/PX: CPT

## 2023-08-30 PROCEDURE — 25010000002 CEFAZOLIN IN DEXTROSE 2-4 GM/100ML-% SOLUTION: Performed by: NEUROLOGICAL SURGERY

## 2023-08-30 PROCEDURE — 70450 CT HEAD/BRAIN W/O DYE: CPT

## 2023-08-30 PROCEDURE — 83735 ASSAY OF MAGNESIUM: CPT | Performed by: NEUROLOGICAL SURGERY

## 2023-08-30 PROCEDURE — 25010000002 CEFAZOLIN IN DEXTROSE 2000 MG/ 100 ML SOLUTION: Performed by: NEUROLOGICAL SURGERY

## 2023-08-30 PROCEDURE — 97116 GAIT TRAINING THERAPY: CPT

## 2023-08-30 PROCEDURE — 80048 BASIC METABOLIC PNL TOTAL CA: CPT | Performed by: NEUROLOGICAL SURGERY

## 2023-08-30 PROCEDURE — 82948 REAGENT STRIP/BLOOD GLUCOSE: CPT

## 2023-08-30 PROCEDURE — 70250 X-RAY EXAM OF SKULL: CPT

## 2023-08-30 PROCEDURE — 84132 ASSAY OF SERUM POTASSIUM: CPT | Performed by: HOSPITALIST

## 2023-08-30 PROCEDURE — 94760 N-INVAS EAR/PLS OXIMETRY 1: CPT

## 2023-08-30 PROCEDURE — 92612 ENDOSCOPY SWALLOW (FEES) VID: CPT

## 2023-08-30 PROCEDURE — 93880 EXTRACRANIAL BILAT STUDY: CPT

## 2023-08-30 PROCEDURE — 82306 VITAMIN D 25 HYDROXY: CPT | Performed by: NEUROLOGICAL SURGERY

## 2023-08-30 PROCEDURE — 25010000002 CALCIUM GLUCONATE PER 10 ML: Performed by: INTERNAL MEDICINE

## 2023-08-30 PROCEDURE — 74018 RADEX ABDOMEN 1 VIEW: CPT

## 2023-08-30 PROCEDURE — 0 POTASSIUM CHLORIDE 10 MEQ/100ML SOLUTION: Performed by: HOSPITALIST

## 2023-08-30 PROCEDURE — 25010000002 THIAMINE HCL 200 MG/2ML SOLUTION 2 ML VIAL: Performed by: NEUROLOGICAL SURGERY

## 2023-08-30 PROCEDURE — 99024 POSTOP FOLLOW-UP VISIT: CPT

## 2023-08-30 PROCEDURE — 85025 COMPLETE CBC W/AUTO DIFF WBC: CPT | Performed by: NEUROLOGICAL SURGERY

## 2023-08-30 PROCEDURE — 92526 ORAL FUNCTION THERAPY: CPT

## 2023-08-30 PROCEDURE — 25010000002 CALCITRIOL PER 0.1 MCG: Performed by: INTERNAL MEDICINE

## 2023-08-30 PROCEDURE — 85610 PROTHROMBIN TIME: CPT | Performed by: NEUROLOGICAL SURGERY

## 2023-08-30 PROCEDURE — 36415 COLL VENOUS BLD VENIPUNCTURE: CPT | Performed by: NEUROLOGICAL SURGERY

## 2023-08-30 PROCEDURE — 99232 SBSQ HOSP IP/OBS MODERATE 35: CPT

## 2023-08-30 PROCEDURE — 97530 THERAPEUTIC ACTIVITIES: CPT

## 2023-08-30 PROCEDURE — 84100 ASSAY OF PHOSPHORUS: CPT | Performed by: NEUROLOGICAL SURGERY

## 2023-08-30 PROCEDURE — 71046 X-RAY EXAM CHEST 2 VIEWS: CPT

## 2023-08-30 PROCEDURE — 82330 ASSAY OF CALCIUM: CPT | Performed by: NEUROLOGICAL SURGERY

## 2023-08-30 RX ORDER — CALCIUM GLUCONATE 94 MG/ML
1 INJECTION, SOLUTION INTRAVENOUS ONCE
Status: COMPLETED | OUTPATIENT
Start: 2023-08-30 | End: 2023-08-30

## 2023-08-30 RX ORDER — CALCITRIOL 1 UG/ML
1 INJECTION INTRAVENOUS 3 TIMES WEEKLY
Status: DISCONTINUED | OUTPATIENT
Start: 2023-08-30 | End: 2023-09-01

## 2023-08-30 RX ORDER — ATORVASTATIN CALCIUM 20 MG/1
40 TABLET, FILM COATED ORAL NIGHTLY
Status: DISCONTINUED | OUTPATIENT
Start: 2023-08-30 | End: 2023-09-08 | Stop reason: HOSPADM

## 2023-08-30 RX ORDER — POTASSIUM CHLORIDE 7.45 MG/ML
10 INJECTION INTRAVENOUS
Status: COMPLETED | OUTPATIENT
Start: 2023-08-30 | End: 2023-08-30

## 2023-08-30 RX ORDER — ERGOCALCIFEROL 1.25 MG/1
50000 CAPSULE ORAL
Status: DISCONTINUED | OUTPATIENT
Start: 2023-08-30 | End: 2023-08-30

## 2023-08-30 RX ORDER — ASPIRIN 81 MG/1
81 TABLET ORAL DAILY
Status: DISCONTINUED | OUTPATIENT
Start: 2023-08-31 | End: 2023-09-01

## 2023-08-30 RX ADMIN — POTASSIUM CHLORIDE 10 MEQ: 7.46 INJECTION, SOLUTION INTRAVENOUS at 11:11

## 2023-08-30 RX ADMIN — CALCIUM GLUCONATE 1 G: 98 INJECTION, SOLUTION INTRAVENOUS at 13:47

## 2023-08-30 RX ADMIN — Medication 10 ML: at 08:25

## 2023-08-30 RX ADMIN — CEFAZOLIN SODIUM 2000 MG: 2 INJECTION, SOLUTION INTRAVENOUS at 02:30

## 2023-08-30 RX ADMIN — FOLIC ACID 1 MG: 5 INJECTION, SOLUTION INTRAMUSCULAR; INTRAVENOUS; SUBCUTANEOUS at 10:27

## 2023-08-30 RX ADMIN — THIAMINE HYDROCHLORIDE 500 MG: 100 INJECTION, SOLUTION INTRAMUSCULAR; INTRAVENOUS at 21:54

## 2023-08-30 RX ADMIN — Medication 10 ML: at 22:17

## 2023-08-30 RX ADMIN — CEFAZOLIN SODIUM 2000 MG: 2 INJECTION, SOLUTION INTRAVENOUS at 17:16

## 2023-08-30 RX ADMIN — THIAMINE HYDROCHLORIDE 500 MG: 100 INJECTION, SOLUTION INTRAMUSCULAR; INTRAVENOUS at 14:26

## 2023-08-30 RX ADMIN — FAMOTIDINE 20 MG: 10 INJECTION INTRAVENOUS at 08:25

## 2023-08-30 RX ADMIN — THIAMINE HYDROCHLORIDE 500 MG: 100 INJECTION, SOLUTION INTRAMUSCULAR; INTRAVENOUS at 05:36

## 2023-08-30 RX ADMIN — POTASSIUM CHLORIDE 10 MEQ: 7.46 INJECTION, SOLUTION INTRAVENOUS at 12:23

## 2023-08-30 RX ADMIN — POTASSIUM CHLORIDE 10 MEQ: 7.46 INJECTION, SOLUTION INTRAVENOUS at 08:25

## 2023-08-30 RX ADMIN — POTASSIUM CHLORIDE 10 MEQ: 7.46 INJECTION, SOLUTION INTRAVENOUS at 13:46

## 2023-08-30 RX ADMIN — CALCITRIOL 1 MCG: 1 INJECTION INTRAVENOUS at 14:26

## 2023-08-30 RX ADMIN — CEFAZOLIN SODIUM 2000 MG: 2 INJECTION, SOLUTION INTRAVENOUS at 11:12

## 2023-08-30 NOTE — CASE MANAGEMENT/SOCIAL WORK
Continued Stay Note  [facility]     Patient Name: Braxton Wolfe  MRN: [id number]  Today's Date: 8/30/2023    Admit Date: 8/24/2023    Plan: Rehab   Discharge Plan       Row Name 08/30/23 1334       Plan    Plan Rehab    Patient/Family in Agreement with Plan yes    Plan Comments Received call from pts sister/Julia Alves [phone] who states she is assisting with pts finanacial needs while he is hospitalized. Julia states that pts insurnace terms tomorrow and would like to assess his eligibility for Medicaid.  Call placed to [employer] who will assess.  CCP continues to follow.  ZHOU Li RN                   Discharge Codes    No documentation.                 Expected Discharge Date and Time       Expected Discharge Date Expected Discharge Time    Sep 1, 2023               Yazmin Li RN

## 2023-08-30 NOTE — THERAPY TREATMENT NOTE
Patient Name: Braxton Wolfe  : 1965    MRN: [id number]                              Today's Date: 2023       Admit Date: 2023    Visit Dx:     ICD-10-CM ICD-9-CM   1. Follow-up exam  Z09 V67.9   2. Sepsis with acute renal failure without septic shock, due to unspecified organism, unspecified acute renal failure type  A41.9 038.9    R65.20 995.92    N17.9 584.9   3. Acute respiratory failure, unspecified whether with hypoxia or hypercapnia  J96.00 518.81   4. Acute kidney injury  N17.9 584.9   5. Hypokalemia  E87.6 276.8   6. Hypernatremia  E87.0 276.0   7. Obstructive hydrocephalus  G91.1 331.4   8. Shunt malfunction  T85.618A 996.59     Patient Active Problem List   Diagnosis    Sepsis    Shunt malfunction    Cerebral ventriculomegaly    Lethargy    Confusion    S/P  Polaris SPVA-200 shunt 23 w/ Dr. Ritchie valve set to 110     History reviewed. No pertinent past medical history.  Past Surgical History:   Procedure Laterality Date    BRAIN SURGERY       SHUNT INSERTION N/A 2023    Procedure: VENTRICULAR PERITONEAL SHUNT REVISION;  Surgeon: Roberto Ritchie MD;  Location: [facility];  Service: Neurosurgery;  Laterality: N/A;      General Information       Row Name 23 1608          Physical Therapy Time and Intention    Document Type therapy note (daily note)  -EB     Mode of Treatment physical therapy;individual therapy  -EB       Row Name 23 1608          General Information    Patient Profile Reviewed yes  -EB     Existing Precautions/Restrictions fall  -EB       Row Name 23 1608          Cognition    Orientation Status (Cognition) oriented to;person;place  -EB       Row Name 23 1608          Safety Issues, Functional Mobility    Safety Issues Affecting Function (Mobility) sequencing abilities;judgment;awareness of need for assistance;insight into deficits/self-awareness  -EB     Impairments Affecting Function (Mobility) balance;cognition;endurance/activity  tolerance;strength;postural/trunk control;range of motion (ROM);motor control  -EB               User Key  (r) = Recorded By, (t) = Taken By, (c) = Cosigned By      Initials Name Provider Type    Ciera Moss PTA Physical Therapist Assistant                   Mobility       Row Name 08/30/23 1612          Bed Mobility    Supine-Sit [place] (Bed Mobility) contact guard;minimum assist (75% patient effort);verbal cues  -EB     Sit-Supine [place] (Bed Mobility) contact guard;minimum assist (75% patient effort);verbal cues  -EB     Assistive Device (Bed Mobility) bed rails;head of bed elevated  -EB       Row Name 08/30/23 1612          Sit-Stand Transfer    Sit-Stand [place] (Transfers) minimum assist (75% patient effort);2 person assist;moderate assist (50% patient effort)  -EB     Assistive Device (Sit-Stand Transfers) --  HHAX2  -EB     Comment, (Sit-Stand Transfer) 2 STS transfers from EOB, cues for posture  -EB       Row Name 08/30/23 1612          Gait/Stairs (Locomotion)    [place] Level (Gait) minimum assist (75% patient effort);2 person assist;moderate assist (50% patient effort)  -EB     Assistive Device (Gait) --  hhax2  -EB     Distance in Feet (Gait) 4ft from side step, seated rest break then 8ft forward.  -EB     Deviations/Abnormal Patterns (Gait) stride length decreased;base of support, narrow;gait speed decreased;angeli decreased  -EB     Bilateral Gait Deviations forward flexed posture  -EB     Left Sided Gait Deviations weight shift ability decreased;decreased knee extension  -EB     Comment, (Gait/Stairs) unsteady gait but no LOB. Fatigues quickly. Sequencing cues given throughout.  -EB               User Key  (r) = Recorded By, (t) = Taken By, (c) = Cosigned By      Initials Name Provider Type    Ciera Moss PTA Physical Therapist Assistant                   Obj/Interventions       Row Name 08/30/23 1616          Motor Skills    Therapeutic Exercise --  BLE: LAQs (X10)   -EB       Row Name 08/30/23 1616          Balance    Balance Assessment sitting static balance  -EB     Static Sitting Balance contact guard  -EB     Position, Sitting Balance sitting edge of bed  -EB     Comment, Balance occassional righ side lean. cues given to correct.  -EB               User Key  (r) = Recorded By, (t) = Taken By, (c) = Cosigned By      Initials Name Provider Type    Ciera Moss PTA Physical Therapist Assistant                   Goals/Plan    No documentation.                  Clinical Impression       Row Name 08/30/23 1616          Plan of Care Review    Plan of Care Reviewed With patient  -EB     Progress improving  -EB     Outcome Evaluation Pt seen for PT treatment today. Pt progressed with mobility today. Pt able to complete bed mobility with CGA/Karina with sequencing cues given. Pt also completed 2 STS tansfers with MinAX2/ModA with HHA of 2, cues for upright posture. Pt worked on gait taking side steps, about 4ft but needed a seated rest break due to getting fatigued and more unsteady. After a seated rest break, Pt was able to ambulate forward about 8ft. pt requires sequencing cues and cues for upright posture. Pt required MinAX2/ModA with gait. Pt fatigued afterwards and needed to lay back down. Will continue to progress pt as able.  -EB       Row Name 08/30/23 1616          Therapy Assessment/Plan (PT)    Therapy Frequency (PT) 5 times/wk  -EB       Row Name 08/30/23 1616          Positioning and Restraints    Pre-Treatment Position in bed  -EB     Post Treatment Position bed  -EB     In Bed supine;call light within reach;encouraged to call for assist;exit alarm on  -EB               User Key  (r) = Recorded By, (t) = Taken By, (c) = Cosigned By      Initials Name Provider Type    Ciera Moss PTA Physical Therapist Assistant                   Outcome Measures       Row Name 08/30/23 1620          How much help from another person do you currently need...    Turning from  your back to your side while in flat bed without using bedrails? 3  -EB     Moving from lying on back to sitting on the side of a flat bed without bedrails? 3  -EB     Moving to and from a bed to a chair (including a wheelchair)? 2  -EB     Standing up from a chair using your arms (e.g., wheelchair, bedside chair)? 2  -EB     Climbing 3-5 steps with a railing? 1  -EB     To walk in hospital room? 2  -EB     AM-PAC 6 Clicks Score (PT) 13  -EB     Highest level of mobility 4 --> Transferred to chair/commode  -EB       Row Name 08/30/23 1620          Modified [place] Scale    Modified [place] Scale 4 - Moderately severe disability.  Unable to walk without assistance, and unable to attend to own bodily needs without assistance.  -               User Key  (r) = Recorded By, (t) = Taken By, (c) = Cosigned By      Initials Name Provider Type     Ciera Carter PTA Physical Therapist Assistant                                 Physical Therapy Education       Title: PT OT SLP Therapies (Done)       Topic: Physical Therapy (Done)       Point: Mobility training (Done)       Learning Progress Summary             Patient Acceptance, E,D, VU,NR by  at 8/30/2023 1621    Acceptance, E,TB, NR by CB at 8/26/2023 1558                         Point: Home exercise program (Done)       Learning Progress Summary             Patient Acceptance, E,D, VU,NR by  at 8/30/2023 1621                         Point: Body mechanics (Done)       Learning Progress Summary             Patient Acceptance, E,D, VU,NR by  at 8/30/2023 1621    Acceptance, E,TB, NR by CB at 8/26/2023 1558                         Point: Precautions (Done)       Learning Progress Summary             Patient Acceptance, E,D, VU,NR by  at 8/30/2023 1621    Acceptance, E,TB, NR by CB at 8/26/2023 1558                                         User Key       Initials Effective Dates Name Provider Type [facility] 02/14/23 -  Ciera Carter PTA Physical Therapist  Assistant PT    CB 10/22/21 -  Luh Etienne PT Physical Therapist PT                  PT Recommendation and Plan     Plan of Care Reviewed With: patient  Progress: improving  Outcome Evaluation: Pt seen for PT treatment today. Pt progressed with mobility today. Pt able to complete bed mobility with CGA/Karina with sequencing cues given. Pt also completed 2 STS tansfers with MinAX2/ModA with HHA of 2, cues for upright posture. Pt worked on gait taking side steps, about 4ft but needed a seated rest break due to getting fatigued and more unsteady. After a seated rest break, Pt was able to ambulate forward about 8ft. pt requires sequencing cues and cues for upright posture. Pt required MinAX2/ModA with gait. Pt fatigued afterwards and needed to lay back down. Will continue to progress pt as able.     Time Calculation:         PT Charges       Row Name 08/30/23 1607             Time Calculation    Start Time 1514  -EB      Stop Time 1537  -EB      Time Calculation (min) 23 min  -EB      PT Received On 08/30/23  -EB      PT - Next Appointment 08/31/23  -EB         Time Calculation- PT    Total Timed Code Minutes- PT 23 minute(s)  -EB                User Key  (r) = Recorded By, (t) = Taken By, (c) = Cosigned By      Initials Name Provider Type    EB Ciera Carter PTA Physical Therapist Assistant                  Therapy Charges for Today       Code Description Service Date Service Provider Modifiers Qty    11733882082 HC GAIT TRAINING EA 15 MIN 8/30/2023 Ciera Carter PTA GP 1    49091700711 HC PT THERAPEUTIC ACT EA 15 MIN 8/30/2023 Ciera Carter PTA GP 1    93607812529  PT THER SUPP EA 15 MIN 8/30/2023 Ciera Carter PTA GP 2            PT G-Codes  Outcome Measure Options: AM-PAC 6 Clicks Basic Mobility (PT), Modified [place]  AM-PAC 6 Clicks Score (PT): 13  Modified [place] Scale: 4 - Moderately severe disability.  Unable to walk without assistance, and unable to attend to own bodily needs without assistance.        Ciera Carter, PTA  8/30/2023

## 2023-08-30 NOTE — ANESTHESIA POSTPROCEDURE EVALUATION
"Patient: Braxton Wolfe    Procedure Summary       Date: 08/29/23 Room / Location: [facility] OR [address] [place] [address] MAIN OR    Anesthesia Start: 1822 Anesthesia Stop: 2001    Procedure: VENTRICULAR PERITONEAL SHUNT REVISION (Head) Diagnosis:     Surgeons: Roberto Ritchie MD Provider: Surjit Khalil MD    Anesthesia Type: Not recorded ASA Status: 4 - Emergent            Anesthesia Type: No value filed.    Vitals  Vitals Value Taken Time   /91 08/29/23 2006   Temp 36.7 øC (98.1 øF) 08/29/23 1957   Pulse 82 08/29/23 2014   Resp 16 08/29/23 2005   SpO2 97 % 08/29/23 2014   Vitals shown include unvalidated device data.        Post Anesthesia Care and Evaluation    Patient location during evaluation: bedside  Patient participation: complete - patient participated  Level of consciousness: awake  Pain management: adequate    Airway patency: patent  Anesthetic complications: No anesthetic complications  PONV Status: controlled  Cardiovascular status: acceptable  Respiratory status: acceptable  Hydration status: acceptable    Comments: /91   Pulse 84   Temp 36.7 øC (98.1 øF) (Oral)   Resp 16   Ht 177.8 cm (70\")   Wt 79.4 kg (175 lb)   SpO2 95%   BMI 25.11 kg/mý       "

## 2023-08-30 NOTE — ANESTHESIA POSTPROCEDURE EVALUATION
"Patient: Braxton Wolfe    Procedure Summary       Date: 08/29/23 Room / Location: [facility] OR [address] [place] [address] MAIN OR    Anesthesia Start: 1822 Anesthesia Stop: 2001    Procedure: VENTRICULAR PERITONEAL SHUNT REVISION (Head) Diagnosis:     Surgeons: Roberto Ritchie MD Provider: Surjit Khalil MD    Anesthesia Type: Not recorded ASA Status: 4 - Emergent            Anesthesia Type: No value filed.    Vitals  Vitals Value Taken Time   /91 08/29/23 2006   Temp 36.7 øC (98.1 øF) 08/29/23 1957   Pulse 82 08/29/23 2014   Resp 16 08/29/23 2005   SpO2 97 % 08/29/23 2014   Vitals shown include unvalidated device data.        Post Anesthesia Care and Evaluation    Patient location during evaluation: bedside  Patient participation: complete - patient participated  Level of consciousness: awake  Pain management: adequate    Airway patency: patent  Anesthetic complications: No anesthetic complications  PONV Status: controlled  Cardiovascular status: acceptable  Respiratory status: acceptable  Hydration status: acceptable    Comments: /91   Pulse 84   Temp 36.7 øC (98.1 øF) (Oral)   Resp 16   Ht 177.8 cm (70\")   Wt 79.4 kg (175 lb)   SpO2 95%   BMI 25.11 kg/mý       "

## 2023-08-30 NOTE — MBS/VFSS/FEES
Acute Care - Speech Language Pathology   Swallow  FEES  [facility]     Patient Name: Braxton Wolfe  : 1965  MRN: [id number]  Today's Date: 2023               Admit Date: 2023    Visit Dx:     ICD-10-CM ICD-9-CM   1. Follow-up exam  Z09 V67.9   2. Sepsis with acute renal failure without septic shock, due to unspecified organism, unspecified acute renal failure type  A41.9 038.9    R65.20 995.92    N17.9 584.9   3. Acute respiratory failure, unspecified whether with hypoxia or hypercapnia  J96.00 518.81   4. Acute kidney injury  N17.9 584.9   5. Hypokalemia  E87.6 276.8   6. Hypernatremia  E87.0 276.0   7. Obstructive hydrocephalus  G91.1 331.4   8. Shunt malfunction  T85.618A 996.59     Patient Active Problem List   Diagnosis    Sepsis    Shunt malfunction    Cerebral ventriculomegaly    Lethargy    Confusion    S/P  Polaris SPVA-200 shunt 23 w/ Dr. Ritchie valve set to 110     History reviewed. No pertinent past medical history.  Past Surgical History:   Procedure Laterality Date    BRAIN SURGERY       SHUNT INSERTION N/A 2023    Procedure: VENTRICULAR PERITONEAL SHUNT REVISION;  Surgeon: Roberto Ritchie MD;  Location: [facility];  Service: Neurosurgery;  Laterality: N/A;       SLP Recommendation and Plan  SLP Swallowing Diagnosis: mod-severe, oral dysphagia, pharyngeal dysphagia (23)  SLP Diet Recommendation: NPO, ice chips between meals after oral care, with supervision (23)  Recommended Precautions and Strategies: assist with feeding, general aspiration precautions, 1:1 supervision (23)  SLP Rec. for Method of Medication Administration: meds via alternate route (23)     Monitor for Signs of Aspiration: yes, notify SLP if any concerns (23)  Recommended Diagnostics: reassess via clinical swallow evaluation (23)  Swallow Criteria for Skilled Therapeutic Interventions Met: demonstrates skilled criteria (23  0900)  Anticipated Discharge Disposition (SLP): unknown (08/30/23 0900)  Rehab Potential/Prognosis, Swallowing: good, to achieve stated therapy goals (08/30/23 0900)  Therapy Frequency (Swallow): PRN (08/30/23 0900)  Predicted Duration Therapy Intervention (Days): until discharge (08/30/23 0900)  Oral Care Recommendations: Oral Care BID/PRN (08/30/23 0900)                                      Oral Care Recommendations: Oral Care BID/PRN (08/30/23 0900)    Plan of Care Reviewed With: patient  Outcome Evaluation: FEES completed. Results similar to previous VFSS. Discussed results with MD, RN, patient, and family. Long discussion with family regarding NPO with PEG vs diet of quality of life. Family reports dysphagia and motor speech dysfunction are new. Possible slow speech d/t medicine.This clinican suspects some baseline impairment exacerbated by current illness, which was discussed with family. Family plans to discuss NPO/PEG vs diet with known aspiration risk next date with patient. Regardless of decision. The patient would be an excellent therapy candidate for EMST.      SWALLOW EVALUATION (last 72 hours)       SLP Adult Swallow Evaluation       Row Name 08/30/23 0900       Rehab Evaluation    Document Type evaluation  -SH    Subjective Information --    Patient Observations cooperative  slow to respond  -SH    Patient/Family/Caregiver Comments/Observations discussion with daughter, patient, and parents  -SH    Patient Effort adequate  -SH    Symptoms Noted During/After Treatment --       General Information    Patient Profile Reviewed yes  -SH    Pertinent History Of Current Problem Acute hypercapnic respiratory failure s/p vent 8/24-8/25, B/L Aspiration pneumonia, Vomiting; unclear etiology, S/p prior  shunt, 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  -SH    Current Method of Nutrition NPO  -SH    Precautions/Limitations, Vision WFL;for purposes of eval  -SH     "Precautions/Limitations, Hearing WFL;for purposes of eval  -    Prior Level of Function-Communication other (see comments)  \"slowed\" per family per medicine  -    Prior Level of Function-Swallowing no diet consistency restrictions  -    Plans/Goals Discussed with patient and family;agreed upon  -    Patient's Goals for Discharge return to PO diet  -    Family Goals for Discharge patient able to return to PO diet  -       Pain    Additional Documentation Pain Scale: FACES Pre/Post-Treatment (Group)  -       Pain Scale: FACES Pre/Post-Treatment    Pain: FACES Scale, Pretreatment 0-->no hurt  -       Oral Motor Structure and Function    Dentition Assessment natural, present and adequate  -    Secretion Management wet vocal quality  -       Oral Musculature and Cranial Nerve Assessment    Oral Motor General Assessment generalized oral motor weakness  -       MBS/VFSS Interpretation    VFSS Summary --       SLP Communication to Radiology    Summary Statement --       Fiberoptic Endoscopic Evaluation of Swallowing (FEES)    Risks/Benefits Reviewed risks/benefits explained;other (see comments)  Dr. Hooper approved FEES  -    Scope serial number/identification Ambu  -       Anatomy and Physiology    Anatomic Considerations no anatomic structural deviation  -    Velopharyngeal other (see comments)  tight  -    Base of Tongue symmetrical  -    Epiglottis WFL  -SH    Laryngeal Function Breathing symmetrical  -    Laryngeal Function Phonation symmetrical  -    Secretion Rating Scale (Waldemar et al. 1996) 1- secretions present around the laryngeal vestibule  -    Secretion Description clear;thick  -    Ice Chips elicited swallow;did not clear secretions  -    Spontaneous Swallow frequency reduced;did not clear secretions  -       Initiation of Pharyngeal Swallow    FEES Summary FEES completed and presented through R nare. Oral and nasopharynx tight, which impacted visualization. Strong " cough when scope touched valleculae, but spontaneous swallow frequency reduced. Pt with intermittent wet voice prior to assessment indicating poor management of secretions. Non transient penetration suspected with ice as material present in the laryngeal vestibule after the swallow. Moderate pharyngeal residue after the swallow with initial trial of honey via spoon. Patient able to clear residue to mild at the valleculae with cued additional swallow. Epiglottic undercoating appreciated. Adequate swallow initiation with additional trial of honey via spoon, however moderate posterior pharyngeal wall and valleculae residue post swallow. Increased epiglottic undercoating after second trial. Penetration observed after the swallow with honey residue as patient propelled puree bolus. Pt unable to strip material off scope, therefore images were often obstructed. Suspected penetration during the swallow with puree d/t material present in the laryngeal vestibule post swallow. Material present in the posterior commissure after the swallow indicating posterior penetration before and after the swallow with puree. SLP recs NPO. If patient/family request PO for quality of life, SLP recs honey via spoon only and puree. Encouarge multiple swallows and alternate liquids/solids. Pt would greatly benefit from EMST.  Long discussion with family regarding NPO with PEG vs diet of quality of life. Family reports dysphagia and motor speech dysfunction are new. This clinican suspects some baseline impairment exacerbated by current illness.  -       Swallowing Quality of Life Assessment    Education and counseling provided --  Family requested SLP return to the room to discuss NPO/alternative means of nutrition vs puree and honey via spoon only with known aspiration risk. Daughter, Yamilet, present. All questions answered at this time. Please all free water protocol with ice.  -       SLP Evaluation Clinical Impression    SLP Swallowing  Diagnosis mod-severe;oral dysphagia;pharyngeal dysphagia  -    Functional Impact risk of aspiration/pneumonia;risk of malnutrition;risk of dehydration  -    Rehab Potential/Prognosis, Swallowing good, to achieve stated therapy goals  -    Swallow Criteria for Skilled Therapeutic Interventions Met demonstrates skilled criteria  -       Recommendations    Therapy Frequency (Swallow) PRN  -    Predicted Duration Therapy Intervention (Days) until discharge  -    SLP Diet Recommendation NPO;ice chips between meals after oral care, with supervision  -    Recommended Diagnostics reassess via clinical swallow evaluation  -    Recommended Precautions and Strategies assist with feeding;general aspiration precautions;1:1 supervision  -    Oral Care Recommendations Oral Care BID/PRN  -    SLP Rec. for Method of Medication Administration meds via alternate route  -    Monitor for Signs of Aspiration yes;notify SLP if any concerns  -    Anticipated Discharge Disposition (SLP) unknown  -       Swallow Goals (SLP)    Swallow LTGs Patient will demonstrate functional swallow for  -       (LTG) Patient will demonstrate functional swallow for    Diet Texture (Demonstrate functional swallow) desired comfort diet  -    Liquid viscosity (Demonstrate functional swallow) desired comfort liquid viscosity  -    [place] (Demonstrate functional swallow) independently (over 90% accuracy)  -              User Key  (r) = Recorded By, (t) = Taken By, (c) = Cosigned By      Initials Name Effective Dates     Mar Beavers MS CCC-SLP 06/16/21 -      Wanda Pressley SLP 08/01/23 -                     EDUCATION  The patient has been educated in the following areas:   Dysphagia (Swallowing Impairment).        SLP GOALS       Row Name 08/30/23 1400 08/30/23 0900          (LTG) Patient will demonstrate functional swallow for    Diet Texture (Demonstrate functional swallow) desired comfort diet  - desired comfort  diet  -     Liquid viscosity (Demonstrate functional swallow) desired comfort liquid viscosity  - desired comfort liquid viscosity  -     [place] (Demonstrate functional swallow) independently (over 90% accuracy)  - independently (over 90% accuracy)  -     Comment (Demonstrate functional swallow) Family requested SLP return to the room to discuss NPO/alternative means of nutrition vs puree and honey via spoon only with known aspiration risk. Daughter, Yamilet, present. All questions answered at this time. Please all free water protocol with ice.  -SH  -SH --               User Key  (r) = Recorded By, (t) = Taken By, (c) = Cosigned By      Initials Name Provider Type     Mar Beavers MS CCC-SLP Speech and Language Pathologist                       Time Calculation:    Time Calculation- SLP       Row Name 23 1452 23 145          Time Calculation- SLP    SLP Start Time -- 730  -     SLP Received On -- 23  -        Untimed Charges    05029-GW Fiberoptic Endo Eval Swallow Minutes 120  -SH --     79877-CN Treatment Swallow Minutes 30  -SH --        Total Minutes    Untimed Charges Total Minutes 150  -SH --      Total Minutes 150  -SH --               User Key  (r) = Recorded By, (t) = Taken By, (c) = Cosigned By      Initials Name Provider Type     Mar Beavers MS CCC-SLP Speech and Language Pathologist                    Therapy Charges for Today       Code Description Service Date Service Provider Modifiers Qty    54173743622  HC SCP BRONCH ASCOPE FLX DISPOSABLE 2023 Mar Beavers MS CCC-SLP  1    61389964246 HC ST FIBEROPTIC ENDO EVAL SWALL 8 2023 Mar Beavers MS CCC-SLP GN 1    [id number]  ST TREATMENT SWALLOW 2 2023 Mar Beavers MS CCC-SLP GN 1                 Mar Beavers MS CCC-SLP  2023   and Acute Care - Speech Language Pathology Treatment Note    [facility]     Patient Name: Braxton Wolfe  : 1965  MRN:  [id number]    Today's Date: 8/30/2023                   Admit Date: 8/24/2023       Visit Dx:      ICD-10-CM ICD-9-CM   1. Follow-up exam  Z09 V67.9   2. Sepsis with acute renal failure without septic shock, due to unspecified organism, unspecified acute renal failure type  A41.9 038.9    R65.20 995.92    N17.9 584.9   3. Acute respiratory failure, unspecified whether with hypoxia or hypercapnia  J96.00 518.81   4. Acute kidney injury  N17.9 584.9   5. Hypokalemia  E87.6 276.8   6. Hypernatremia  E87.0 276.0   7. Obstructive hydrocephalus  G91.1 331.4   8. Shunt malfunction  T85.618A 996.59       Patient Active Problem List   Diagnosis    Sepsis    Shunt malfunction    Cerebral ventriculomegaly    Lethargy    Confusion    S/P  Polaris SPVA-200 shunt 8/29/23 w/ Dr. Ritchie valve set to 110       History reviewed. No pertinent past medical history.    Past Surgical History:   Procedure Laterality Date    BRAIN SURGERY       SHUNT INSERTION N/A 8/29/2023    Procedure: VENTRICULAR PERITONEAL SHUNT REVISION;  Surgeon: Roberto Ritchie MD;  Location: [facility];  Service: Neurosurgery;  Laterality: N/A;       SLP Recommendation and Plan                Swallow Criteria for Skilled Therapeutic Interventions Met: demonstrates skilled criteria (08/30/23 0900)  Anticipated Discharge Disposition (SLP): unknown (08/30/23 0900)     Therapy Frequency (Swallow): PRN (08/30/23 0900)  Predicted Duration Therapy Intervention (Days): until discharge (08/30/23 0900)  Oral Care Recommendations: Oral Care BID/PRN (08/30/23 0900)              Plan of Care Reviewed With: patient (08/30/23 1020)  Outcome Evaluation: FEES completed. Results similar to previous VFSS. Discussed results with MD, RN, patient, and family. Long discussion with family regarding NPO with PEG vs diet of quality of life. Family reports dysphagia and motor speech dysfunction are new. Possible slow speech d/t medicine.This clinican suspects some baseline impairment  exacerbated by current illness, which was discussed with family. Family plans to discuss NPO/PEG vs diet with known aspiration risk next date with patient. Regardless of decision. The patient would be an excellent therapy candidate for EMST. (08/30/23 1020)                SLP EVALUATION (last 72 hours)       SLP SLC Evaluation       Row Name 08/28/23 1000                   Recommendations    Anticipated Discharge Disposition (SLP) unknown  -HF                  User Key  (r) = Recorded By, (t) = Taken By, (c) = Cosigned By      Initials Name Effective Dates    HF Wanda Pressley SLP 08/01/23 -                        EDUCATION    The patient has been educated in the following areas:       Dysphagia (Swallowing Impairment).             SLP GOALS       Row Name 08/30/23 1400 08/30/23 0900          (LTG) Patient will demonstrate functional swallow for    Diet Texture (Demonstrate functional swallow) desired comfort diet  -SH desired comfort diet  -SH     Liquid viscosity (Demonstrate functional swallow) desired comfort liquid viscosity  -SH desired comfort liquid viscosity  -SH     [place] (Demonstrate functional swallow) independently (over 90% accuracy)  -SH independently (over 90% accuracy)  -SH     Comment (Demonstrate functional swallow) Family requested SLP return to the room to discuss NPO/alternative means of nutrition vs puree and honey via spoon only with known aspiration risk. Daughter, Yamilet, present. All questions answered at this time. Please all free water protocol with ice.  -SH  -SH --               User Key  (r) = Recorded By, (t) = Taken By, (c) = Cosigned By      Initials Name Provider Type    Mar Rojas MS CCC-SLP Speech and Language Pathologist                                Time Calculation:        Time Calculation- SLP       Row Name 08/30/23 1452 08/30/23 1451          Time Calculation- SLP    SLP Start Time -- 0730  -     SLP Received On -- 08/30/23  -        Untimed Charges     03366-BL Fiberoptic Endo Eval Swallow Minutes 120  -SH --     27725-WT Treatment Swallow Minutes 30  -SH --        Total Minutes    Untimed Charges Total Minutes 150  -SH --      Total Minutes 150  -SH --               User Key  (r) = Recorded By, (t) = Taken By, (c) = Cosigned By      Initials Name Provider Type     Mar Beavers MS CCC-SLP Speech and Language Pathologist                      Therapy Charges for Today       Code Description Service Date Service Provider Modifiers Qty    47909694665 HC HC SCP BRONCH ASCOPE FLX DISPOSABLE 8/30/2023 Mar Beavers, MS CCC-SLP  1    [id number]  ST FIBEROPTIC ENDO EVAL SWALL 8 8/30/2023 Mar Beavers, MS CCC-SLP GN 1    [id number]  ST TREATMENT SWALLOW 2 8/30/2023 Mar Beavers MS ELIS-SLP GN 1                             Mar Beavers MS CCC-SLP  8/30/2023

## 2023-08-30 NOTE — PROGRESS NOTES
[facility] NEUROSURGERY INTRACRANIAL POSTOP note    PATIENT IDENTIFICATION:   Name:  Braxton Wolfe      MRN:  [id number]     57 y.o.  male               CC:POD #1 ventriculoperitoneal shunt revision      Subjective     Interval History: No acute issues overnight.  Nursing reports patient forgetful with delayed responses.  Out of restraints.  Several episodes of incontinence overnight.  Failed swallow eval today.  Pulled out core track.  Nephrology managing IV fluids.    Objective     Vital signs in last 24 hours:  Temp:  [97.8 °F (36.6 °C)-98.3 °F (36.8 °C)] 98 °F (36.7 °C)  Heart Rate:  [67-92] 78  Resp:  [16-18] 16  BP: (127-138)/(86-98) 135/98      Intake/Output this shift:  No intake/output data recorded.    Intake/Output last 3 shifts:  I/O last 3 completed shifts:  In: 1310.7 [I.V.:1310.7]  Out: 1000 [Urine:1000]        LABS:  .  Results from last 7 days   Lab Units 08/30/23  0536 08/29/23  0636 08/28/23  1626   WBC 10*3/mm3 6.53 6.92 7.23   HEMOGLOBIN g/dL 12.7* 12.7* 12.7*   HEMATOCRIT % 37.2* 37.7 37.0*   PLATELETS 10*3/mm3 206 157 150     .  Results from last 7 days   Lab Units 08/30/23  0536 08/29/23  0641 08/28/23  1626 08/27/23  0648 08/26/23  0329 08/25/23  0917 08/25/23  0010 08/24/23  1805   SODIUM mmol/L 143 144 147*   < > 147* 144   < > 150*   POTASSIUM mmol/L 3.2* 3.1* 3.3*   < > 3.6 3.9   < > 2.9*   CHLORIDE mmol/L 106 109* 111*   < > 111* 108*   < > 125*   CO2 mmol/L 27.0 26.0 26.0   < > 24.7 20.0*   < > 10.0*   BUN mg/dL 9 11 14   < > 44* 52*   < > 27*   CREATININE mg/dL 0.95 0.97 1.01   < > 2.53* 3.85*   < > 3.05*   CALCIUM mg/dL 8.0* 8.0* 8.2*   < > 8.2* 9.2   < > 4.9*   BILIRUBIN mg/dL  --   --   --   --  0.7 0.5  --  0.2   ALK PHOS U/L  --   --   --   --  58 69  --  43   ALT (SGPT) U/L  --   --   --   --  16 15  --  9   AST (SGOT) U/L  --   --   --   --  37 34  --  20   GLUCOSE mg/dL 101* 96 98   < > 109* 137*   < > 92    < > = values in this interval not displayed.          IMAGING  STUDIES:    CT head without contrast Stealth protocol 8/29/2023:  There is a lateral right frontal hector hole with a  shunt extending  through the hector hole. The tip of the shunt terminates in the posterior  frontal horn of the right lateral ventricle near the foramen of San,  similar to 8/28/2023. There is hypoattenuation along the the catheter  tract, better assessed on recent MRI. The ventricles are not  significantly changed in size or configuration.  There are postsurgical changes from left occipital craniotomy with mesh  reconstruction. There is encephalomalacia/gliosis throughout the left  greater than right cerebellar hemispheres with marked dilatation of the  fourth ventricle, also similar to prior.   Focal hypoattenuation in the posterior right corona radiata is also not  significantly changed. No acute intracranial hemorrhage or pathologic  extra-axial collection is identified.  There is no midline shift or mass  effect.  The basal cisterns are patent.    There is mild mucosal thickening of the left sphenoid sinus with small  moderate aerated secretions. There are moderate left and small right  mastoid effusions. There is nonunion of the posterior arch of C1, which  could be developmental or due to old trauma or surgery.        IMPRESSION:  Postsurgical changes, as above, with a right frontal approach  shunt,  which is similar to prior. No significant change in ventricle size or  configuration compared to 8/28/2023.    FL shunt series: 8/29/2023:  Frontal and lateral views at the level of the head, neck, chest,  abdomen, reveal right frontal ventriculoperitoneal shunt catheter  extending into the upper pelvis near the midline. The catheter is partly  obscured by enteric contrast material that is present in the colon, but  otherwise appears intact.  A Codman Havalentinem nonprogrammable shunt valve is apparent       CT head without contrast 8/30/2023: Pending  MPRESSION:  1. Since yesterday afternoon's head  CT on 08/29/2023 at 4:39 p.m., there  has been shunt revision with bubbles of air in the scalp adjacent to the  shunt port overlying the anterior superior lateral right parietal bone.  There is stable position of the  shunt catheter that courses from the  superior right coronal suture through the superior right frontal lobe  parenchyma through the superior body of the right lateral ventricle and  its distal tip is at the right foramen of Monro. The lateral and third  ventricles remain enlarged. They are clearly larger than they were on an  outside MRI of the brain on 04/21/2022. However they are slightly  smaller than they were on a head CT 6 days ago on 08/24/2023. Since the  head CT on 08/24/2023, six days ago, there is resolving low-density in  the temporal occipital periventricular white matter compatible with  resolving transependymal extension of CSF.     2. 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 There is extensive  encephalomalacia involving the majority of the left cerebellum and good  portions of the right cerebellar hemisphere. MRI of the brain 2 days ago  on 08/28/2023 demonstrated multiple tiny enhancing cerebellar nodules  compatible with multiple hemangioblastomas that are unable to be  evaluated on this noncontrast head CT. There is marked dilatation of the  fourth ventricle.  Some of its ex vacuo dilatation due to extensive  cerebellar encephalomalacia although there is posterior tenting of the  cervicomedullary junction and a small size superior aspect of the  aqueduct of Sylvius and the marked dilatation of the fourth ventricle  may be secondary to partial (Please review, inaudible.  Thank you) of  the fourth ventricle. The remainder of the head CT is unremarkable. The  results were  communicated to Roberto Ritchie MD, from neurosurgery by  telephone on 08/30/2023 at 9:50 AM.     I reviewed imagining studies on 8/30/23    Meds reviewed/changed: Yes    Current Facility-Administered Medications:     benzonatate (TESSALON) capsule 100 mg, 100 mg, Oral, TID PRN, Roberto Ritchie MD    sennosides-docusate (PERICOLACE) 8.6-50 MG per tablet 2 tablet, 2 tablet, Oral, BID **AND** polyethylene glycol (MIRALAX) packet 17 g, 17 g, Oral, Daily PRN **AND** bisacodyl (DULCOLAX) EC tablet 5 mg, 5 mg, Oral, Daily PRN **AND** bisacodyl (DULCOLAX) suppository 10 mg, 10 mg, Rectal, Daily PRN, Roberto Ritchie MD    Calcium Replacement - Follow Nurse / BPA Driven Protocol, , Does not apply, PRN, Roberto Ritchie MD    ceFAZolin in dextrose (ANCEF) IVPB solution 2,000 mg, 2,000 mg, Intravenous, Q8H, Roberto Ritchie MD, 2,000 mg at 08/30/23 0230    dextrose (D50W) (25 g/50 mL) IV injection 25 g, 25 g, Intravenous, Q15 Min PRN, Roberto Ritchie MD, 25 g at 08/27/23 0630    dextrose (D5W) 5 % infusion, 50 mL/hr, Intravenous, Continuous, Roberto Ritchie MD, Last Rate: 50 mL/hr at 08/29/23 2055, 50 mL/hr at 08/29/23 2055    dextrose (GLUTOSE) oral gel 15 g, 15 g, Oral, Q15 Min PRN, Roberto Ritchie MD    docusate sodium (COLACE) capsule 100 mg, 100 mg, Oral, BID, Roberto Ritchie MD    famotidine (PEPCID) injection 20 mg, 20 mg, Intravenous, Daily, Roberto Ritchie MD, 20 mg at 08/30/23 0825    folic acid 1 mg in sodium chloride 0.9 % 50 mL IVPB, 1 mg, Intravenous, Daily, Roberto Ritchie MD, Last Rate: 100 mL/hr at 08/29/23 0600, 1 mg at 08/29/23 0600    glucagon (GLUCAGEN) injection 1 mg, 1 mg, Intramuscular, Q15 Min PRN, Roberto Rithcie MD    HYDROcodone-acetaminophen (NORCO) 5-325 MG per tablet 1 tablet, 1 tablet, Oral, Q6H PRN, Roberto Ritchie MD    lactated ringers infusion, 9 mL/hr, Intravenous, Continuous, Roberto Ritchie MD, Last Rate: 9 mL/hr at 08/29/23 1818, 9 mL/hr at 08/29/23 1818    nitroglycerin (NITROSTAT) SL tablet 0.4 mg, 0.4 mg, Sublingual,  Q5 Min PRN, Roberto Ritchie MD    ondansetron (ZOFRAN) injection 4 mg, 4 mg, Intravenous, Q6H PRN, Roberto Ritchie MD, 4 mg at 08/29/23 1926    potassium chloride 10 mEq in 100 mL IVPB, 10 mEq, Intravenous, Q1H, Heath Hooper MD, Last Rate: 100 mL/hr at 08/30/23 0825, 10 mEq at 08/30/23 0825    Potassium Replacement - Follow Nurse / BPA Driven Protocol, , Does not apply, PRN, Roberto Ritchie MD    [COMPLETED] Insert Peripheral IV, , , Once **AND** sodium chloride 0.9 % flush 10 mL, 10 mL, Intravenous, PRN, Roberto Ritchie MD    sodium chloride 0.9 % flush 10 mL, 10 mL, Intravenous, Q12H, Roberto Ritchie MD, 10 mL at 08/30/23 0825    sodium chloride 0.9 % flush 10 mL, 10 mL, Intravenous, PRN, Roberto Ritchie MD    sodium chloride 0.9 % infusion 40 mL, 40 mL, Intravenous, PRN, Roberto Ritchie MD    thiamine (B-1) 500 mg in sodium chloride 0.9 % 100 mL IVPB, 500 mg, Intravenous, Q8H, Last Rate: 200 mL/hr at 08/30/23 0536, 500 mg at 08/30/23 0536 **FOLLOWED BY** [START ON 8/31/2023] thiamine (B-1) injection 200 mg, 200 mg, Intravenous, Q8H **FOLLOWED BY** [START ON 9/4/2023] thiamine (VITAMIN B-1) tablet 100 mg, 100 mg, Oral, Daily, Roberto Ritchie MD    vitamin D (ERGOCALCIFEROL) capsule 50,000 Units, 50,000 Units, Oral, Q7 Days, Sweta Luu MD        Physical Exam:    General:   Awake, alert, oriented x3. Speech clear with no aphasia  HEENT:    Right-sided surgical site covered with dressing which is clean dry and intact  Neck:    supple  CN II:    Visual fields full to confrontation  CN III IV VI:   Difficulties with upgaze-eyes retract back to midline some nystagmus visualized.  Bilateral lid retraction.., PERRL 3 mm bilaterally  CN VII:    Facial movements are symmetric. No weakness.  Motor:    Normal muscle strength, bulk and tone in upper and lower extremities.  No fasciculations, rigidity, spasticity, or abnormal movements.  Mild left upper extremity drift.  Left lower extremity foot drop with not much strength  for dorsiflexion 2/5 which is baseline per patient.  Reflexes:   Plantar responses are flexor.   Sensation:   Normal to light touch; no extinction  Station and Gait:  Per PT note 8/30/2023: FEES completed. Results similar to previous VFSS. Discussed results with MD, RN, patient, and family. Long discussion with family regarding NPO with PEG vs diet for quality of life. Family reports dysphagia and motor speech dysfunction are new. Possible slow speech d/t medicine per mother. This clinican suspects some baseline impairment exacerbated by current illness, which was discussed with family. Family plans to discuss NPO/PEG vs diet with known aspiration risk next date with patient. Regardless of decision, the patient would be an excellent therapy candidate for EMST.     Coordination:   Left-sided ataxia in upper and lower extremities  Extremities:   Wearing SCD    Assessment & Plan     ASSESSMENT:      Sepsis    Shunt malfunction    Cerebral ventriculomegaly    Lethargy    Confusion    Patient with history of being found down with's subsequent acute renal failure, acidosis, acute/subacute right cerebral infarct as well as ventriculomegaly with lethargy and confusion who is POD #1 ventriculoperitoneal shunt revision.  Neuro exam today is relatively unchanged as he still exhibits lethargy, gaze deficits although disorientation has improved.  There is concern for some under shunting so we will change his Sophysa Polaris SPVA-200 shunt from the setting of 110 to the new setting of 70 today and follow back up tomorrow.  Patient to go to acute rehab.  We will see him in about 2 weeks for follow-up visit and CT head imaging.  If he is at [facility] we can see him in house at that time.  Neurosurgery will check back on Friday, 9/1/2023, for evaluation of patient and status of rehab plan.      PLAN:     May resume Aspirin 8/31  Shunt setting change 110 to 70  Physical therapy  Neurochecks  Plan for acute rehab      CC: normal pressure  "hydrocephalus    Procedure:  Patient presents for adjustment of  shunt setting due to provider request and symptoms of lethargy, confusion and gait instability . His prior right frontal shunt setting was 110. The right frontal was reset to 70 using the reprogramming magnet. Confirmation was obtained with the confirmatory magnet 3 times.        PLAN: Patient does not require follow-up shunt xray for setting confirmation. Follow-up as scheduled. Office will contact patient if follow-up required sooner due to xray findings. Patient to call for any concerns including but not limited to headaches, vision changes, balance difficulties, incontinence of bladder, weakness.         I discussed the patient's findings and my recommendations with patient and Dr. Ritchie.    During patient visit, I utilized appropriate personal protective equipment including mask and gloves.  Mask used was standard procedure mask. Appropriate PPE was worn during the entire visit.  Hand hygiene was completed before and after.      LOS: 6 days       [facility], APRN 8/30/2023  08:20 EDT    \"Dictated utilizing Dragon dictation\".    "

## 2023-08-30 NOTE — PROGRESS NOTES
"                                              LOS: 6 days   Patient Care Team:  Provider, No Known as PCP - General    Chief Complaint:  F/up aspiration pneumonia, respiratory failure.    Subjective   Interval History  Denies cough.  On 2 L oxygen.  S/p  shunt placement    REVIEW OF SYSTEMS:   Constitutional: No fever or chills  Gastrointestinal: No nausea, vomiting or diarrhea  Cardiovascular: No chest pain or palpitation.      Physical Exam:     Vital Signs  Temp:  [98 °F (36.7 °C)-98.3 °F (36.8 °C)] 98.3 °F (36.8 °C)  Heart Rate:  [65-92] 81  Resp:  [16-17] 17  BP: (127-138)/(77-98) 132/80    Intake/Output Summary (Last 24 hours) at 8/30/2023 1059  Last data filed at 8/29/2023 1600  Gross per 24 hour   Intake 0 ml   Output --   Net 0 ml       Flowsheet Rows      Flowsheet Row First Filed Value   Admission Height 177.8 cm (70\") Documented at 08/25/2023 0906   Admission Weight 75.9 kg (167 lb 5.3 oz) Documented at 08/24/2023 1802            PPE used per hospital policy    General Appearance:   Alert, cooperative, in no acute distress   ENMT:  Mallampati score 2, moist mucous membrane   Eyes:  Pupils equal and reactive to light. EOMI   Neck:   Trachea midline. No thyromegaly.   Lungs:   No audible rhonchi on today's exam..  No wheezing.  No labored breathing.    Heart:   Regular rhythm and normal rate, normal S1 and S2, no         murmur   Skin:   No rash or ecchymosis   Abdomen:    Soft. No tenderness. No HSM.   Neuro/psych:  Conscious, alert, oriented x3. Strength 5/5 in upper and lower  ext.  Appropriate mood and affect   Extremities:  No cyanosis, clubbing or edema.  Warm extremities and well-perfused          Results Review:        Results from last 7 days   Lab Units 08/30/23  0536 08/29/23  0641 08/28/23  1626   SODIUM mmol/L 143 144 147*   POTASSIUM mmol/L 3.2* 3.1* 3.3*   CHLORIDE mmol/L 106 109* 111*   CO2 mmol/L 27.0 26.0 26.0   BUN mg/dL 9 11 14   CREATININE mg/dL 0.95 0.97 1.01   GLUCOSE mg/dL 101* 96 " 98   CALCIUM mg/dL 8.0* 8.0* 8.2*       Results from last 7 days   Lab Units 08/26/23  0329 08/25/23  0917 08/24/23  1805   CK TOTAL U/L 1,208* 1,023* 500*   HSTROP T ng/L  --   --  27*       Results from last 7 days   Lab Units 08/30/23  0536 08/29/23  0636 08/28/23  1626   WBC 10*3/mm3 6.53 6.92 7.23   HEMOGLOBIN g/dL 12.7* 12.7* 12.7*   HEMATOCRIT % 37.2* 37.7 37.0*   PLATELETS 10*3/mm3 206 157 150       Results from last 7 days   Lab Units 08/30/23  0536 08/29/23  0636 08/28/23  1626 08/25/23  0010 08/24/23  1805   INR  1.33* 1.41* 1.45*   < > 1.38*   APTT seconds  --   --   --   --  25.5    < > = values in this interval not displayed.       Results from last 7 days   Lab Units 08/24/23  1805   PROBNP pg/mL 7,202.0*                     Results from last 7 days   Lab Units 08/25/23  0330 08/24/23  1900   PH, ARTERIAL pH units 7.449 7.341*   PCO2, ARTERIAL mm Hg 27.1* 34.2*   PO2 ART mm Hg 69.3* 100.2*   MODALITY  Adult Vent Adult Vent   O2 SATURATION CALC % 94.8 97.4           I reviewed the patient's new clinical results.        Medication Review:   calcium gluconate, 1 g, Intravenous, Once  ceFAZolin, 2,000 mg, Intravenous, Q8H  docusate sodium, 100 mg, Oral, BID  famotidine, 20 mg, Intravenous, Daily  folic acid (FOLVITE) IVPB, 1 mg, Intravenous, Daily  potassium chloride, 10 mEq, Intravenous, Q1H  senna-docusate sodium, 2 tablet, Oral, BID  sodium chloride, 10 mL, Intravenous, Q12H  thiamine (B-1) IV, 500 mg, Intravenous, Q8H   Followed by  [START ON 8/31/2023] thiamine (B-1) IV, 200 mg, Intravenous, Q8H   Followed by  [START ON 9/4/2023] thiamine, 100 mg, Oral, Daily  vitamin D, 50,000 Units, Oral, Q7 Days        dextrose, 50 mL/hr, Last Rate: 50 mL/hr (08/29/23 2055)  lactated ringers, 9 mL/hr, Last Rate: 9 mL/hr (08/29/23 1818)        Diagnostic imaging:  I personally and independently reviewed the following images:  CXR 8/25/2023: Central line and ET tube in good position.  Increased interstitial  pulmonary infiltrates on the right.    CT brain 8/30/2023: Dilation of the fourth ventricle.  No change compared to yesterday.   shunt noted    Assessment     Acute hypercapnic respiratory failure s/p vent 8/24-8/25  B/L Aspiration pneumonia  Vomiting; unclear etiology  Acute encephalopathy; suspected HIE (resolved)  Acute SVT  Acute renal failure  Electrolytes disturbance: Hypernatremia and hypokalemia  Severe hypocalcemia and metabolic acidosis  Incidental lung nodules-needs outpatient follow  S/p prior  shunt  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  Anemia, chronic          Plan     -Finished 5 days antibiotic therapy.  No need to continue further.  -Oxygen as needed to keep SPO2 >90%  -Continue flutter and I-S.  Encouraged to use it.  - D50 for hypernatremia.  Thiamine and B12 supplement  -Oxygen by NC and titrate to keep SPO2 >90%  -Replace potassium  -PT OT        Fran Carbone MD  08/30/23  10:59 EDT        This note was dictated utilizing Dragon dictation

## 2023-08-30 NOTE — PROGRESS NOTES
[facility] Progress Note      Patient Name: Braxton Wolfe  : 1965  MRN: [id number]  Primary Care Physician:  Provider, No Known  Date of admission: 2023    Subjective     Interval History:   Follow-up YUDITH and hypernatremia.  Patient remains on D5W.  SP  shunt revision last night.  Denies HA.  No visual changes. Hungry.  Voice still weak.  Urine not measured. No bm.  NPO.         Objective     Vitals:   Temp:  [97.8 °F (36.6 °C)-98.3 °F (36.8 °C)] 98.3 °F (36.8 °C)  Heart Rate:  [65-92] 65  Resp:  [16-18] 16  BP: (127-138)/(77-98) 128/77  Flow (L/min):  [2-4] 2    Intake/Output Summary (Last 24 hours) at 2023 0853  Last data filed at 2023 1600  Gross per 24 hour   Intake 0 ml   Output 500 ml   Net -500 ml         Physical Exam:    General Appearance: Awake, alert. Answers appropriately.    HEENT PERRL. Oral mucosa dry.  Dressing on head.   Neck: supple, no JVD  Lungs: Clear to auscultation. Unlabored on RA  Heart: RRR, no s3 or rub  Abdomen: soft, nontender, non-distended. + bs  Extremities: trace LLE edema, lower ext.     Scheduled Meds:     ceFAZolin, 2,000 mg, Intravenous, Q8H  docusate sodium, 100 mg, Oral, BID  famotidine, 20 mg, Intravenous, Daily  folic acid (FOLVITE) IVPB, 1 mg, Intravenous, Daily  potassium chloride, 10 mEq, Intravenous, Q1H  senna-docusate sodium, 2 tablet, Oral, BID  sodium chloride, 10 mL, Intravenous, Q12H  thiamine (B-1) IV, 500 mg, Intravenous, Q8H   Followed by  [START ON 2023] thiamine (B-1) IV, 200 mg, Intravenous, Q8H   Followed by  [START ON 2023] thiamine, 100 mg, Oral, Daily  vitamin D, 50,000 Units, Oral, Q7 Days      IV Meds:   dextrose, 50 mL/hr, Last Rate: 50 mL/hr (23)  lactated ringers, 9 mL/hr, Last Rate: 9 mL/hr (23)        Results Reviewed:   I have personally reviewed the results from the time of this admission to 2023 08:53 EDT     Results from last 7 days   Lab Units  08/30/23  0536 08/29/23  0641 08/28/23  1626 08/27/23 0648 08/26/23 0329 08/25/23  0917 08/25/23  0010 08/24/23  1805   SODIUM mmol/L 143 144 147*   < > 147* 144   < > 150*   POTASSIUM mmol/L 3.2* 3.1* 3.3*   < > 3.6 3.9   < > 2.9*   CHLORIDE mmol/L 106 109* 111*   < > 111* 108*   < > 125*   CO2 mmol/L 27.0 26.0 26.0   < > 24.7 20.0*   < > 10.0*   BUN mg/dL 9 11 14   < > 44* 52*   < > 27*   CREATININE mg/dL 0.95 0.97 1.01   < > 2.53* 3.85*   < > 3.05*   CALCIUM mg/dL 8.0* 8.0* 8.2*   < > 8.2* 9.2   < > 4.9*   BILIRUBIN mg/dL  --   --   --   --  0.7 0.5  --  0.2   ALK PHOS U/L  --   --   --   --  58 69  --  43   ALT (SGPT) U/L  --   --   --   --  16 15  --  9   AST (SGOT) U/L  --   --   --   --  37 34  --  20   GLUCOSE mg/dL 101* 96 98   < > 109* 137*   < > 92    < > = values in this interval not displayed.       Estimated Creatinine Clearance: 104 mL/min (by C-G formula based on SCr of 0.95 mg/dL).  Results from last 7 days   Lab Units 08/30/23 0536 08/29/23  0641 08/28/23 1626 08/28/23  0652   MAGNESIUM mg/dL 5.5* 3.4* 2.2  --    PHOSPHORUS mg/dL 2.6 2.6  --  2.1*       Results from last 7 days   Lab Units 08/26/23 0329 08/25/23 0917   URIC ACID mg/dL 4.8 8.5*       Results from last 7 days   Lab Units 08/30/23  0536 08/29/23  0636 08/28/23 1626 08/27/23 0648 08/26/23  0329   WBC 10*3/mm3 6.53 6.92 7.23 7.93 10.59   HEMOGLOBIN g/dL 12.7* 12.7* 12.7* 11.3* 12.0*   PLATELETS 10*3/mm3 206 157 150 131* 139*       Results from last 7 days   Lab Units 08/30/23  0536 08/29/23  0636 08/28/23  1626 08/27/23  0648 08/26/23  0329   INR  1.33* 1.41* 1.45* 1.54* 1.35*         Assessment / Plan     ASSESSMENT:  Non olig YUDITH - prerenal azotemia due to volume depletion, resolved with IVF. Hypernatremia -sodium now normal on D5W which will continue as long as he is NPO.  Hypocalcemia.  His ionized calcium is mildly low.  Vitamin D level low and PTH appropriately elevated.  Phosphorus has been normal.  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  Neurosurgery evaluated for increase in his lateral and third ventricle sizes.  SP   shunt revision 8/29.    Acute encephalopathy    Aspiration PNA .  Completed antibiotic therapy. Speech eval ongoing .    PLAN:  Continue D5W while he is n.p.o.  Calcium supplementation via protocol. K supplement protocol.   3.  VItamin D supplement when able to take po.   4. IV calcitriol until taking po.     Sweta Luu MD  08/30/23  08:53 EDT    [facility]  [phone]

## 2023-08-30 NOTE — PLAN OF CARE
Goal Outcome Evaluation:  Plan of Care Reviewed With: patient        Progress: improving  Outcome Evaluation: Pt seen for PT treatment today. Pt progressed with mobility today. Pt able to complete bed mobility with CGA/Karina with sequencing cues given. Pt also completed 2 STS tansfers with MinAX2/ModA with HHA of 2, cues for upright posture. Pt worked on gait taking side steps, about 4ft but needed a seated rest break due to getting fatigued and more unsteady. After a seated rest break, Pt was able to ambulate forward about 8ft. pt requires sequencing cues and cues for upright posture. Pt required MinAX2/ModA with gait. Pt fatigued afterwards and needed to lay back down. Will continue to progress pt as able.

## 2023-08-30 NOTE — PROGRESS NOTES
[facility]     LOS: 6 days     Subjective:    CC:Altered Mental Status    DIET:  Diet Order   Procedures    NPO Diet NPO Type: Strict NPO   Patient is more awake and alert today, answering questions appropriately.  Not falling asleep as easily as he did yesterday.  While I am in the room he is undergoing a video study by speech therapy.  Patient is likely aspirating based upon their findings.  This is possibly chronic.  Family is weighing risks and benefits of trying a modified diet versus feeding tube.    Objective:    Vital Signs:  Temp:  [98 °F (36.7 °C)-98.3 °F (36.8 °C)] 98.3 °F (36.8 °C)  Heart Rate:  [65-92] 66  Resp:  [16-17] 17  BP: (128-138)/(77-98) 132/80    SpO2:  [90 %-98 %] 90 %  on  Flow (L/min):  [2-4] 2;   Device (Oxygen Therapy): room air  Body mass index is 27.11 kg/m².    Physical Exam  Constitutional:       Appearance: Normal appearance.   HENT:      Head: Normocephalic and atraumatic.   Cardiovascular:      Rate and Rhythm: Normal rate.      Heart sounds: No murmur heard.    No friction rub.   Pulmonary:      Effort: Pulmonary effort is normal.      Breath sounds: Normal breath sounds.   Abdominal:      General: Bowel sounds are normal. There is no distension.      Palpations: Abdomen is soft.      Tenderness: There is no abdominal tenderness.   Skin:     General: Skin is warm and dry.   Neurological:      Mental Status: He is alert.      Comments: Mild left sided weakness   Psychiatric:         Mood and Affect: Mood normal.         Behavior: Behavior normal.       Results Review:    Glucose   Date Value Ref Range Status   08/30/2023 101 (H) 65 - 99 mg/dL Final   08/29/2023 96 65 - 99 mg/dL Final   08/28/2023 98 65 - 99 mg/dL Final     Results from last 7 days   Lab Units 08/30/23  0536   WBC 10*3/mm3 6.53   HEMOGLOBIN g/dL 12.7*   HEMATOCRIT % 37.2*   PLATELETS 10*3/mm3 206       Results from last 7 days   Lab Units 08/30/23  0536 08/27/23  0648 08/26/23  0329    SODIUM mmol/L 143   < > 147*   POTASSIUM mmol/L 3.2*   < > 3.6   CHLORIDE mmol/L 106   < > 111*   CO2 mmol/L 27.0   < > 24.7   BUN mg/dL 9   < > 44*   CREATININE mg/dL 0.95   < > 2.53*   CALCIUM mg/dL 8.0*   < > 8.2*   BILIRUBIN mg/dL  --   --  0.7   ALK PHOS U/L  --   --  58   ALT (SGPT) U/L  --   --  16   AST (SGOT) U/L  --   --  37   GLUCOSE mg/dL 101*   < > 109*    < > = values in this interval not displayed.       Results from last 7 days   Lab Units 08/30/23  0536 08/25/23  0010 08/24/23  1805   INR  1.33*   < > 1.38*   APTT seconds  --   --  25.5    < > = values in this interval not displayed.       Results from last 7 days   Lab Units 08/30/23  0536   MAGNESIUM mg/dL 5.5*       Results from last 7 days   Lab Units 08/26/23  0329 08/25/23  0917 08/24/23  1805   CK TOTAL U/L 1,208* 1,023* 500*   HSTROP T ng/L  --   --  27*       Cultures:  Blood Culture   Date Value Ref Range Status   08/24/2023 No growth at 2 days  Preliminary   08/24/2023 No growth at 2 days  Preliminary     Respiratory Culture   Date Value Ref Range Status   08/25/2023   Final    Rare Normal respiratory arti. No S. aureus or Pseudomonas aeruginosa detected. Final report.       I have reviewed daily medications and changes in CPOE    Scheduled meds  [START ON 8/31/2023] aspirin, 81 mg, Oral, Daily  atorvastatin, 40 mg, Oral, Nightly  calcitriol, 1 mcg, Intravenous, Once per day on Mon Wed Fri  ceFAZolin, 2,000 mg, Intravenous, Q8H  docusate sodium, 100 mg, Oral, BID  famotidine, 20 mg, Intravenous, Daily  folic acid (FOLVITE) IVPB, 1 mg, Intravenous, Daily  senna-docusate sodium, 2 tablet, Oral, BID  sodium chloride, 10 mL, Intravenous, Q12H  thiamine (B-1) IV, 500 mg, Intravenous, Q8H   Followed by  [START ON 8/31/2023] thiamine (B-1) IV, 200 mg, Intravenous, Q8H   Followed by  [START ON 9/4/2023] thiamine, 100 mg, Oral, Daily        dextrose, 50 mL/hr, Last Rate: 50 mL/hr (08/29/23 2055)  lactated ringers, 9 mL/hr, Last Rate: 9 mL/hr  (08/29/23 1818)      PRN meds    benzonatate    senna-docusate sodium **AND** polyethylene glycol **AND** bisacodyl **AND** bisacodyl    Calcium Replacement - Follow Nurse / BPA Driven Protocol    dextrose    dextrose    glucagon (human recombinant)    HYDROcodone-acetaminophen    nitroglycerin    ondansetron    Potassium Replacement - Follow Nurse / BPA Driven Protocol    [COMPLETED] Insert Peripheral IV **AND** sodium chloride    sodium chloride    sodium chloride        Sepsis    Shunt malfunction    Cerebral ventriculomegaly    Lethargy    Confusion    S/P  [facility] SPVA-200 shunt 8/29/23 w/ Dr. 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  He has been off of this medication now for a week.  He will need to follow-up with MD Reynoso about when to start this.  Ager side effects from this medication are hypoxemia and anemia.  Starting dose is 120 but the  patient has been intolerant of it in the past and is down to 40 mg.    Mild coagulopathy possibly related to nutrition, monitor    Heath Hooper MD  08/30/23  16:29 EDT

## 2023-08-30 NOTE — PLAN OF CARE
Goal Outcome Evaluation:  Plan of Care Reviewed With: patient           Outcome Evaluation: FEES completed. Results similar to previous VFSS. Discussed results with MD, RN, patient, and family. Long discussion with family regarding NPO with PEG vs diet for quality of life. Family reports dysphagia and motor speech dysfunction are new. Possible slow speech d/t medicine per mother. This clinican suspects some baseline impairment exacerbated by current illness, which was discussed with family. Family plans to discuss NPO/PEG vs diet with known aspiration risk next date with patient. Regardless of decision, the patient would be an excellent therapy candidate for EMST.    Addendum: Family requested SLP return to the room to discuss two options of NPO/alternative means of nutrition vs puree and honey via spoon only with known aspiration risk to patient. Daughter, Yamilet, present. All questions answered at this time. Please allow free water protocol with ice.

## 2023-08-30 NOTE — PROGRESS NOTES
"DOS: 2023  NAME: Braxton Wolfe   : 1965  PCP: Provider, No Known  Chief Complaint   Patient presents with    Altered Mental Status     Stroke    Subjective:     Interval History  Pt seen in follow up today.  No acute events overnight.  Patient is just returned to his room, says that he feels good but is also tired. Nurse at bedside. No family present.  History taken from: patient chart    Part of the note was copied and pasted, but thoroughly reviewed for any corrections.    Objective:  Vital signs: /80 (BP Location: Right arm, Patient Position: Lying)   Pulse 66   Temp 98.3 °F (36.8 °C) (Oral)   Resp 17   Ht 177.8 cm (70\")   Wt 85.7 kg (188 lb 15 oz)   SpO2 90%   BMI 27.11 kg/m²       Physical Exam:  GENERAL: NAD, alert and cooperative  HEENT: Normocephalic, atraumatic   Resp: Even and unlabored, no audible wheezing.  Skin: Warm and dry, no rashes or birth marks.   Psychiatric: Normal mood and affect.     Neurological:   MS: A/O to person, place, time (month/year).  Normal attention/concentration, language intact, no neglect   CN: visual acuity grossly normal, PERRL, EOMI, no nystagmus, no facial droop, no dysarthria, hearing symmetric, tongue midline, shoulder shrug symmetric  Motor: 5/5 strength all 4 ext. Normal tone. No tremor  Sensory: Intact to light touch, cold temperature, and cold sensation in all four ext.  Coordination: No dysmetria finger-to-nose test, heel-to-shin test normal  Rapid alternating movements: Normal finger to thumb tap  Gait and station: Deferred  Reflexes: 1+ patellar, achilles. Down-going toes    Results Review: I have reviewed MRI brain and see an acute/subacute infarct of the right medial cortex     I reviewed the patient's new clinical results.  I reviewed the patient's new imaging results and agree with the interpretation.  I reviewed the patient's other test results and agree with the interpretation  Discussed with Dr. Darrin Kwong " Medications:  Scheduled Medications:calcitriol, 1 mcg, Intravenous, Once per day on Mon Wed Fri  ceFAZolin, 2,000 mg, Intravenous, Q8H  docusate sodium, 100 mg, Oral, BID  famotidine, 20 mg, Intravenous, Daily  folic acid (FOLVITE) IVPB, 1 mg, Intravenous, Daily  senna-docusate sodium, 2 tablet, Oral, BID  sodium chloride, 10 mL, Intravenous, Q12H  thiamine (B-1) IV, 500 mg, Intravenous, Q8H   Followed by  [START ON 8/31/2023] thiamine (B-1) IV, 200 mg, Intravenous, Q8H   Followed by  [START ON 9/4/2023] thiamine, 100 mg, Oral, Daily      Infusions: dextrose, 50 mL/hr, Last Rate: 50 mL/hr (08/29/23 2055)  lactated ringers, 9 mL/hr, Last Rate: 9 mL/hr (08/29/23 1818)      PRN Medications:    benzonatate    senna-docusate sodium **AND** polyethylene glycol **AND** bisacodyl **AND** bisacodyl    Calcium Replacement - Follow Nurse / BPA Driven Protocol    dextrose    dextrose    glucagon (human recombinant)    HYDROcodone-acetaminophen    nitroglycerin    ondansetron    Potassium Replacement - Follow Nurse / BPA Driven Protocol    [COMPLETED] Insert Peripheral IV **AND** sodium chloride    sodium chloride    sodium chloride    Laboratory results:  Lab Results   Component Value Date    GLUCOSE 101 (H) 08/30/2023    CALCIUM 8.0 (L) 08/30/2023     08/30/2023    K 3.2 (L) 08/30/2023    CO2 27.0 08/30/2023     08/30/2023    BUN 9 08/30/2023    CREATININE 0.95 08/30/2023    EGFRIFAFRI 113 01/19/2022    EGFRIFNONA 98 01/19/2022    BCR 9.5 08/30/2023    ANIONGAP 10.0 08/30/2023     Lab Results   Component Value Date    WBC 6.53 08/30/2023    HGB 12.7 (L) 08/30/2023    HCT 37.2 (L) 08/30/2023    MCV 88.4 08/30/2023     08/30/2023      Results from last 7 days   Lab Units 08/29/23  0641   CHOLESTEROL mg/dL 102     Lab Results   Component Value Date    INR 1.33 (H) 08/30/2023    INR 1.41 (H) 08/29/2023    INR 1.45 (H) 08/28/2023    PROTIME 16.7 (H) 08/30/2023    PROTIME 17.5 (H) 08/29/2023    PROTIME 17.9 (H)  08/28/2023     Lab Results   Component Value Date    TSH 2.01 01/19/2022     No components found for: LDLCALC  Lab Results   Component Value Date    HGBA1C 5.80 (H) 08/29/2023     No components found for: B12    Review and interpretation of imaging:   Adult Transthoracic Echo Complete W/ Cont if Necessary Per Protocol    Result Date: 8/25/2023    The study is technically difficult for diagnosis.   Left ventricular ejection fraction appears to be 56 - 60%.   Left ventricular diastolic function was indeterminate.   Estimated right ventricular systolic pressure from tricuspid regurgitation is normal (<35 mmHg).   There is a trivial pericardial effusion. There is no evidence of cardiac tamponade.     EEG    Result Date: 8/25/2023  Date of onset: 8/25/23 Date of offset: 8/25/23 Indication:Found down, comatose Technical description:  This is a 21 channel digital EEG recording that began on 0959 and ended on 1026.  Background:  The background consists of a posteriorly dominant rhythm that is notably absent.  Anteriorly, there is diffuse theta and delta activity.  There is fair spontaneous variability.  Hyperventilation was not performed and photic stimulation was performed and did not induce an abnormal driving response there is no focal slowing.  There are no interictal epileptiform discharges and no electrographic seizures noted during the study.  EKG demonstrates tachycardia between 100 and 120 bpm for majority of the study.  Several triphasic waves are seen during the study.  Impression: This is an abnormal routine EEG study due to the presence of diffuse delta and theta slowing.  There are no interictal epileptiform discharges and no electrographic seizures.  These electrophysiologic findings are indicative of moderately severe encephalopathy.  There are additionally triphasic waves seen. Triphasic waves are a nonspecific finding seen with encephalopathies, more often with hepatic and/or renal derangements.       CT  Abdomen Pelvis Without Contrast    Result Date: 8/24/2023  CT OF THE CHEST WITHOUT CONTRAST; CT OF THE ABDOMEN AND PELVIS WITHOUT CONTRAST  HISTORY: Found down. Concern for aspiration.  COMPARISON: None available.  TECHNIQUE: Axial CT imaging was obtained from the thoracic inlet through the symphysis pubis. No IV contrast was administered.  FINDINGS: CT OF THE CHEST: Endotracheal tube terminates in satisfactory position. Thyroid gland is unremarkable. Thoracic aorta is normal in caliber. Mediastinal lymph nodes do not appear pathologically enlarged. There is a subpleural nodule noted within the right upper lobe, measuring up to 8 mm. There are some reticular nodular infiltrates which are noted within the right upper lobe. There is dense dependent consolidation with air bronchograms noted within both lower lobes. There may be trace bilateral pleural effusions. No acute osseous abnormalities are seen. The patient does have a right-sided ventriculoperitoneal shunt.  CT OF THE ABDOMEN AND PELVIS: The stomach, duodenum, spleen, adrenal glands, and gallbladder are all grossly unremarkable. There are low-attenuation lesions which are identified within the pancreas. They are poorly assessed on this unenhanced exam. 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 They were described on prior report from June 17, 2020, but again are poorly assessed on this exam. No suspicious hepatic lesions are seen. No hydronephrosis is seen on either side. I do not see any renal masses on this unenhanced exam. No distal ureteral or bladder stones are seen. Prostate gland is within normal limits. There is no bowel obstruction. Ventricular peritoneal shunt terminates within the left lower quadrant. The appendix is normal. No acute osseous abnormalities are seen. There is mild colonic diverticulosis.       1. Dense dependent consolidation bilaterally., Given history, aspiration is a concern. Patient is also noted  to have some mild reticulonodular infiltrates within the right upper lobe, as well as an 8 mm subpleural nodule within the right upper lobe. CT follow-up in 3 months is recommended. 2. 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 However, they are incompletely assessed in the absence of contrast material and prior studies for comparison.    Radiation dose reduction techniques were utilized, including automated exposure control and exposure modulation based on body size.   This report was finalized on 8/24/2023 10:34 PM by Dr. Charlee Mednia M.D.      CT Head Without Contrast    Result Date: 8/30/2023  CT SCAN OF THE HEAD WITHOUT CONTRAST 08/30/2023  CLINICAL HISTORY: Status post revision of  shunt yesterday 08/29/2023. 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  Patient has a history of Von Hippel-Lindau.  TECHNIQUE: Spiral CT images were obtained from the base of the skull to the vertex without intravenous contrast. Images were reformatted and submitted in 1 mm thick axial, sagittal and coronal CT sections with brain algorithm.  This is correlated to prior head CTs yesterday 08/29/2023 and head CT 08/24/2023 and a prior MRI of the brain on 08/28/2023 and outside MRIs of the brain on 04/21/2022 and 01/14/2022.  FINDINGS: The patient has had a large 5 x 7.5 cm midline occipital left occipital craniectomy.  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 There is extensive encephalomalacia involving the majority of the left cerebellar hemisphere and some loculated CSF lateral to the left cerebellum better demonstrated on recent MRI of the brain on 08/25/2023. There is  also encephalomalacia throughout the majority of the right cerebellum. There is a markedly enlarged fourth ventricle measuring 4.1 x 3.4 cm in medial lateral and anterior posterior dimension. There is tenting of the cervicomedullary junction posteriorly. Recent MRI of the brain on 08/28/2023 demonstrated multiple small enhancing cerebellar nodules compatible with multiple hemangioblastomas that are not able to be appreciated on this noncontrast head CT. There is a ventriculoperitoneal shunt catheter in place that courses through the superior right coronal suture through the superior right frontal lobe parenchyma enters the superior body of the right lateral ventricle and its distal tip is at the level of the right foramen of Monro.  The lateral and third ventricles are mildly enlarged.  The temporal horns of the lateral ventricles in particular are large in size. The lateral and third ventricles have clearly enlarged when compared to an outside MRI of the brain on 04/21/2021. The lateral and third ventricles have slightly decreased in size when compared to head CT 08/24/2023, unchanged in size when compared to yesterday's head CT 08/29/2023. There is confluent rind of low density in the right frontal white matter along the margins of the ventriculoperitoneal shunt catheter with rind of low-density tracking 3 x 3.3 cm, may be white matter encephalomalacia, while there could be some backtracking of CSF. Since the head CT on 08/24/2023 there is resolving low-density in the temporal occipital periventricular white matter compatible with resolving transependymal extension of CSF. A tiny tubular tract through the lateral right parietal bone where there was likely previous placement and removal of a ventriculostomy catheter in the remote past. I see no midline shift. No acute intracranial hemorrhage is identified. There are bubbles of air along the port for the ventriculoperitoneal shunt catheter within the scalp overlying  the anterolateral right parietal bone from recent shunt revision procedure, and the bubbles of air are new when compared to yesterday afternoon's head CT 08/29/2023 at 4:39 p.m.      1. Since yesterday afternoon's head CT on 08/29/2023 at 4:39 p.m., there has been shunt revision with bubbles of air in the scalp adjacent to the shunt port overlying the anterior superior lateral right parietal bone. There is stable position of the  shunt catheter that courses from the superior right coronal suture through the superior right frontal lobe parenchyma through the superior body of the right lateral ventricle and its distal tip is at the right foramen of Monro. The lateral and third ventricles remain enlarged. They are clearly larger than they were on an outside MRI of the brain on 04/21/2022. However they are slightly smaller than they were on a head CT 6 days ago on 08/24/2023. Since the head CT on 08/24/2023, six days ago, there is resolving low-density in the temporal occipital periventricular white matter compatible with resolving transependymal extension of CSF.  2. Back on 04/26/2019 the patient had a large 7.5 x 5 cm midline occipital to left occipital craniectomy and it is covered by metallic mesh and by history resection of cerebellar hemangioblastoma in this patient with history of Von Hippel-Lindau. There is extensive encephalomalacia involving the majority of the left cerebellum and good portions of the right cerebellar hemisphere. MRI of the brain 2 days ago on 08/28/2023 demonstrated multiple tiny enhancing cerebellar nodules compatible with multiple hemangioblastomas that are unable to be evaluated on this noncontrast head CT. There is marked dilatation of the fourth ventricle.  Some of its ex vacuo dilatation due to extensive cerebellar encephalomalacia although there is posterior tenting of the cervicomedullary junction and a small size superior aspect of the aqueduct of Sylvius and the marked dilatation  of the fourth ventricle may be secondary to partial (Please review, inaudible.  Thank you) of the fourth ventricle. The remainder of the head CT is unremarkable. The results were communicated to Roberto Ritchie MD, from neurosurgery by telephone on 08/30/2023 at 9:50 AM.  Radiation dose reduction techniques were utilized, including automated exposure control and exposure modulation based on body size.       CT Head Without Contrast    Result Date: 8/29/2023  EXAM:  CT HEAD WO CONTRAST-  HISTORY: Ventriculomegaly, sepsis, preoperative planning.  COMPARISON: MRI brain 8/28/2023  TECHNIQUE: Noncontrast images of the brain were obtained. Reformatted images were reviewed. Radiation dose reduction techniques were utilized, including automated exposure control and exposure modulation based on body size.  FINDINGS:   There is a lateral right frontal hector hole with a  shunt extending through the hector hole. The tip of the shunt terminates in the posterior frontal horn of the right lateral ventricle near the foramen of San, similar to 8/28/2023. There is hypoattenuation along the the catheter tract, better assessed on recent MRI. The ventricles are not significantly changed in size or configuration. There are postsurgical changes from left occipital craniotomy with mesh reconstruction. There is encephalomalacia/gliosis throughout the left greater than right cerebellar hemispheres with marked dilatation of the fourth ventricle, also similar to prior. Focal hypoattenuation in the posterior right corona radiata is also not significantly changed. No acute intracranial hemorrhage or pathologic extra-axial collection is identified.  There is no midline shift or mass effect.  The basal cisterns are patent.  There is mild mucosal thickening of the left sphenoid sinus with small moderate aerated secretions. There are moderate left and small right mastoid effusions. 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       Postsurgical changes, as above, with a right frontal approach  shunt, which is similar to prior. No significant change in ventricle size or configuration compared to 8/28/2023.  This report was finalized on 8/29/2023 5:56 PM by Dr. Nata Briceño M.D.      CT Head Without Contrast    Result Date: 8/24/2023  CT OF THE HEAD WITHOUT CONTRAST  HISTORY: Unresponsiveness  COMPARISON: None available.  TECHNIQUE: Axial CT imaging was obtained through the brain. No IV contrast was administered.  FINDINGS: Unfortunately, patient's prior imaging is not available for comparison. The patient has a right frontal ventriculoperitoneal shunt, which terminates within the right lateral ventricle. There is encephalomalacia noted within the right frontal lobe, adjacent to the course of the catheter. There is ventricular dilatation. Patient may have some additional mild encephalomalacia within the right parietal lobe. There is some decreased attenuation surrounding the posterior and temporal horns of the lateral ventricles. Some of this may be related to small vessel disease. Given dilatation of the ventricular system, some transependymal flow of CSF is not excluded. There are areas of encephalomalacia noted within both cerebellar hemispheres. The patient is also noted to have marked dilatation of the fourth ventricle, which has been described on prior imaging. The patient is status post suboccipital craniectomy. There is partial opacification of the mastoid air cells bilaterally. Mucosal thickening is noted within the ethmoid and sphenoid sinuses. There are air-fluid levels within the sphenoid sinuses. Correlation with any evidence of sinusitis is recommended.       1. No acute intracranial hemorrhage. 2. Unfortunately, this patient's prior imaging is not available for comparison. There is ventriculomegaly. I'm uncertain if this has increased when compared to prior imaging. There is  some decreased attenuation surrounding the posterior and temporal horns of the lateral ventricles bilaterally. Some transependymal flow of CSF cannot be excluded..  Radiation dose reduction techniques were utilized, including automated exposure control and exposure modulation based on body size.   This report was finalized on 8/24/2023 10:21 PM by Dr. Charlee Medina M.D.      CT Chest Without Contrast Diagnostic    Result Date: 8/24/2023  CT OF THE CHEST WITHOUT CONTRAST; CT OF THE ABDOMEN AND PELVIS WITHOUT CONTRAST  HISTORY: Found down. Concern for aspiration.  COMPARISON: None available.  TECHNIQUE: Axial CT imaging was obtained from the thoracic inlet through the symphysis pubis. No IV contrast was administered.  FINDINGS: CT OF THE CHEST: Endotracheal tube terminates in satisfactory position. Thyroid gland is unremarkable. Thoracic aorta is normal in caliber. Mediastinal lymph nodes do not appear pathologically enlarged. There is a subpleural nodule noted within the right upper lobe, measuring up to 8 mm. There are some reticular nodular infiltrates which are noted within the right upper lobe. There is dense dependent consolidation with air bronchograms noted within both lower lobes. There may be trace bilateral pleural effusions. No acute osseous abnormalities are seen. The patient does have a right-sided ventriculoperitoneal shunt.  CT OF THE ABDOMEN AND PELVIS: The stomach, duodenum, spleen, adrenal glands, and gallbladder are all grossly unremarkable. There are low-attenuation lesions which are identified within the pancreas. They are poorly assessed on this unenhanced exam. 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 They were described on prior report from June 17, 2020, but again are poorly assessed on this exam. No suspicious hepatic lesions are seen. No hydronephrosis is seen on either side. I do not see any renal masses on this unenhanced exam. No distal ureteral  or bladder stones are seen. Prostate gland is within normal limits. There is no bowel obstruction. Ventricular peritoneal shunt terminates within the left lower quadrant. The appendix is normal. No acute osseous abnormalities are seen. There is mild colonic diverticulosis.       1. Dense dependent consolidation bilaterally., Given history, aspiration is a concern. Patient is also noted to have some mild reticulonodular infiltrates within the right upper lobe, as well as an 8 mm subpleural nodule within the right upper lobe. CT follow-up in 3 months is recommended. 2. 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 However, they are incompletely assessed in the absence of contrast material and prior studies for comparison.    Radiation dose reduction techniques were utilized, including automated exposure control and exposure modulation based on body size.   This report was finalized on 8/24/2023 10:34 PM by Dr. Charlee Medina M.D.      MRI Brain With & Without Contrast    Result Date: 8/28/2023  MRI OF THE BRAIN WITH AND WITHOUT CONTRAST  CLINICAL HISTORY: Von Hippel-Lindau with cerebellar hemangioblastoma, prior craniotomy and  shunt placement. Recent ICU admission with persistent confusion and very mild weakness on the left side.  TECHNIQUE: MRI of the brain was obtained with sagittal pre and postgadolinium T1, axial pre-gadolinium and postgadolinium, axial postgadolinium MPRAGE, coronal postgadolinium T1,  axial FLAIR, axial T2, axial susceptibility weighted, and axial diffusion-weighted images.  COMPARISON: MR brain 4/21/2022. CT head 8/24/2023.   FINDINGS:  New punctate focus of diffusion restriction with associated T2/FLAIR hyperintensity in the right paracentral lobule (series 5 image 20). No associated SWI hypointensity or enhancement. No significant mass effect or midline shift.  Stable right frontal approach  ventriculostomy catheter with tip in the right lateral ventricle near the foramen of Jayy. Lateral and third ventricles have increased in size from 2022 MRI but are unchanged from recent CT head. Mildly increased T2/FLAIR hyperintensity surrounding the right frontal approach ventriculostomy catheter. No new periventricular T2/FLAIR hyperintensity aside from surrounding the catheter. No midline shift or herniation. Unchanged ex vacuo dilatation of the fourth ventricle.  Left suboccipital craniectomy for left cerebellar tumor resection. Unchanged encephalomalacia and T2/FLAIR white matter hyperintensities in the left greater than right cerebellar lobes. Unchanged multiple bilateral enhancing intra-axial cerebellar nodules, the largest in the inferior right cerebellum measuring 1.1 x 0.8 cm (series 10 image 5).  Preserved vascular flow voids. Unchanged bilateral mastoid air cell effusions. Symmetric posterior nasopharyngeal soft tissues.       1. New punctate focus of diffusion restriction/FLAIR hyperintensity in the right paracentral lobule most suggestive of acute/subacute infarct. No hemorrhagic transformation. 2. Stable right frontal approach ventriculostomy catheter with tip in the right lateral ventricle near the foramen of San. Lateral and third ventricles have increased in size from 2022 MRI. Mildly increased pericatheter T2/FLAIR hyperintensity. 3. Left suboccipital craniectomy for left cerebellar tumor resection. Multiple unchanged small enhancing intra-axial cerebellar nodules measuring up to 1.1 cm.   Above critical findings were discussed with Dr. Huertas at 5:54 p.m. on 8/28/2023.  This report was finalized on 8/28/2023 5:56 PM by Dr. Darron Jaramillo M.D.      [facility] FEES - Fiberoptic Endo Eval Swallow    Result Date: 8/30/2023  This procedure was auto-finalized with no dictation required.    XR Chest 1 View    Result Date: 8/25/2023  XR CHEST 1 VW-8/25/2023  HISTORY: Central line placement.  Right  internal jugular central venous catheter seen with its tip overlying the SVC. No pneumothorax is seen. Endotracheal tube is seen in good position. Heart size is within normal limits. There is some mild patchy atelectasis or infiltrate in the right lower lung. Left lung appears clear.      1. Central venous catheter tip overlying the SVC. 2. No pneumothorax is seen.   This report was finalized on 8/25/2023 6:41 AM by Dr. Sriram Ray M.D.      XR Chest 1 View    Result Date: 8/25/2023  SINGLE VIEW OF THE CHEST  HISTORY: Respiratory failure  COMPARISON: August 24, 2023  FINDINGS: Endotracheal tube terminates in satisfactory position. There is a right-sided ventriculoperitoneal shunt. Heart size is within normal limits. Bibasilar consolidation is noted. No pneumothorax is seen. Trace left pleural effusion is suspected.      Persistent bibasilar consolidation.  This report was finalized on 8/25/2023 5:17 AM by Dr. Charlee Medina M.D.      XR Chest 1 View    Result Date: 8/24/2023  CHEST SINGLE VIEW  HISTORY: Respiratory failure. Patient was found down.  COMPARISON: None.  FINDINGS: There has been intubation and the ET tube tip is 2.2 cm above the melanie and this could be withdrawn 2 cm for better position. The heart size appears normal. There is no evidence for perihilar edema or pneumothorax or focal airspace disease.  There is some limitation as the lung apices, particular on the right and the right costophrenic angle, are not included on the field-of-view. There is shunt tubing overlying the right thorax.      Limited exam demonstrates intubation with ET tube tip 2.2 cm above the melanie and this could be withdrawn 2 cm for better position. No further evidence for active disease in the chest.  This report was finalized on 8/24/2023 7:10 PM by Dr. Marcelo De M.D.      XR Abdomen KUB    Result Date: 8/28/2023  Patient: DAVID CERNA  Time Out: 05:41 Exam(s): XR ABDOMEN EXAM:   XR Abdomen, 2 Views  CLINICAL HISTORY:    Reason for exam: FEEDING TUBE PLACEMENT. TECHNIQUE:   Frontal view of the abdomen pelvis with upright view of the abdomen. COMPARISON:   No relevant prior studies available. FINDINGS:   Lower thorax:  Bibasilar infiltrates noted.   Intraperitoneal space:  No free air.   Gastrointestinal tract:  Unremarkable.  No dilation.   Bones joints:  Unremarkable.   Tubes, lines and devices:  Distal end of an enteric tube tip overlies the region of the gastric antrum proximal duodenum.  The visualized bowel gas is unremarkable-without evidence of obstruction. IMPRESSION:     1.  Enteric tube tip at the region of the distal stomach proximal duodenum. 2.  Bibasilar infiltrates noted.     Electronically signed by Norma Jaramillo MD on 08-28-23 at 0541    XR Abdomen KUB    Result Date: 8/27/2023  KUB  HISTORY: Feeding tube placement.  COMPARISON: CT abdomen and pelvis 08/24/2020.  FINDINGS: Feeding tube has been placed and the tip is in the gastric fundus. Bowel gas pattern in the upper abdomen is nonobstructive. The lower abdomen and pelvis are not included on the field-of-view.      Feeding tube tip is in the stomach.   This report was finalized on 8/27/2023 11:50 PM by Dr. Marcelo De M.D.      FL Video Swallow Single Contrast    Result Date: 8/29/2023  VIDEO SWALLOWING EXAMINATION BY SPEECH PATHOLOGY  Clinical: Dysphasia  Video swallowing examination performed under the direction of speech pathology. Imaging reviewed by radiologist who concurs with the findings.  Speech pathology summary:  Radiologist present Dr. Chavez. Deep nontransient penetration to the vocal cords with thin liquids. Nontransient penetration intermittently with nectar thick liquid, honey thick liquid, and puree.   FLUOROSCOPY TIME: 3 minutes 35 seconds, 5224 images.  This report was finalized on 8/29/2023 7:45 AM by Dr. Tyrel Chavez M.D.      FL Shunt Series Programable    Result Date: 8/30/2023  FL SHUNT SERIES PROGRAMABLE-   INDICATIONS: Hydrocephalus   TECHNIQUE: SHUNT SERIES WITH FLUOROSCOPIC EVALUATION of shunt valve  COMPARISON: None available  FINDINGS:  10 images were obtained.  Frontal and lateral views at the level of the head, neck, chest, abdomen, reveal right frontal ventriculoperitoneal shunt catheter extending into the upper pelvis near the midline. The catheter is partly obscured by enteric contrast material that is present in the colon, but otherwise appears intact.  A Codman Hakim nonprogrammable shunt valve is apparent.  Fluoroscopy time: 0.2 minutes  Radiation exposure: Dose area product: 113.44 uGy x m(2)        As described.  This report was finalized on 8/30/2023 8:11 AM by Dr. Adria Damon M.D.      XR Shunt Series    Result Date: 8/30/2023  XR SHUNT SERIES-  INDICATIONS:  shunt revision  TECHNIQUE: FLUOROSCOPIC ASSISTANCE IN THE OPERATING ROOM.  FINDINGS:  1 intraoperative fluoroscopic spot view was obtained during the  shunt revision procedure and recorded to the PACS for review, partly demonstrating the  shunt catheter. Please see operative report for full details.  Fluoroscopy time: 18 seconds  Radiation exposure: Dose area product: 101.22 uGy x m(2)        As described.  This report was finalized on 8/30/2023 8:34 AM by Dr. Adria Damon M.D.       Work-up to date:  CTH: No acute intracranial hemorrhage.  Ventriculomegaly noted decreased attenuation surrounding posterior and temporal horns of lateral ventricles bilaterally.  Some transependymal flow of CSF cannot be excluded.  rEEG: Abnormal due to presence of diffuse delta and theta slowing.  Triphasic waves seen.  No interictal epileptiform discharges or electrographic seizures noted.  Findings indicative of moderately severe encephalopathy.  2D Echo: EF 56-60%, all left ventricular wall segments contract normally.  Left atrium not well visualized.  Quality of study limited due to limited views obtained and patient being intubated.  MRI  brain: New punctate focus of diffusion restriction/FLAIR hyperintensity in the right paracentral lobule most suggestive of acute/subacute infarct.  Stable right frontal approach ventriculostomy catheter with tip in the right lateral ventricle near the foramen of San.  Lateral and third ventricles increased in size from 2020 to MRI.  Mildly increased pericatheter T2/FLAIR hyperintensity.  Left suboccipital craniectomy for left cerebellar tumor resection.  Multiple unchanged small enhancing intra-axial cerebral nodules.  Car US: Report pending  CTH repeat 8/30: Stable position of  shunt catheter.  Lateral and third ventricles remain enlarged, clearly larger than they were on outside MRI brain scan 4/21/2022 however slightly smaller than they were on head CT done 8/24/2023.  Resolving low-density in temporal occipital periventricular white matter compatible with resolving transependymal extension of CSF.  Marked dilatation of the fourth ventricle.    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  He was originally admitted to [facility] 8/24/2023 to the ICU with YUDITH and severe encephalopathy requiring endotracheal intubation.  Prior to admission he was living at home independently and was found down by a friend.  He was extubated and moved out of the ICU on 8/26.  Neurosurgery was consulted for enlarged ventricles compared to previous scans on CT and he went for shunt revision with Dr. Ritchie on 8/29.  Patient also had MRI brain imaging which showed a new punctate focus of  diffusion restriction/FLAIR hyperintensity in the right paracentral lobule most suggestive of acute to subacute infarct.  He was started on aspirin 81 mg which was held prior to his shunt revision however he may resume it tomorrow, Thursday, per Neurosurgery.  On neuro exam today he is much improved from when I saw him 2 days ago.  A carotid ultrasound has been ordered, completed but results pending.  If no significant stenosis, we will sign off and see again upon request.  Call RRT for acute neurologic change or concern.    Plan:  Follow-up carotid ultrasound results  Re-start aspirin 81mg tomorrow  Start atorvastatin 40mg, LDL 54  BP goal <130/80  [facility]  Stroke education  Therapies as written  CCP for discharge planning    I have discussed the above with the patient, SOPHIE Bell, and Dr. Clifford who are in agreement with the plan.     Donna Chavarria, JUD  08/30/23  16:09 EDT    Diagnoses:    Sepsis    Shunt malfunction    Cerebral ventriculomegaly    Lethargy    Confusion    S/P  Polaris SPVA-200 shunt 8/29/23 w/ Dr. Ritchie valve set to 110

## 2023-08-31 ENCOUNTER — APPOINTMENT (OUTPATIENT)
Dept: GENERAL RADIOLOGY | Facility: HOSPITAL | Age: 58
DRG: 871 | End: 2023-08-31
Payer: COMMERCIAL

## 2023-08-31 ENCOUNTER — TELEPHONE (OUTPATIENT)
Dept: NEUROSURGERY | Facility: CLINIC | Age: 58
End: 2023-08-31
Payer: COMMERCIAL

## 2023-08-31 DIAGNOSIS — G93.89 CEREBRAL VENTRICULOMEGALY: ICD-10-CM

## 2023-08-31 DIAGNOSIS — Z98.2 S/P VP SHUNT: Primary | ICD-10-CM

## 2023-08-31 LAB
ALBUMIN SERPL-MCNC: 2.7 G/DL (ref 3.5–5.2)
ALBUMIN/GLOB SERPL: 1 G/DL
ALP SERPL-CCNC: 80 U/L (ref 39–117)
ALT SERPL W P-5'-P-CCNC: 26 U/L (ref 1–41)
ANION GAP SERPL CALCULATED.3IONS-SCNC: 9 MMOL/L (ref 5–15)
AST SERPL-CCNC: 27 U/L (ref 1–40)
BASOPHILS # BLD AUTO: 0.05 10*3/MM3 (ref 0–0.2)
BASOPHILS NFR BLD AUTO: 0.6 % (ref 0–1.5)
BILIRUB SERPL-MCNC: 0.6 MG/DL (ref 0–1.2)
BUN SERPL-MCNC: 8 MG/DL (ref 6–20)
BUN/CREAT SERPL: 8.8 (ref 7–25)
CA-I BLD-MCNC: 4.6 MG/DL (ref 4.6–5.4)
CA-I SERPL ISE-MCNC: 1.15 MMOL/L (ref 1.15–1.35)
CALCIUM SPEC-SCNC: 7.8 MG/DL (ref 8.6–10.5)
CHLORIDE SERPL-SCNC: 106 MMOL/L (ref 98–107)
CO2 SERPL-SCNC: 25 MMOL/L (ref 22–29)
CREAT SERPL-MCNC: 0.91 MG/DL (ref 0.76–1.27)
DEPRECATED RDW RBC AUTO: 42.8 FL (ref 37–54)
EGFRCR SERPLBLD CKD-EPI 2021: 98.3 ML/MIN/1.73
EOSINOPHIL # BLD AUTO: 0.26 10*3/MM3 (ref 0–0.4)
EOSINOPHIL NFR BLD AUTO: 3.3 % (ref 0.3–6.2)
ERYTHROCYTE [DISTWIDTH] IN BLOOD BY AUTOMATED COUNT: 13 % (ref 12.3–15.4)
GLOBULIN UR ELPH-MCNC: 2.7 GM/DL
GLUCOSE BLDC GLUCOMTR-MCNC: 103 MG/DL (ref 70–130)
GLUCOSE BLDC GLUCOMTR-MCNC: 107 MG/DL (ref 70–130)
GLUCOSE BLDC GLUCOMTR-MCNC: 111 MG/DL (ref 70–130)
GLUCOSE BLDC GLUCOMTR-MCNC: 99 MG/DL (ref 70–130)
GLUCOSE SERPL-MCNC: 99 MG/DL (ref 65–99)
HCT VFR BLD AUTO: 38.4 % (ref 37.5–51)
HGB BLD-MCNC: 12.7 G/DL (ref 13–17.7)
IMM GRANULOCYTES # BLD AUTO: 0.15 10*3/MM3 (ref 0–0.05)
IMM GRANULOCYTES NFR BLD AUTO: 1.9 % (ref 0–0.5)
INR PPP: 2.02 (ref 0.9–1.1)
LYMPHOCYTES # BLD AUTO: 1.5 10*3/MM3 (ref 0.7–3.1)
LYMPHOCYTES NFR BLD AUTO: 18.9 % (ref 19.6–45.3)
MAGNESIUM SERPL-MCNC: 2.1 MG/DL (ref 1.6–2.6)
MCH RBC QN AUTO: 30 PG (ref 26.6–33)
MCHC RBC AUTO-ENTMCNC: 33.1 G/DL (ref 31.5–35.7)
MCV RBC AUTO: 90.6 FL (ref 79–97)
MONOCYTES # BLD AUTO: 0.81 10*3/MM3 (ref 0.1–0.9)
MONOCYTES NFR BLD AUTO: 10.2 % (ref 5–12)
NEUTROPHILS NFR BLD AUTO: 5.16 10*3/MM3 (ref 1.7–7)
NEUTROPHILS NFR BLD AUTO: 65.1 % (ref 42.7–76)
NRBC BLD AUTO-RTO: 0.1 /100 WBC (ref 0–0.2)
PHOSPHATE SERPL-MCNC: 2 MG/DL (ref 2.5–4.5)
PLAT MORPH BLD: NORMAL
PLATELET # BLD AUTO: 252 10*3/MM3 (ref 140–450)
PMV BLD AUTO: 9.6 FL (ref 6–12)
POTASSIUM SERPL-SCNC: 3.3 MMOL/L (ref 3.5–5.2)
POTASSIUM SERPL-SCNC: 3.7 MMOL/L (ref 3.5–5.2)
PROT SERPL-MCNC: 5.4 G/DL (ref 6–8.5)
PROTHROMBIN TIME: 23.2 SECONDS (ref 11.7–14.2)
RBC # BLD AUTO: 4.24 10*6/MM3 (ref 4.14–5.8)
RBC MORPH BLD: NORMAL
SODIUM SERPL-SCNC: 140 MMOL/L (ref 136–145)
WBC MORPH BLD: NORMAL
WBC NRBC COR # BLD: 7.93 10*3/MM3 (ref 3.4–10.8)

## 2023-08-31 PROCEDURE — 0 POTASSIUM CHLORIDE 10 MEQ/100ML SOLUTION: Performed by: HOSPITALIST

## 2023-08-31 PROCEDURE — 25010000002 CEFAZOLIN IN DEXTROSE 2-4 GM/100ML-% SOLUTION: Performed by: NEUROLOGICAL SURGERY

## 2023-08-31 PROCEDURE — 97116 GAIT TRAINING THERAPY: CPT

## 2023-08-31 PROCEDURE — 80053 COMPREHEN METABOLIC PANEL: CPT | Performed by: INTERNAL MEDICINE

## 2023-08-31 PROCEDURE — 84100 ASSAY OF PHOSPHORUS: CPT | Performed by: NEUROLOGICAL SURGERY

## 2023-08-31 PROCEDURE — 36415 COLL VENOUS BLD VENIPUNCTURE: CPT | Performed by: INTERNAL MEDICINE

## 2023-08-31 PROCEDURE — 74018 RADEX ABDOMEN 1 VIEW: CPT

## 2023-08-31 PROCEDURE — 97530 THERAPEUTIC ACTIVITIES: CPT

## 2023-08-31 PROCEDURE — 82948 REAGENT STRIP/BLOOD GLUCOSE: CPT

## 2023-08-31 PROCEDURE — 85610 PROTHROMBIN TIME: CPT | Performed by: NEUROLOGICAL SURGERY

## 2023-08-31 PROCEDURE — 25010000002 THIAMINE HCL 200 MG/2ML SOLUTION: Performed by: NEUROLOGICAL SURGERY

## 2023-08-31 PROCEDURE — 25010000002 THIAMINE HCL 200 MG/2ML SOLUTION 2 ML VIAL: Performed by: NEUROLOGICAL SURGERY

## 2023-08-31 PROCEDURE — 82330 ASSAY OF CALCIUM: CPT | Performed by: NEUROLOGICAL SURGERY

## 2023-08-31 PROCEDURE — 85007 BL SMEAR W/DIFF WBC COUNT: CPT | Performed by: NEUROLOGICAL SURGERY

## 2023-08-31 PROCEDURE — 83735 ASSAY OF MAGNESIUM: CPT | Performed by: NEUROLOGICAL SURGERY

## 2023-08-31 PROCEDURE — 85025 COMPLETE CBC W/AUTO DIFF WBC: CPT | Performed by: NEUROLOGICAL SURGERY

## 2023-08-31 PROCEDURE — 84132 ASSAY OF SERUM POTASSIUM: CPT | Performed by: HOSPITALIST

## 2023-08-31 RX ORDER — POTASSIUM CHLORIDE 7.45 MG/ML
10 INJECTION INTRAVENOUS
Status: COMPLETED | OUTPATIENT
Start: 2023-08-31 | End: 2023-08-31

## 2023-08-31 RX ORDER — SODIUM PHOSPHATE IN 0.9 % NACL 15MMOL/100
15 PLASTIC BAG, INJECTION (ML) INTRAVENOUS ONCE
Status: COMPLETED | OUTPATIENT
Start: 2023-08-31 | End: 2023-08-31

## 2023-08-31 RX ADMIN — CEFAZOLIN SODIUM 2000 MG: 2 INJECTION, SOLUTION INTRAVENOUS at 01:05

## 2023-08-31 RX ADMIN — THIAMINE HYDROCHLORIDE 200 MG: 100 INJECTION, SOLUTION INTRAMUSCULAR; INTRAVENOUS at 22:39

## 2023-08-31 RX ADMIN — FOLIC ACID 1 MG: 5 INJECTION, SOLUTION INTRAMUSCULAR; INTRAVENOUS; SUBCUTANEOUS at 08:03

## 2023-08-31 RX ADMIN — Medication 10 ML: at 08:05

## 2023-08-31 RX ADMIN — POTASSIUM CHLORIDE 10 MEQ: 7.46 INJECTION, SOLUTION INTRAVENOUS at 08:55

## 2023-08-31 RX ADMIN — CEFAZOLIN SODIUM 2000 MG: 2 INJECTION, SOLUTION INTRAVENOUS at 09:32

## 2023-08-31 RX ADMIN — POTASSIUM CHLORIDE 10 MEQ: 7.46 INJECTION, SOLUTION INTRAVENOUS at 09:32

## 2023-08-31 RX ADMIN — MUPIROCIN 1 APPLICATION: 20 OINTMENT TOPICAL at 22:12

## 2023-08-31 RX ADMIN — Medication 10 ML: at 22:06

## 2023-08-31 RX ADMIN — THIAMINE HYDROCHLORIDE 500 MG: 100 INJECTION, SOLUTION INTRAMUSCULAR; INTRAVENOUS at 05:31

## 2023-08-31 RX ADMIN — CEFAZOLIN SODIUM 2000 MG: 2 INJECTION, SOLUTION INTRAVENOUS at 22:39

## 2023-08-31 RX ADMIN — ATORVASTATIN CALCIUM 40 MG: 20 TABLET, FILM COATED ORAL at 22:06

## 2023-08-31 RX ADMIN — POTASSIUM CHLORIDE 10 MEQ: 7.46 INJECTION, SOLUTION INTRAVENOUS at 10:44

## 2023-08-31 RX ADMIN — THIAMINE HYDROCHLORIDE 200 MG: 100 INJECTION, SOLUTION INTRAMUSCULAR; INTRAVENOUS at 15:31

## 2023-08-31 RX ADMIN — POTASSIUM CHLORIDE 10 MEQ: 7.46 INJECTION, SOLUTION INTRAVENOUS at 06:37

## 2023-08-31 RX ADMIN — FAMOTIDINE 20 MG: 10 INJECTION INTRAVENOUS at 08:03

## 2023-08-31 RX ADMIN — SODIUM PHOSPHATE, MONOBASIC, MONOHYDRATE AND SODIUM PHOSPHATE, DIBASIC, ANHYDROUS 15 MMOL: 276; 142 INJECTION, SOLUTION INTRAVENOUS at 11:55

## 2023-08-31 NOTE — PROGRESS NOTES
"Nutrition Services    Patient Name:  Braxton Wolfe  YOB: 1965  MRN: [id number]  Admit Date:  8/24/2023    Assessment Date:  08/31/23    Comment:  Nutrition consult. Pt failed his swallow eval (FEES and VFSS). Concern of chronic aspiration noted. Pt/family have decided to try a Cortrak and see how he does. He has been NPO x 7 days.  Labs, meds, skin reviewed.  K+ 3.3, Phos 2.0, being replaced. NG Cortrak placed - Obtaining KUB as tracing a bit atypical - poss coil in stomach or around a HH.    Would initiate TF's with Isosource 1.5 at 20mL/hr. Adv slow to goal of 55mL/hr.  Min free water while on IVF. Monitor lytes for possible refeeding syndrome.    RD to follow clinical course, nutrition support needs.      CLINICAL NUTRITION ASSESSMENT      Reason for Assessment Cortrak Placement, Physician Consult, Tube Feeding Assessment      Diagnosis/Problem   Acute encephalopathy. Asp PNA, vomiting, ARF, S/p prior  shunt ,VHL syndrome with CNS hemangioblastomas    Medical/Surgical History History reviewed. No pertinent past medical history.    Past Surgical History:   Procedure Laterality Date    BRAIN SURGERY       SHUNT INSERTION N/A 8/29/2023    Procedure: VENTRICULAR PERITONEAL SHUNT REVISION;  Surgeon: Roberto Ritchie MD;  Location: [facility];  Service: Neurosurgery;  Laterality: N/A;        Encounter Information        Nutrition History:     Food Preferences:    Supplements:    Factors Affecting Intake: altered mental status, swallow impairment     Anthropometrics        Current Height  Current Weight  BMI kg/m2 Height: 177.8 cm (70\")  Weight: 79.7 kg (175 lb 11.3 oz) (08/31/23 0505)  Body mass index is 25.21 kg/m².   Adjusted BMI (if applicable)    BMI Category Overweight (25 - 29.9)       Admission Weight 75.9kg       Ideal Body Weight (IBW) 73kg   Adjusted IBW (if applicable)        Usual Body Weight (UBW) unkown   Weight Trend Unknown       Weight History Wt Readings from Last 30 Encounters: "   08/31/23 0505 79.7 kg (175 lb 11.3 oz)   08/30/23 0547 85.7 kg (188 lb 15 oz)   08/29/23 1733 79.4 kg (175 lb)   08/29/23 0441 79 kg (174 lb 2.6 oz)   08/28/23 0350 76.6 kg (168 lb 14 oz)   08/26/23 0319 79.8 kg (175 lb 14.8 oz)   08/25/23 0906 75.8 kg (167 lb)   08/24/23 1802 75.9 kg (167 lb 5.3 oz)   08/25/23 1852 75.8 kg (167 lb)        Estimated/Assessed Needs        Current Weight  Weight: 79.7 kg (175 lb 11.3 oz) (08/31/23 0505)       Energy Requirements    Weight for Calculation 79.7 kg   Method for Estimation  25 kcal/kg   EST Needs (kcal/day) 1993       Protein Requirements    Weight for Calculation 79.7 kg   EST Protein Needs (g/kg) 1.0 - 1.2 gm/kg   EST Daily Needs (g/day) 80-95       Fluid Requirements     Method for Estimation 1 mL/kcal    EST Needs (mL/day) 2000     Tests/Procedures        Tests/Procedures Clinical Swallow Evaluation, CT scan, FEES, VFSS, X-Ray     Labs       Pertinent Labs    Results from last 7 days   Lab Units 08/31/23  0544 08/30/23  1707 08/30/23  0536 08/29/23  0641 08/27/23  0648 08/26/23  0329 08/25/23  0917   SODIUM mmol/L 140  --  143 144   < > 147* 144   POTASSIUM mmol/L 3.3* 4.0 3.2* 3.1*   < > 3.6 3.9   CHLORIDE mmol/L 106  --  106 109*   < > 111* 108*   CO2 mmol/L 25.0  --  27.0 26.0   < > 24.7 20.0*   BUN mg/dL 8  --  9 11   < > 44* 52*   CREATININE mg/dL 0.91  --  0.95 0.97   < > 2.53* 3.85*   CALCIUM mg/dL 7.8*  --  8.0* 8.0*   < > 8.2* 9.2   BILIRUBIN mg/dL 0.6  --   --   --   --  0.7 0.5   ALK PHOS U/L 80  --   --   --   --  58 69   ALT (SGPT) U/L 26  --   --   --   --  16 15   AST (SGOT) U/L 27  --   --   --   --  37 34   GLUCOSE mg/dL 99  --  101* 96   < > 109* 137*    < > = values in this interval not displayed.       Results from last 7 days   Lab Units 08/31/23  0554 08/31/23  0544 08/30/23  0536 08/29/23  0641   MAGNESIUM mg/dL  --  2.1 5.5* 3.4*   PHOSPHORUS mg/dL  --  2.0* 2.6 2.6   HEMOGLOBIN g/dL 12.7*  --  12.7*  --    HEMATOCRIT % 38.4  --  37.2*  --     WBC 10*3/mm3 7.93  --  6.53  --    TRIGLYCERIDES mg/dL  --   --   --  105   ALBUMIN g/dL  --  2.7*  --   --        Results from last 7 days   Lab Units 08/31/23  0554 08/30/23  0536 08/29/23  0636 08/28/23  1626 08/27/23  0648 08/25/23  0010 08/24/23  1805   INR  2.02* 1.33* 1.41* 1.45* 1.54*   < > 1.38*   APTT seconds  --   --   --   --   --   --  25.5   PLATELETS 10*3/mm3 252 206 157 150 131*   < > 306    < > = values in this interval not displayed.       No results found for: COVID19  Lab Results   Component Value Date    HGBA1C 5.80 (H) 08/29/2023          Medications           Scheduled Medications aspirin, 81 mg, Oral, Daily  atorvastatin, 40 mg, Oral, Nightly  calcitriol, 1 mcg, Intravenous, Once per day on Mon Wed Fri  ceFAZolin, 2,000 mg, Intravenous, Q8H  docusate sodium, 100 mg, Oral, BID  famotidine, 20 mg, Intravenous, Daily  folic acid (FOLVITE) IVPB, 1 mg, Intravenous, Daily  senna-docusate sodium, 2 tablet, Oral, BID  sodium chloride, 10 mL, Intravenous, Q12H  sodium phosphate, 15 mmol, Intravenous, Once  thiamine (B-1) IV, 200 mg, Intravenous, Q8H   Followed by  [START ON 9/4/2023] thiamine, 100 mg, Oral, Daily       Infusions dextrose, 50 mL/hr, Last Rate: 50 mL/hr (08/29/23 2055)  lactated ringers, 9 mL/hr, Last Rate: 9 mL/hr (08/29/23 1818)       PRN Medications   benzonatate    senna-docusate sodium **AND** polyethylene glycol **AND** bisacodyl **AND** bisacodyl    Calcium Replacement - Follow Nurse / BPA Driven Protocol    dextrose    dextrose    glucagon (human recombinant)    HYDROcodone-acetaminophen    nitroglycerin    ondansetron    Potassium Replacement - Follow Nurse / BPA Driven Protocol    [COMPLETED] Insert Peripheral IV **AND** sodium chloride    sodium chloride    sodium chloride     Physical Findings          Physical Appearance alert, oriented, room air   Oral/Mouth Cavity WNL   Edema  1+ (trace), 2+ (mild)   Gastrointestinal last bowel movement: 8/31   Skin  surgical incision:  head   Tubes/Drains/Lines Cortrak, NG tube, bridle in place   NFPE No clinical signs of muscle wasting or fat loss   --  Current Nutrition Orders & Evaluation of Intake       Oral Nutrition     Food Allergies NKFA   Current PO Diet NPO Diet NPO Type: Strict NPO   Supplement n/a   PO Evaluation     % PO Intake/# of Days 0   --  PES STATEMENT / NUTRITION DIAGNOSIS      Nutrition Dx Problem  Problem: Inadequate Oral Intake  Etiology: Medical Diagnosis - encephalopathy    Signs/Symptoms: Report of Minimal PO Intake    Comment:    --  NUTRITION INTERVENTION / PLAN OF CARE      Intervention Goal(s) Maintain nutrition status, Reduce/improve symptoms, Meet estimated needs, Disease management/therapy, Initiate feeding/diet, Tolerate PO , and No significant weight loss         RD Intervention/Action Follow Tx Progress, Care plan reviewed, and Recommend/ordered:          Prescription/Orders:       PO Diet       Supplements       Snacks       Enteral Nutrition       Parenteral Nutrition    New Prescription Ordered? Yes      Enteral Prescription:     Enteral Route NG    TF Delivery Method Continuous    Enteral Product Isosource 1.5    Modular None    Propofol Rate/Kcal     TF Start Rate (mL/hr) 20    TF Goal Rate (mL/hr) 55    Free Water Flush (mL) 30 mL q 4 hrs    TF Provision at Goal: 1998kcal, 89gm protein, 1003mL free water + 180mL water flushes         Calories 99 % needs met         Protein  100 % needs met         Fluid (mL) 1183 mL total     Prescription Ordered Yes         Monitor/Evaluation Per protocol   Discharge Plan/Needs Pending clinical course   Education Will instruct as appropriate   --    RD to follow per protocol.    Electronically signed by:  Chiquita Klein RD  08/31/23 12:48 EDT

## 2023-08-31 NOTE — PROGRESS NOTES
Enter Query Response Below      Query Response: yes due to spesis             If applicable, please update the problem list.     Patient: Braxton Wolfe        : 1965  Account: [id number]           Admit Date:         How to Respond to this query:       a. Click New Note     b. Answer query within the yellow box.                c. Update the Problem List, if applicable.      If you have any questions about this query contact me at: [email or web address]    Dr. Hooper,    Patient admitted  after being found down at home. Notes include sepsis, acute hypercapnic respiratory failure, and acute kidney injury. Patient was treated with critical care monitoring, mechanical ventilation, IVFs, IV antibiotics, and avoidance of nephrotoxins.     Are any of this patient’s organ dysfunctions (acute hypercapnic respiratory failure, acute kidney failure, lactic acidosis) secondary to sepsis?     - Yes, all secondary to sepsis   - Yes, but not all, please specify _______________  - No   - Other ________________   - Unable to determine    By submitting this query, we are merely seeking further clarification of documentation to accurately reflect all conditions that you are monitoring, evaluating, treating or that extend the hospitalization or utilize additional resources of care. Please utilize your independent clinical judgment when addressing the question(s) above.     This query and your response, once completed, will be entered into the legal medical record.    Sincerely,  Tristan Berg  Clinical Documentation Integrity Program

## 2023-08-31 NOTE — PROGRESS NOTES
[facility] Progress Note      Patient Name: Braxton Wolfe  : 1965  MRN: [id number]  Primary Care Physician:  Provider, No Known  Date of admission: 2023    Subjective     Interval History:   Follow-up YUDITH and hypernatremia.  Patient remains on D5W.  Not hungry.  He does feel thirsty.  He and his family are trying to decide between a feeding tube and eating with the recognition that he is at risk for aspiration.  Denies headache.  Slept well last night.        Objective     Vitals:   Temp:  [97.4 °F (36.3 °C)-99 °F (37.2 °C)] 98.3 °F (36.8 °C)  Heart Rate:  [59-99] 72  Resp:  [16-17] 16  BP: (105-138)/(67-89) 138/76  Flow (L/min):  [2] 2    Intake/Output Summary (Last 24 hours) at 2023 1000  Last data filed at 2023 0505  Gross per 24 hour   Intake --   Output 1450 ml   Net -1450 ml         Physical Exam:    General Appearance: Awake, alert. Answers appropriately.    HEENT PERRL. Oral mucosa dry.  Dressing on head.   Neck: supple, no JVD  Lungs: Clear to auscultation. Unlabored on RA  Heart: RRR, no s3 or rub  Abdomen: soft, nontender, non-distended. + bs  Extremities: trace LLE edema, lower ext.     Scheduled Meds:     aspirin, 81 mg, Oral, Daily  atorvastatin, 40 mg, Oral, Nightly  calcitriol, 1 mcg, Intravenous, Once per day on   ceFAZolin, 2,000 mg, Intravenous, Q8H  docusate sodium, 100 mg, Oral, BID  famotidine, 20 mg, Intravenous, Daily  folic acid (FOLVITE) IVPB, 1 mg, Intravenous, Daily  potassium chloride, 10 mEq, Intravenous, Q1H  senna-docusate sodium, 2 tablet, Oral, BID  sodium chloride, 10 mL, Intravenous, Q12H  sodium phosphate, 15 mmol, Intravenous, Once  thiamine (B-1) IV, 200 mg, Intravenous, Q8H   Followed by  [START ON 2023] thiamine, 100 mg, Oral, Daily      IV Meds:   dextrose, 50 mL/hr, Last Rate: 50 mL/hr (23)  lactated ringers, 9 mL/hr, Last Rate: 9 mL/hr (23)        Results Reviewed:   I have  personally reviewed the results from the time of this admission to 8/31/2023 10:00 EDT     Results from last 7 days   Lab Units 08/31/23  0544 08/30/23  1707 08/30/23  0536 08/29/23  0641 08/27/23  0648 08/26/23 0329 08/25/23  0917   SODIUM mmol/L 140  --  143 144   < > 147* 144   POTASSIUM mmol/L 3.3* 4.0 3.2* 3.1*   < > 3.6 3.9   CHLORIDE mmol/L 106  --  106 109*   < > 111* 108*   CO2 mmol/L 25.0  --  27.0 26.0   < > 24.7 20.0*   BUN mg/dL 8  --  9 11   < > 44* 52*   CREATININE mg/dL 0.91  --  0.95 0.97   < > 2.53* 3.85*   CALCIUM mg/dL 7.8*  --  8.0* 8.0*   < > 8.2* 9.2   BILIRUBIN mg/dL 0.6  --   --   --   --  0.7 0.5   ALK PHOS U/L 80  --   --   --   --  58 69   ALT (SGPT) U/L 26  --   --   --   --  16 15   AST (SGOT) U/L 27  --   --   --   --  37 34   GLUCOSE mg/dL 99  --  101* 96   < > 109* 137*    < > = values in this interval not displayed.       Estimated Creatinine Clearance: 101 mL/min (by C-G formula based on SCr of 0.91 mg/dL).  Results from last 7 days   Lab Units 08/31/23  0544 08/30/23  0536 08/29/23  0641   MAGNESIUM mg/dL 2.1 5.5* 3.4*   PHOSPHORUS mg/dL 2.0* 2.6 2.6       Results from last 7 days   Lab Units 08/26/23 0329 08/25/23  0917   URIC ACID mg/dL 4.8 8.5*       Results from last 7 days   Lab Units 08/31/23  0554 08/30/23  0536 08/29/23  0636 08/28/23  1626 08/27/23  0648   WBC 10*3/mm3 7.93 6.53 6.92 7.23 7.93   HEMOGLOBIN g/dL 12.7* 12.7* 12.7* 12.7* 11.3*   PLATELETS 10*3/mm3 252 206 157 150 131*       Results from last 7 days   Lab Units 08/31/23  0554 08/30/23  0536 08/29/23  0636 08/28/23  1626 08/27/23  0648   INR  2.02* 1.33* 1.41* 1.45* 1.54*         Assessment / Plan     ASSESSMENT:  Non olig YUDITH - prerenal azotemia due to volume depletion, resolved with IVF. Hypernatremia -sodium now normal on D5W which will continue as long as he is NPO.  Hypocalcemia.  His ionized calcium is normal today.  Vitamin D level low and PTH appropriately elevated.  Replace phosphorus .  On IV  calcitriol until able to take p.o. vitamin D.  Von Hippel Lindau syndrome with CNS hemangioblastomas, previous left cerebellar craniotomy for tumor resection.  Neurosurgery evaluated for increase in his lateral and third ventricle sizes.  SP   shunt revision 8/29.    Acute encephalopathy resolved.  Aspiration PNA .  Completed antibiotic therapy. Speech eval ongoing .  Needs nutrition.  Will need a decision on PEG versus eating.    PLAN:  Continue D5W while he is n.p.o.  Calcium supplementation via protocol. K supplement protocol.   3.  VItamin D supplement when able to take po.  4. IV calcitriol until taking po.     Sweta Luu MD  08/31/23  10:00 EDT    [facility]  [phone]

## 2023-08-31 NOTE — PLAN OF CARE
Goal Outcome Evaluation:   VSS. 02@2lpm overnight. A&OX4. Delayed responses at times. Forgetful. Remains NPO, can have ice chips sparingly after oral care. Pending pt/family decision r/t nutrition. Continues on IVF. Call light in reach.

## 2023-08-31 NOTE — PLAN OF CARE
Goal Outcome Evaluation:  Plan of Care Reviewed With: patient        Progress: no change  Outcome Evaluation: VSS. Alert and oriented x4. Slow to respond at times. Coretrak placed this shift. TF initiated per order. Up to chair x2 assist. Mother at bedside. Potassium and phosphorus replaced per protocol. No complaints of pain. Will continue to provide supportive care.

## 2023-08-31 NOTE — NURSING NOTE
Wound/Ostomy: We see you patient at the request of the floor nurse regrding skin issue bihind left Knee. Patient is alert, oriented, upon assessment is observed partial-thickness skin loss behind left knee of unknown etiology according to the family that was at bedside.  Wound care order placed into Epic.  Please re-consult for any additional needs.

## 2023-08-31 NOTE — PROGRESS NOTES
"                                              LOS: 7 days   Patient Care Team:  Provider, No Known as PCP - General    Chief Complaint:  F/up aspiration pneumonia, respiratory failure.    Subjective   Interval History  On RA.  Denies dyspnea or cough.    REVIEW OF SYSTEMS:   Constitutional: No fever or chills  Gastrointestinal: No nausea, vomiting or diarrhea  Cardiovascular: No chest pain or palpitation.      Physical Exam:     Vital Signs  Temp:  [97.4 °F (36.3 °C)-99.6 °F (37.6 °C)] 99.6 °F (37.6 °C)  Heart Rate:  [59-99] 78  Resp:  [16-17] 16  BP: (105-140)/(67-93) 140/81    Intake/Output Summary (Last 24 hours) at 8/31/2023 1726  Last data filed at 8/31/2023 1642  Gross per 24 hour   Intake 30 ml   Output 800 ml   Net -770 ml       Flowsheet Rows      Flowsheet Row First Filed Value   Admission Height 177.8 cm (70\") Documented at 08/25/2023 0906   Admission Weight 75.9 kg (167 lb 5.3 oz) Documented at 08/24/2023 1802            PPE used per hospital policy    General Appearance:   Alert, cooperative, in no acute distress   ENMT:  Mallampati score 2, moist mucous membrane   Eyes:  Pupils equal and reactive to light. EOMI   Neck:   Trachea midline. No thyromegaly.   Lungs:   Relatively clear anterior auscultation but slightly coarse at the bases.  No wheezing.    Heart:   Regular rhythm and normal rate, normal S1 and S2, no         murmur   Skin:   No rash or ecchymosis   Abdomen:    Soft. No tenderness. No HSM.   Neuro/psych:  Conscious, alert, oriented x3.  Moves all extremities..  Appropriate mood and affect   Extremities:  No cyanosis, clubbing or edema.  Warm extremities and well-perfused          Results Review:        Results from last 7 days   Lab Units 08/31/23  1635 08/31/23  0544 08/30/23  1707 08/30/23  0536 08/29/23  0641   SODIUM mmol/L  --  140  --  143 144   POTASSIUM mmol/L 3.7 3.3* 4.0 3.2* 3.1*   CHLORIDE mmol/L  --  106  --  106 109*   CO2 mmol/L  --  25.0  --  27.0 26.0   BUN mg/dL  --  8  -- "  9 11   CREATININE mg/dL  --  0.91  --  0.95 0.97   GLUCOSE mg/dL  --  99  --  101* 96   CALCIUM mg/dL  --  7.8*  --  8.0* 8.0*       Results from last 7 days   Lab Units 08/26/23  0329 08/25/23  0917 08/24/23  1805   CK TOTAL U/L 1,208* 1,023* 500*   HSTROP T ng/L  --   --  27*       Results from last 7 days   Lab Units 08/31/23  0554 08/30/23  0536 08/29/23  0636   WBC 10*3/mm3 7.93 6.53 6.92   HEMOGLOBIN g/dL 12.7* 12.7* 12.7*   HEMATOCRIT % 38.4 37.2* 37.7   PLATELETS 10*3/mm3 252 206 157       Results from last 7 days   Lab Units 08/31/23  0554 08/30/23  0536 08/29/23  0636 08/25/23  0010 08/24/23  1805   INR  2.02* 1.33* 1.41*   < > 1.38*   APTT seconds  --   --   --   --  25.5    < > = values in this interval not displayed.       Results from last 7 days   Lab Units 08/24/23  1805   PROBNP pg/mL 7,202.0*                     Results from last 7 days   Lab Units 08/25/23  0330 08/24/23  1900   PH, ARTERIAL pH units 7.449 7.341*   PCO2, ARTERIAL mm Hg 27.1* 34.2*   PO2 ART mm Hg 69.3* 100.2*   MODALITY  Adult Vent Adult Vent   O2 SATURATION CALC % 94.8 97.4           I reviewed the patient's new clinical results.        Medication Review:   aspirin, 81 mg, Oral, Daily  atorvastatin, 40 mg, Oral, Nightly  calcitriol, 1 mcg, Intravenous, Once per day on Mon Wed Fri  ceFAZolin, 2,000 mg, Intravenous, Q8H  docusate sodium, 100 mg, Oral, BID  famotidine, 20 mg, Intravenous, Daily  folic acid (FOLVITE) IVPB, 1 mg, Intravenous, Daily  mupirocin, 1 application , Topical, Q12H  senna-docusate sodium, 2 tablet, Oral, BID  sodium chloride, 10 mL, Intravenous, Q12H  thiamine (B-1) IV, 200 mg, Intravenous, Q8H   Followed by  [START ON 9/4/2023] thiamine, 100 mg, Oral, Daily        lactated ringers, 9 mL/hr, Last Rate: 9 mL/hr (08/29/23 1818)        Diagnostic imaging:  I personally and independently reviewed the following images:  CXR 8/25/2023: Central line and ET tube in good position.  Increased interstitial pulmonary  infiltrates on the right.    CT brain 8/30/2023: Dilation of the fourth ventricle.  No change compared to yesterday.   shunt noted    KUB 8/31/2023: Tip of the feeding tube is in the duodenum.    Assessment     Acute hypercapnic respiratory failure s/p vent 8/24-8/25  B/L Aspiration pneumonia  Vomiting; unclear etiology  Acute encephalopathy; suspected HIE (resolved)  Acute SVT  Acute renal failure  Electrolytes disturbance: Hypernatremia and hypokalemia  Severe hypocalcemia and metabolic acidosis  Incidental lung nodules-needs outpatient follow  S/p prior  shunt  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  Anemia, chronic  Dysphagia s/p core track placement 8/20          Plan     -Finished 5 days antibiotic therapy.  No need to continue further.  -Oxygen as needed to keep SPO2 >90%  -Continue flutter and I-S.  Encouraged to use it.  -Free water through the core track  - Tube feeding  -Oxygen by NC and titrate to keep SPO2 >90%  -Replace potassium  -PT OT        Fran Carbone MD  08/31/23  17:26 EDT        This note was dictated utilizing Dragon dictation

## 2023-08-31 NOTE — PLAN OF CARE
Goal Outcome Evaluation:  Plan of Care Reviewed With: patient        Progress: improving  Outcome Evaluation: Pt seen for PT treatment today. pt continues to progress with mobility. Pt required CGA with bed mobility needing increased time. Pt completed a STS transfer with MinAX2. Pt worked on gait and ambulated 25ft with rwx, MinAX2/ModAX2. Pt  required multiple sequencing cues, cues for posture, and assistance with maneuvering walker. Pt required increased assistance as pt fatigued and pt's left foot would increasingly drag. Will continue to progress pt as able.

## 2023-08-31 NOTE — PROGRESS NOTES
[facility]     LOS: 7 days     Subjective:    CC:Altered Mental Status    DIET:  Diet Order   Procedures    NPO Diet NPO Type: Strict NPO   Speech evaluation yesterday and patient showed evidence of aspiration, he is more awake and alert today than yesterday, voice seems to be stronger than yesterday    His daughter is at the bedside    Patient with no new complaints    Objective:    Vital Signs:  Temp:  [97.4 °F (36.3 °C)-99 °F (37.2 °C)] 98.3 °F (36.8 °C)  Heart Rate:  [59-99] 78  Resp:  [16-17] 16  BP: (105-138)/(67-93) 135/93    SpO2:  [87 %-97 %] 96 %  on  Flow (L/min):  [2] 2;   Device (Oxygen Therapy): room air  Body mass index is 25.21 kg/m².    Physical Exam  Constitutional:       Appearance: Normal appearance.   HENT:      Head: Normocephalic and atraumatic.   Cardiovascular:      Rate and Rhythm: Normal rate.      Heart sounds: No murmur heard.    No friction rub.   Pulmonary:      Effort: Pulmonary effort is normal.      Breath sounds: Normal breath sounds.   Abdominal:      General: Bowel sounds are normal. There is no distension.      Palpations: Abdomen is soft.      Tenderness: There is no abdominal tenderness.   Skin:     General: Skin is warm and dry.   Neurological:      Mental Status: He is alert.      Comments: Mild left sided weakness   Psychiatric:         Mood and Affect: Mood normal.         Behavior: Behavior normal.       Results Review:    Glucose   Date Value Ref Range Status   08/31/2023 99 65 - 99 mg/dL Final   08/30/2023 101 (H) 65 - 99 mg/dL Final   08/29/2023 96 65 - 99 mg/dL Final   08/28/2023 98 65 - 99 mg/dL Final     Results from last 7 days   Lab Units 08/31/23  0554   WBC 10*3/mm3 7.93   HEMOGLOBIN g/dL 12.7*   HEMATOCRIT % 38.4   PLATELETS 10*3/mm3 252       Results from last 7 days   Lab Units 08/31/23  0544   SODIUM mmol/L 140   POTASSIUM mmol/L 3.3*   CHLORIDE mmol/L 106   CO2 mmol/L 25.0   BUN mg/dL 8   CREATININE mg/dL 0.91   CALCIUM mg/dL  7.8*   BILIRUBIN mg/dL 0.6   ALK PHOS U/L 80   ALT (SGPT) U/L 26   AST (SGOT) U/L 27   GLUCOSE mg/dL 99       Results from last 7 days   Lab Units 08/31/23  0554 08/25/23  0010 08/24/23  1805   INR  2.02*   < > 1.38*   APTT seconds  --   --  25.5    < > = values in this interval not displayed.       Results from last 7 days   Lab Units 08/31/23  0544   MAGNESIUM mg/dL 2.1       Results from last 7 days   Lab Units 08/26/23  0329 08/25/23  0917 08/24/23  1805   CK TOTAL U/L 1,208* 1,023* 500*   HSTROP T ng/L  --   --  27*       Cultures:  Blood Culture   Date Value Ref Range Status   08/24/2023 No growth at 2 days  Preliminary   08/24/2023 No growth at 2 days  Preliminary     Respiratory Culture   Date Value Ref Range Status   08/25/2023   Final    Rare Normal respiratory arti. No S. aureus or Pseudomonas aeruginosa detected. Final report.       I have reviewed daily medications and changes in CPOE    Scheduled meds  aspirin, 81 mg, Oral, Daily  atorvastatin, 40 mg, Oral, Nightly  calcitriol, 1 mcg, Intravenous, Once per day on Mon Wed Fri  ceFAZolin, 2,000 mg, Intravenous, Q8H  docusate sodium, 100 mg, Oral, BID  famotidine, 20 mg, Intravenous, Daily  folic acid (FOLVITE) IVPB, 1 mg, Intravenous, Daily  mupirocin, 1 application , Topical, Q12H  senna-docusate sodium, 2 tablet, Oral, BID  sodium chloride, 10 mL, Intravenous, Q12H  thiamine (B-1) IV, 200 mg, Intravenous, Q8H   Followed by  [START ON 9/4/2023] thiamine, 100 mg, Oral, Daily        lactated ringers, 9 mL/hr, Last Rate: 9 mL/hr (08/29/23 1818)      PRN meds    benzonatate    senna-docusate sodium **AND** polyethylene glycol **AND** bisacodyl **AND** bisacodyl    Calcium Replacement - Follow Nurse / BPA Driven Protocol    dextrose    dextrose    glucagon (human recombinant)    HYDROcodone-acetaminophen    nitroglycerin    ondansetron    Potassium Replacement - Follow Nurse / BPA Driven Protocol    [COMPLETED] Insert Peripheral IV **AND** sodium chloride     sodium chloride    sodium chloride        Sepsis    Shunt malfunction    Cerebral ventriculomegaly    Lethargy    Confusion    S/P  Polaris SPVA-200 shunt 8/29/23 w/ Dr. Ritchie valve set to 110        Assessment/Plan:  Acute encephalopathy; suspected HIE  -Mental status continues to improve  -Status post shunt revision 8/29, by neurosurgery:Patient does not require follow-up shunt xray for setting confirmation. Follow-up as scheduled. Office will contact patient if follow-up required sooner due to xray findings. Patient to call for any concerns including but not limited to headaches, vision changes, balance difficulties, incontinence of bladder, weakness.     Acute hypercapnic respiratory failure s/p vent till 8/25  -Patient is currently on room air with excellent oxygen saturations    B/L Aspiration pneumonia  -Possibly acute on chronic aspiration  -Rocephin x4 doses here at [facility]    Vomiting; unclear etiology  -Patient is currently n.p.o. because of aspiration    Dysphagia  -Placing core track    Acute renal failure  -Creatinine on admission was 3.0 and it increased to 3.8 and creatinine is normalized  -Patient is being followed by nephrology    hypernatremia  -improved    Hypokalemia  -replace    Severe hypocalcemia  -better    Severe metabolic acidosis  -better    Elevated BNP    Incidental lung nodules-needs outpatient follow    S/p prior  shunt and required revision-neurosurgery signed off    VHL with craniotomy and has some residual left-sided weakness from this    -Patient is on Welireg for VHL to slow down tumor growth.  He has been off of this medication now for a week.  He will need to follow-up with MD Reynoso about when to restart side effects from this medication are hypoxemia and anemia.  Starting dose is 120 but the patient has been intolerant of it in the past and is down to 40 mg.    Mild coagulopathy possibly related to nutrition, monitor    Discussed with patient and daughter about risks  and benefits of placing feeding tube starting with a NG and possibly placing PEG tube    >45 minutes spent, > 1/2 time spent counseling and coordination of care       Heath Hooper MD  08/31/23  15:22 EDT

## 2023-08-31 NOTE — TELEPHONE ENCOUNTER
----- Message from JUD Botello sent at 8/30/2023  4:14 PM EDT -----  Regarding: f/u with [facility] imaging  Please schedule follow-up for Monday, September 11 for office visit with APC with CT head without contrast.  Diagnosis: Ventriculomegaly status post shunt revision.      This patient might possibly be in [facility] acute rehab at the time of appointment, if so, we can just see him in the hospital to take the place of the office visit.

## 2023-08-31 NOTE — TELEPHONE ENCOUNTER
Please schedule head CT same day or day prior. Let them know it has to be done as patient is post-op

## 2023-08-31 NOTE — THERAPY TREATMENT NOTE
Patient Name: Braxton Wofle  : 1965    MRN: [id number]                              Today's Date: 2023       Admit Date: 2023    Visit Dx:     ICD-10-CM ICD-9-CM   1. Follow-up exam  Z09 V67.9   2. Sepsis with acute renal failure without septic shock, due to unspecified organism, unspecified acute renal failure type  A41.9 038.9    R65.20 995.92    N17.9 584.9   3. Acute respiratory failure, unspecified whether with hypoxia or hypercapnia  J96.00 518.81   4. Acute kidney injury  N17.9 584.9   5. Hypokalemia  E87.6 276.8   6. Hypernatremia  E87.0 276.0   7. Obstructive hydrocephalus  G91.1 331.4   8. Shunt malfunction  T85.618A 996.59     Patient Active Problem List   Diagnosis    Sepsis    Shunt malfunction    Cerebral ventriculomegaly    Lethargy    Confusion    S/P  Polaris SPVA-200 shunt 23 w/ Dr. Ritchie valve set to 110     History reviewed. No pertinent past medical history.  Past Surgical History:   Procedure Laterality Date    BRAIN SURGERY       SHUNT INSERTION N/A 2023    Procedure: VENTRICULAR PERITONEAL SHUNT REVISION;  Surgeon: Roberto Ritchie MD;  Location: [facility];  Service: Neurosurgery;  Laterality: N/A;      General Information       Row Name 23 1558          Physical Therapy Time and Intention    Document Type therapy note (daily note)  -EB     Mode of Treatment physical therapy;individual therapy  -EB       Row Name 23 1558          General Information    Patient Profile Reviewed yes  -EB     Existing Precautions/Restrictions fall  -EB       Row Name 23 1558          Cognition    Orientation Status (Cognition) oriented x 3  -EB       Row Name 23 1558          Safety Issues, Functional Mobility    Safety Issues Affecting Function (Mobility) sequencing abilities;impulsivity;awareness of need for assistance;insight into deficits/self-awareness;judgment;positioning of assistive device;problem-solving;safety precaution awareness  -EB      Impairments Affecting Function (Mobility) balance;endurance/activity tolerance;strength;postural/trunk control;range of motion (ROM);motor control;cognition  -EB               User Key  (r) = Recorded By, (t) = Taken By, (c) = Cosigned By      Initials Name Provider Type    Ciera Moss PTA Physical Therapist Assistant                   Mobility       Row Name 08/31/23 1559          Bed Mobility    Supine-Sit [place] (Bed Mobility) contact guard;verbal cues  -EB     Sit-Supine [place] (Bed Mobility) contact guard;verbal cues  -EB     Assistive Device (Bed Mobility) bed rails;head of bed elevated  -EB     Comment, (Bed Mobility) increased time. cues for sitting posture. Pt leans to right with head down. Pt able to correct with verbal cues.  -EB       Row Name 08/31/23 1559          Sit-Stand Transfer    Sit-Stand [place] (Transfers) minimum assist (75% patient effort);2 person assist;verbal cues  -EB     Assistive Device (Sit-Stand Transfers) walker, front-wheeled  -EB       Row Name 08/31/23 1559          Gait/Stairs (Locomotion)    [place] Level (Gait) minimum assist (75% patient effort);moderate assist (50% patient effort);2 person assist  MinAX2 initially then ModAX2  -EB     Distance in Feet (Gait) 25ft  -EB     Deviations/Abnormal Patterns (Gait) stride length decreased;base of support, narrow;gait speed decreased;angeli decreased  -EB     Bilateral Gait Deviations forward flexed posture  -EB     Left Sided Gait Deviations weight shift ability decreased;decreased knee extension;foot drop/toe drag  -EB     Comment, (Gait/Stairs) did well initially but increased assistance as pt fatigues. multiple sequencing cues. cues for posture and widening DMITRI. Needed assistance with maneuvering walker.  -EB               User Key  (r) = Recorded By, (t) = Taken By, (c) = Cosigned By      Initials Name Provider Type    Ciera Moss PTA Physical Therapist Assistant                    Obj/Interventions    No documentation.                  Goals/Plan    No documentation.                  Clinical Impression       Row Name 08/31/23 1601          Plan of Care Review    Plan of Care Reviewed With patient  -EB     Progress improving  -EB     Outcome Evaluation Pt seen for PT treatment today. pt continues to progress with mobility. Pt required CGA with bed mobility needing increased time. Pt completed a STS transfer with MinAX2. Pt worked on gait and ambulated 25ft with rwx, MinAX2/ModAX2. Pt  required multiple sequencing cues, cues for posture, and assistance with maneuvering walker. Pt required increased assistance as pt fatigued and pt's left foot would increasingly drag. Will continue to progress pt as able.  -EB       Row Name 08/31/23 1608          Therapy Assessment/Plan (PT)    Therapy Frequency (PT) 5 times/wk  -EB       Row Name 08/31/23 160          Positioning and Restraints    Pre-Treatment Position in bed  -EB     Post Treatment Position bed  -EB     In Bed side lying right;call light within reach;exit alarm on;encouraged to call for assist  -EB               User Key  (r) = Recorded By, (t) = Taken By, (c) = Cosigned By      Initials Name Provider Type    EB Ciera Carter PTA Physical Therapist Assistant                   Outcome Measures       Row Name 08/31/23 4795          How much help from another person do you currently need...    Turning from your back to your side while in flat bed without using bedrails? 3  -EB     Moving from lying on back to sitting on the side of a flat bed without bedrails? 3  -EB     Moving to and from a bed to a chair (including a wheelchair)? 2  -EB     Standing up from a chair using your arms (e.g., wheelchair, bedside chair)? 2  -EB     Climbing 3-5 steps with a railing? 1  -EB     To walk in hospital room? 2  -EB     AM-PAC 6 Clicks Score (PT) 13  -EB     Highest level of mobility 4 --> Transferred to chair/commode  -EB       Row Name 08/31/23 7259           Modified [place] Scale    Modified [place] Scale 4 - Moderately severe disability.  Unable to walk without assistance, and unable to attend to own bodily needs without assistance.  -EB               User Key  (r) = Recorded By, (t) = Taken By, (c) = Cosigned By      Initials Name Provider Type    Ciera Moss PTA Physical Therapist Assistant                                 Physical Therapy Education       Title: PT OT SLP Therapies (Done)       Topic: Physical Therapy (Done)       Point: Mobility training (Done)       Learning Progress Summary             Patient Acceptance, E,D, VU,NR by  at 8/31/2023 1607    Acceptance, E,D, VU,NR by EB at 8/30/2023 1621    Acceptance, E,TB, NR by CB at 8/26/2023 1558                         Point: Home exercise program (Done)       Learning Progress Summary             Patient Acceptance, E,D, VU,NR by  at 8/30/2023 1621                         Point: Body mechanics (Done)       Learning Progress Summary             Patient Acceptance, E,D, VU,NR by  at 8/31/2023 1607    Acceptance, E,D, VU,NR by EB at 8/30/2023 1621    Acceptance, E,TB, NR by CB at 8/26/2023 1558                         Point: Precautions (Done)       Learning Progress Summary             Patient Acceptance, E,D, VU,NR by  at 8/31/2023 1607    Acceptance, E,D, VU,NR by EB at 8/30/2023 1621    Acceptance, E,TB, NR by CB at 8/26/2023 1558                                         User Key       Initials Effective Dates Name Provider Type Discipline     02/14/23 -  Ciera Carter PTA Physical Therapist Assistant PT    CB 10/22/21 -  Luh Etienne PT Physical Therapist PT                  PT Recommendation and Plan     Plan of Care Reviewed With: patient  Progress: improving  Outcome Evaluation: Pt seen for PT treatment today. pt continues to progress with mobility. Pt required CGA with bed mobility needing increased time. Pt completed a STS transfer with MinAX2. Pt worked on gait and ambulated  25ft with rwx, MinAX2/ModAX2. Pt  required multiple sequencing cues, cues for posture, and assistance with maneuvering walker. Pt required increased assistance as pt fatigued and pt's left foot would increasingly drag. Will continue to progress pt as able.     Time Calculation:         PT Charges       Row Name 08/31/23 1557             Time Calculation    Start Time 1409  -EB      Stop Time 1432  -EB      Time Calculation (min) 23 min  -EB      PT Received On 08/31/23  -EB      PT - Next Appointment 09/01/23  -EB         Time Calculation- PT    Total Timed Code Minutes- PT 23 minute(s)  -EB                User Key  (r) = Recorded By, (t) = Taken By, (c) = Cosigned By      Initials Name Provider Type    Ciera Moss PTA Physical Therapist Assistant                  Therapy Charges for Today       Code Description Service Date Service Provider Modifiers Qty    21265736591 HC GAIT TRAINING EA 15 MIN 8/30/2023 Ciera Carter, PTA GP 1    42394233420 HC PT THERAPEUTIC ACT EA 15 MIN 8/30/2023 Ciera Carter, PTA GP 1    17128831660 HC PT THER SUPP EA 15 MIN 8/30/2023 Ciera Carter, PTA GP 2    71204970758 HC GAIT TRAINING EA 15 MIN 8/31/2023 Ciera Carter, PTA GP 1    16324267938 HC PT THERAPEUTIC ACT EA 15 MIN 8/31/2023 Ciera Carter, PTA GP 1    00463166276 HC PT THER SUPP EA 15 MIN 8/31/2023 Ciera Carter, PTA GP 1            PT G-Codes  Outcome Measure Options: AM-PAC 6 Clicks Basic Mobility (PT), Modified [place]  AM-PAC 6 Clicks Score (PT): 13  Modified [place] Scale: 4 - Moderately severe disability.  Unable to walk without assistance, and unable to attend to own bodily needs without assistance.       Ciera Carter PTA  8/31/2023

## 2023-09-01 LAB
ALBUMIN SERPL-MCNC: 2.9 G/DL (ref 3.5–5.2)
ANION GAP SERPL CALCULATED.3IONS-SCNC: 7.9 MMOL/L (ref 5–15)
BACTERIA SPEC AEROBE CULT: NORMAL
BASOPHILS # BLD AUTO: 0.02 10*3/MM3 (ref 0–0.2)
BASOPHILS NFR BLD AUTO: 0.3 % (ref 0–1.5)
BUN SERPL-MCNC: 8 MG/DL (ref 6–20)
BUN/CREAT SERPL: 9.2 (ref 7–25)
CA-I BLD-MCNC: 4.7 MG/DL (ref 4.6–5.4)
CA-I SERPL ISE-MCNC: 1.18 MMOL/L (ref 1.15–1.35)
CALCIUM SPEC-SCNC: 8 MG/DL (ref 8.6–10.5)
CHLORIDE SERPL-SCNC: 106 MMOL/L (ref 98–107)
CO2 SERPL-SCNC: 30.1 MMOL/L (ref 22–29)
CREAT SERPL-MCNC: 0.87 MG/DL (ref 0.76–1.27)
DEPRECATED RDW RBC AUTO: 44.1 FL (ref 37–54)
EGFRCR SERPLBLD CKD-EPI 2021: 100.6 ML/MIN/1.73
EOSINOPHIL # BLD AUTO: 0.22 10*3/MM3 (ref 0–0.4)
EOSINOPHIL NFR BLD AUTO: 3.2 % (ref 0.3–6.2)
ERYTHROCYTE [DISTWIDTH] IN BLOOD BY AUTOMATED COUNT: 13.4 % (ref 12.3–15.4)
GLUCOSE BLDC GLUCOMTR-MCNC: 105 MG/DL (ref 70–130)
GLUCOSE BLDC GLUCOMTR-MCNC: 116 MG/DL (ref 70–130)
GLUCOSE BLDC GLUCOMTR-MCNC: 120 MG/DL (ref 70–130)
GLUCOSE BLDC GLUCOMTR-MCNC: 130 MG/DL (ref 70–130)
GLUCOSE SERPL-MCNC: 117 MG/DL (ref 65–99)
GRAM STN SPEC: NORMAL
HCT VFR BLD AUTO: 35.2 % (ref 37.5–51)
HGB BLD-MCNC: 11.7 G/DL (ref 13–17.7)
IMM GRANULOCYTES # BLD AUTO: 0.11 10*3/MM3 (ref 0–0.05)
IMM GRANULOCYTES NFR BLD AUTO: 1.6 % (ref 0–0.5)
INR PPP: 1.75 (ref 0.9–1.1)
LYMPHOCYTES # BLD AUTO: 1.38 10*3/MM3 (ref 0.7–3.1)
LYMPHOCYTES NFR BLD AUTO: 20.1 % (ref 19.6–45.3)
MAGNESIUM SERPL-MCNC: 1.9 MG/DL (ref 1.6–2.6)
MCH RBC QN AUTO: 30.1 PG (ref 26.6–33)
MCHC RBC AUTO-ENTMCNC: 33.2 G/DL (ref 31.5–35.7)
MCV RBC AUTO: 90.5 FL (ref 79–97)
MONOCYTES # BLD AUTO: 0.66 10*3/MM3 (ref 0.1–0.9)
MONOCYTES NFR BLD AUTO: 9.6 % (ref 5–12)
NEUTROPHILS NFR BLD AUTO: 4.49 10*3/MM3 (ref 1.7–7)
NEUTROPHILS NFR BLD AUTO: 65.2 % (ref 42.7–76)
NRBC BLD AUTO-RTO: 0 /100 WBC (ref 0–0.2)
PHOSPHATE SERPL-MCNC: 2.4 MG/DL (ref 2.5–4.5)
PLATELET # BLD AUTO: 257 10*3/MM3 (ref 140–450)
PMV BLD AUTO: 8.9 FL (ref 6–12)
POTASSIUM SERPL-SCNC: 3.2 MMOL/L (ref 3.5–5.2)
POTASSIUM SERPL-SCNC: 4.1 MMOL/L (ref 3.5–5.2)
PROTHROMBIN TIME: 20.8 SECONDS (ref 11.7–14.2)
RBC # BLD AUTO: 3.89 10*6/MM3 (ref 4.14–5.8)
SODIUM SERPL-SCNC: 144 MMOL/L (ref 136–145)
WBC NRBC COR # BLD: 6.88 10*3/MM3 (ref 3.4–10.8)

## 2023-09-01 PROCEDURE — 82330 ASSAY OF CALCIUM: CPT | Performed by: NEUROLOGICAL SURGERY

## 2023-09-01 PROCEDURE — 85025 COMPLETE CBC W/AUTO DIFF WBC: CPT | Performed by: NEUROLOGICAL SURGERY

## 2023-09-01 PROCEDURE — 82948 REAGENT STRIP/BLOOD GLUCOSE: CPT

## 2023-09-01 PROCEDURE — 36415 COLL VENOUS BLD VENIPUNCTURE: CPT | Performed by: NEUROLOGICAL SURGERY

## 2023-09-01 PROCEDURE — 80069 RENAL FUNCTION PANEL: CPT | Performed by: INTERNAL MEDICINE

## 2023-09-01 PROCEDURE — 99024 POSTOP FOLLOW-UP VISIT: CPT

## 2023-09-01 PROCEDURE — 25010000002 THIAMINE HCL 200 MG/2ML SOLUTION: Performed by: NEUROLOGICAL SURGERY

## 2023-09-01 PROCEDURE — 94799 UNLISTED PULMONARY SVC/PX: CPT

## 2023-09-01 PROCEDURE — 83735 ASSAY OF MAGNESIUM: CPT | Performed by: NEUROLOGICAL SURGERY

## 2023-09-01 PROCEDURE — 25010000002 CALCITRIOL PER 0.1 MCG: Performed by: INTERNAL MEDICINE

## 2023-09-01 PROCEDURE — 85610 PROTHROMBIN TIME: CPT | Performed by: NEUROLOGICAL SURGERY

## 2023-09-01 PROCEDURE — 84132 ASSAY OF SERUM POTASSIUM: CPT | Performed by: STUDENT IN AN ORGANIZED HEALTH CARE EDUCATION/TRAINING PROGRAM

## 2023-09-01 PROCEDURE — 97116 GAIT TRAINING THERAPY: CPT

## 2023-09-01 RX ORDER — SODIUM PHOSPHATE IN 0.9 % NACL 15MMOL/100
15 PLASTIC BAG, INJECTION (ML) INTRAVENOUS ONCE
Status: COMPLETED | OUTPATIENT
Start: 2023-09-01 | End: 2023-09-01

## 2023-09-01 RX ORDER — BISACODYL 5 MG/1
5 TABLET, DELAYED RELEASE ORAL DAILY PRN
Status: DISCONTINUED | OUTPATIENT
Start: 2023-09-01 | End: 2023-09-08 | Stop reason: HOSPADM

## 2023-09-01 RX ORDER — ASPIRIN 81 MG/1
81 TABLET, CHEWABLE ORAL DAILY
Status: DISCONTINUED | OUTPATIENT
Start: 2023-09-01 | End: 2023-09-08 | Stop reason: HOSPADM

## 2023-09-01 RX ORDER — BISACODYL 10 MG
10 SUPPOSITORY, RECTAL RECTAL DAILY PRN
Status: DISCONTINUED | OUTPATIENT
Start: 2023-09-01 | End: 2023-09-08 | Stop reason: HOSPADM

## 2023-09-01 RX ORDER — POLYETHYLENE GLYCOL 3350 17 G/17G
17 POWDER, FOR SOLUTION ORAL DAILY
Status: DISCONTINUED | OUTPATIENT
Start: 2023-09-01 | End: 2023-09-08 | Stop reason: HOSPADM

## 2023-09-01 RX ORDER — POTASSIUM CHLORIDE 1.5 G/1.58G
40 POWDER, FOR SOLUTION ORAL EVERY 4 HOURS
Status: COMPLETED | OUTPATIENT
Start: 2023-09-01 | End: 2023-09-01

## 2023-09-01 RX ORDER — ERGOCALCIFEROL 1.25 MG/1
50000 CAPSULE ORAL
Status: DISCONTINUED | OUTPATIENT
Start: 2023-09-02 | End: 2023-09-08 | Stop reason: HOSPADM

## 2023-09-01 RX ADMIN — SODIUM PHOSPHATE, MONOBASIC, MONOHYDRATE AND SODIUM PHOSPHATE, DIBASIC, ANHYDROUS 15 MMOL: 276; 142 INJECTION, SOLUTION INTRAVENOUS at 10:28

## 2023-09-01 RX ADMIN — MUPIROCIN 1 APPLICATION: 20 OINTMENT TOPICAL at 08:21

## 2023-09-01 RX ADMIN — FOLIC ACID 1 MG: 5 INJECTION, SOLUTION INTRAMUSCULAR; INTRAVENOUS; SUBCUTANEOUS at 08:20

## 2023-09-01 RX ADMIN — POTASSIUM CHLORIDE 40 MEQ: 1.5 POWDER, FOR SOLUTION ORAL at 06:37

## 2023-09-01 RX ADMIN — Medication 10 ML: at 12:38

## 2023-09-01 RX ADMIN — FAMOTIDINE 20 MG: 10 INJECTION INTRAVENOUS at 08:19

## 2023-09-01 RX ADMIN — POTASSIUM CHLORIDE 40 MEQ: 1.5 POWDER, FOR SOLUTION ORAL at 15:48

## 2023-09-01 RX ADMIN — THIAMINE HYDROCHLORIDE 200 MG: 100 INJECTION, SOLUTION INTRAMUSCULAR; INTRAVENOUS at 22:50

## 2023-09-01 RX ADMIN — ASPIRIN 81 MG: 81 TABLET, CHEWABLE ORAL at 12:38

## 2023-09-01 RX ADMIN — CALCITRIOL 1 MCG: 1 INJECTION INTRAVENOUS at 08:19

## 2023-09-01 RX ADMIN — ATORVASTATIN CALCIUM 40 MG: 20 TABLET, FILM COATED ORAL at 20:34

## 2023-09-01 RX ADMIN — MUPIROCIN 1 APPLICATION: 20 OINTMENT TOPICAL at 20:34

## 2023-09-01 RX ADMIN — SENNOSIDES AND DOCUSATE SODIUM 2 TABLET: 50; 8.6 TABLET ORAL at 08:20

## 2023-09-01 RX ADMIN — POTASSIUM CHLORIDE 40 MEQ: 1.5 POWDER, FOR SOLUTION ORAL at 12:38

## 2023-09-01 RX ADMIN — Medication 10 ML: at 20:34

## 2023-09-01 RX ADMIN — THIAMINE HYDROCHLORIDE 200 MG: 100 INJECTION, SOLUTION INTRAMUSCULAR; INTRAVENOUS at 06:37

## 2023-09-01 RX ADMIN — THIAMINE HYDROCHLORIDE 200 MG: 100 INJECTION, SOLUTION INTRAMUSCULAR; INTRAVENOUS at 15:40

## 2023-09-01 NOTE — PROGRESS NOTES
[facility] Progress Note      Patient Name: Braxton Wolfe  : 1965  MRN: [id number]  Primary Care Physician:  Provider, No Known  Date of admission: 2023    Subjective     Interval History:   F/u YUDITH     Review of Systems:   Denies dyspnea or nausea     Objective     Vitals:   Temp:  [98.3 °F (36.8 °C)-99.6 °F (37.6 °C)] 98.6 °F (37 °C)  Heart Rate:  [68-78] 77  Resp:  [14-18] 14  BP: (123-140)/(78-93) 127/78  Flow (L/min):  [2] 2    Intake/Output Summary (Last 24 hours) at 2023 0727  Last data filed at 2023 1700  Gross per 24 hour   Intake 30 ml   Output 1400 ml   Net -1370 ml       Physical Exam:    General Appearance: comfortable, alert, +DHT  Neck: supple, no JVD  Lungs: CTA bilat no rales   Heart: RRR, normal S1 and S2  Abdomen: soft, nontender, nondistended  Extremities: no edema, cyanosis or clubbing      Scheduled Meds:     aspirin, 81 mg, Oral, Daily  atorvastatin, 40 mg, Oral, Nightly  calcitriol, 1 mcg, Intravenous, Once per day on   docusate sodium, 100 mg, Oral, BID  famotidine, 20 mg, Intravenous, Daily  folic acid (FOLVITE) IVPB, 1 mg, Intravenous, Daily  mupirocin, 1 application , Topical, Q12H  potassium chloride, 40 mEq, Oral, Q4H  senna-docusate sodium, 2 tablet, Oral, BID  sodium chloride, 10 mL, Intravenous, Q12H  thiamine (B-1) IV, 200 mg, Intravenous, Q8H   Followed by  [START ON 2023] thiamine, 100 mg, Oral, Daily      IV Meds:   lactated ringers, 9 mL/hr, Last Rate: 9 mL/hr (238)        Results Reviewed:   I have personally reviewed the results from the time of this admission to 2023 07:27 EDT     Results from last 7 days   Lab Units 23  0549 23  1635 23  0544 23  1707 23  0536 23  0648 23  0329 23  0917   SODIUM mmol/L 144  --  140  --  143   < > 147* 144   POTASSIUM mmol/L 3.2* 3.7 3.3*   < > 3.2*   < > 3.6 3.9   CHLORIDE mmol/L 106  --  106  --  106   < > 111*  108*   CO2 mmol/L 30.1*  --  25.0  --  27.0   < > 24.7 20.0*   BUN mg/dL 8  --  8  --  9   < > 44* 52*   CREATININE mg/dL 0.87  --  0.91  --  0.95   < > 2.53* 3.85*   CALCIUM mg/dL 8.0*  --  7.8*  --  8.0*   < > 8.2* 9.2   BILIRUBIN mg/dL  --   --  0.6  --   --   --  0.7 0.5   ALK PHOS U/L  --   --  80  --   --   --  58 69   ALT (SGPT) U/L  --   --  26  --   --   --  16 15   AST (SGOT) U/L  --   --  27  --   --   --  37 34   GLUCOSE mg/dL 117*  --  99  --  101*   < > 109* 137*    < > = values in this interval not displayed.     Estimated Creatinine Clearance: 107.3 mL/min (by C-G formula based on SCr of 0.87 mg/dL).  Results from last 7 days   Lab Units 09/01/23  0549 08/31/23  0544 08/30/23  0536   MAGNESIUM mg/dL 1.9 2.1 5.5*   PHOSPHORUS mg/dL 2.4* 2.0* 2.6     Results from last 7 days   Lab Units 08/26/23  0329 08/25/23  0917   URIC ACID mg/dL 4.8 8.5*     Results from last 7 days   Lab Units 09/01/23  0549 08/31/23  0554 08/30/23  0536 08/29/23  0636 08/28/23  1626   WBC 10*3/mm3 6.88 7.93 6.53 6.92 7.23   HEMOGLOBIN g/dL 11.7* 12.7* 12.7* 12.7* 12.7*   PLATELETS 10*3/mm3 257 252 206 157 150     Results from last 7 days   Lab Units 09/01/23  0549 08/31/23  0554 08/30/23  0536 08/29/23  0636 08/28/23  1626   INR  1.75* 2.02* 1.33* 1.41* 1.45*       Assessment / Plan     ASSESSMENT:  Non olig YUDITH - prerenal azotemia due to volume depletion, resolved with IVF. Na adequate 144, can inc water flushes if climbs further.  K low still 3.2.  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  Neurosurgery evaluated for increase in his lateral and third ventricle sizes.  SP  shunt revision 8/29.   Dysphagia - core track placed, may need PEG.  Alb low 2.9.    Aspiration PNA .  Completed antibiotic therapy. Speech eval ongoing .  Needs nutrition.  Will need a decision on PEG versus  eating.      PLAN:  Replace K - Inc KCL 40 q4h x 3 doses   As renal fcn has normalized nephrology will sign off.  Please call back w questions      Joel Gilliam MD  09/01/23  07:27 EDT    [facility]  [phone]

## 2023-09-01 NOTE — PLAN OF CARE
Goal Outcome Evaluation:  Plan of Care Reviewed With: patient        Progress: no change  Outcome Evaluation: Pt seen for PT treatment today. Pt required CGA with bed mobility with pt needing verbal cues to correct forward lean while sitting on EOB. Pt completed STS transfer with MinAX2 and ambulated 20ft with ModAX2/rwx,. Pt required several stops during gait to correct forward flexed posture. Pt has left foot/toe drag but will become excessive when pt becomes increasingly fatigued. Pt does exhibit decreased coordination of left foot placement. Pt requires constant sequencing cues and assist with guiding walker with turns. Will continue to progress pt as able and follow pt for d/c needs.

## 2023-09-01 NOTE — PROGRESS NOTES
Name: Braxton Wolfe ADMIT: 2023   : 1965  PCP: Provider, No Known    MRN: [id number] LOS: 8 days   AGE/SEX: 57 y.o. male  ROOM: [facility]     Subjective   Subjective   No acute events overnight.  Patient laying in bed, patient with delayed response to questions, however alert, oriented x3, follows commands.  Was initiated on tube feeds via core track yesterday, tolerating.  Denies fevers chills.  Denies abdominal pain.    Review of Systems   As above  Objective   Objective   Vital Signs  Temp:  [97.6 °F (36.4 °C)-99.6 °F (37.6 °C)] 98.8 °F (37.1 °C)  Heart Rate:  [68-86] 86  Resp:  [14-18] 18  BP: (116-140)/(73-88) 116/73  SpO2:  [93 %-95 %] 94 %  on   ;   Device (Oxygen Therapy): room air  Body mass index is 25.62 kg/m².  Physical Exam    General: Alert, laying in bed, not in distress, chronically ill appearing  HEENT: Normocephalic, atraumatic  CV: Regular rate and rhythm, no murmurs rubs or gallops  Lungs: CTA, no wheezing  Abdomen: Soft, nontender, nondistended  Extremities: No significant peripheral edema , left-sided weakness    Results Review     I reviewed the patient's new clinical results.  Results from last 7 days   Lab Units 23  0549 23  0554 23  0536 23  0636   WBC 10*3/mm3 6.88 7.93 6.53 6.92   HEMOGLOBIN g/dL 11.7* 12.7* 12.7* 12.7*   PLATELETS 10*3/mm3 257 252 206 157     Results from last 7 days   Lab Units 23  0549 23  1635 23  0544 23  1707 23  0536 23  0641   SODIUM mmol/L 144  --  140  --  143 144   POTASSIUM mmol/L 3.2* 3.7 3.3* 4.0 3.2* 3.1*   CHLORIDE mmol/L 106  --  106  --  106 109*   CO2 mmol/L 30.1*  --  25.0  --  27.0 26.0   BUN mg/dL 8  --  8  --  9 11   CREATININE mg/dL 0.87  --  0.91  --  0.95 0.97   GLUCOSE mg/dL 117*  --  99  --  101* 96   Estimated Creatinine Clearance: 107.3 mL/min (by C-G formula based on SCr of 0.87 mg/dL).  Results from last 7 days   Lab Units 23  0549 23  0544  08/26/23  0329   ALBUMIN g/dL 2.9* 2.7* 2.8*   BILIRUBIN mg/dL  --  0.6 0.7   ALK PHOS U/L  --  80 58   AST (SGOT) U/L  --  27 37   ALT (SGPT) U/L  --  26 16     Results from last 7 days   Lab Units 09/01/23  0549 08/31/23  0544 08/30/23  0536 08/29/23  0641 08/27/23  0648 08/26/23  0329   CALCIUM mg/dL 8.0* 7.8* 8.0* 8.0*   < > 8.2*   ALBUMIN g/dL 2.9* 2.7*  --   --   --  2.8*   MAGNESIUM mg/dL 1.9 2.1 5.5* 3.4*   < > 2.8*   PHOSPHORUS mg/dL 2.4* 2.0* 2.6 2.6   < > 2.2*    < > = values in this interval not displayed.     Results from last 7 days   Lab Units 08/26/23  0329   PROCALCITONIN ng/mL 1.48*   No results found for: COVID19  Glucose   Date/Time Value Ref Range Status   09/01/2023 0810 120 70 - 130 mg/dL Final   09/01/2023 0420 105 70 - 130 mg/dL Final   08/31/2023 2354 111 70 - 130 mg/dL Final   08/31/2023 1211 107 70 - 130 mg/dL Final   08/31/2023 0418 103 70 - 130 mg/dL Final   08/31/2023 0038 99 70 - 130 mg/dL Final   08/30/2023 2051 92 70 - 130 mg/dL Final           XR Abdomen KUB  Clinical: Core check catheter placement     COMPARISON 8/28/2023     FINDINGS: Cortright catheter tip is located within the duodenum, at the  junction of the second and third portions. It has been advanced since  the prior examination. There is contrast demonstrated now within the  colon. The remainder is unremarkable.     This report was finalized on 8/31/2023 2:47 PM by Dr. Tyrel Chavez M.D.     CT Head Without Contrast  Narrative: CT SCAN OF THE HEAD WITHOUT CONTRAST 08/30/2023     CLINICAL HISTORY: Status post revision of  shunt yesterday 08/29/2023.  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  Patient  has a history of Von Hippel-Lindau.      TECHNIQUE: Spiral CT images were obtained from the base of the skull to  the vertex without intravenous contrast. Images were reformatted and  submitted in 1 mm thick axial, sagittal and  coronal CT sections with  brain algorithm.      This is correlated to prior head CTs yesterday 08/29/2023 and head CT  08/24/2023 and a prior MRI of the brain on 08/28/2023 and outside MRIs  of the brain on 04/21/2022 and 01/14/2022.     FINDINGS: The patient has had a large 5 x 7.5 cm midline occipital left  occipital craniectomy.  The craniectomy is covered by metallic mesh and  resection of the posterior midline 1.8 cm segment of the posterior ring  of C1, and by history this occurred back on 04/26/2019 for resection of  cerebellar hemangioblastoma. There is extensive encephalomalacia  involving the majority of the left cerebellar hemisphere and some  loculated CSF lateral to the left cerebellum better demonstrated on  recent MRI of the brain on 08/25/2023. There is also encephalomalacia  throughout the majority of the right cerebellum. There is a markedly  enlarged fourth ventricle measuring 4.1 x 3.4 cm in medial lateral and  anterior posterior dimension. There is tenting of the cervicomedullary  junction posteriorly. Recent MRI of the brain on 08/28/2023 demonstrated  multiple small enhancing cerebellar nodules compatible with multiple  hemangioblastomas that are not able to be appreciated on this  noncontrast head CT. There is a ventriculoperitoneal shunt catheter in  place that courses through the superior right coronal suture through the  superior right frontal lobe parenchyma enters the superior body of the  right lateral ventricle and its distal tip is at the level of the right  foramen of Monro.  The lateral and third ventricles are mildly enlarged.   The temporal horns of the lateral ventricles in particular are large in  size. The lateral and third ventricles have clearly enlarged when  compared to an outside MRI of the brain on 04/21/2021. The lateral and  third ventricles have slightly decreased in size when compared to head  CT 08/24/2023, unchanged in size when compared to yesterday's head  CT  08/29/2023. There is confluent rind of low density in the right frontal  white matter along the margins of the ventriculoperitoneal shunt  catheter with rind of low-density tracking 3 x 3.3 cm, may be white  matter encephalomalacia, while there could be some backtracking of CSF.  Since the head CT on 08/24/2023 there is resolving low-density in the  temporal occipital periventricular white matter compatible with  resolving transependymal extension of CSF. A tiny tubular tract through  the lateral right parietal bone where there was likely previous  placement and removal of a ventriculostomy catheter in the remote past.  I see no midline shift. No acute intracranial hemorrhage is identified.  There are bubbles of air along the port for the ventriculoperitoneal  shunt catheter within the scalp overlying the anterolateral right  parietal bone from recent shunt revision procedure, and the bubbles of  air are new when compared to yesterday afternoon's head CT 08/29/2023 at  4:39 p.m.     Impression: 1. Since yesterday afternoon's head CT on 08/29/2023 at 4:39 p.m., there  has been shunt revision with bubbles of air in the scalp adjacent to the  shunt port overlying the anterior superior lateral right parietal bone.  There is stable position of the  shunt catheter that courses from the  superior right coronal suture through the superior right frontal lobe  parenchyma through the superior body of the right lateral ventricle and  its distal tip is at the right foramen of Monro. The lateral and third  ventricles remain enlarged. They are clearly larger than they were on an  outside MRI of the brain on 04/21/2022. However they are slightly  smaller than they were on a head CT 6 days ago on 08/24/2023. Since the  head CT on 08/24/2023, six days ago, there is resolving low-density in  the temporal occipital periventricular white matter compatible with  resolving transependymal extension of CSF.     2. Back on 04/26/2019  the patient had a large 7.5 x 5 cm midline  occipital to left occipital craniectomy and it is covered by metallic  mesh and by history resection of cerebellar hemangioblastoma in this  patient with history of Von Hippel-Lindau. There is extensive  encephalomalacia involving the majority of the left cerebellum and also  extensive encephalomalacia throughout good portions of the right  cerebellar hemisphere. MRI of the brain 2 days ago on 08/28/2023  demonstrated multiple tiny enhancing cerebellar nodules compatible with  multiple hemangioblastomas that are unable to be evaluated on this  noncontrast head CT. There is marked dilatation of the fourth ventricle.   Some of it is ex vacuo dilatation due to extensive cerebellar  encephalomalacia although there is posterior tenting of the  cervicomedullary junction and a small size superior aspect of the  aqueduct of Sylvius and the marked dilatation of the fourth ventricle  may be secondary to encystment of the fourth ventricle. The remainder of  the head CT is unremarkable. The results were communicated to Roberto Ritchie MD, from neurosurgery by telephone on 08/30/2023 at 9:50 AM.     Radiation dose reduction techniques were utilized, including automated  exposure control and exposure modulation based on body size.        This report was finalized on 8/31/2023 5:56 AM by Dr. Shahram Quintero M.D.       Scheduled Medications  aspirin, 81 mg, Nasogastric, Daily  atorvastatin, 40 mg, Oral, Nightly  calcitriol, 1 mcg, Intravenous, Once per day on Mon Wed Fri  docusate sodium, 100 mg, Oral, BID  famotidine, 20 mg, Intravenous, Daily  folic acid (FOLVITE) IVPB, 1 mg, Intravenous, Daily  mupirocin, 1 application , Topical, Q12H  polyethylene glycol, 17 g, Nasogastric, Daily  potassium chloride, 40 mEq, Oral, Q4H  sodium chloride, 10 mL, Intravenous, Q12H  sodium phosphate, 15 mmol, Intravenous, Once  thiamine (B-1) IV, 200 mg, Intravenous, Q8H   Followed by  [START ON 9/4/2023]  thiamine, 100 mg, Oral, Daily    Infusions  lactated ringers, 9 mL/hr, Last Rate: 9 mL/hr (08/29/23 1818)    Diet  NPO Diet NPO Type: Strict NPO    I have personally reviewed     [x]  Laboratory   [x]  Microbiology   [x]  Radiology   [x]  EKG/Telemetry  []  Cardiology/Vascular   []  Pathology    []  Records       Assessment/Plan     Active Hospital Problems    Diagnosis  POA    **Sepsis [A41.9]  Yes    S/P  P[facility]is SPVA-200 shunt 8/29/23 w/ Dr. Ritchie valve set to 110 [Z98.2]  Not Applicable    Shunt malfunction [T85.618A]  Yes    Cerebral ventriculomegaly [G93.89]  Yes    Lethargy [R53.83]  Yes    Confusion [R41.0]  Yes      Resolved Hospital Problems   No resolved problems to display.       57 y.o. male admitted with Sepsis.      Patient has a history of von Hippel-Lindau syndrome and is on tumor suppressive medication  welireg, CNS hemangioblastomas, craniotomy status post  shunt placement, baseline hemiparesis, being brought to the ED on 08/24/2023 after found down by family.    Acute encephalopathy; suspected HIE  -Mental status continues to improve  -Status post shunt revision 8/29, by neurosurgery:Patient does not require follow-up shunt xray for setting confirmation. Follow-up as scheduled. Office will contact patient if follow-up required sooner due to xray findings. Patient to call for any concerns including but not limited to headaches, vision changes, balance difficulties, incontinence of bladder, weakness.      Acute hypercapnic respiratory failure s/p vent till 8/25  -Patient is currently on room air      B/L Aspiration pneumonia  -Possibly acute on chronic aspiration  -Status pos IV ceftriaxone x4 days.          Dysphagia/vomiting;status post core track placement 08/31, speech therapy.  Will need repeat swallow study early next week, if continues to aspirate we need to discuss further goals of care including possible PEG tube.  No further episodes of vomiting, continue to monitor.         YUDITH:  Resolved with IV fluids, monitor.      Hypokalemia/hypophosphatemia: P.o. and IV replacement ordered, monitor daily.       Incidental lung nodules- seen on CT 08/24/2023, CT follow-up in 3 months is  recommended.     S/p prior  shunt and required revision-neurosurgery signed off     VHL with craniotomy   - residual left-sided weakness from this   -Patient is on Welireg for VHL to slow down tumor growth.  He has been off of this medication now for a week.  He will need to follow-up with MD Reynoso about when to restart side effects from this medication are hypoxemia and anemia.  Starting dose is 120 but the patient has been intolerant of it in the past and is down to 40 mg.     Mild coagulopathy possibly related to nutrition, monitor, improving           SCDs for DVT prophylaxis.  Full code.  Discussed with patient and nursing staff.  Anticipate discharge SNS, versus home health, timing to be determined      Copied text in this note has been reviewed and is accurate as of 09/01/23.         Dictated utilizing Dragon dictation        Bentley Lovell MD  [facility]  09/01/23  11:58 EDT

## 2023-09-01 NOTE — PROGRESS NOTES
"Nutrition Services    Patient Name:  Braxton Wolfe  YOB: 1965  MRN: [id number]  Admit Date:  8/24/2023    Assessment Date:  09/01/23    Comment:  Nutrition follow up.  Tolerating TF's so far with Isosource 1.5, Currently at 35mL/hr.  Had a rough night not getting much sleep due to bed alarm.  Resting now. IVF's now off. Na+ 144.      Would increase free water to 30mL/hr.    RD to follow clinical course, nutrition support needs.      CLINICAL NUTRITION ASSESSMENT      Reason for Assessment Follow-up Protocol     Diagnosis/Problem   Acute encephalopathy. Asp PNA, vomiting, ARF, S/p prior  shunt ,VHL syndrome with CNS hemangioblastomas    Medical/Surgical History History reviewed. No pertinent past medical history.    Past Surgical History:   Procedure Laterality Date    BRAIN SURGERY       SHUNT INSERTION N/A 8/29/2023    Procedure: VENTRICULAR PERITONEAL SHUNT REVISION;  Surgeon: Roberto Ritchie MD;  Location: [facility];  Service: Neurosurgery;  Laterality: N/A;        Encounter Information        Nutrition History:     Food Preferences:    Supplements:    Factors Affecting Intake: altered mental status, swallow impairment     Anthropometrics        Current Height  Current Weight  BMI kg/m2 Height: 177.8 cm (70\")  Weight: 81 kg (178 lb 9.2 oz) (09/01/23 0638)  Body mass index is 25.62 kg/m².   Adjusted BMI (if applicable)    BMI Category Overweight (25 - 29.9)       Admission Weight 75.9kg       Ideal Body Weight (IBW) 73kg   Adjusted IBW (if applicable)        Usual Body Weight (UBW) unkown   Weight Trend Unknown       Weight History Wt Readings from Last 30 Encounters:   09/01/23 0638 81 kg (178 lb 9.2 oz)   08/31/23 0505 79.7 kg (175 lb 11.3 oz)   08/30/23 0547 85.7 kg (188 lb 15 oz)   08/29/23 1733 79.4 kg (175 lb)   08/29/23 0441 79 kg (174 lb 2.6 oz)   08/28/23 0350 76.6 kg (168 lb 14 oz)   08/26/23 0319 79.8 kg (175 lb 14.8 oz)   08/25/23 0906 75.8 kg (167 lb)   08/24/23 1802 75.9 kg " (167 lb 5.3 oz)   08/25/23 1852 75.8 kg (167 lb)        Estimated/Assessed Needs        Current Weight  Weight: 81 kg (178 lb 9.2 oz) (09/01/23 0638)       Energy Requirements    Weight for Calculation 79.7 kg   Method for Estimation  25 kcal/kg   EST Needs (kcal/day) 1993       Protein Requirements    Weight for Calculation 79.7 kg   EST Protein Needs (g/kg) 1.0 - 1.2 gm/kg   EST Daily Needs (g/day) 80-95       Fluid Requirements     Method for Estimation 1 mL/kcal    EST Needs (mL/day) 2000     Tests/Procedures        Tests/Procedures X-Ray     Labs       Pertinent Labs    Results from last 7 days   Lab Units 09/01/23  0549 08/31/23  1635 08/31/23  0544 08/30/23  1707 08/30/23  0536 08/27/23  0648 08/26/23  0329   SODIUM mmol/L 144  --  140  --  143   < > 147*   POTASSIUM mmol/L 3.2* 3.7 3.3*   < > 3.2*   < > 3.6   CHLORIDE mmol/L 106  --  106  --  106   < > 111*   CO2 mmol/L 30.1*  --  25.0  --  27.0   < > 24.7   BUN mg/dL 8  --  8  --  9   < > 44*   CREATININE mg/dL 0.87  --  0.91  --  0.95   < > 2.53*   CALCIUM mg/dL 8.0*  --  7.8*  --  8.0*   < > 8.2*   BILIRUBIN mg/dL  --   --  0.6  --   --   --  0.7   ALK PHOS U/L  --   --  80  --   --   --  58   ALT (SGPT) U/L  --   --  26  --   --   --  16   AST (SGOT) U/L  --   --  27  --   --   --  37   GLUCOSE mg/dL 117*  --  99  --  101*   < > 109*    < > = values in this interval not displayed.       Results from last 7 days   Lab Units 09/01/23  0549 08/31/23  0554 08/31/23  0544 08/30/23  0536 08/29/23  0641   MAGNESIUM mg/dL 1.9  --  2.1 5.5* 3.4*   PHOSPHORUS mg/dL 2.4*  --  2.0* 2.6 2.6   HEMOGLOBIN g/dL 11.7*   < >  --  12.7*  --    HEMATOCRIT % 35.2*   < >  --  37.2*  --    WBC 10*3/mm3 6.88   < >  --  6.53  --    TRIGLYCERIDES mg/dL  --   --   --   --  105   ALBUMIN g/dL 2.9*  --  2.7*  --   --     < > = values in this interval not displayed.       Results from last 7 days   Lab Units 09/01/23  0549 08/31/23  0554 08/30/23  0536 08/29/23  0636 08/28/23  1626    INR  1.75* 2.02* 1.33* 1.41* 1.45*   PLATELETS 10*3/mm3 257 252 206 157 150       No results found for: COVID19  Lab Results   Component Value Date    HGBA1C 5.80 (H) 08/29/2023          Medications           Scheduled Medications aspirin, 81 mg, Oral, Daily  atorvastatin, 40 mg, Oral, Nightly  calcitriol, 1 mcg, Intravenous, Once per day on Mon Wed Fri  docusate sodium, 100 mg, Oral, BID  famotidine, 20 mg, Intravenous, Daily  folic acid (FOLVITE) IVPB, 1 mg, Intravenous, Daily  mupirocin, 1 application , Topical, Q12H  potassium chloride, 40 mEq, Oral, Q4H  senna-docusate sodium, 2 tablet, Oral, BID  sodium chloride, 10 mL, Intravenous, Q12H  sodium phosphate, 15 mmol, Intravenous, Once  thiamine (B-1) IV, 200 mg, Intravenous, Q8H   Followed by  [START ON 9/4/2023] thiamine, 100 mg, Oral, Daily       Infusions lactated ringers, 9 mL/hr, Last Rate: 9 mL/hr (08/29/23 1818)       PRN Medications   benzonatate    senna-docusate sodium **AND** polyethylene glycol **AND** bisacodyl **AND** bisacodyl    Calcium Replacement - Follow Nurse / BPA Driven Protocol    dextrose    dextrose    glucagon (human recombinant)    HYDROcodone-acetaminophen    nitroglycerin    ondansetron    Potassium Replacement - Follow Nurse / BPA Driven Protocol    [COMPLETED] Insert Peripheral IV **AND** sodium chloride    sodium chloride    sodium chloride     Physical Findings          Physical Appearance alert, oriented, room air   Oral/Mouth Cavity WNL   Edema  1+ (trace), 2+ (mild)   Gastrointestinal last bowel movement: 8/31   Skin  surgical incision: head   Tubes/Drains/Lines Cortrak, ND tube, bridle in place   NFPE No clinical signs of muscle wasting or fat loss   --  Current Nutrition Orders & Evaluation of Intake       Oral Nutrition     Food Allergies NKFA   Current PO Diet NPO Diet NPO Type: Strict NPO   Supplement n/a   PO Evaluation     % PO Intake/# of Days 0      Enteral Nutrition     Enteral Route ND    TF Delivery Method  Continuous    Enteral Product Isosource 1.5    Modular None    Propofol Rate/Kcal     TF Current Rate (mL) 35    TF Goal Rate (mL) 55    Current Water Flush (mL) 30 q 4 hrs    Current TF Provision  1260 kcal, 57 gm protein, 638 mL free water + 180 mL water flushes         Calories 63 % needs met         Protein  63 % needs met         TF Fluid (mL) 818mL         IV Fluids     Avg Volume Delivered (mL) Not well documented    % Goal Volume     TF Residual  no or minimal residual    TF Changes rate increased    TF Tolerance tolerating     PES STATEMENT / NUTRITION DIAGNOSIS      Nutrition Dx Problem  Problem: Inadequate Oral Intake  Etiology: Medical Diagnosis - encephalopathy    Signs/Symptoms: Report of Minimal PO Intake    Comment:    --  NUTRITION INTERVENTION / PLAN OF CARE      Intervention Goal(s) Maintain nutrition status, Reduce/improve symptoms, Meet estimated needs, Disease management/therapy, Initiate feeding/diet, Tolerate PO , and No significant weight loss         RD Intervention/Action Follow Tx Progress, Care plan reviewed, and Recommend/ordered:          Prescription/Orders:       PO Diet       Supplements       Snacks       Enteral Nutrition       Parenteral Nutrition    New Prescription Ordered? Yes      Enteral Prescription:     Enteral Route NG    TF Delivery Method Continuous    Enteral Product Isosource 1.5    Modular None    Propofol Rate/Kcal     TF Start Rate (mL/hr) 20    TF Goal Rate (mL/hr) 55    Free Water Flush (mL) 30 mL q 1 hrs    TF Provision at Goal: 1998kcal, 89gm protein, 1003mL free water + 720mL water flushes         Calories 99 % needs met         Protein  100 % needs met         Fluid (mL) 1723 mL total     Prescription Ordered Yes         Monitor/Evaluation Per protocol   Discharge Plan/Needs Pending clinical course   Education Will instruct as appropriate   --    RD to follow per protocol.    Electronically signed by:  Chiquita Klein RD  09/01/23 11:32 EDT

## 2023-09-01 NOTE — PROGRESS NOTES
"                                              LOS: 8 days   Patient Care Team:  Provider, No Known as PCP - General    Chief Complaint:  F/up aspiration pneumonia, respiratory failure.    Subjective   Interval History  On RA.  No dyspnea or cough      Physical Exam:     Vital Signs  Temp:  [97.6 °F (36.4 °C)-99.6 °F (37.6 °C)] 98.8 °F (37.1 °C)  Heart Rate:  [68-86] 78  Resp:  [14-18] 18  BP: (116-140)/(73-88) 116/73    Intake/Output Summary (Last 24 hours) at 9/1/2023 1443  Last data filed at 8/31/2023 1700  Gross per 24 hour   Intake 20 ml   Output 550 ml   Net -530 ml       Flowsheet Rows      Flowsheet Row First Filed Value   Admission Height 177.8 cm (70\") Documented at 08/25/2023 0906   Admission Weight 75.9 kg (167 lb 5.3 oz) Documented at 08/24/2023 1802            PPE used per hospital policy    General Appearance:   Alert, cooperative, in no acute distress               Lungs:   Relatively clear anterior auscultation but slightly coarse at the bases.  No wheezing.  No change    Heart:   Regular rhythm and normal rate, normal S1 and S2, no         murmur           Neuro/psych:  Conscious, alert, oriented x3.  Moves all extremities..  Appropriate mood and affect   Extremities:  No cyanosis, clubbing or edema.  Warm extremities and well-perfused          Results Review:        Results from last 7 days   Lab Units 09/01/23  0549 08/31/23  1635 08/31/23  0544 08/30/23  1707 08/30/23  0536   SODIUM mmol/L 144  --  140  --  143   POTASSIUM mmol/L 3.2* 3.7 3.3*   < > 3.2*   CHLORIDE mmol/L 106  --  106  --  106   CO2 mmol/L 30.1*  --  25.0  --  27.0   BUN mg/dL 8  --  8  --  9   CREATININE mg/dL 0.87  --  0.91  --  0.95   GLUCOSE mg/dL 117*  --  99  --  101*   CALCIUM mg/dL 8.0*  --  7.8*  --  8.0*    < > = values in this interval not displayed.       Results from last 7 days   Lab Units 08/26/23  0329   CK TOTAL U/L 1,208*       Results from last 7 days   Lab Units 09/01/23  0549 08/31/23  0554 08/30/23  0536 "   WBC 10*3/mm3 6.88 7.93 6.53   HEMOGLOBIN g/dL 11.7* 12.7* 12.7*   HEMATOCRIT % 35.2* 38.4 37.2*   PLATELETS 10*3/mm3 257 252 206       Results from last 7 days   Lab Units 09/01/23  0549 08/31/23  0554 08/30/23  0536   INR  1.75* 2.02* 1.33*                                     I reviewed the patient's new clinical results.        Medication Review:   aspirin, 81 mg, Nasogastric, Daily  atorvastatin, 40 mg, Oral, Nightly  calcitriol, 1 mcg, Intravenous, Once per day on Mon Wed Fri  docusate sodium, 100 mg, Oral, BID  famotidine, 20 mg, Intravenous, Daily  folic acid (FOLVITE) IVPB, 1 mg, Intravenous, Daily  mupirocin, 1 application , Topical, Q12H  polyethylene glycol, 17 g, Nasogastric, Daily  potassium chloride, 40 mEq, Oral, Q4H  sodium chloride, 10 mL, Intravenous, Q12H  thiamine (B-1) IV, 200 mg, Intravenous, Q8H   Followed by  [START ON 9/4/2023] thiamine, 100 mg, Oral, Daily        lactated ringers, 9 mL/hr, Last Rate: 9 mL/hr (08/29/23 1818)          Assessment     Acute hypercapnic respiratory failure s/p vent 8/24-8/25  B/L Aspiration pneumonia  Vomiting; unclear etiology  Acute encephalopathy; suspected HIE (resolved)  Acute SVT  Acute renal failure  Electrolytes disturbance: Hypernatremia and hypokalemia  Severe hypocalcemia and metabolic acidosis  Incidental lung nodules-needs outpatient follow  S/p prior  shunt  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  Anemia, chronic  Dysphagia s/p core track placement 8/20          Plan     -Finished 5 days antibiotic therapy.  No need to continue further.  -Oxygen as needed to keep SPO2 >90%  -Continue flutter and I-S.  Encouraged to use it.  -Free water through the core track  - Tube feeding  -Oxygen by NC and titrate to keep SPO2 >90%  -PT OT    No much else to add from pulmonary perspective and therefore we will sign off.    Fran Carbone MD  09/01/23  14:43 EDT        This note was dictated utilizing Stormy  dictation

## 2023-09-01 NOTE — PROGRESS NOTES
[facility] NEUROSURGERY INTRACRANIAL POSTOP note    PATIENT IDENTIFICATION:   Name:  Braxton Wolfe      MRN:  [id number]     57 y.o.  male               CC:POD #3  ventriculoperitoneal shunt revision      Subjective     Interval History: No acute issues over the past couple days.  Nursing reports delayed responses at times with occasional incontinence.  Observed patient ambulating with physical therapy today.  Needing assist x2 with walker.  Weaker on left side with left foot dragging.    Objective     Vital signs in last 24 hours:  Temp:  [97.6 °F (36.4 °C)-99.6 °F (37.6 °C)] 97.6 °F (36.4 °C)  Heart Rate:  [68-78] 77  Resp:  [14-18] 18  BP: (121-140)/(78-93) 121/88      Intake/Output this shift:  No intake/output data recorded.    Intake/Output last 3 shifts:  I/O last 3 completed shifts:  In: 30 [Other:30]  Out: 2200 [Urine:2200]        LABS:  .  Results from last 7 days   Lab Units 09/01/23  0549 08/31/23  0554 08/30/23  0536   WBC 10*3/mm3 6.88 7.93 6.53   HEMOGLOBIN g/dL 11.7* 12.7* 12.7*   HEMATOCRIT % 35.2* 38.4 37.2*   PLATELETS 10*3/mm3 257 252 206       .  Results from last 7 days   Lab Units 09/01/23  0549 08/31/23  1635 08/31/23  0544 08/30/23  1707 08/30/23  0536 08/27/23  0648 08/26/23  0329   SODIUM mmol/L 144  --  140  --  143   < > 147*   POTASSIUM mmol/L 3.2* 3.7 3.3*   < > 3.2*   < > 3.6   CHLORIDE mmol/L 106  --  106  --  106   < > 111*   CO2 mmol/L 30.1*  --  25.0  --  27.0   < > 24.7   BUN mg/dL 8  --  8  --  9   < > 44*   CREATININE mg/dL 0.87  --  0.91  --  0.95   < > 2.53*   CALCIUM mg/dL 8.0*  --  7.8*  --  8.0*   < > 8.2*   BILIRUBIN mg/dL  --   --  0.6  --   --   --  0.7   ALK PHOS U/L  --   --  80  --   --   --  58   ALT (SGPT) U/L  --   --  26  --   --   --  16   AST (SGOT) U/L  --   --  27  --   --   --  37   GLUCOSE mg/dL 117*  --  99  --  101*   < > 109*    < > = values in this interval not displayed.            IMAGING STUDIES:    No new neuroimaging    Meds reviewed/changed:  Yes    Current Facility-Administered Medications:     aspirin EC tablet 81 mg, 81 mg, Oral, Daily, Deon, Donna, APRN    atorvastatin (LIPITOR) tablet 40 mg, 40 mg, Oral, Nightly, Deon, Donna, APRN, 40 mg at 08/31/23 2206    benzonatate (TESSALON) capsule 100 mg, 100 mg, Oral, TID PRN, Roberto Ritchie MD    sennosides-docusate (PERICOLACE) 8.6-50 MG per tablet 2 tablet, 2 tablet, Oral, BID, 2 tablet at 09/01/23 0820 **AND** polyethylene glycol (MIRALAX) packet 17 g, 17 g, Oral, Daily PRN **AND** bisacodyl (DULCOLAX) EC tablet 5 mg, 5 mg, Oral, Daily PRN **AND** bisacodyl (DULCOLAX) suppository 10 mg, 10 mg, Rectal, Daily PRN, Roberto Ritchie MD    calcitriol (CALCIJEX) injection 1 mcg, 1 mcg, Intravenous, Once per day on Mon Wed Fri, Sweta Luu MD, 1 mcg at 09/01/23 0819    Calcium Replacement - Follow Nurse / BPA Driven Protocol, , Does not apply, PRN, Roberto Ritchie MD    dextrose (D50W) (25 g/50 mL) IV injection 25 g, 25 g, Intravenous, Q15 Min PRN, Roberto Ritchie MD, 25 g at 08/27/23 0630    dextrose (GLUTOSE) oral gel 15 g, 15 g, Oral, Q15 Min PRN, Roberto Ritchie MD    docusate sodium (COLACE) capsule 100 mg, 100 mg, Oral, BID, Roberto Ritchie MD    famotidine (PEPCID) injection 20 mg, 20 mg, Intravenous, Daily, Roberto Ritchie MD, 20 mg at 09/01/23 0819    folic acid 1 mg in sodium chloride 0.9 % 50 mL IVPB, 1 mg, Intravenous, Daily, Roberto Ritchie MD, Last Rate: 100 mL/hr at 09/01/23 0820, 1 mg at 09/01/23 0820    glucagon (GLUCAGEN) injection 1 mg, 1 mg, Intramuscular, Q15 Min PRN, Roberto Ritchie MD    HYDROcodone-acetaminophen (NORCO) 5-325 MG per tablet 1 tablet, 1 tablet, Oral, Q6H PRN, Roberto Ritchie MD    lactated ringers infusion, 9 mL/hr, Intravenous, Continuous, Roberto Ritchie MD, Last Rate: 9 mL/hr at 08/29/23 1818, 9 mL/hr at 08/29/23 1818    mupirocin (BACTROBAN) 2 % ointment 1 application , 1 application , Topical, Q12H, Heath Hooper MD, 1 application  at 09/01/23 0821    nitroglycerin  (NITROSTAT) SL tablet 0.4 mg, 0.4 mg, Sublingual, Q5 Min PRN, Roberto Ritchie MD    ondansetron (ZOFRAN) injection 4 mg, 4 mg, Intravenous, Q6H PRN, Roberto Ritchie MD, 4 mg at 08/29/23 1926    potassium chloride (KLOR-CON) packet 40 mEq, 40 mEq, Oral, Q4H, Joel Gilliam MD, 40 mEq at 09/01/23 0637    Potassium Replacement - Follow Nurse / BPA Driven Protocol, , Does not apply, PRN, Roberto Ritchie MD    [COMPLETED] Insert Peripheral IV, , , Once **AND** sodium chloride 0.9 % flush 10 mL, 10 mL, Intravenous, PRN, Roberto Ritchie MD    sodium chloride 0.9 % flush 10 mL, 10 mL, Intravenous, Q12H, Roberto Ritchie MD, 10 mL at 08/31/23 2206    sodium chloride 0.9 % flush 10 mL, 10 mL, Intravenous, PRN, Roberto Ritchie MD    sodium chloride 0.9 % infusion 40 mL, 40 mL, Intravenous, PRN, Roberto Ritchie MD    sodium phosphates 15 mmol in 250 mL 0.9% sodium chloride IVPB, 15 mmol, Intravenous, Once, Bentley Lovell MD, 15 mmol at 09/01/23 1028    [COMPLETED] thiamine (B-1) 500 mg in sodium chloride 0.9 % 100 mL IVPB, 500 mg, Intravenous, Q8H, Last Rate: 200 mL/hr at 08/31/23 0531, 500 mg at 08/31/23 0531 **FOLLOWED BY** thiamine (B-1) injection 200 mg, 200 mg, Intravenous, Q8H, 200 mg at 09/01/23 0637 **FOLLOWED BY** [START ON 9/4/2023] thiamine (VITAMIN B-1) tablet 100 mg, 100 mg, Oral, Daily, Roberto Ritchie MD        Physical Exam:    General:   Awake, alert, oriented x3. Speech clear with no aphasia  HEENT:    Right frontoparietal surgical site with surgical glue without any swelling, redness, drainage.  Right frontal Polaris shunt valve palpated.  [place] pumps and refills easily.  Neck:    supple  CN II:    Visual fields full to confrontation  CN III IV VI:   Difficulties with upgaze-eyes retract back to midline some nystagmus visualized.  Bilateral lid retraction.., PERRL 3 mm bilaterally  CN VII:    Facial movements are symmetric. No weakness.  Motor:    Weakness in left upper and lower extremity.  Mild left  upper extremity drift., bulk and tone in upper and lower extremities.  No fasciculations, rigidity, spasticity, or abnormal movements.   Left lower extremity foot drop with not much strength for dorsiflexion 2/5 which is baseline per patient.    Sensation:   Normal to light touch; no extinction  Station and Gait:  Per PT note 8/31/2023: Pt seen for PT treatment today. pt continues to progress with mobility. Pt required CGA with bed mobility needing increased time. Pt completed a STS transfer with MinAX2. Pt worked on gait and ambulated 25ft with rwx, MinAX2/ModAX2. Pt  required multiple sequencing cues, cues for posture, and assistance with maneuvering walker. Pt required increased assistance as pt fatigued and pt's left foot would increasingly drag. Will continue to progress pt as able.     Coordination:   Left-sided ataxia in upper and lower extremities  Extremities:   Wearing SCD    Assessment & Plan     ASSESSMENT:      Sepsis    Shunt malfunction    Cerebral ventriculomegaly    Lethargy    Confusion    S/P  Polaris SPVA-200 shunt 8/29/23 w/ Dr. Ritchie valve set to 110    Patient with history of being found down with's subsequent acute renal failure, acidosis, acute/subacute right cerebral infarct as well as ventriculomegaly with lethargy and confusion who is POD #3 ventriculoperitoneal shunt revision.  Last shunt setting of Sophysa Polaris SPVA-200 set at 70 on 8/30/2023.  Patient continues to exhibit lethargy and feels relatively unchanged, however, he is ambulating decently well with physical therapy.  I suspect the plan is for acute rehab.  Patient is scheduled for follow-up appointment with neurosurgery on 11 September with follow-up CT head imaging.  Please call for any further questions or concerns in the meantime.    PLAN:     Neurosurgical follow-up 9/11/2023 with CT head imaging  Plan for acute rehab             Follow-up as scheduled. Patient to call for any concerns including but not limited to  "headaches, vision changes, balance difficulties, incontinence of bladder, weakness.         I discussed the patient's findings and my recommendations with patient, nursing staff, and Dr. Lugo.    During patient visit, I utilized appropriate personal protective equipment including mask and gloves.  Mask used was standard procedure mask. Appropriate PPE was worn during the entire visit.  Hand hygiene was completed before and after.      LOS: 8 days       [facility], APRN  9/1/2023  10:45 EDT    \"Dictated utilizing Dragon dictation\".    "

## 2023-09-01 NOTE — THERAPY TREATMENT NOTE
Patient Name: Braxton Wolfe  : 1965    MRN: [id number]                              Today's Date: 2023       Admit Date: 2023    Visit Dx:     ICD-10-CM ICD-9-CM   1. Follow-up exam  Z09 V67.9   2. Sepsis with acute renal failure without septic shock, due to unspecified organism, unspecified acute renal failure type  A41.9 038.9    R65.20 995.92    N17.9 584.9   3. Acute respiratory failure, unspecified whether with hypoxia or hypercapnia  J96.00 518.81   4. Acute kidney injury  N17.9 584.9   5. Hypokalemia  E87.6 276.8   6. Hypernatremia  E87.0 276.0   7. Obstructive hydrocephalus  G91.1 331.4   8. Shunt malfunction  T85.618A 996.59     Patient Active Problem List   Diagnosis    Sepsis    Shunt malfunction    Cerebral ventriculomegaly    Lethargy    Confusion    S/P  Polaris SPVA-200 shunt 23 w/ Dr. Ritchie valve set to 110     History reviewed. No pertinent past medical history.  Past Surgical History:   Procedure Laterality Date    BRAIN SURGERY       SHUNT INSERTION N/A 2023    Procedure: VENTRICULAR PERITONEAL SHUNT REVISION;  Surgeon: Roberto Ritchie MD;  Location: [facility];  Service: Neurosurgery;  Laterality: N/A;      General Information       Row Name 23 1126          Physical Therapy Time and Intention    Document Type therapy note (daily note)  -EB     Mode of Treatment physical therapy;individual therapy  -EB       Row Name 23 1126          General Information    Existing Precautions/Restrictions fall  -EB       Row Name 23 1126          Cognition    Orientation Status (Cognition) oriented x 3  -EB       Row Name 23 1126          Safety Issues, Functional Mobility    Safety Issues Affecting Function (Mobility) ability to follow commands;sequencing abilities;judgment;insight into deficits/self-awareness;problem-solving  -EB     Impairments Affecting Function (Mobility) balance;endurance/activity tolerance;strength;range of motion (ROM);motor  control;cognition;coordination  -EB               User Key  (r) = Recorded By, (t) = Taken By, (c) = Cosigned By      Initials Name Provider Type    Ciera Moss PTA Physical Therapist Assistant                   Mobility       Row Name 09/01/23 1129          Bed Mobility    Supine-Sit Upper Tract (Bed Mobility) contact guard;verbal cues  -EB     Sit-Supine Upper Tract (Bed Mobility) contact guard;verbal cues  -EB     Assistive Device (Bed Mobility) bed rails;head of bed elevated  -EB     Comment, (Bed Mobility) Pt leaning too far forward with head down. Pt able to correct posture with verbal cueing.  -EB       Row Name 09/01/23 1129          Sit-Stand Transfer    Sit-Stand Upper Tract (Transfers) minimum assist (75% patient effort);2 person assist  -EB     Assistive Device (Sit-Stand Transfers) walker, front-wheeled  -EB     Comment, (Sit-Stand Transfer) cues for posture correction  -EB       Row Name 09/01/23 1129          Gait/Stairs (Locomotion)    Upper Tract Level (Gait) moderate assist (50% patient effort);2 person assist  -EB     Assistive Device (Gait) walker, front-wheeled  -EB     Distance in Feet (Gait) 25ft  -EB     Deviations/Abnormal Patterns (Gait) stride length decreased;base of support, narrow;gait speed decreased;angeli decreased  -EB     Bilateral Gait Deviations forward flexed posture  -EB     Left Sided Gait Deviations weight shift ability decreased;decreased knee extension;foot drop/toe drag  -EB     Comment, (Gait/Stairs) decreased coordination of foot placement with left foot and increase of left foot drag wehn fatigued. Cues to widen DMITRI. sequencing cues throughout.  Needed several stops for pt to correct forward flexed  posture.  -EB               User Key  (r) = Recorded By, (t) = Taken By, (c) = Cosigned By      Initials Name Provider Type    Ciera Moss PTA Physical Therapist Assistant                   Obj/Interventions    No documentation.                  Goals/Plan     No documentation.                  Clinical Impression       Row Name 09/01/23 1134          Plan of Care Review    Plan of Care Reviewed With patient  -EB     Progress no change  -EB     Outcome Evaluation Pt seen for PT treatment today. Pt required CGA with bed mobility with pt needing verbal cues to correct forward lean while sitting on EOB. Pt completed STS transfer with MinAX2 and ambulated 20ft with ModAX2/rwx,. Pt required several stops during gait to correct forward flexed posture. Pt has left foot/toe drag but will become excessive when pt becomes increasingly fatigued. Pt does exhibit decreased coordination of left foot placement. Pt requires constant sequencing cues and assist with guiding walker with turns. Will continue to progress pt as able and follow pt for d/c needs.  -       Row Name 09/01/23 1134          Therapy Assessment/Plan (PT)    Therapy Frequency (PT) 5 times/wk  -       Row Name 09/01/23 1134          Positioning and Restraints    Pre-Treatment Position in bed  -EB     Post Treatment Position bed  -EB     In Bed side lying right;call light within reach;encouraged to call for assist;exit alarm on  -EB               User Key  (r) = Recorded By, (t) = Taken By, (c) = Cosigned By      Initials Name Provider Type    EB Ciera Carter PTA Physical Therapist Assistant                   Outcome Measures       Row Name 09/01/23 1140 09/01/23 0800       How much help from another person do you currently need...    Turning from your back to your side while in flat bed without using bedrails? 3  -EB 4  -CG    Moving from lying on back to sitting on the side of a flat bed without bedrails? 3  -EB 4  -CG    Moving to and from a bed to a chair (including a wheelchair)? 2  -EB 3  -CG    Standing up from a chair using your arms (e.g., wheelchair, bedside chair)? 2  -EB 3  -CG    Climbing 3-5 steps with a railing? 1  -EB 2  -CG    To walk in hospital room? 2  -EB 2  -CG    AM-PAC 6 Clicks Score (PT)  13  -EB 18  -CG    Highest level of mobility 4 --> Transferred to chair/commode  -EB 6 --> Walked 10 steps or more  -CG      Row Name 09/01/23 1140          Modified [place] Scale    Modified [place] Scale 4 - Moderately severe disability.  Unable to walk without assistance, and unable to attend to own bodily needs without assistance.  -EB               User Key  (r) = Recorded By, (t) = Taken By, (c) = Cosigned By      Initials Name Provider Type    EB Ciera Carter PTA Physical Therapist Assistant    Osvaldo Mohan RN Registered Nurse                                 Physical Therapy Education       Title: PT OT SLP Therapies (Done)       Topic: Physical Therapy (Done)       Point: Mobility training (Done)       Learning Progress Summary             Patient Acceptance, E,D, VU,NR by EB at 9/1/2023 1140    Acceptance, E,D, VU,NR by EB at 8/31/2023 1607    Acceptance, E,D, VU,NR by EB at 8/30/2023 1621    Acceptance, E,TB, NR by CB at 8/26/2023 1558                         Point: Home exercise program (Done)       Learning Progress Summary             Patient Acceptance, E,D, VU,NR by EB at 8/30/2023 1621                         Point: Body mechanics (Done)       Learning Progress Summary             Patient Acceptance, E,D, VU,NR by EB at 9/1/2023 1140    Acceptance, E,D, VU,NR by EB at 8/31/2023 1607    Acceptance, E,D, VU,NR by EB at 8/30/2023 1621    Acceptance, E,TB, NR by CB at 8/26/2023 1558                         Point: Precautions (Done)       Learning Progress Summary             Patient Acceptance, E,D, VU,NR by EB at 9/1/2023 1140    Acceptance, E,D, VU,NR by EB at 8/31/2023 1607    Acceptance, E,D, VU,NR by EB at 8/30/2023 1621    Acceptance, E,TB, NR by CB at 8/26/2023 1558                                         User Key       Initials Effective Dates Name Provider Type Discipline    EB 02/14/23 -  Ciera Carter PTA Physical Therapist Assistant PT    CB 10/22/21 -  Luh Etienne, BRAD Physical  Therapist PT                  PT Recommendation and Plan     Plan of Care Reviewed With: patient  Progress: no change  Outcome Evaluation: Pt seen for PT treatment today. Pt required CGA with bed mobility with pt needing verbal cues to correct forward lean while sitting on EOB. Pt completed STS transfer with MinAX2 and ambulated 20ft with ModAX2/rwx,. Pt required several stops during gait to correct forward flexed posture. Pt has left foot/toe drag but will become excessive when pt becomes increasingly fatigued. Pt does exhibit decreased coordination of left foot placement. Pt requires constant sequencing cues and assist with guiding walker with turns. Will continue to progress pt as able and follow pt for d/c needs.     Time Calculation:         PT Charges       Row Name 09/01/23 1123             Time Calculation    Start Time 0923  -EB      Stop Time 0938  -EB      Time Calculation (min) 15 min  -EB      PT Received On 09/01/23  -EB      PT - Next Appointment 09/03/23  -EB         Time Calculation- PT    Total Timed Code Minutes- PT 15 minute(s)  -EB                User Key  (r) = Recorded By, (t) = Taken By, (c) = Cosigned By      Initials Name Provider Type    EB Ciera Carter PTA Physical Therapist Assistant                  Therapy Charges for Today       Code Description Service Date Service Provider Modifiers Qty    01461144947 HC GAIT TRAINING EA 15 MIN 8/31/2023 Ciera Carter, PTA GP 1    16438485655 HC PT THERAPEUTIC ACT EA 15 MIN 8/31/2023 Ciera Carter, PTA GP 1    14372435412 HC PT THER SUPP EA 15 MIN 8/31/2023 Ciera Carter PTA GP 1    05804807727 HC GAIT TRAINING EA 15 MIN 9/1/2023 Ciera Carter PTA GP 1    94336133548 HC PT THER SUPP EA 15 MIN 9/1/2023 Ciera Carter, BINU GP 1            PT G-Codes  Outcome Measure Options: AM-PAC 6 Clicks Basic Mobility (PT), Modified [place]  AM-PAC 6 Clicks Score (PT): 13  Modified [place] Scale: 4 - Moderately severe disability.  Unable to walk without  assistance, and unable to attend to own bodily needs without assistance.       Ciera Carter, PTA  9/1/2023

## 2023-09-01 NOTE — PLAN OF CARE
Goal Outcome Evaluation:   VSS. A&OX4. Delayed responses at times. No c/o pain or discomfort. Tube feeds infusing per order, increased to 35ml/hr at 0430. Up with assist x2. Utilized bedpan and urinal overnight. Incontinence at times, care provided. Call light in reach.

## 2023-09-02 LAB
ALBUMIN SERPL-MCNC: 3.2 G/DL (ref 3.5–5.2)
ANION GAP SERPL CALCULATED.3IONS-SCNC: 4.5 MMOL/L (ref 5–15)
BASOPHILS # BLD AUTO: 0.04 10*3/MM3 (ref 0–0.2)
BASOPHILS NFR BLD AUTO: 0.5 % (ref 0–1.5)
BUN SERPL-MCNC: 11 MG/DL (ref 6–20)
BUN/CREAT SERPL: 12.9 (ref 7–25)
CALCIUM SPEC-SCNC: 8.7 MG/DL (ref 8.6–10.5)
CHLORIDE SERPL-SCNC: 108 MMOL/L (ref 98–107)
CO2 SERPL-SCNC: 30.5 MMOL/L (ref 22–29)
CREAT SERPL-MCNC: 0.85 MG/DL (ref 0.76–1.27)
DEPRECATED RDW RBC AUTO: 43.1 FL (ref 37–54)
EGFRCR SERPLBLD CKD-EPI 2021: 101.4 ML/MIN/1.73
EOSINOPHIL # BLD AUTO: 0.18 10*3/MM3 (ref 0–0.4)
EOSINOPHIL NFR BLD AUTO: 2.3 % (ref 0.3–6.2)
ERYTHROCYTE [DISTWIDTH] IN BLOOD BY AUTOMATED COUNT: 13.2 % (ref 12.3–15.4)
GLUCOSE BLDC GLUCOMTR-MCNC: 112 MG/DL (ref 70–130)
GLUCOSE BLDC GLUCOMTR-MCNC: 121 MG/DL (ref 70–130)
GLUCOSE BLDC GLUCOMTR-MCNC: 130 MG/DL (ref 70–130)
GLUCOSE SERPL-MCNC: 137 MG/DL (ref 65–99)
HCT VFR BLD AUTO: 35.2 % (ref 37.5–51)
HGB BLD-MCNC: 11.9 G/DL (ref 13–17.7)
IMM GRANULOCYTES # BLD AUTO: 0.1 10*3/MM3 (ref 0–0.05)
IMM GRANULOCYTES NFR BLD AUTO: 1.3 % (ref 0–0.5)
INR PPP: 1.14 (ref 0.9–1.1)
LYMPHOCYTES # BLD AUTO: 1.52 10*3/MM3 (ref 0.7–3.1)
LYMPHOCYTES NFR BLD AUTO: 19.2 % (ref 19.6–45.3)
MAGNESIUM SERPL-MCNC: 2.1 MG/DL (ref 1.6–2.6)
MCH RBC QN AUTO: 30.5 PG (ref 26.6–33)
MCHC RBC AUTO-ENTMCNC: 33.8 G/DL (ref 31.5–35.7)
MCV RBC AUTO: 90.3 FL (ref 79–97)
MONOCYTES # BLD AUTO: 0.72 10*3/MM3 (ref 0.1–0.9)
MONOCYTES NFR BLD AUTO: 9.1 % (ref 5–12)
NEUTROPHILS NFR BLD AUTO: 5.34 10*3/MM3 (ref 1.7–7)
NEUTROPHILS NFR BLD AUTO: 67.6 % (ref 42.7–76)
NRBC BLD AUTO-RTO: 0 /100 WBC (ref 0–0.2)
PHOSPHATE SERPL-MCNC: 2.8 MG/DL (ref 2.5–4.5)
PLATELET # BLD AUTO: 288 10*3/MM3 (ref 140–450)
PMV BLD AUTO: 8.9 FL (ref 6–12)
POTASSIUM SERPL-SCNC: 4.1 MMOL/L (ref 3.5–5.2)
PROTHROMBIN TIME: 14.8 SECONDS (ref 11.7–14.2)
RBC # BLD AUTO: 3.9 10*6/MM3 (ref 4.14–5.8)
SODIUM SERPL-SCNC: 143 MMOL/L (ref 136–145)
WBC NRBC COR # BLD: 7.9 10*3/MM3 (ref 3.4–10.8)

## 2023-09-02 PROCEDURE — 85025 COMPLETE CBC W/AUTO DIFF WBC: CPT | Performed by: NEUROLOGICAL SURGERY

## 2023-09-02 PROCEDURE — 85610 PROTHROMBIN TIME: CPT | Performed by: NEUROLOGICAL SURGERY

## 2023-09-02 PROCEDURE — 94799 UNLISTED PULMONARY SVC/PX: CPT

## 2023-09-02 PROCEDURE — 80069 RENAL FUNCTION PANEL: CPT | Performed by: INTERNAL MEDICINE

## 2023-09-02 PROCEDURE — 83735 ASSAY OF MAGNESIUM: CPT | Performed by: NEUROLOGICAL SURGERY

## 2023-09-02 PROCEDURE — 36415 COLL VENOUS BLD VENIPUNCTURE: CPT | Performed by: NEUROLOGICAL SURGERY

## 2023-09-02 PROCEDURE — 25010000002 THIAMINE HCL 200 MG/2ML SOLUTION: Performed by: NEUROLOGICAL SURGERY

## 2023-09-02 PROCEDURE — 82948 REAGENT STRIP/BLOOD GLUCOSE: CPT

## 2023-09-02 PROCEDURE — 94761 N-INVAS EAR/PLS OXIMETRY MLT: CPT

## 2023-09-02 RX ADMIN — THIAMINE HYDROCHLORIDE 200 MG: 100 INJECTION, SOLUTION INTRAMUSCULAR; INTRAVENOUS at 06:33

## 2023-09-02 RX ADMIN — ASPIRIN 81 MG: 81 TABLET, CHEWABLE ORAL at 08:45

## 2023-09-02 RX ADMIN — MUPIROCIN 1 APPLICATION: 20 OINTMENT TOPICAL at 21:02

## 2023-09-02 RX ADMIN — THIAMINE HYDROCHLORIDE 200 MG: 100 INJECTION, SOLUTION INTRAMUSCULAR; INTRAVENOUS at 15:09

## 2023-09-02 RX ADMIN — THIAMINE HYDROCHLORIDE 200 MG: 100 INJECTION, SOLUTION INTRAMUSCULAR; INTRAVENOUS at 21:02

## 2023-09-02 RX ADMIN — Medication 10 ML: at 21:02

## 2023-09-02 RX ADMIN — FAMOTIDINE 20 MG: 10 INJECTION INTRAVENOUS at 08:44

## 2023-09-02 RX ADMIN — ERGOCALCIFEROL 50000 UNITS: 1.25 CAPSULE ORAL at 08:45

## 2023-09-02 RX ADMIN — MUPIROCIN 1 APPLICATION: 20 OINTMENT TOPICAL at 08:45

## 2023-09-02 RX ADMIN — ATORVASTATIN CALCIUM 40 MG: 20 TABLET, FILM COATED ORAL at 21:02

## 2023-09-02 RX ADMIN — Medication 10 ML: at 08:46

## 2023-09-02 RX ADMIN — FOLIC ACID 1 MG: 5 INJECTION, SOLUTION INTRAMUSCULAR; INTRAVENOUS; SUBCUTANEOUS at 08:45

## 2023-09-02 NOTE — PROGRESS NOTES
Name: Braxton Wolfe ADMIT: 2023   : 1965  PCP: Provider, No Known    MRN: [id number] LOS: 9 days   AGE/SEX: 57 y.o. male  ROOM: [facility]     Subjective   Subjective   No acute events overnight.  Laying in bed.  Alert, oriented x3.  Denies complaints.    Review of Systems   As above  Objective   Objective   Vital Signs  Temp:  [98 °F (36.7 °C)-98.8 °F (37.1 °C)] 98.3 °F (36.8 °C)  Heart Rate:  [68-86] 82  Resp:  [18-22] 22  BP: (111-125)/(70-76) 111/70  SpO2:  [91 %-95 %] 95 %  on   ;   Device (Oxygen Therapy): room air  Body mass index is 25.88 kg/m².  Physical Exam    General: Alert, laying in bed, not in distress, chronically ill appearing  HEENT: Normocephalic, atraumatic, right scalp dressing in place  CV: Regular rate and rhythm, no murmurs rubs or gallops  Lungs: CTA, no wheezing  Abdomen: Soft, nontender, nondistended  Extremities: No significant peripheral edema   Results Review     I reviewed the patient's new clinical results.  Results from last 7 days   Lab Units 23  0559 23  0549 23  0554 23  0536   WBC 10*3/mm3 7.90 6.88 7.93 6.53   HEMOGLOBIN g/dL 11.9* 11.7* 12.7* 12.7*   PLATELETS 10*3/mm3 288 257 252 206       Results from last 7 days   Lab Units 23  0559 23  1515 23  0549 23  1635 23  0544 23  1707 23  0536   SODIUM mmol/L 143  --  144  --  140  --  143   POTASSIUM mmol/L 4.1 4.1 3.2* 3.7 3.3*   < > 3.2*   CHLORIDE mmol/L 108*  --  106  --  106  --  106   CO2 mmol/L 30.5*  --  30.1*  --  25.0  --  27.0   BUN mg/dL 11  --  8  --  8  --  9   CREATININE mg/dL 0.85  --  0.87  --  0.91  --  0.95   GLUCOSE mg/dL 137*  --  117*  --  99  --  101*    < > = values in this interval not displayed.     Estimated Creatinine Clearance: 110.9 mL/min (by C-G formula based on SCr of 0.85 mg/dL).  Results from last 7 days   Lab Units 23  0559 23  0549 23  0544   ALBUMIN g/dL 3.2* 2.9* 2.7*   BILIRUBIN mg/dL  --   --   0.6   ALK PHOS U/L  --   --  80   AST (SGOT) U/L  --   --  27   ALT (SGPT) U/L  --   --  26       Results from last 7 days   Lab Units 09/02/23  0559 09/01/23  0549 08/31/23  0544 08/30/23  0536   CALCIUM mg/dL 8.7 8.0* 7.8* 8.0*   ALBUMIN g/dL 3.2* 2.9* 2.7*  --    MAGNESIUM mg/dL 2.1 1.9 2.1 5.5*   PHOSPHORUS mg/dL 2.8 2.4* 2.0* 2.6           No results found for: COVID19  Glucose   Date/Time Value Ref Range Status   09/02/2023 0622 130 70 - 130 mg/dL Final   09/02/2023 0000 121 70 - 130 mg/dL Final   09/01/2023 1839 130 70 - 130 mg/dL Final   09/01/2023 1216 116 70 - 130 mg/dL Final   09/01/2023 0810 120 70 - 130 mg/dL Final   09/01/2023 0420 105 70 - 130 mg/dL Final   08/31/2023 2354 111 70 - 130 mg/dL Final           XR Abdomen KUB  Clinical: Core check catheter placement     COMPARISON 8/28/2023     FINDINGS: Cortright catheter tip is located within the duodenum, at the  junction of the second and third portions. It has been advanced since  the prior examination. There is contrast demonstrated now within the  colon. The remainder is unremarkable.     This report was finalized on 8/31/2023 2:47 PM by Dr. Tyrel Chavez M.D.     CT Head Without Contrast  Narrative: CT SCAN OF THE HEAD WITHOUT CONTRAST 08/30/2023     CLINICAL HISTORY: Status post revision of  shunt yesterday 08/29/2023.  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  Patient  has a history of Von Hippel-Lindau.      TECHNIQUE: Spiral CT images were obtained from the base of the skull to  the vertex without intravenous contrast. Images were reformatted and  submitted in 1 mm thick axial, sagittal and coronal CT sections with  brain algorithm.      This is correlated to prior head CTs yesterday 08/29/2023 and head CT  08/24/2023 and a prior MRI of the brain on 08/28/2023 and outside MRIs  of the brain on 04/21/2022 and 01/14/2022.     FINDINGS: The patient  has had a large 5 x 7.5 cm midline occipital left  occipital craniectomy.  The craniectomy is covered by metallic mesh and  resection of the posterior midline 1.8 cm segment of the posterior ring  of C1, and by history this occurred back on 04/26/2019 for resection of  cerebellar hemangioblastoma. There is extensive encephalomalacia  involving the majority of the left cerebellar hemisphere and some  loculated CSF lateral to the left cerebellum better demonstrated on  recent MRI of the brain on 08/25/2023. There is also encephalomalacia  throughout the majority of the right cerebellum. There is a markedly  enlarged fourth ventricle measuring 4.1 x 3.4 cm in medial lateral and  anterior posterior dimension. There is tenting of the cervicomedullary  junction posteriorly. Recent MRI of the brain on 08/28/2023 demonstrated  multiple small enhancing cerebellar nodules compatible with multiple  hemangioblastomas that are not able to be appreciated on this  noncontrast head CT. There is a ventriculoperitoneal shunt catheter in  place that courses through the superior right coronal suture through the  superior right frontal lobe parenchyma enters the superior body of the  right lateral ventricle and its distal tip is at the level of the right  foramen of Monro.  The lateral and third ventricles are mildly enlarged.   The temporal horns of the lateral ventricles in particular are large in  size. The lateral and third ventricles have clearly enlarged when  compared to an outside MRI of the brain on 04/21/2021. The lateral and  third ventricles have slightly decreased in size when compared to head  CT 08/24/2023, unchanged in size when compared to yesterday's head CT  08/29/2023. There is confluent rind of low density in the right frontal  white matter along the margins of the ventriculoperitoneal shunt  catheter with rind of low-density tracking 3 x 3.3 cm, may be white  matter encephalomalacia, while there could be some  backtracking of CSF.  Since the head CT on 08/24/2023 there is resolving low-density in the  temporal occipital periventricular white matter compatible with  resolving transependymal extension of CSF. A tiny tubular tract through  the lateral right parietal bone where there was likely previous  placement and removal of a ventriculostomy catheter in the remote past.  I see no midline shift. No acute intracranial hemorrhage is identified.  There are bubbles of air along the port for the ventriculoperitoneal  shunt catheter within the scalp overlying the anterolateral right  parietal bone from recent shunt revision procedure, and the bubbles of  air are new when compared to yesterday afternoon's head CT 08/29/2023 at  4:39 p.m.     Impression: 1. Since yesterday afternoon's head CT on 08/29/2023 at 4:39 p.m., there  has been shunt revision with bubbles of air in the scalp adjacent to the  shunt port overlying the anterior superior lateral right parietal bone.  There is stable position of the  shunt catheter that courses from the  superior right coronal suture through the superior right frontal lobe  parenchyma through the superior body of the right lateral ventricle and  its distal tip is at the right foramen of Monro. The lateral and third  ventricles remain enlarged. They are clearly larger than they were on an  outside MRI of the brain on 04/21/2022. However they are slightly  smaller than they were on a head CT 6 days ago on 08/24/2023. Since the  head CT on 08/24/2023, six days ago, there is resolving low-density in  the temporal occipital periventricular white matter compatible with  resolving transependymal extension of CSF.     2. 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 There is extensive  encephalomalacia  involving the majority of the left cerebellum and also  extensive encephalomalacia throughout good portions of the right  cerebellar hemisphere. MRI of the brain 2 days ago on 08/28/2023  demonstrated multiple tiny enhancing cerebellar nodules compatible with  multiple hemangioblastomas that are unable to be evaluated on this  noncontrast head CT. There is marked dilatation of the fourth ventricle.   Some of it is ex vacuo dilatation due to extensive cerebellar  encephalomalacia although there is posterior tenting of the  cervicomedullary junction and a small size superior aspect of the  aqueduct of Sylvius and the marked dilatation of the fourth ventricle  may be secondary to encystment of the fourth ventricle. The remainder of  the head CT is unremarkable. The results were communicated to Roberto Ritchie MD, from neurosurgery by telephone on 08/30/2023 at 9:50 AM.     Radiation dose reduction techniques were utilized, including automated  exposure control and exposure modulation based on body size.        This report was finalized on 8/31/2023 5:56 AM by Dr. Shahram Quintero M.D.       Scheduled Medications  aspirin, 81 mg, Nasogastric, Daily  atorvastatin, 40 mg, Oral, Nightly  docusate sodium, 100 mg, Oral, BID  famotidine, 20 mg, Intravenous, Daily  folic acid (FOLVITE) IVPB, 1 mg, Intravenous, Daily  mupirocin, 1 application , Topical, Q12H  polyethylene glycol, 17 g, Nasogastric, Daily  sodium chloride, 10 mL, Intravenous, Q12H  thiamine (B-1) IV, 200 mg, Intravenous, Q8H   Followed by  [START ON 9/4/2023] thiamine, 100 mg, Oral, Daily  vitamin D, 50,000 Units, Nasogastric, Q7 Days    Infusions  lactated ringers, 9 mL/hr, Last Rate: 9 mL/hr (08/29/23 1818)    Diet  NPO Diet NPO Type: Strict NPO    I have personally reviewed     [x]  Laboratory   []  Microbiology   []  Radiology   []  EKG/Telemetry  []  Cardiology/Vascular   []  Pathology    []  Records       Assessment/Plan     Active Hospital Problems    Diagnosis   POA    **Sepsis [A41.9]  Yes    S/P  Polaris SPVA-200 shunt 8/29/23 w/ Dr. Ritchie valve set to 110 [Z98.2]  Not Applicable    Shunt malfunction [T85.618A]  Yes    Cerebral ventriculomegaly [G93.89]  Yes    Lethargy [R53.83]  Yes    Confusion [R41.0]  Yes      Resolved Hospital Problems   No resolved problems to display.           Patient has a history of von Hippel-Lindau syndrome and is on tumor suppressive medication  welireg, CNS hemangioblastomas, craniotomy status post  shunt placement, baseline hemiparesis, being brought to the ED on 08/24/2023 after found down by family.    Acute encephalopathy; suspected HIE  -Mental status continues to improve,   -Status post shunt revision 8/29, by neurosurgery:Patient does not require follow-up shunt xray for setting confirmation. Follow-up as scheduled. Office will contact patient if follow-up required sooner due to xray findings. Patient to call for any concerns including but not limited to headaches, vision changes, balance difficulties, incontinence of bladder, weakness.    Improved, alert, x3      Acute to subacute infarct: MRI 08/28/2023 showed new punctate focus of diffusion restriction/FLAIR hyperintensity inthe right paracentral lobule most suggestive of acute/subacute infarct.  Neurology evaluated, appreciate recs.  Patient initiated on aspirin 81 mg daily, atorvastatin 40 mg daily.         Acute hypercapnic respiratory failure s/p vent till 8/25  -Patient is currently on room air, remained stable     B/L Aspiration pneumonia  -Possibly acute on chronic aspiration  -Status pos IV ceftriaxone x4 days.          Dysphagia/vomiting;status post core track placement 08/31, speech therapy following.  Will need repeat swallow study early next week, if continues to aspirate we need to discuss further goals of care including possible PEG tube.  No further episodes of vomiting, continue to monitor.         YUDITH: Resolved with IV fluids,  monitor.      Hypokalemia/hypophosphatemia: Improved.  Monitor.       Incidental lung nodules- seen on CT 08/24/2023, CT follow-up in 3 months is  recommended.     S/p prior  shunt and required revision-neurosurgery signed off     VHL with craniotomy   - residual left-sided weakness from this   -Patient is on Welireg for VHL to slow down tumor growth.  He has been off of this medication now for a week.  He will need to follow-up with MD Reynoso about when to restart side effects from this medication are hypoxemia and anemia.  Starting dose is 120 but the patient has been intolerant of it in the past and is down to 40 mg.     Mild coagulopathy possibly related to nutrition, monitor, improving           SCDs for DVT prophylaxis.  Full code.  Discussed with patient and nursing staff.  Anticipate discharge SNS, versus home health, timing to be determined      Copied text in this note has been reviewed and is accurate as of 09/02/23.         Dictated utilizing Dragon dictation        Bentley Lovell MD  [facility]  09/02/23  10:23 EDT

## 2023-09-02 NOTE — PLAN OF CARE
Goal Outcome Evaluation:      VSS. A/o x4 but very forgetful and slow to respond at times. Follows commands. LLE weakness and L foot drop. NPO, tube feeds via cortrak, now at goal rate. R scalp dressing cdi. Incontinent bowel and bladder.      Progress: no change

## 2023-09-03 PROBLEM — Q85.83 VON HIPPEL-LINDAU SYNDROME: Status: ACTIVE | Noted: 2023-09-03

## 2023-09-03 PROBLEM — G91.1 OBSTRUCTIVE HYDROCEPHALUS: Status: ACTIVE | Noted: 2023-08-24

## 2023-09-03 PROBLEM — R13.12 OROPHARYNGEAL DYSPHAGIA: Status: ACTIVE | Noted: 2023-09-03

## 2023-09-03 LAB
ALBUMIN SERPL-MCNC: 3.3 G/DL (ref 3.5–5.2)
ANION GAP SERPL CALCULATED.3IONS-SCNC: 7.2 MMOL/L (ref 5–15)
BUN SERPL-MCNC: 13 MG/DL (ref 6–20)
BUN/CREAT SERPL: 15.7 (ref 7–25)
CALCIUM SPEC-SCNC: 8.7 MG/DL (ref 8.6–10.5)
CHLORIDE SERPL-SCNC: 104 MMOL/L (ref 98–107)
CO2 SERPL-SCNC: 28.8 MMOL/L (ref 22–29)
CREAT SERPL-MCNC: 0.83 MG/DL (ref 0.76–1.27)
DEPRECATED RDW RBC AUTO: 43.7 FL (ref 37–54)
EGFRCR SERPLBLD CKD-EPI 2021: 102.1 ML/MIN/1.73
ERYTHROCYTE [DISTWIDTH] IN BLOOD BY AUTOMATED COUNT: 13.3 % (ref 12.3–15.4)
GLUCOSE BLDC GLUCOMTR-MCNC: 100 MG/DL (ref 70–130)
GLUCOSE SERPL-MCNC: 102 MG/DL (ref 65–99)
HCT VFR BLD AUTO: 37.2 % (ref 37.5–51)
HGB BLD-MCNC: 12.4 G/DL (ref 13–17.7)
INR PPP: 1.02 (ref 0.9–1.1)
MAGNESIUM SERPL-MCNC: 2.2 MG/DL (ref 1.6–2.6)
MCH RBC QN AUTO: 30.3 PG (ref 26.6–33)
MCHC RBC AUTO-ENTMCNC: 33.3 G/DL (ref 31.5–35.7)
MCV RBC AUTO: 91 FL (ref 79–97)
PHOSPHATE SERPL-MCNC: 3.2 MG/DL (ref 2.5–4.5)
PLATELET # BLD AUTO: 314 10*3/MM3 (ref 140–450)
PMV BLD AUTO: 8.9 FL (ref 6–12)
POTASSIUM SERPL-SCNC: 4.1 MMOL/L (ref 3.5–5.2)
PROTHROMBIN TIME: 13.5 SECONDS (ref 11.7–14.2)
RBC # BLD AUTO: 4.09 10*6/MM3 (ref 4.14–5.8)
SODIUM SERPL-SCNC: 140 MMOL/L (ref 136–145)
WBC NRBC COR # BLD: 8.15 10*3/MM3 (ref 3.4–10.8)

## 2023-09-03 PROCEDURE — 83735 ASSAY OF MAGNESIUM: CPT | Performed by: NEUROLOGICAL SURGERY

## 2023-09-03 PROCEDURE — 85027 COMPLETE CBC AUTOMATED: CPT | Performed by: STUDENT IN AN ORGANIZED HEALTH CARE EDUCATION/TRAINING PROGRAM

## 2023-09-03 PROCEDURE — 25010000002 THIAMINE HCL 200 MG/2ML SOLUTION: Performed by: NEUROLOGICAL SURGERY

## 2023-09-03 PROCEDURE — 94799 UNLISTED PULMONARY SVC/PX: CPT

## 2023-09-03 PROCEDURE — 25010000002 THIAMINE PER 100 MG: Performed by: NEUROLOGICAL SURGERY

## 2023-09-03 PROCEDURE — 82948 REAGENT STRIP/BLOOD GLUCOSE: CPT

## 2023-09-03 PROCEDURE — 99221 1ST HOSP IP/OBS SF/LOW 40: CPT | Performed by: SURGERY

## 2023-09-03 PROCEDURE — 80069 RENAL FUNCTION PANEL: CPT | Performed by: INTERNAL MEDICINE

## 2023-09-03 PROCEDURE — 97530 THERAPEUTIC ACTIVITIES: CPT

## 2023-09-03 PROCEDURE — 85610 PROTHROMBIN TIME: CPT | Performed by: NEUROLOGICAL SURGERY

## 2023-09-03 PROCEDURE — 94761 N-INVAS EAR/PLS OXIMETRY MLT: CPT

## 2023-09-03 PROCEDURE — 36415 COLL VENOUS BLD VENIPUNCTURE: CPT | Performed by: NEUROLOGICAL SURGERY

## 2023-09-03 RX ORDER — UREA 10 %
3 LOTION (ML) TOPICAL NIGHTLY PRN
Status: DISCONTINUED | OUTPATIENT
Start: 2023-09-03 | End: 2023-09-08 | Stop reason: HOSPADM

## 2023-09-03 RX ORDER — SODIUM CHLORIDE, SODIUM LACTATE, POTASSIUM CHLORIDE, CALCIUM CHLORIDE 600; 310; 30; 20 MG/100ML; MG/100ML; MG/100ML; MG/100ML
75 INJECTION, SOLUTION INTRAVENOUS CONTINUOUS
Status: DISCONTINUED | OUTPATIENT
Start: 2023-09-03 | End: 2023-09-07

## 2023-09-03 RX ADMIN — SODIUM CHLORIDE, POTASSIUM CHLORIDE, SODIUM LACTATE AND CALCIUM CHLORIDE 75 ML/HR: 600; 310; 30; 20 INJECTION, SOLUTION INTRAVENOUS at 16:00

## 2023-09-03 RX ADMIN — MUPIROCIN 1 APPLICATION: 20 OINTMENT TOPICAL at 21:00

## 2023-09-03 RX ADMIN — THIAMINE HYDROCHLORIDE 200 MG: 100 INJECTION, SOLUTION INTRAMUSCULAR; INTRAVENOUS at 14:00

## 2023-09-03 RX ADMIN — THIAMINE HYDROCHLORIDE 200 MG: 100 INJECTION, SOLUTION INTRAMUSCULAR; INTRAVENOUS at 21:00

## 2023-09-03 RX ADMIN — Medication 10 ML: at 21:00

## 2023-09-03 RX ADMIN — FAMOTIDINE 20 MG: 10 INJECTION INTRAVENOUS at 17:08

## 2023-09-03 RX ADMIN — FOLIC ACID 1 MG: 5 INJECTION, SOLUTION INTRAMUSCULAR; INTRAVENOUS; SUBCUTANEOUS at 18:02

## 2023-09-03 NOTE — PLAN OF CARE
Goal Outcome Evaluation:  Plan of Care Reviewed With: patient        Progress: improving       Pt a/o x3-4, RA, VSS. Pt removed cortrak last night. Spoke with family about options and they voiced they were ready to proceed withy PEG placement. Pt to have PEG placed tomorrow with Dr. Sahu. LR @ 75 initiated. Spoke with dietician and attending and they determined pt should remain NPO until procedure tomorrow. Crani incision MARIA A. Will CTM.

## 2023-09-03 NOTE — PROGRESS NOTES
Name: Braxton Wolfe ADMIT: 2023   : 1965  PCP: Provider, No Known    MRN: [id number] LOS: 10 days   AGE/SEX: 57 y.o. male  ROOM: [facility]     Subjective   Subjective   Overnight patient pulled out his core track.  This morning laying in bed, denies complaints when asked.  Per report patient forgetful, requires frequent reorientation.  Did not sleep much overnight.    Review of Systems   As above  Objective   Objective   Vital Signs  Temp:  [98 °F (36.7 °C)-98.6 °F (37 °C)] 98 °F (36.7 °C)  Heart Rate:  [60-81] 81  Resp:  [16-20] 16  BP: (103-123)/(73-85) 103/73  SpO2:  [92 %-97 %] 97 %  on   ;   Device (Oxygen Therapy): room air  Body mass index is 25.88 kg/m².  Physical Exam    General: Alert, laying in bed, not in distress, cooperative  HEENT: Normocephalic, right scalp dressing in place  CV: Regular rate and rhythm, no murmurs rubs or gallops  Lungs: CTA, no wheezing  Abdomen: Soft, nontender, nondistended  Extremities: No significant peripheral edema   Results Review     I reviewed the patient's new clinical results.  Results from last 7 days   Lab Units 23  0708 23  0559 23  0549 23  0554   WBC 10*3/mm3 8.15 7.90 6.88 7.93   HEMOGLOBIN g/dL 12.4* 11.9* 11.7* 12.7*   PLATELETS 10*3/mm3 314 288 257 252       Results from last 7 days   Lab Units 23  0708 23  0559 23  1515 23  0549 23  1635 23  0544   SODIUM mmol/L 140 143  --  144  --  140   POTASSIUM mmol/L 4.1 4.1 4.1 3.2*   < > 3.3*   CHLORIDE mmol/L 104 108*  --  106  --  106   CO2 mmol/L 28.8 30.5*  --  30.1*  --  25.0   BUN mg/dL 13 11  --  8  --  8   CREATININE mg/dL 0.83 0.85  --  0.87  --  0.91   GLUCOSE mg/dL 102* 137*  --  117*  --  99    < > = values in this interval not displayed.     Estimated Creatinine Clearance: 113.6 mL/min (by C-G formula based on SCr of 0.83 mg/dL).  Results from last 7 days   Lab Units 23  0708 23  0559 23  0549 23  0544    ALBUMIN g/dL 3.3* 3.2* 2.9* 2.7*   BILIRUBIN mg/dL  --   --   --  0.6   ALK PHOS U/L  --   --   --  80   AST (SGOT) U/L  --   --   --  27   ALT (SGPT) U/L  --   --   --  26       Results from last 7 days   Lab Units 09/03/23  0708 09/02/23  0559 09/01/23  0549 08/31/23  0544   CALCIUM mg/dL 8.7 8.7 8.0* 7.8*   ALBUMIN g/dL 3.3* 3.2* 2.9* 2.7*   MAGNESIUM mg/dL 2.2 2.1 1.9 2.1   PHOSPHORUS mg/dL 3.2 2.8 2.4* 2.0*           No results found for: COVID19  Glucose   Date/Time Value Ref Range Status   09/03/2023 0637 100 70 - 130 mg/dL Final   09/02/2023 1805 112 70 - 130 mg/dL Final   09/02/2023 0622 130 70 - 130 mg/dL Final   09/02/2023 0000 121 70 - 130 mg/dL Final   09/01/2023 1839 130 70 - 130 mg/dL Final   09/01/2023 1216 116 70 - 130 mg/dL Final   09/01/2023 0810 120 70 - 130 mg/dL Final           XR Abdomen KUB  Clinical: Core check catheter placement     COMPARISON 8/28/2023     FINDINGS: Cortright catheter tip is located within the duodenum, at the  junction of the second and third portions. It has been advanced since  the prior examination. There is contrast demonstrated now within the  colon. The remainder is unremarkable.     This report was finalized on 8/31/2023 2:47 PM by Dr. Tyrel Chavez M.D.     CT Head Without Contrast  Narrative: CT SCAN OF THE HEAD WITHOUT CONTRAST 08/30/2023     CLINICAL HISTORY: Status post revision of  shunt yesterday 08/29/2023.  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  Patient  has a history of Von Hippel-Lindau.      TECHNIQUE: Spiral CT images were obtained from the base of the skull to  the vertex without intravenous contrast. Images were reformatted and  submitted in 1 mm thick axial, sagittal and coronal CT sections with  brain algorithm.      This is correlated to prior head CTs yesterday 08/29/2023 and head CT  08/24/2023 and a prior MRI of the brain on 08/28/2023 and  outside MRIs  of the brain on 04/21/2022 and 01/14/2022.     FINDINGS: The patient has had a large 5 x 7.5 cm midline occipital left  occipital craniectomy.  The craniectomy is covered by metallic mesh and  resection of the posterior midline 1.8 cm segment of the posterior ring  of C1, and by history this occurred back on 04/26/2019 for resection of  cerebellar hemangioblastoma. There is extensive encephalomalacia  involving the majority of the left cerebellar hemisphere and some  loculated CSF lateral to the left cerebellum better demonstrated on  recent MRI of the brain on 08/25/2023. There is also encephalomalacia  throughout the majority of the right cerebellum. There is a markedly  enlarged fourth ventricle measuring 4.1 x 3.4 cm in medial lateral and  anterior posterior dimension. There is tenting of the cervicomedullary  junction posteriorly. Recent MRI of the brain on 08/28/2023 demonstrated  multiple small enhancing cerebellar nodules compatible with multiple  hemangioblastomas that are not able to be appreciated on this  noncontrast head CT. There is a ventriculoperitoneal shunt catheter in  place that courses through the superior right coronal suture through the  superior right frontal lobe parenchyma enters the superior body of the  right lateral ventricle and its distal tip is at the level of the right  foramen of Monro.  The lateral and third ventricles are mildly enlarged.   The temporal horns of the lateral ventricles in particular are large in  size. The lateral and third ventricles have clearly enlarged when  compared to an outside MRI of the brain on 04/21/2021. The lateral and  third ventricles have slightly decreased in size when compared to head  CT 08/24/2023, unchanged in size when compared to yesterday's head CT  08/29/2023. There is confluent rind of low density in the right frontal  white matter along the margins of the ventriculoperitoneal shunt  catheter with rind of low-density tracking  3 x 3.3 cm, may be white  matter encephalomalacia, while there could be some backtracking of CSF.  Since the head CT on 08/24/2023 there is resolving low-density in the  temporal occipital periventricular white matter compatible with  resolving transependymal extension of CSF. A tiny tubular tract through  the lateral right parietal bone where there was likely previous  placement and removal of a ventriculostomy catheter in the remote past.  I see no midline shift. No acute intracranial hemorrhage is identified.  There are bubbles of air along the port for the ventriculoperitoneal  shunt catheter within the scalp overlying the anterolateral right  parietal bone from recent shunt revision procedure, and the bubbles of  air are new when compared to yesterday afternoon's head CT 08/29/2023 at  4:39 p.m.     Impression: 1. Since yesterday afternoon's head CT on 08/29/2023 at 4:39 p.m., there  has been shunt revision with bubbles of air in the scalp adjacent to the  shunt port overlying the anterior superior lateral right parietal bone.  There is stable position of the  shunt catheter that courses from the  superior right coronal suture through the superior right frontal lobe  parenchyma through the superior body of the right lateral ventricle and  its distal tip is at the right foramen of Monro. The lateral and third  ventricles remain enlarged. They are clearly larger than they were on an  outside MRI of the brain on 04/21/2022. However they are slightly  smaller than they were on a head CT 6 days ago on 08/24/2023. Since the  head CT on 08/24/2023, six days ago, there is resolving low-density in  the temporal occipital periventricular white matter compatible with  resolving transependymal extension of CSF.     2. Back on 04/26/2019 the patient had a large 7.5 x 5 cm midline  occipital to left occipital craniectomy and it is covered by metallic  mesh and by history resection of cerebellar hemangioblastoma in  this  patient with history of Von Hippel-Lindau. There is extensive  encephalomalacia involving the majority of the left cerebellum and also  extensive encephalomalacia throughout good portions of the right  cerebellar hemisphere. MRI of the brain 2 days ago on 08/28/2023  demonstrated multiple tiny enhancing cerebellar nodules compatible with  multiple hemangioblastomas that are unable to be evaluated on this  noncontrast head CT. There is marked dilatation of the fourth ventricle.   Some of it is ex vacuo dilatation due to extensive cerebellar  encephalomalacia although there is posterior tenting of the  cervicomedullary junction and a small size superior aspect of the  aqueduct of Sylvius and the marked dilatation of the fourth ventricle  may be secondary to encystment of the fourth ventricle. The remainder of  the head CT is unremarkable. The results were communicated to Roberto Ritchie MD, from neurosurgery by telephone on 08/30/2023 at 9:50 AM.     Radiation dose reduction techniques were utilized, including automated  exposure control and exposure modulation based on body size.        This report was finalized on 8/31/2023 5:56 AM by Dr. Shahram Quintero M.D.       Scheduled Medications  aspirin, 81 mg, Nasogastric, Daily  atorvastatin, 40 mg, Oral, Nightly  docusate sodium, 100 mg, Oral, BID  famotidine, 20 mg, Intravenous, Daily  folic acid (FOLVITE) IVPB, 1 mg, Intravenous, Daily  mupirocin, 1 application , Topical, Q12H  polyethylene glycol, 17 g, Nasogastric, Daily  sodium chloride, 10 mL, Intravenous, Q12H  thiamine (B-1) IV, 200 mg, Intravenous, Q8H   Followed by  [START ON 9/4/2023] thiamine, 100 mg, Oral, Daily  vitamin D, 50,000 Units, Nasogastric, Q7 Days    Infusions  lactated ringers, 9 mL/hr, Last Rate: 9 mL/hr (08/29/23 1818)    Diet  NPO Diet NPO Type: Strict NPO    I have personally reviewed     [x]  Laboratory   []  Microbiology   []  Radiology   []  EKG/Telemetry  []  Cardiology/Vascular   []   Pathology    []  Records       Assessment/Plan     Active Hospital Problems    Diagnosis  POA    **Sepsis [A41.9]  Yes    S/P  Polaris SPVA-200 shunt 8/29/23 w/ Dr. Ritchie valve set to 110 [Z98.2]  Not Applicable    Shunt malfunction [T85.618A]  Yes    Cerebral ventriculomegaly [G93.89]  Yes    Lethargy [R53.83]  Yes    Confusion [R41.0]  Yes      Resolved Hospital Problems   No resolved problems to display.           Patient has a history of von Hippel-Lindau syndrome and is on tumor suppressive medication  welireg, CNS hemangioblastomas, craniotomy status post  shunt placement, baseline hemiparesis, being brought to the ED on 08/24/2023 after found down by family.    Acute encephalopathy; suspected HIE,   -Status post shunt revision 8/29, by neurosurgery:Patient does not require follow-up shunt xray for setting confirmation. Follow-up as scheduled. Office will contact patient if follow-up required sooner due to xray findings. Patient to call for any concerns including but not limited to headaches, vision changes, balance difficulties, incontinence of bladder, weakness.    Improved, alert, x3 but forgetful, requiring frequent orientation      Acute to subacute infarct: MRI 08/28/2023 showed new punctate focus of diffusion restriction/FLAIR hyperintensity inthe right paracentral lobule most suggestive of acute/subacute infarct.  Neurology evaluated, appreciate recs.  Patient initiated on aspirin 81 mg daily, atorvastatin 40 mg daily.,  Continue         Acute hypercapnic respiratory failure s/p vent till 8/25  -Patient is currently on room air, remained stable     B/L Aspiration pneumonia  -Possibly acute on chronic aspiration  -Status pos IV ceftriaxone x4 days.          Dysphagia/vomiting;status post core track placement 08/31,     Will need repeat swallow study tomorrow if able, if continues to aspirate we need to discuss further goals of care including possible PEG tube.  No further episodes of vomiting,  continue to monitor.    Patient pulled out his core track, placed new core track for tube feeds           YUDITH: Resolved with IV fluids, monitor.      Hypokalemia/hypophosphatemia: Improved.  Monitor.       Incidental lung nodules- seen on CT 08/24/2023, CT follow-up in 3 months is  recommended.     S/p prior  shunt and required revision-neurosurgery signed off     VHL with craniotomy   - residual left-sided weakness from this   -Patient is on Welireg for VHL to slow down tumor growth.  He has been off of this medication now for a week.  He will need to follow-up with MD Reynoso about when to restart side effects from this medication are hypoxemia and anemia.  Starting dose is 120 but the patient has been intolerant of it in the past and is down to 40 mg.     Mild coagulopathy, possibly related to nutrition, monitor, INR normalized.           SCDs for DVT prophylaxis.  Full code.  Discussed with patient and nursing staff.  Anticipate discharge SNS, versus home health, timing to be determined      Copied text in this note has been reviewed and is accurate as of 09/03/23.         Dictated utilizing Dragon dictation        Bentley Lovell MD  [facility]  09/03/23  10:06 EDT

## 2023-09-03 NOTE — CONSULTS
General Surgery Consultation    Consulting Physician: Arabella Casarez MD  Referring Physician: Dr. Lovell    Reason for consultation: PEG tube placement    CC: Dysphagia    HPI:   The patient is a very pleasant 57 y.o. 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  He was admitted to the hospital on 8/24/2023 after he was found down by a friend at home.  He was intubated in route and has been liberated from the ventilator but has struggled with persistent dysphagia due to a presumed stroke.  He underwent a  shunt few days ago with Dr. Ritchie for worsening hydrocephalus.  He has had multiple Dobbhoff tubes placed but consistently pulls them out while asleep.  He is awake and alert, but says that when he pulls out the Dobbhoff tubes he is unaware of what he is doing.  I have been consulted for possible PEG tube as he has failed multiple swallow studies and has shown signs of aspiration pneumonia.  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   8/28/2023    tadalafil (CIALIS) 5 MG tablet Take 1 tablet by mouth Daily.       belzutifan (WELIREG) 40 MG tablet Take 1 tablet by mouth Daily.          Allergies: No known drug allergies    Social History: , non-smoker, no regular alcohol use    Family History: Noncontributory    Review of Systems:   Constitutional: denies any weight changes, fatigue, or weakness  Eyes: denies blurred/double vision or scleral icterus  Cardiovascular: denies chest pain, palpitations, or edemas  Respiratory: denies cough, sputum, or dyspnea  Gastrointestinal: Positive for  dysphagia; denies melena, hematochezia, nausea, or vomiting  Genitourinary: denies dysuria or hematuria  Endocrine: denies cold intolerance, lethargy, or flushing  Hematologic: denies excessive bruising or bleeding  Musculoskeletal: denies weakness, joint swelling, joint pain, or stiffness  Neurologic: denies seizures, CVA, paresthesia, or peripheral neuropathy  Skin: denies change in nevi, rashes, masses, or jaundice     All other systems reviewed and were negative.    Physical Exam:   Vitals:    09/03/23 1100   BP: 100/72   Pulse: 88   Resp: 16   Temp: 98.6 °F (37 °C)   SpO2: 97%     Height: 177 cm  Weight: 81 kg  BMI: 25.88  GENERAL: awake and alert, no acute distress  HEENT: normocephalic, atraumatic, no scleral icterus, moist mucous membranes  NECK: Supple, there is no thyromegaly or lymphadenopathy  RESPIRATORY: clear to auscultation, no wheezes, rales or rhonchi, symmetric air entry  CARDIOVASCULAR: regular rate and rhythm    GASTROINTESTINAL: Soft, nontender, nondistended, upper midline scar from previous  shunt is well-healed  MUSCULOSKELETAL: no cyanosis, clubbing, or edema   NEUROLOGIC: alert and oriented, normal speech, follows all commands  SKIN: Moist, warm, no rashes, no jaundice      Diagnostic workup:     Pertinent labs:   Results from last 7 days   Lab Units 09/03/23  0708 09/02/23  0559 09/01/23  0549 08/31/23  0554 08/30/23  0536 08/29/23  0636 08/28/23  1626   WBC 10*3/mm3 8.15 7.90 6.88 7.93 6.53 6.92 7.23   HEMOGLOBIN g/dL 12.4* 11.9* 11.7* 12.7* 12.7* 12.7* 12.7*   HEMATOCRIT % 37.2* 35.2* 35.2* 38.4 37.2* 37.7 37.0*   PLATELETS 10*3/mm3 314 288 257 252 206 157 150     Results from last 7 days   Lab Units 09/03/23  0708 09/02/23  0559 09/01/23  1515 09/01/23  0549 08/31/23  1635 08/31/23  0544   SODIUM mmol/L 140 143  --  144  --  140   POTASSIUM mmol/L 4.1 4.1 4.1 3.2*   < > 3.3*   CHLORIDE mmol/L 104 108*  --  106  --  106   CO2 mmol/L 28.8 30.5*  --  30.1*  --  25.0   BUN mg/dL 13 11  --   8  --  8   CREATININE mg/dL 0.83 0.85  --  0.87  --  0.91   CALCIUM mg/dL 8.7 8.7  --  8.0*  --  7.8*   BILIRUBIN mg/dL  --   --   --   --   --  0.6   ALK PHOS U/L  --   --   --   --   --  80   ALT (SGPT) U/L  --   --   --   --   --  26   AST (SGOT) U/L  --   --   --   --   --  27   GLUCOSE mg/dL 102* 137*  --  117*  --  99    < > = values in this interval not displayed.       IMAGING:  KUB done a few days ago shows the  shunt coiled within the RUQ and it does not cross midline, cortrak is in distal 3rd portion of duodenum    Assessment and plan:     The patient is a 57 y.o. male with persistent oropharyngeal dysphagia likely related to his intracranial tumors, hydrocephalus, etc.    I discussed with the patient and his family at bedside the risks, benefits, and alternatives to a percutaneous endoscopic gastrostomy tube placement.  They understand the risks of the procedure include bleeding, infection around the tube, physiologic drainage around the tube which can become a nuisance, and possible tube placement blindly through intra-abdominal structures such as the small bowel or colon.  Additionally, he is at risk for developing an infection around the  shunt, but I think this is very low risk.  I reviewed his KUB that was done a few days ago which shows the  shunt is within the right upper quadrant away from where the PEG tube would be placed.  Despite the risks of the procedure, he and his family have consented to proceed.  I will put him on the schedule for PEG tube tomorrow with Dr. Sahu.    Arabella Casarez MD  General, Robotic, and Endoscopic Surgery  [facility]    [address], [address]  [place] [address]  P: [phone]  F: [phone]

## 2023-09-03 NOTE — CONSULTS
Nutrition Services    Patient Name:  Braxton Wolfe  YOB: 1965  MRN: [id number]  Admit Date:  8/24/2023    Pt pulled out his cortrak but is now on the schedule for a PEG tube tomorrow. Discussed with RN, will hold off on replacing as he is likely to pull it out. RD will continue to follow.    Electronically signed by:  Mercedes Zhou RD  09/03/23 14:08 EDT

## 2023-09-03 NOTE — PLAN OF CARE
Goal Outcome Evaluation:  Plan of Care Reviewed With: patient, family        Progress: improving  Outcome Evaluation: Pt received in bed upon arrival and agreeable to PT. Pt completed bed mobility with SBA. Pt stood requiring min A x 2 with cues for upright posture once standing. Pt ambulated 40' total c RW requiring mod A x 2. Pt demonstrated L drop foot, decreased L knee extension during stance phase, forward flexed posture, and R trunk lean while ambulating. Cues provided throughout to correct gait mechancis having to stop throughout for safety. Pt fatigues quickly with minimal activity. Pt initially demonstrated good balance while sitting EOB but worsened with fatigue after ambulating with a R trunk lean. Pt returned to side-lying with all needs in reach at end of session. Pt will continue to benefit from skilled PT to address strength, coordination, endurance, and functional mobility.      Anticipated Discharge Disposition (PT): inpatient rehabilitation facility, sub acute care setting

## 2023-09-03 NOTE — THERAPY TREATMENT NOTE
Patient Name: Braxton Wolfe  : 1965    MRN: [id number]                              Today's Date: 9/3/2023       Admit Date: 2023    Visit Dx:     ICD-10-CM ICD-9-CM   1. Follow-up exam  Z09 V67.9   2. Sepsis with acute renal failure without septic shock, due to unspecified organism, unspecified acute renal failure type  A41.9 038.9    R65.20 995.92    N17.9 584.9   3. Acute respiratory failure, unspecified whether with hypoxia or hypercapnia  J96.00 518.81   4. Acute kidney injury  N17.9 584.9   5. Hypokalemia  E87.6 276.8   6. Hypernatremia  E87.0 276.0   7. Obstructive hydrocephalus  G91.1 331.4   8. Shunt malfunction  T85.618A 996.59     Patient Active Problem List   Diagnosis    Sepsis    Shunt malfunction    Cerebral ventriculomegaly    Lethargy    Confusion    S/P  Polaris SPVA-200 shunt 23 w/ Dr. Ritchie valve set to 110     History reviewed. No pertinent past medical history.  Past Surgical History:   Procedure Laterality Date    BRAIN SURGERY       SHUNT INSERTION N/A 2023    Procedure: VENTRICULAR PERITONEAL SHUNT REVISION;  Surgeon: Roberto Ritchie MD;  Location: [facility];  Service: Neurosurgery;  Laterality: N/A;      General Information       Row Name 23 1148          Physical Therapy Time and Intention    Document Type therapy note (daily note)  -CS     Mode of Treatment individual therapy;physical therapy  -CS       Row Name 23 1148          General Information    Patient Profile Reviewed yes  -CS     Existing Precautions/Restrictions fall  -CS       Row Name 23 1148          Cognition    Orientation Status (Cognition) oriented x 3  -CS       Row Name 23 1148          Safety Issues, Functional Mobility    Safety Issues Affecting Function (Mobility) awareness of need for assistance;insight into deficits/self-awareness;sequencing abilities  -CS     Impairments Affecting Function (Mobility) balance;endurance/activity tolerance;strength;range of  motion (ROM);motor control;cognition;coordination  -CS               User Key  (r) = Recorded By, (t) = Taken By, (c) = Cosigned By      Initials Name Provider Type    CS Tesha Bedolla PT Physical Therapist                   Mobility       Row Name 09/03/23 1149          Bed Mobility    Bed Mobility supine-sit;sit-supine  -CS     Supine-Sit [place] (Bed Mobility) standby assist  -CS     Sit-Supine [place] (Bed Mobility) standby assist  -CS     Assistive Device (Bed Mobility) bed rails  -CS     Comment, (Bed Mobility) improved posture while sitting EOB initially but R trunk lean following activity  -CS       Row Name 09/03/23 1149          Sit-Stand Transfer    Sit-Stand [place] (Transfers) minimum assist (75% patient effort);2 person assist;verbal cues;nonverbal cues (demo/gesture)  -CS     Assistive Device (Sit-Stand Transfers) walker, front-wheeled  -CS     Comment, (Sit-Stand Transfer) VC for UE placement & upright posture  -CS       Row Name 09/03/23 1149          Gait/Stairs (Locomotion)    [place] Level (Gait) moderate assist (50% patient effort);2 person assist;verbal cues;nonverbal cues (demo/gesture)  -CS     Assistive Device (Gait) walker, front-wheeled  -CS     Distance in Feet (Gait) 40'  -CS     Deviations/Abnormal Patterns (Gait) stride length decreased;base of support, narrow;gait speed decreased;angeli decreased  -CS     Bilateral Gait Deviations forward flexed posture  -CS     Left Sided Gait Deviations weight shift ability decreased;decreased knee extension;foot drop/toe drag  -CS     Right Sided Gait Deviations leans right  -CS     [place] Level (Stairs) not tested  -CS     Comment, (Gait/Stairs) L drop foot that worsens with fatigue; strong flexed posture & R trunk lean - cues provided throughout to correct gait mechanics; stopping throughout for safety  -CS               User Key  (r) = Recorded By, (t) = Taken By, (c) = Cosigned By      Initials Name Provider Type     Tesha Reyes PT Physical Therapist                   Obj/Interventions       Row Name 09/03/23 1152          Balance    Balance Assessment sitting static balance;sitting dynamic balance;standing static balance;standing dynamic balance  -     Static Sitting Balance standby assist  -CS     Dynamic Sitting Balance contact guard  -CS     Position, Sitting Balance unsupported;sitting edge of bed  -     Static Standing Balance minimal assist;2-person assist  -CS     Dynamic Standing Balance moderate assist;2-person assist  -CS     Position/Device Used, Standing Balance supported;walker, front-wheeled  -     Comment, Balance R lean while sitting EOB & during ambulation  -CS               User Key  (r) = Recorded By, (t) = Taken By, (c) = Cosigned By      Initials Name Provider Type    CS Tesha Bedolla, PT Physical Therapist                   Goals/Plan    No documentation.                  Clinical Impression       Row Name 09/03/23 1152          Pain    Pretreatment Pain Rating 0/10 - no pain  -CS     Posttreatment Pain Rating 0/10 - no pain  -CS       Row Name 09/03/23 1152          Plan of Care Review    Plan of Care Reviewed With patient;family  -     Progress improving  -     Outcome Evaluation Pt received in bed upon arrival and agreeable to PT. Pt completed bed mobility with SBA. Pt stood requiring min A x 2 with cues for upright posture once standing. Pt ambulated 40' total c RW requiring mod A x 2. Pt demonstrated L drop foot, decreased L knee extension during stance phase, forward flexed posture, and R trunk lean while ambulating. Cues provided throughout to correct gait mechancis having to stop throughout for safety. Pt fatigues quickly with minimal activity. Pt initially demonstrated good balance while sitting EOB but worsened with fatigue after ambulating with a R trunk lean. Pt returned to side-lying with all needs in reach at end of session. Pt will continue to benefit from skilled PT  to address strength, coordination, endurance, and functional mobility.  -CS       Row Name 09/03/23 1152          Therapy Assessment/Plan (PT)    Criteria for Skilled Interventions Met (PT) yes;meets criteria  -CS     Therapy Frequency (PT) 5 times/wk  -CS       Row Name 09/03/23 1152          Positioning and Restraints    Pre-Treatment Position in bed  -CS     Post Treatment Position bed  -CS     In Bed notified nsg;side lying right;call light within reach;encouraged to call for assist;exit alarm on;with family/caregiver  -CS               User Key  (r) = Recorded By, (t) = Taken By, (c) = Cosigned By      Initials Name Provider Type    CS Tesha Bedolla, PT Physical Therapist                   Outcome Measures       Row Name 09/03/23 1157          How much help from another person do you currently need...    Turning from your back to your side while in flat bed without using bedrails? 4  -CS     Moving from lying on back to sitting on the side of a flat bed without bedrails? 3  -CS     Moving to and from a bed to a chair (including a wheelchair)? 2  -CS     Standing up from a chair using your arms (e.g., wheelchair, bedside chair)? 2  -CS     Climbing 3-5 steps with a railing? 1  -CS     To walk in hospital room? 2  -CS     AM-PAC 6 Clicks Score (PT) 14  -CS     Highest level of mobility 4 --> Transferred to chair/commode  -CS       Row Name 09/03/23 1157          Functional Assessment    Outcome Measure Options AM-PAC 6 Clicks Basic Mobility (PT)  -CS               User Key  (r) = Recorded By, (t) = Taken By, (c) = Cosigned By      Initials Name Provider Type    Tesha Reyes, PT Physical Therapist                                 Physical Therapy Education       Title: PT OT SLP Therapies (Done)       Topic: Physical Therapy (Done)       Point: Mobility training (Done)       Learning Progress Summary             Patient Acceptance, E,TB, VU,DU,NR by CS at 9/3/2023 1157    Acceptance, E,D, VU,NR by EB at  9/1/2023 1140    Acceptance, E,D, VU,NR by EB at 8/31/2023 1607    Acceptance, E,D, VU,NR by EB at 8/30/2023 1621    Acceptance, E,TB, NR by CB at 8/26/2023 1558                         Point: Home exercise program (Done)       Learning Progress Summary             Patient Acceptance, E,TB, VU,DU,NR by CS at 9/3/2023 1157    Acceptance, E,D, VU,NR by EB at 8/30/2023 1621                         Point: Body mechanics (Done)       Learning Progress Summary             Patient Acceptance, E,TB, VU,DU,NR by CS at 9/3/2023 1157    Acceptance, E,D, VU,NR by EB at 9/1/2023 1140    Acceptance, E,D, VU,NR by EB at 8/31/2023 1607    Acceptance, E,D, VU,NR by EB at 8/30/2023 1621    Acceptance, E,TB, NR by CB at 8/26/2023 1558                         Point: Precautions (Done)       Learning Progress Summary             Patient Acceptance, E,TB, VU,DU,NR by CS at 9/3/2023 1157    Acceptance, E,D, VU,NR by EB at 9/1/2023 1140    Acceptance, E,D, VU,NR by EB at 8/31/2023 1607    Acceptance, E,D, VU,NR by EB at 8/30/2023 1621    Acceptance, E,TB, NR by CB at 8/26/2023 1558                                         User Key       Initials Effective Dates Name Provider Type Discipline    EB 02/14/23 -  Ciera Carter PTA Physical Therapist Assistant PT    CB 10/22/21 -  Luh Etienne, BRAD Physical Therapist PT     09/22/22 -  Tesha Bedolla, PT Physical Therapist PT                  PT Recommendation and Plan     Plan of Care Reviewed With: patient, family  Progress: improving  Outcome Evaluation: Pt received in bed upon arrival and agreeable to PT. Pt completed bed mobility with SBA. Pt stood requiring min A x 2 with cues for upright posture once standing. Pt ambulated 40' total c RW requiring mod A x 2. Pt demonstrated L drop foot, decreased L knee extension during stance phase, forward flexed posture, and R trunk lean while ambulating. Cues provided throughout to correct gait mechancis having to stop throughout for safety. Pt  fatigues quickly with minimal activity. Pt initially demonstrated good balance while sitting EOB but worsened with fatigue after ambulating with a R trunk lean. Pt returned to side-lying with all needs in reach at end of session. Pt will continue to benefit from skilled PT to address strength, coordination, endurance, and functional mobility.     Time Calculation:         PT Charges       Row Name 09/03/23 1158             Time Calculation    Start Time 1100  -CS      Stop Time 1113  -CS      Time Calculation (min) 13 min  -CS      PT Received On 09/03/23  -CS      PT - Next Appointment 09/05/23  -CS         Time Calculation- PT    Total Timed Code Minutes- PT 12 minute(s)  -CS         Timed Charges    30121 - PT Therapeutic Activity Minutes 12  -CS         Total Minutes    Timed Charges Total Minutes 12  -CS       Total Minutes 12  -CS                User Key  (r) = Recorded By, (t) = Taken By, (c) = Cosigned By      Initials Name Provider Type    CS Tesha Bedolla, PT Physical Therapist                  Therapy Charges for Today       Code Description Service Date Service Provider Modifiers Qty    [id number]  PT THERAPEUTIC ACT EA 15 MIN 9/3/2023 Tesha Bedolla PT GP 1            PT G-Codes  Outcome Measure Options: AM-PAC 6 Clicks Basic Mobility (PT)  AM-PAC 6 Clicks Score (PT): 14  Modified Gay Scale: 4 - Moderately severe disability.  Unable to walk without assistance, and unable to attend to own bodily needs without assistance.  PT Discharge Summary  Anticipated Discharge Disposition (PT): inpatient rehabilitation facility, sub acute care setting    Tesha Bedolla PT  9/3/2023

## 2023-09-03 NOTE — PLAN OF CARE
Goal Outcome Evaluation:      VSS. Disoriented to situation and forgetful requiring frequent reorientation. Did not sleep much tonight. R scalp incision cdi open to air.      Progress: no change

## 2023-09-04 ENCOUNTER — ANESTHESIA EVENT (OUTPATIENT)
Dept: PERIOP | Facility: HOSPITAL | Age: 58
DRG: 871 | End: 2023-09-04
Payer: COMMERCIAL

## 2023-09-04 ENCOUNTER — ANESTHESIA (OUTPATIENT)
Dept: PERIOP | Facility: HOSPITAL | Age: 58
DRG: 871 | End: 2023-09-04
Payer: COMMERCIAL

## 2023-09-04 LAB
ALBUMIN SERPL-MCNC: 3.3 G/DL (ref 3.5–5.2)
ANION GAP SERPL CALCULATED.3IONS-SCNC: 8.6 MMOL/L (ref 5–15)
BUN SERPL-MCNC: 14 MG/DL (ref 6–20)
BUN/CREAT SERPL: 15.7 (ref 7–25)
CALCIUM SPEC-SCNC: 8.6 MG/DL (ref 8.6–10.5)
CHLORIDE SERPL-SCNC: 106 MMOL/L (ref 98–107)
CO2 SERPL-SCNC: 26.4 MMOL/L (ref 22–29)
CREAT SERPL-MCNC: 0.89 MG/DL (ref 0.76–1.27)
DEPRECATED RDW RBC AUTO: 43.9 FL (ref 37–54)
EGFRCR SERPLBLD CKD-EPI 2021: 100 ML/MIN/1.73
ERYTHROCYTE [DISTWIDTH] IN BLOOD BY AUTOMATED COUNT: 13.4 % (ref 12.3–15.4)
GLUCOSE BLDC GLUCOMTR-MCNC: 75 MG/DL (ref 70–130)
GLUCOSE BLDC GLUCOMTR-MCNC: 78 MG/DL (ref 70–130)
GLUCOSE BLDC GLUCOMTR-MCNC: 80 MG/DL (ref 70–130)
GLUCOSE BLDC GLUCOMTR-MCNC: 84 MG/DL (ref 70–130)
GLUCOSE SERPL-MCNC: 78 MG/DL (ref 65–99)
HCT VFR BLD AUTO: 38.8 % (ref 37.5–51)
HGB BLD-MCNC: 12.8 G/DL (ref 13–17.7)
MAGNESIUM SERPL-MCNC: 2.1 MG/DL (ref 1.6–2.6)
MCH RBC QN AUTO: 29.5 PG (ref 26.6–33)
MCHC RBC AUTO-ENTMCNC: 33 G/DL (ref 31.5–35.7)
MCV RBC AUTO: 89.4 FL (ref 79–97)
PHOSPHATE SERPL-MCNC: 3.3 MG/DL (ref 2.5–4.5)
PLATELET # BLD AUTO: 313 10*3/MM3 (ref 140–450)
PMV BLD AUTO: 9 FL (ref 6–12)
POTASSIUM SERPL-SCNC: 3.9 MMOL/L (ref 3.5–5.2)
RBC # BLD AUTO: 4.34 10*6/MM3 (ref 4.14–5.8)
SODIUM SERPL-SCNC: 141 MMOL/L (ref 136–145)
WBC NRBC COR # BLD: 7.67 10*3/MM3 (ref 3.4–10.8)

## 2023-09-04 PROCEDURE — 82948 REAGENT STRIP/BLOOD GLUCOSE: CPT

## 2023-09-04 PROCEDURE — 83735 ASSAY OF MAGNESIUM: CPT | Performed by: NEUROLOGICAL SURGERY

## 2023-09-04 PROCEDURE — 94799 UNLISTED PULMONARY SVC/PX: CPT

## 2023-09-04 PROCEDURE — 94761 N-INVAS EAR/PLS OXIMETRY MLT: CPT

## 2023-09-04 PROCEDURE — 88305 TISSUE EXAM BY PATHOLOGIST: CPT | Performed by: SURGERY

## 2023-09-04 PROCEDURE — 25010000002 PROPOFOL 500 MG/50ML EMULSION: Performed by: STUDENT IN AN ORGANIZED HEALTH CARE EDUCATION/TRAINING PROGRAM

## 2023-09-04 PROCEDURE — 25010000002 PROPOFOL 10 MG/ML EMULSION: Performed by: STUDENT IN AN ORGANIZED HEALTH CARE EDUCATION/TRAINING PROGRAM

## 2023-09-04 PROCEDURE — 80069 RENAL FUNCTION PANEL: CPT | Performed by: INTERNAL MEDICINE

## 2023-09-04 PROCEDURE — 25010000002 CEFAZOLIN IN DEXTROSE 2-4 GM/100ML-% SOLUTION: Performed by: SURGERY

## 2023-09-04 PROCEDURE — 85027 COMPLETE CBC AUTOMATED: CPT | Performed by: STUDENT IN AN ORGANIZED HEALTH CARE EDUCATION/TRAINING PROGRAM

## 2023-09-04 PROCEDURE — 0DB48ZX EXCISION OF ESOPHAGOGASTRIC JUNCTION, VIA NATURAL OR ARTIFICIAL OPENING ENDOSCOPIC, DIAGNOSTIC: ICD-10-PCS | Performed by: SURGERY

## 2023-09-04 PROCEDURE — 43239 EGD BIOPSY SINGLE/MULTIPLE: CPT | Performed by: SURGERY

## 2023-09-04 PROCEDURE — 0DB68ZX EXCISION OF STOMACH, VIA NATURAL OR ARTIFICIAL OPENING ENDOSCOPIC, DIAGNOSTIC: ICD-10-PCS | Performed by: SURGERY

## 2023-09-04 PROCEDURE — 0DH63UZ INSERTION OF FEEDING DEVICE INTO STOMACH, PERCUTANEOUS APPROACH: ICD-10-PCS | Performed by: SURGERY

## 2023-09-04 PROCEDURE — 43246 EGD PLACE GASTROSTOMY TUBE: CPT | Performed by: SURGERY

## 2023-09-04 RX ORDER — HYDROMORPHONE HYDROCHLORIDE 1 MG/ML
0.25 INJECTION, SOLUTION INTRAMUSCULAR; INTRAVENOUS; SUBCUTANEOUS
Status: DISCONTINUED | OUTPATIENT
Start: 2023-09-04 | End: 2023-09-04 | Stop reason: HOSPADM

## 2023-09-04 RX ORDER — FLUMAZENIL 0.1 MG/ML
0.2 INJECTION INTRAVENOUS AS NEEDED
Status: DISCONTINUED | OUTPATIENT
Start: 2023-09-04 | End: 2023-09-04 | Stop reason: HOSPADM

## 2023-09-04 RX ORDER — PROMETHAZINE HYDROCHLORIDE 25 MG/1
25 SUPPOSITORY RECTAL ONCE AS NEEDED
Status: DISCONTINUED | OUTPATIENT
Start: 2023-09-04 | End: 2023-09-04 | Stop reason: HOSPADM

## 2023-09-04 RX ORDER — PANTOPRAZOLE SODIUM 40 MG/10ML
40 INJECTION, POWDER, LYOPHILIZED, FOR SOLUTION INTRAVENOUS EVERY 12 HOURS SCHEDULED
Status: DISCONTINUED | OUTPATIENT
Start: 2023-09-04 | End: 2023-09-07

## 2023-09-04 RX ORDER — HYDROCODONE BITARTRATE AND ACETAMINOPHEN 7.5; 325 MG/1; MG/1
1 TABLET ORAL EVERY 4 HOURS PRN
Status: DISCONTINUED | OUTPATIENT
Start: 2023-09-04 | End: 2023-09-04 | Stop reason: HOSPADM

## 2023-09-04 RX ORDER — EPHEDRINE SULFATE 50 MG/ML
5 INJECTION, SOLUTION INTRAVENOUS ONCE AS NEEDED
Status: DISCONTINUED | OUTPATIENT
Start: 2023-09-04 | End: 2023-09-04 | Stop reason: HOSPADM

## 2023-09-04 RX ORDER — NALOXONE HCL 0.4 MG/ML
0.2 VIAL (ML) INJECTION AS NEEDED
Status: DISCONTINUED | OUTPATIENT
Start: 2023-09-04 | End: 2023-09-04 | Stop reason: HOSPADM

## 2023-09-04 RX ORDER — FENTANYL CITRATE 50 UG/ML
25 INJECTION, SOLUTION INTRAMUSCULAR; INTRAVENOUS
Status: DISCONTINUED | OUTPATIENT
Start: 2023-09-04 | End: 2023-09-04 | Stop reason: HOSPADM

## 2023-09-04 RX ORDER — CEFAZOLIN SODIUM 2 G/100ML
2000 INJECTION, SOLUTION INTRAVENOUS ONCE
Status: COMPLETED | OUTPATIENT
Start: 2023-09-04 | End: 2023-09-04

## 2023-09-04 RX ORDER — LABETALOL HYDROCHLORIDE 5 MG/ML
5 INJECTION, SOLUTION INTRAVENOUS
Status: DISCONTINUED | OUTPATIENT
Start: 2023-09-04 | End: 2023-09-04 | Stop reason: HOSPADM

## 2023-09-04 RX ORDER — LIDOCAINE HYDROCHLORIDE 20 MG/ML
INJECTION, SOLUTION INFILTRATION; PERINEURAL AS NEEDED
Status: DISCONTINUED | OUTPATIENT
Start: 2023-09-04 | End: 2023-09-04 | Stop reason: SURG

## 2023-09-04 RX ORDER — IPRATROPIUM BROMIDE AND ALBUTEROL SULFATE 2.5; .5 MG/3ML; MG/3ML
3 SOLUTION RESPIRATORY (INHALATION) ONCE AS NEEDED
Status: DISCONTINUED | OUTPATIENT
Start: 2023-09-04 | End: 2023-09-04 | Stop reason: HOSPADM

## 2023-09-04 RX ORDER — PROPOFOL 10 MG/ML
INJECTION, EMULSION INTRAVENOUS AS NEEDED
Status: DISCONTINUED | OUTPATIENT
Start: 2023-09-04 | End: 2023-09-04 | Stop reason: SURG

## 2023-09-04 RX ORDER — ONDANSETRON 2 MG/ML
4 INJECTION INTRAMUSCULAR; INTRAVENOUS ONCE AS NEEDED
Status: DISCONTINUED | OUTPATIENT
Start: 2023-09-04 | End: 2023-09-04 | Stop reason: HOSPADM

## 2023-09-04 RX ORDER — DIPHENHYDRAMINE HYDROCHLORIDE 50 MG/ML
12.5 INJECTION INTRAMUSCULAR; INTRAVENOUS
Status: DISCONTINUED | OUTPATIENT
Start: 2023-09-04 | End: 2023-09-04 | Stop reason: HOSPADM

## 2023-09-04 RX ORDER — HYDRALAZINE HYDROCHLORIDE 20 MG/ML
5 INJECTION INTRAMUSCULAR; INTRAVENOUS
Status: DISCONTINUED | OUTPATIENT
Start: 2023-09-04 | End: 2023-09-04 | Stop reason: HOSPADM

## 2023-09-04 RX ORDER — DROPERIDOL 2.5 MG/ML
0.62 INJECTION, SOLUTION INTRAMUSCULAR; INTRAVENOUS
Status: DISCONTINUED | OUTPATIENT
Start: 2023-09-04 | End: 2023-09-04 | Stop reason: HOSPADM

## 2023-09-04 RX ORDER — HYDROCODONE BITARTRATE AND ACETAMINOPHEN 5; 325 MG/1; MG/1
1 TABLET ORAL ONCE AS NEEDED
Status: DISCONTINUED | OUTPATIENT
Start: 2023-09-04 | End: 2023-09-04 | Stop reason: HOSPADM

## 2023-09-04 RX ORDER — PROMETHAZINE HYDROCHLORIDE 25 MG/1
25 TABLET ORAL ONCE AS NEEDED
Status: DISCONTINUED | OUTPATIENT
Start: 2023-09-04 | End: 2023-09-04 | Stop reason: HOSPADM

## 2023-09-04 RX ADMIN — LIDOCAINE HYDROCHLORIDE 60 MG: 20 INJECTION, SOLUTION INFILTRATION; PERINEURAL at 11:16

## 2023-09-04 RX ADMIN — Medication 10 ML: at 21:27

## 2023-09-04 RX ADMIN — CEFAZOLIN SODIUM 2000 MG: 2 INJECTION, SOLUTION INTRAVENOUS at 11:08

## 2023-09-04 RX ADMIN — PANTOPRAZOLE SODIUM 40 MG: 40 INJECTION, POWDER, FOR SOLUTION INTRAVENOUS at 12:53

## 2023-09-04 RX ADMIN — Medication 10 ML: at 12:49

## 2023-09-04 RX ADMIN — ATORVASTATIN CALCIUM 40 MG: 20 TABLET, FILM COATED ORAL at 21:27

## 2023-09-04 RX ADMIN — PROPOFOL 250 MCG/KG/MIN: 10 INJECTION, EMULSION INTRAVENOUS at 11:16

## 2023-09-04 RX ADMIN — PANTOPRAZOLE SODIUM 40 MG: 40 INJECTION, POWDER, FOR SOLUTION INTRAVENOUS at 21:27

## 2023-09-04 RX ADMIN — PROPOFOL 20 MG: 10 INJECTION, EMULSION INTRAVENOUS at 11:16

## 2023-09-04 RX ADMIN — MUPIROCIN 1 APPLICATION: 20 OINTMENT TOPICAL at 21:27

## 2023-09-04 NOTE — ANESTHESIA PREPROCEDURE EVALUATION
Anesthesia Evaluation     Patient summary reviewed and Nursing notes reviewed   no history of anesthetic complications:   NPO Solid Status: > 8 hours  NPO Liquid Status: > 2 hours           Airway   Mallampati: II  TM distance: >3 FB  Neck ROM: full  Dental      Pulmonary    Cardiovascular     ECG reviewed      ROS comment: Echo in 8/2023 unremarkable    Neuro/Psych    ROS Comment:  shunt  GI/Hepatic/Renal/Endo      Musculoskeletal     Abdominal    Substance History      OB/GYN          Other          Other Comment: [place]                  Anesthesia Plan    ASA 3     MAC     intravenous induction     Anesthetic plan, risks, benefits, and alternatives have been provided, discussed and informed consent has been obtained with: patient.      CODE STATUS:    Code Status (Patient has no pulse and is not breathing): CPR (Attempt to Resuscitate)  Medical Interventions (Patient has pulse or is breathing): Full Support

## 2023-09-04 NOTE — PLAN OF CARE
Goal Outcome Evaluation:  Plan of Care Reviewed With: patient        Progress: improving       Pt a/o x4, RA, VSS. PEG placed this AM to gravity bag. Will start tube feeds tomorrow @ 20cc/hr. No complaints of pain. LR @ 75ml/hr. Will CTM.

## 2023-09-04 NOTE — PROGRESS NOTES
Name: Braxton Wolfe ADMIT: 2023   : 1965  PCP: Provider, No Known    MRN: [id number] LOS: 11 days   AGE/SEX: 57 y.o. male  ROOM: [facility] Main OR/MAIN OR     Subjective   Subjective   No acute events overnight.  Laying in bed, denies complaints when asked.  N.p.o. after midnight for planned PEG tube placement.  Review of Systems   As above  Objective   Objective   Vital Signs  Temp:  [98 °F (36.7 °C)-98.6 °F (37 °C)] 98.1 °F (36.7 °C)  Heart Rate:  [65-88] 75  Resp:  [16-18] 16  BP: (100-125)/(65-84) 113/76  SpO2:  [91 %-98 %] 95 %  on   ;   Device (Oxygen Therapy): room air  Body mass index is 24.58 kg/m².  Physical Exam    General: Alert, laying in bed, not in distress,  HEENT: Normocephalic, right scalp dressing in place  CV: Regular rate and rhythm, no murmurs rubs or gallops  Lungs: CTA, no wheezing  Abdomen: Soft, nontender, nondistended  Extremities: No significant peripheral edema     Results Review     I reviewed the patient's new clinical results.  Results from last 7 days   Lab Units 23  0535 23  0708 23  0559 23  0549   WBC 10*3/mm3 7.67 8.15 7.90 6.88   HEMOGLOBIN g/dL 12.8* 12.4* 11.9* 11.7*   PLATELETS 10*3/mm3 313 314 288 257       Results from last 7 days   Lab Units 23  0535 23  0708 23  0559 23  1515 23  0549   SODIUM mmol/L 141 140 143  --  144   POTASSIUM mmol/L 3.9 4.1 4.1 4.1 3.2*   CHLORIDE mmol/L 106 104 108*  --  106   CO2 mmol/L 26.4 28.8 30.5*  --  30.1*   BUN mg/dL 14 13 11  --  8   CREATININE mg/dL 0.89 0.83 0.85  --  0.87   GLUCOSE mg/dL 78 102* 137*  --  117*     Estimated Creatinine Clearance: 100.6 mL/min (by C-G formula based on SCr of 0.89 mg/dL).  Results from last 7 days   Lab Units 23  0535 23  0708 23  0559 23  0549 23  0544   ALBUMIN g/dL 3.3* 3.3* 3.2* 2.9* 2.7*   BILIRUBIN mg/dL  --   --   --   --  0.6   ALK PHOS U/L  --   --   --   --  80   AST (SGOT) U/L  --   --   --   --   27   ALT (SGPT) U/L  --   --   --   --  26       Results from last 7 days   Lab Units 09/04/23  0535 09/03/23  0708 09/02/23  0559 09/01/23  0549   CALCIUM mg/dL 8.6 8.7 8.7 8.0*   ALBUMIN g/dL 3.3* 3.3* 3.2* 2.9*   MAGNESIUM mg/dL 2.1 2.2 2.1 1.9   PHOSPHORUS mg/dL 3.3 3.2 2.8 2.4*           No results found for: COVID19  Glucose   Date/Time Value Ref Range Status   09/04/2023 0602 80 70 - 130 mg/dL Final   09/04/2023 0003 84 70 - 130 mg/dL Final   09/03/2023 0637 100 70 - 130 mg/dL Final   09/02/2023 1805 112 70 - 130 mg/dL Final   09/02/2023 0622 130 70 - 130 mg/dL Final   09/02/2023 0000 121 70 - 130 mg/dL Final   09/01/2023 1839 130 70 - 130 mg/dL Final           XR Abdomen KUB  Clinical: Core check catheter placement     COMPARISON 8/28/2023     FINDINGS: Cortright catheter tip is located within the duodenum, at the  junction of the second and third portions. It has been advanced since  the prior examination. There is contrast demonstrated now within the  colon. The remainder is unremarkable.     This report was finalized on 8/31/2023 2:47 PM by Dr. Tyrel Chavez M.D.     CT Head Without Contrast  Narrative: CT SCAN OF THE HEAD WITHOUT CONTRAST 08/30/2023     CLINICAL HISTORY: Status post revision of  shunt yesterday 08/29/2023.  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  Patient  has a history of Von Hippel-Lindau.      TECHNIQUE: Spiral CT images were obtained from the base of the skull to  the vertex without intravenous contrast. Images were reformatted and  submitted in 1 mm thick axial, sagittal and coronal CT sections with  brain algorithm.      This is correlated to prior head CTs yesterday 08/29/2023 and head CT  08/24/2023 and a prior MRI of the brain on 08/28/2023 and outside MRIs  of the brain on 04/21/2022 and 01/14/2022.     FINDINGS: The patient has had a large 5 x 7.5 cm midline occipital  left  occipital craniectomy.  The craniectomy is covered by metallic mesh and  resection of the posterior midline 1.8 cm segment of the posterior ring  of C1, and by history this occurred back on 04/26/2019 for resection of  cerebellar hemangioblastoma. There is extensive encephalomalacia  involving the majority of the left cerebellar hemisphere and some  loculated CSF lateral to the left cerebellum better demonstrated on  recent MRI of the brain on 08/25/2023. There is also encephalomalacia  throughout the majority of the right cerebellum. There is a markedly  enlarged fourth ventricle measuring 4.1 x 3.4 cm in medial lateral and  anterior posterior dimension. There is tenting of the cervicomedullary  junction posteriorly. Recent MRI of the brain on 08/28/2023 demonstrated  multiple small enhancing cerebellar nodules compatible with multiple  hemangioblastomas that are not able to be appreciated on this  noncontrast head CT. There is a ventriculoperitoneal shunt catheter in  place that courses through the superior right coronal suture through the  superior right frontal lobe parenchyma enters the superior body of the  right lateral ventricle and its distal tip is at the level of the right  foramen of Monro.  The lateral and third ventricles are mildly enlarged.   The temporal horns of the lateral ventricles in particular are large in  size. The lateral and third ventricles have clearly enlarged when  compared to an outside MRI of the brain on 04/21/2021. The lateral and  third ventricles have slightly decreased in size when compared to head  CT 08/24/2023, unchanged in size when compared to yesterday's head CT  08/29/2023. There is confluent rind of low density in the right frontal  white matter along the margins of the ventriculoperitoneal shunt  catheter with rind of low-density tracking 3 x 3.3 cm, may be white  matter encephalomalacia, while there could be some backtracking of CSF.  Since the head CT on  08/24/2023 there is resolving low-density in the  temporal occipital periventricular white matter compatible with  resolving transependymal extension of CSF. A tiny tubular tract through  the lateral right parietal bone where there was likely previous  placement and removal of a ventriculostomy catheter in the remote past.  I see no midline shift. No acute intracranial hemorrhage is identified.  There are bubbles of air along the port for the ventriculoperitoneal  shunt catheter within the scalp overlying the anterolateral right  parietal bone from recent shunt revision procedure, and the bubbles of  air are new when compared to yesterday afternoon's head CT 08/29/2023 at  4:39 p.m.     Impression: 1. Since yesterday afternoon's head CT on 08/29/2023 at 4:39 p.m., there  has been shunt revision with bubbles of air in the scalp adjacent to the  shunt port overlying the anterior superior lateral right parietal bone.  There is stable position of the  shunt catheter that courses from the  superior right coronal suture through the superior right frontal lobe  parenchyma through the superior body of the right lateral ventricle and  its distal tip is at the right foramen of Monro. The lateral and third  ventricles remain enlarged. They are clearly larger than they were on an  outside MRI of the brain on 04/21/2022. However they are slightly  smaller than they were on a head CT 6 days ago on 08/24/2023. Since the  head CT on 08/24/2023, six days ago, there is resolving low-density in  the temporal occipital periventricular white matter compatible with  resolving transependymal extension of CSF.     2. 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 There is extensive  encephalomalacia involving the majority of the left  cerebellum and also  extensive encephalomalacia throughout good portions of the right  cerebellar hemisphere. MRI of the brain 2 days ago on 08/28/2023  demonstrated multiple tiny enhancing cerebellar nodules compatible with  multiple hemangioblastomas that are unable to be evaluated on this  noncontrast head CT. There is marked dilatation of the fourth ventricle.   Some of it is ex vacuo dilatation due to extensive cerebellar  encephalomalacia although there is posterior tenting of the  cervicomedullary junction and a small size superior aspect of the  aqueduct of Sylvius and the marked dilatation of the fourth ventricle  may be secondary to encystment of the fourth ventricle. The remainder of  the head CT is unremarkable. The results were communicated to Roberto Ritchie MD, from neurosurgery by telephone on 08/30/2023 at 9:50 AM.     Radiation dose reduction techniques were utilized, including automated  exposure control and exposure modulation based on body size.        This report was finalized on 8/31/2023 5:56 AM by Dr. Shahram Quintero M.D.       Scheduled Medications  [MAR Hold] aspirin, 81 mg, Nasogastric, Daily  [MAR Hold] atorvastatin, 40 mg, Oral, Nightly  ceFAZolin, 2,000 mg, Intravenous, Once  [MAR Hold] docusate sodium, 100 mg, Oral, BID  famotidine, 20 mg, Intravenous, Daily  [MAR Hold] folic acid (FOLVITE) IVPB, 1 mg, Intravenous, Daily  [MAR Hold] mupirocin, 1 application , Topical, Q12H  [MAR Hold] polyethylene glycol, 17 g, Nasogastric, Daily  [MAR Hold] sodium chloride, 10 mL, Intravenous, Q12H  thiamine (B-1) IV, 200 mg, Intravenous, Q8H   Followed by  [MAR Hold] thiamine, 100 mg, Oral, Daily  [MAR Hold] vitamin D, 50,000 Units, Nasogastric, Q7 Days    Infusions  lactated ringers, 9 mL/hr, Last Rate: 9 mL/hr (08/29/23 1818)  lactated ringers, 75 mL/hr, Last Rate: Stopped (09/04/23 0256)    Diet  NPO Diet NPO Type: Strict NPO    I have personally reviewed     [x]  Laboratory   []  Microbiology   []   Radiology   []  EKG/Telemetry  []  Cardiology/Vascular   []  Pathology    []  Records       Assessment/Plan     Active Hospital Problems    Diagnosis  POA    **Sepsis [A41.9]  Yes    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 Ritchie valve set to 110 [Z98.2]  Not Applicable    Shunt malfunction [T85.618A]  Yes    Cerebral ventriculomegaly [G93.89]  Yes    Lethargy [R53.83]  Yes    Confusion [R41.0]  Yes    Obstructive hydrocephalus [G91.1]  Unknown      Resolved Hospital Problems   No resolved problems to display.           Patient has a history of von Hippel-Lindau syndrome and is on tumor suppressive medication  welireg, CNS hemangioblastomas, craniotomy status post  shunt placement, baseline hemiparesis, being brought to the ED on 08/24/2023 after found down by family.    Acute encephalopathy; suspected HIE,   -Status post shunt revision 8/29, by neurosurgery:Patient does not require follow-up shunt xray for setting confirmation. Follow-up as scheduled. Office will contact patient if follow-up required sooner due to xray findings. Patient to call for any concerns including but not limited to headaches, vision changes, balance difficulties, incontinence of bladder, weakness.    Improved, alert, x3 but forgetful, requiring frequent reorientation      Acute to subacute infarct: MRI 08/28/2023 showed new punctate focus of diffusion restriction/FLAIR hyperintensity inthe right paracentral lobule most suggestive of acute/subacute infarct.  Neurology evaluated, appreciate recs.  Patient initiated on aspirin 81 mg daily, atorvastatin 40 mg daily.,  Continue         Acute hypercapnic respiratory failure s/p vent till 8/25  -Patient is currently on room air, remained stable     B/L Aspiration pneumonia  -Possibly acute on chronic aspiration  -Status pos IV ceftriaxone x4 days.          Dysphagia/vomiting;  status post core  track placement 08/31,   Family wanted to proceed with PEG tube placement.  General surgery consulted, plan for or today for PEG tube placement.         YUDITH: Resolved with IV fluids, monitor.      Hypokalemia/hypophosphatemia: Improved.  Monitor.       Incidental lung nodules- seen on CT 08/24/2023, CT follow-up in 3 months is  recommended.     S/p prior  shunt and required revision-neurosurgery signed off     VHL with craniotomy   - residual left-sided weakness from this   -Patient is on Welireg for VHL to slow down tumor growth.  He has been off of this medication now for a week.  He will need to follow-up with MD Reynoso about when to restart side effects from this medication are hypoxemia and anemia.  Starting dose is 120 but the patient has been intolerant of it in the past and is down to 40 mg.     Mild coagulopathy, possibly related to nutrition, monitor, INR normalized.           SCDs for DVT prophylaxis.  Full code.  Discussed with patient and nursing staff.  Anticipate discharge SNS, versus home health, timing to be determined pending PEG tube placement.  Likely stable to be discharged in 2-3 days      Copied text in this note has been reviewed and is accurate as of 09/04/23.         Dictated utilizing Dragon dictation        Bentley Lovell MD  [facility]  09/04/23  10:31 EDT

## 2023-09-04 NOTE — PLAN OF CARE
Goal Outcome Evaluation:      VSS. Disoriented to situation, forgetful. NPO. Pulled out IV, awaiting new access. Going for PEG placement today.      Progress: no change

## 2023-09-04 NOTE — OP NOTE
Preoperative diagnosis:   Dysphagia  Postoperative diagnosis:   Same    Procedure: EGD+ PEG insertion and cold forceps biopsy of the esophagus.  Attending surgeon: Yoshi Sahu MD  Anesthesia: MAC  Specimens: GE junction biopsy  Blood loss: Minimal  Drains: 20 American push PEG.    Indications:   Patient is a 57 y.o. y.o. year old man who has dysphagia has failed swallow evaluation.  He is here today for PEG tube placement.  Risk and benefits of procedure including bleeding, infection, perforation, hollow viscus injury discussed in detail with the patient that verbalized understanding and agreed with the plan     Description of procedure: He is taken to the operative room and positioned on the stretcher in the supine position. A surgical pause was performed. After graduated amounts of sedation were administered, the flexible endoscope was inserted into the mouth through bite-block.     It was advanced slowly into the esophagus and stomach.  Stomach was insufflated.      A suitable location for placement of the PEG tube was identified by transillumination with the endoscopic light source and careful ballottement of the abdominal wall.  A spinal needle was used to percutaneously enter the stomach after prepping the skin and anesthetizing with 2% lidocaine.  A guidewire was passed.  It was grasped with the endoscopic snare.  The guidewire, snare and endoscope were withdrawn through the patient's mouth.  A 20 Belgian push style PEG tube was advanced over the guidewire.  It was seated at 3 cm from the bumper according to the markings on the tube.  The retention flange and feeding tube adapter were attached.  Confirmation of the position of the PEG tube was performed by endoscopy.  The scope was introduced through the patient's mouth all the way to the stomach under direct vision.  There was evidence of esophagitis with bleeding.  Cold forceps biopsies were obtained.  Irrigation was performed.  There was minimal  bleeding after that.  The bumper was found inside the stomach.  The scope was withdrawn slowly     He tolerated the procedure very well, and is returned to the recovery area in stable condition.     Instructions:   -Please connect PEG tube to gravity bag  -Can use for water and meds immediately  -Start tube feeds in 24 hours.  Start feeds at 20 cc/h and advance by 20 cc every 4 hours to goal.  -Nutrition consultation for tube feeds recommendations  -Keep abdominal binder in place   -Protonix IV twice daily for at least 3 days, switch to p.o. when able to

## 2023-09-04 NOTE — ANESTHESIA POSTPROCEDURE EVALUATION
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        Post Anesthesia Care and Evaluation    Patient location during evaluation: bedside  Patient participation: complete - patient participated  Level of consciousness: awake and alert  Pain management: adequate    Airway patency: patent  Anesthetic complications: No anesthetic complications  PONV Status: controlled  Cardiovascular status: blood pressure returned to baseline and acceptable  Respiratory status: acceptable  Hydration status: acceptable

## 2023-09-05 LAB
ALBUMIN SERPL-MCNC: 3.3 G/DL (ref 3.5–5.2)
ANION GAP SERPL CALCULATED.3IONS-SCNC: 11.9 MMOL/L (ref 5–15)
BUN SERPL-MCNC: 13 MG/DL (ref 6–20)
BUN/CREAT SERPL: 15.9 (ref 7–25)
CALCIUM SPEC-SCNC: 8.6 MG/DL (ref 8.6–10.5)
CHLORIDE SERPL-SCNC: 104 MMOL/L (ref 98–107)
CO2 SERPL-SCNC: 25.1 MMOL/L (ref 22–29)
CREAT SERPL-MCNC: 0.82 MG/DL (ref 0.76–1.27)
DEPRECATED RDW RBC AUTO: 44.2 FL (ref 37–54)
EGFRCR SERPLBLD CKD-EPI 2021: 102.5 ML/MIN/1.73
ERYTHROCYTE [DISTWIDTH] IN BLOOD BY AUTOMATED COUNT: 13.5 % (ref 12.3–15.4)
GLUCOSE BLDC GLUCOMTR-MCNC: 78 MG/DL (ref 70–130)
GLUCOSE SERPL-MCNC: 67 MG/DL (ref 65–99)
HCT VFR BLD AUTO: 38.5 % (ref 37.5–51)
HGB BLD-MCNC: 12.9 G/DL (ref 13–17.7)
MAGNESIUM SERPL-MCNC: 2 MG/DL (ref 1.6–2.6)
MCH RBC QN AUTO: 30.4 PG (ref 26.6–33)
MCHC RBC AUTO-ENTMCNC: 33.5 G/DL (ref 31.5–35.7)
MCV RBC AUTO: 90.8 FL (ref 79–97)
PHOSPHATE SERPL-MCNC: 2.7 MG/DL (ref 2.5–4.5)
PLATELET # BLD AUTO: 303 10*3/MM3 (ref 140–450)
PMV BLD AUTO: 8.8 FL (ref 6–12)
POTASSIUM SERPL-SCNC: 3.9 MMOL/L (ref 3.5–5.2)
RBC # BLD AUTO: 4.24 10*6/MM3 (ref 4.14–5.8)
SODIUM SERPL-SCNC: 141 MMOL/L (ref 136–145)
WBC NRBC COR # BLD: 7.27 10*3/MM3 (ref 3.4–10.8)

## 2023-09-05 PROCEDURE — 99231 SBSQ HOSP IP/OBS SF/LOW 25: CPT | Performed by: SURGERY

## 2023-09-05 PROCEDURE — 97530 THERAPEUTIC ACTIVITIES: CPT

## 2023-09-05 PROCEDURE — 94799 UNLISTED PULMONARY SVC/PX: CPT

## 2023-09-05 PROCEDURE — 3E0G76Z INTRODUCTION OF NUTRITIONAL SUBSTANCE INTO UPPER GI, VIA NATURAL OR ARTIFICIAL OPENING: ICD-10-PCS | Performed by: HOSPITALIST

## 2023-09-05 PROCEDURE — 85027 COMPLETE CBC AUTOMATED: CPT | Performed by: SURGERY

## 2023-09-05 PROCEDURE — 94761 N-INVAS EAR/PLS OXIMETRY MLT: CPT

## 2023-09-05 PROCEDURE — 83735 ASSAY OF MAGNESIUM: CPT | Performed by: SURGERY

## 2023-09-05 PROCEDURE — 82948 REAGENT STRIP/BLOOD GLUCOSE: CPT

## 2023-09-05 PROCEDURE — 80069 RENAL FUNCTION PANEL: CPT | Performed by: SURGERY

## 2023-09-05 RX ADMIN — Medication 100 MG: at 08:57

## 2023-09-05 RX ADMIN — ASPIRIN 81 MG: 81 TABLET, CHEWABLE ORAL at 08:49

## 2023-09-05 RX ADMIN — Medication 10 ML: at 08:50

## 2023-09-05 RX ADMIN — HYDROCODONE BITARTRATE AND ACETAMINOPHEN 1 TABLET: 5; 325 TABLET ORAL at 17:20

## 2023-09-05 RX ADMIN — MUPIROCIN 1 APPLICATION: 20 OINTMENT TOPICAL at 21:56

## 2023-09-05 RX ADMIN — MUPIROCIN 1 APPLICATION: 20 OINTMENT TOPICAL at 08:49

## 2023-09-05 RX ADMIN — PANTOPRAZOLE SODIUM 40 MG: 40 INJECTION, POWDER, FOR SOLUTION INTRAVENOUS at 08:49

## 2023-09-05 RX ADMIN — Medication 10 ML: at 21:55

## 2023-09-05 RX ADMIN — HYDROCODONE BITARTRATE AND ACETAMINOPHEN 1 TABLET: 5; 325 TABLET ORAL at 08:49

## 2023-09-05 RX ADMIN — SODIUM CHLORIDE, POTASSIUM CHLORIDE, SODIUM LACTATE AND CALCIUM CHLORIDE 75 ML/HR: 600; 310; 30; 20 INJECTION, SOLUTION INTRAVENOUS at 06:19

## 2023-09-05 RX ADMIN — FOLIC ACID 1 MG: 5 INJECTION, SOLUTION INTRAMUSCULAR; INTRAVENOUS; SUBCUTANEOUS at 08:49

## 2023-09-05 RX ADMIN — ATORVASTATIN CALCIUM 40 MG: 20 TABLET, FILM COATED ORAL at 21:55

## 2023-09-05 RX ADMIN — PANTOPRAZOLE SODIUM 40 MG: 40 INJECTION, POWDER, FOR SOLUTION INTRAVENOUS at 21:55

## 2023-09-05 NOTE — PLAN OF CARE
Goal Outcome Evaluation:  Plan of Care Reviewed With: patient        Progress: improving  Outcome Evaluation: Pt seen by PT this date for tx. Pt sat up to EOB w/ SBA. Pt then stood w/ min A x 2 and use of fww. Pt amb 40' req min A x 2 and use of fww. L foot drop noted as well as narrow DMITRI w/ pt cued several times to correct. Pt stopped at approx 25' and impulsively let go of fww to pull up L sock. Pt educ on imp of keeping B hands on fww for safety. Pt performed B LE ther ex before returning to bed w/ SBA. Discussed w/ parent and parent ordering L AFO after dc. Parent verbalized interest.  PT will prog as pt adrienne.      Anticipated Discharge Disposition (PT): inpatient rehabilitation facility, sub acute care setting

## 2023-09-05 NOTE — PAYOR COMM NOTE
"Braxton Wolfe (57 y.o. Male)     PLEASE SEE ATTACHED FOR CONTINUED INPT STAY DAYS    REF # [id number]     PLEASE CALL VILMA PICHARDO RN/ DEPT @ [phone]   OR [phone]    THANK YOU  VILMA PICHARDO RN  [facility]        Date of Birth   1965    Social Security Number       Address   [address] [place] [address]    Home Phone   [phone]    MRN   [id number]       Christianity   Mosque    Marital Status                               Admission Date   8/24/23    Admission Type   Emergency    Admitting Provider   Meek Castillo MD    Attending Provider   Bentley Lovell MD    Department, Room/Bed   [facility] 5 EAST, E568/1       Discharge Date       Discharge Disposition       Discharge Destination                                 Attending Provider: Bentley Lovell MD    Allergies: No Known Allergies    Isolation: None   Infection: None   Code Status: CPR    Ht: 177.8 cm (70\")   Wt: 77.8 kg (171 lb 8.3 oz)    Admission Cmt: None   Principal Problem: Sepsis [A41.9]                   Active Insurance as of 8/24/2023       Primary Coverage       Payor Plan Insurance Group Employer/Plan Group    [employer]HEM BLUE CROSS ANTHEM BLUE CROSS BLUE SHIELD PPO [id number]       Payor Plan Address Payor Plan Phone Number Payor Plan Fax Number Effective Dates    [address] [phone]  1/1/2023 - None Entered    [place] [address]         Subscriber Name Subscriber Birth Date Member ID       BRAXTON WOLFE 1965 [id number]                     Emergency Contacts        (Rel.) Home Phone Work Phone Mobile Phone    Yamilet Wolfe (Daughter) -- -- [phone]    Aby Wolfe (Mother) [phone] -- [phone]    Faizan Wolfe (Father) [phone] -- [phone]    Julia Wolfe (Sister) [phone] -- --              [place]: [facility] [id number]  Tax ID [id number]     Physician Progress Notes (last 72 hours)    "     Yoshi Sahu MD at 23 0904          Cc: Dysphagia    S: Postoperative day 1 status post PEG tube placement.  Doing fine, no complaints, minimal abdominal discomfort.    O:   Vitals:    23 0011 23 0339 23 0400 23 0700   BP: 102/79   118/77   BP Location: Left arm   Right arm   Patient Position: Lying   Lying   Pulse: 83  68 68   Resp: 18   18   Temp: 98.6 °F (37 °C)   98.5 °F (36.9 °C)   TempSrc: Oral   Oral   SpO2: 98%  99% 95%   Weight:  77.8 kg (171 lb 8.3 oz)     Height:          Alert, no acute distress  Breathing comfortable  Abdomen soft, nontender distended, PEG tube in place    White blood cell count 7.2, hemoglobin 12.9    Assessment and plan    Postoperative day 1 status post PEG tube placement    -Can use for water and meds immediately  -Start tube feeds today.  Start feeds at 20 cc/h and advance by 20 cc every 4 hours to goal.  -Nutrition consultation for tube feeds recommendations  -Keep abdominal binder in place     Yoshi Sahu MD, FACS  General, Minimally Invasive and Endoscopic Surgery  [facility]    [address], [address]  [place], [place], [address]  P: [phone]  F: [phone]            Electronically signed by Yoshi Sahu MD at 23 09       Bentley Lovell MD at 23 0848              Name: Braxton Wolfe ADMIT: 2023   : 1965  PCP: Provider, No Known    MRN: [id number] LOS: 12 days   AGE/SEX: 57 y.o. male  ROOM: [facility]     Subjective   Subjective   No acute events overnight.  Status post PEG tube placement yesterday.  Laying in bed.  Complaints.    Review of Systems  As above  Objective   Objective   Vital Signs  Temp:  [97.8 °F (36.6 °C)-98.7 °F (37.1 °C)] 98.5 °F (36.9 °C)  Heart Rate:  [66-87] 68  Resp:  [15-20] 18  BP: ()/(62-91) 118/77  SpO2:  [91 %-100 %] 95 %  on  Flow (L/min):  [0-4] 0;   Device (Oxygen Therapy): room air  Body mass index is 24.61 kg/m².  Physical  Exam    General: Alert, laying in bed, not in distress,  HEENT: Normocephalic, right scalp incision  clean, intact, no signs of erythema/infection  CV: Regular rate and rhythm, no murmurs rubs or gallops  Lungs: CTA, no wheezing  Abdomen: Soft, nontender, nondistended  Extremities: No significant peripheral edema     Results Review     I reviewed the patient's new clinical results.  Results from last 7 days   Lab Units 09/05/23 0604 09/04/23 0535 09/03/23 0708 09/02/23  0559   WBC 10*3/mm3 7.27 7.67 8.15 7.90   HEMOGLOBIN g/dL 12.9* 12.8* 12.4* 11.9*   PLATELETS 10*3/mm3 303 313 314 288     Results from last 7 days   Lab Units 09/05/23 0604 09/04/23 0535 09/03/23 0708 09/02/23  0559   SODIUM mmol/L 141 141 140 143   POTASSIUM mmol/L 3.9 3.9 4.1 4.1   CHLORIDE mmol/L 104 106 104 108*   CO2 mmol/L 25.1 26.4 28.8 30.5*   BUN mg/dL 13 14 13 11   CREATININE mg/dL 0.82 0.89 0.83 0.85   GLUCOSE mg/dL 67 78 102* 137*   Estimated Creatinine Clearance: 109.4 mL/min (by C-G formula based on SCr of 0.82 mg/dL).  Results from last 7 days   Lab Units 09/05/23 0604 09/04/23 0535 09/03/23 0708 09/02/23  0559 09/01/23  0549 08/31/23  0544   ALBUMIN g/dL 3.3* 3.3* 3.3* 3.2*   < > 2.7*   BILIRUBIN mg/dL  --   --   --   --   --  0.6   ALK PHOS U/L  --   --   --   --   --  80   AST (SGOT) U/L  --   --   --   --   --  27   ALT (SGPT) U/L  --   --   --   --   --  26    < > = values in this interval not displayed.     Results from last 7 days   Lab Units 09/05/23 0604 09/04/23 0535 09/03/23 0708 09/02/23  0559   CALCIUM mg/dL 8.6 8.6 8.7 8.7   ALBUMIN g/dL 3.3* 3.3* 3.3* 3.2*   MAGNESIUM mg/dL 2.0 2.1 2.2 2.1   PHOSPHORUS mg/dL 2.7 3.3 3.2 2.8         No results found for: COVID19  Glucose   Date/Time Value Ref Range Status   09/05/2023 0558 78 70 - 130 mg/dL Final   09/04/2023 2124 75 70 - 130 mg/dL Final   09/04/2023 1637 78 70 - 130 mg/dL Final   09/04/2023 0602 80 70 - 130 mg/dL Final   09/04/2023 0003 84 70 - 130 mg/dL  Final   09/03/2023 0637 100 70 - 130 mg/dL Final   09/02/2023 1805 112 70 - 130 mg/dL Final           XR Abdomen KUB  Clinical: Core check catheter placement     COMPARISON 8/28/2023     FINDINGS: Cortright catheter tip is located within the duodenum, at the  junction of the second and third portions. It has been advanced since  the prior examination. There is contrast demonstrated now within the  colon. The remainder is unremarkable.     This report was finalized on 8/31/2023 2:47 PM by Dr. Tyrel Chavez M.D.     CT Head Without Contrast  Narrative: CT SCAN OF THE HEAD WITHOUT CONTRAST 08/30/2023     CLINICAL HISTORY: Status post revision of  shunt yesterday 08/29/2023.  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  Patient  has a history of Von Hippel-Lindau.      TECHNIQUE: Spiral CT images were obtained from the base of the skull to  the vertex without intravenous contrast. Images were reformatted and  submitted in 1 mm thick axial, sagittal and coronal CT sections with  brain algorithm.      This is correlated to prior head CTs yesterday 08/29/2023 and head CT  08/24/2023 and a prior MRI of the brain on 08/28/2023 and outside MRIs  of the brain on 04/21/2022 and 01/14/2022.     FINDINGS: The patient has had a large 5 x 7.5 cm midline occipital left  occipital craniectomy.  The craniectomy is covered by metallic mesh and  resection of the posterior midline 1.8 cm segment of the posterior ring  of C1, and by history this occurred back on 04/26/2019 for resection of  cerebellar hemangioblastoma. There is extensive encephalomalacia  involving the majority of the left cerebellar hemisphere and some  loculated CSF lateral to the left cerebellum better demonstrated on  recent MRI of the brain on 08/25/2023. There is also encephalomalacia  throughout the majority of the right cerebellum. There is a markedly  enlarged fourth  ventricle measuring 4.1 x 3.4 cm in medial lateral and  anterior posterior dimension. There is tenting of the cervicomedullary  junction posteriorly. Recent MRI of the brain on 08/28/2023 demonstrated  multiple small enhancing cerebellar nodules compatible with multiple  hemangioblastomas that are not able to be appreciated on this  noncontrast head CT. There is a ventriculoperitoneal shunt catheter in  place that courses through the superior right coronal suture through the  superior right frontal lobe parenchyma enters the superior body of the  right lateral ventricle and its distal tip is at the level of the right  foramen of Monro.  The lateral and third ventricles are mildly enlarged.   The temporal horns of the lateral ventricles in particular are large in  size. The lateral and third ventricles have clearly enlarged when  compared to an outside MRI of the brain on 04/21/2021. The lateral and  third ventricles have slightly decreased in size when compared to head  CT 08/24/2023, unchanged in size when compared to yesterday's head CT  08/29/2023. There is confluent rind of low density in the right frontal  white matter along the margins of the ventriculoperitoneal shunt  catheter with rind of low-density tracking 3 x 3.3 cm, may be white  matter encephalomalacia, while there could be some backtracking of CSF.  Since the head CT on 08/24/2023 there is resolving low-density in the  temporal occipital periventricular white matter compatible with  resolving transependymal extension of CSF. A tiny tubular tract through  the lateral right parietal bone where there was likely previous  placement and removal of a ventriculostomy catheter in the remote past.  I see no midline shift. No acute intracranial hemorrhage is identified.  There are bubbles of air along the port for the ventriculoperitoneal  shunt catheter within the scalp overlying the anterolateral right  parietal bone from recent shunt revision procedure, and  the bubbles of  air are new when compared to yesterday afternoon's head CT 08/29/2023 at  4:39 p.m.     Impression: 1. Since yesterday afternoon's head CT on 08/29/2023 at 4:39 p.m., there  has been shunt revision with bubbles of air in the scalp adjacent to the  shunt port overlying the anterior superior lateral right parietal bone.  There is stable position of the  shunt catheter that courses from the  superior right coronal suture through the superior right frontal lobe  parenchyma through the superior body of the right lateral ventricle and  its distal tip is at the right foramen of Monro. The lateral and third  ventricles remain enlarged. They are clearly larger than they were on an  outside MRI of the brain on 04/21/2022. However they are slightly  smaller than they were on a head CT 6 days ago on 08/24/2023. Since the  head CT on 08/24/2023, six days ago, there is resolving low-density in  the temporal occipital periventricular white matter compatible with  resolving transependymal extension of CSF.     2. 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 There is extensive  encephalomalacia involving the majority of the left cerebellum and also  extensive encephalomalacia throughout good portions of the right  cerebellar hemisphere. MRI of the brain 2 days ago on 08/28/2023  demonstrated multiple tiny enhancing cerebellar nodules compatible with  multiple hemangioblastomas that are unable to be evaluated on this  noncontrast head CT. There is marked dilatation of the fourth ventricle.   Some of it is ex vacuo dilatation due to extensive cerebellar  encephalomalacia although there is posterior tenting of the  cervicomedullary junction and a small size superior aspect of the  aqueduct of Sylvius and the marked  dilatation of the fourth ventricle  may be secondary to encystment of the fourth ventricle. The remainder of  the head CT is unremarkable. The results were communicated to Roberto Ricthie MD, from neurosurgery by telephone on 08/30/2023 at 9:50 AM.     Radiation dose reduction techniques were utilized, including automated  exposure control and exposure modulation based on body size.        This report was finalized on 8/31/2023 5:56 AM by Dr. Shahram Quintero M.D.       Scheduled Medications  aspirin, 81 mg, Nasogastric, Daily  atorvastatin, 40 mg, Oral, Nightly  docusate sodium, 100 mg, Oral, BID  folic acid (FOLVITE) IVPB, 1 mg, Intravenous, Daily  mupirocin, 1 application , Topical, Q12H  pantoprazole, 40 mg, Intravenous, Q12H  polyethylene glycol, 17 g, Nasogastric, Daily  sodium chloride, 10 mL, Intravenous, Q12H  thiamine, 100 mg, Oral, Daily  vitamin D, 50,000 Units, Nasogastric, Q7 Days    Infusions  lactated ringers, 9 mL/hr, Last Rate: 9 mL/hr (08/29/23 1818)  lactated ringers, 75 mL/hr, Last Rate: 75 mL/hr (09/05/23 0619)    Diet  NPO Diet NPO Type: Strict NPO    I have personally reviewed     [x]  Laboratory   []  Microbiology   []  Radiology   []  EKG/Telemetry  []  Cardiology/Vascular   []  Pathology    []  Records      Assessment/Plan     Active Hospital Problems    Diagnosis  POA    **Sepsis [A41.9]  Yes    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 Ritchie valve set to 110 [Z98.2]  Not Applicable    Shunt malfunction [T85.618A]  Yes    Cerebral ventriculomegaly [G93.89]  Yes    Lethargy [R53.83]  Yes    Confusion [R41.0]  Yes    Obstructive hydrocephalus [G91.1]  Unknown      Resolved Hospital Problems   No resolved problems to display.           Patient has a history of von Hippel-Lindau syndrome and is on tumor suppressive medication  welireg, CNS hemangioblastomas, craniotomy status post  shunt placement,  baseline hemiparesis, being brought to the ED on 08/24/2023 after found down by family.    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 Follow-up as scheduled. Office will contact patient if follow-up required sooner due to xray findings. Patient to call for any concerns including but not limited to headaches, vision changes, balance difficulties, incontinence of bladder, weakness.    Improved, alert, x3 but forgetful, requiring frequent reorientation      Acute to subacute infarct:   MRI 08/28/2023 showed new punctate focus of diffusion restriction/FLAIR hyperintensity inthe right paracentral lobule most suggestive of acute/subacute infarct.  Neurology evaluated, appreciate recs.  Patient initiated on aspirin 81 mg daily, atorvastatin 40 mg daily, Continue         Acute hypercapnic respiratory failure s/p vent till 8/25  -Patient is currently on room air, remained stable     B/L Aspiration pneumonia  -Possibly acute on chronic aspiration  -Status pos IV ceftriaxone x4 days.        Dysphagia/vomiting;  Status post PEG tube placement 09/04//23, to be initiated on tube feeds today.           YUDITH: Resolved with IV fluids, monitor.      Hypokalemia/hypophosphatemia: Improved.  Monitor.       Incidental lung nodules- seen on CT 08/24/2023, CT follow-up in 3 months is  recommended.     S/p prior  shunt and required revision-neurosurgery signed off, recommend neurosurgery follow-up on  9/11/2023 with CT head imaging      VHL with craniotomy   - residual left-sided weakness from this   -Patient is on Welireg for VHL to slow down tumor growth.  He has been off of this medication now for a week.  He will need to follow-up with MD Reynoso about when to restart side effects from this medication are hypoxemia and anemia.  Starting dose is 120 but the patient has been intolerant of it in the past and is down to  40 mg.     Mild coagulopathy, possibly related to nutrition, monitor, INR normalized.           SCDs for DVT prophylaxis.  Full code.  Discussed with patient and nursing staff.  Anticipate discharge SNS, versus home health,   Likely stable to be discharged in 1-2 days if tolerated tube feeds      Copied text in this note has been reviewed and is accurate as of 23.         Dictated utilizing Dragon dictation        Bentley Lovell MD  [facility]  23  08:52 EDT        Electronically signed by Bentley Lovell MD at 23 0852       Bentley Lovell MD at 23 1031              Name: Braxton Wolfe ADMIT: 2023   : 1965  PCP: Provider, No Known    MRN: [id number] LOS: 11 days   AGE/SEX: 57 y.o. male  ROOM: [facility] OR/MAIN OR     Subjective   Subjective   No acute events overnight.  Laying in bed, denies complaints when asked.  N.p.o. after midnight for planned PEG tube placement.  Review of Systems  As above  Objective   Objective   Vital Signs  Temp:  [98 °F (36.7 °C)-98.6 °F (37 °C)] 98.1 °F (36.7 °C)  Heart Rate:  [65-88] 75  Resp:  [16-18] 16  BP: (100-125)/(65-84) 113/76  SpO2:  [91 %-98 %] 95 %  on   ;   Device (Oxygen Therapy): room air  Body mass index is 24.58 kg/m².  Physical Exam    General: Alert, laying in bed, not in distress,  HEENT: Normocephalic, right scalp dressing in place  CV: Regular rate and rhythm, no murmurs rubs or gallops  Lungs: CTA, no wheezing  Abdomen: Soft, nontender, nondistended  Extremities: No significant peripheral edema     Results Review     I reviewed the patient's new clinical results.  Results from last 7 days   Lab Units 23  0535 23  0708 23  0559 23  0549   WBC 10*3/mm3 7.67 8.15 7.90 6.88   HEMOGLOBIN g/dL 12.8* 12.4* 11.9* 11.7*   PLATELETS 10*3/mm3 313 314 288 257       Results from last 7 days   Lab Units 23  0535 23  0708 23  0559 23  1515 23  0549    SODIUM mmol/L 141 140 143  --  144   POTASSIUM mmol/L 3.9 4.1 4.1 4.1 3.2*   CHLORIDE mmol/L 106 104 108*  --  106   CO2 mmol/L 26.4 28.8 30.5*  --  30.1*   BUN mg/dL 14 13 11  --  8   CREATININE mg/dL 0.89 0.83 0.85  --  0.87   GLUCOSE mg/dL 78 102* 137*  --  117*     Estimated Creatinine Clearance: 100.6 mL/min (by C-G formula based on SCr of 0.89 mg/dL).  Results from last 7 days   Lab Units 09/04/23  0535 09/03/23  0708 09/02/23  0559 09/01/23  0549 08/31/23  0544   ALBUMIN g/dL 3.3* 3.3* 3.2* 2.9* 2.7*   BILIRUBIN mg/dL  --   --   --   --  0.6   ALK PHOS U/L  --   --   --   --  80   AST (SGOT) U/L  --   --   --   --  27   ALT (SGPT) U/L  --   --   --   --  26       Results from last 7 days   Lab Units 09/04/23  0535 09/03/23  0708 09/02/23  0559 09/01/23  0549   CALCIUM mg/dL 8.6 8.7 8.7 8.0*   ALBUMIN g/dL 3.3* 3.3* 3.2* 2.9*   MAGNESIUM mg/dL 2.1 2.2 2.1 1.9   PHOSPHORUS mg/dL 3.3 3.2 2.8 2.4*           No results found for: COVID19  Glucose   Date/Time Value Ref Range Status   09/04/2023 0602 80 70 - 130 mg/dL Final   09/04/2023 0003 84 70 - 130 mg/dL Final   09/03/2023 0637 100 70 - 130 mg/dL Final   09/02/2023 1805 112 70 - 130 mg/dL Final   09/02/2023 0622 130 70 - 130 mg/dL Final   09/02/2023 0000 121 70 - 130 mg/dL Final   09/01/2023 1839 130 70 - 130 mg/dL Final           XR Abdomen KUB  Clinical: Core check catheter placement     COMPARISON 8/28/2023     FINDINGS: Cortright catheter tip is located within the duodenum, at the  junction of the second and third portions. It has been advanced since  the prior examination. There is contrast demonstrated now within the  colon. The remainder is unremarkable.     This report was finalized on 8/31/2023 2:47 PM by Dr. Tyrel Chavez M.D.     CT Head Without Contrast  Narrative: CT SCAN OF THE HEAD WITHOUT CONTRAST 08/30/2023     CLINICAL HISTORY: Status post revision of  shunt yesterday 08/29/2023.  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  Patient  has a history of Von Hippel-Lindau.      TECHNIQUE: Spiral CT images were obtained from the base of the skull to  the vertex without intravenous contrast. Images were reformatted and  submitted in 1 mm thick axial, sagittal and coronal CT sections with  brain algorithm.      This is correlated to prior head CTs yesterday 08/29/2023 and head CT  08/24/2023 and a prior MRI of the brain on 08/28/2023 and outside MRIs  of the brain on 04/21/2022 and 01/14/2022.     FINDINGS: The patient has had a large 5 x 7.5 cm midline occipital left  occipital craniectomy.  The craniectomy is covered by metallic mesh and  resection of the posterior midline 1.8 cm segment of the posterior ring  of C1, and by history this occurred back on 04/26/2019 for resection of  cerebellar hemangioblastoma. There is extensive encephalomalacia  involving the majority of the left cerebellar hemisphere and some  loculated CSF lateral to the left cerebellum better demonstrated on  recent MRI of the brain on 08/25/2023. There is also encephalomalacia  throughout the majority of the right cerebellum. There is a markedly  enlarged fourth ventricle measuring 4.1 x 3.4 cm in medial lateral and  anterior posterior dimension. There is tenting of the cervicomedullary  junction posteriorly. Recent MRI of the brain on 08/28/2023 demonstrated  multiple small enhancing cerebellar nodules compatible with multiple  hemangioblastomas that are not able to be appreciated on this  noncontrast head CT. There is a ventriculoperitoneal shunt catheter in  place that courses through the superior right coronal suture through the  superior right frontal lobe parenchyma enters the superior body of the  right lateral ventricle and its distal tip is at the level of the right  foramen of Monro.  The lateral and third ventricles are mildly enlarged.   The temporal horns of the lateral  ventricles in particular are large in  size. The lateral and third ventricles have clearly enlarged when  compared to an outside MRI of the brain on 04/21/2021. The lateral and  third ventricles have slightly decreased in size when compared to head  CT 08/24/2023, unchanged in size when compared to yesterday's head CT  08/29/2023. There is confluent rind of low density in the right frontal  white matter along the margins of the ventriculoperitoneal shunt  catheter with rind of low-density tracking 3 x 3.3 cm, may be white  matter encephalomalacia, while there could be some backtracking of CSF.  Since the head CT on 08/24/2023 there is resolving low-density in the  temporal occipital periventricular white matter compatible with  resolving transependymal extension of CSF. A tiny tubular tract through  the lateral right parietal bone where there was likely previous  placement and removal of a ventriculostomy catheter in the remote past.  I see no midline shift. No acute intracranial hemorrhage is identified.  There are bubbles of air along the port for the ventriculoperitoneal  shunt catheter within the scalp overlying the anterolateral right  parietal bone from recent shunt revision procedure, and the bubbles of  air are new when compared to yesterday afternoon's head CT 08/29/2023 at  4:39 p.m.     Impression: 1. Since yesterday afternoon's head CT on 08/29/2023 at 4:39 p.m., there  has been shunt revision with bubbles of air in the scalp adjacent to the  shunt port overlying the anterior superior lateral right parietal bone.  There is stable position of the  shunt catheter that courses from the  superior right coronal suture through the superior right frontal lobe  parenchyma through the superior body of the right lateral ventricle and  its distal tip is at the right foramen of Monro. The lateral and third  ventricles remain enlarged. They are clearly larger than they were on an  outside MRI of the brain on  04/21/2022. However they are slightly  smaller than they were on a head CT 6 days ago on 08/24/2023. Since the  head CT on 08/24/2023, six days ago, there is resolving low-density in  the temporal occipital periventricular white matter compatible with  resolving transependymal extension of CSF.     2. 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 There is extensive  encephalomalacia involving the majority of the left cerebellum and also  extensive encephalomalacia throughout good portions of the right  cerebellar hemisphere. MRI of the brain 2 days ago on 08/28/2023  demonstrated multiple tiny enhancing cerebellar nodules compatible with  multiple hemangioblastomas that are unable to be evaluated on this  noncontrast head CT. There is marked dilatation of the fourth ventricle.   Some of it is ex vacuo dilatation due to extensive cerebellar  encephalomalacia although there is posterior tenting of the  cervicomedullary junction and a small size superior aspect of the  aqueduct of Sylvius and the marked dilatation of the fourth ventricle  may be secondary to encystment of the fourth ventricle. The remainder of  the head CT is unremarkable. The results were communicated to Roberto Ritchie MD, from neurosurgery by telephone on 08/30/2023 at 9:50 AM.     Radiation dose reduction techniques were utilized, including automated  exposure control and exposure modulation based on body size.        This report was finalized on 8/31/2023 5:56 AM by Dr. Shahram Quintero M.D.       Scheduled Medications  [MAR Hold] aspirin, 81 mg, Nasogastric, Daily  [MAR Hold] atorvastatin, 40 mg, Oral, Nightly  ceFAZolin, 2,000 mg, Intravenous, Once  [MAR Hold] docusate sodium, 100 mg, Oral, BID  famotidine, 20 mg, Intravenous, Daily  [MAR Hold] folic acid  (FOLVITE) IVPB, 1 mg, Intravenous, Daily  [MAR Hold] mupirocin, 1 application , Topical, Q12H  [MAR Hold] polyethylene glycol, 17 g, Nasogastric, Daily  [MAR Hold] sodium chloride, 10 mL, Intravenous, Q12H  thiamine (B-1) IV, 200 mg, Intravenous, Q8H   Followed by  [MAR Hold] thiamine, 100 mg, Oral, Daily  [MAR Hold] vitamin D, 50,000 Units, Nasogastric, Q7 Days    Infusions  lactated ringers, 9 mL/hr, Last Rate: 9 mL/hr (08/29/23 1818)  lactated ringers, 75 mL/hr, Last Rate: Stopped (09/04/23 0256)    Diet  NPO Diet NPO Type: Strict NPO    I have personally reviewed     [x]  Laboratory   []  Microbiology   []  Radiology   []  EKG/Telemetry  []  Cardiology/Vascular   []  Pathology    []  Records      Assessment/Plan     Active Hospital Problems    Diagnosis  POA    **Sepsis [A41.9]  Yes    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 Ritchie valve set to 110 [Z98.2]  Not Applicable    Shunt malfunction [T85.618A]  Yes    Cerebral ventriculomegaly [G93.89]  Yes    Lethargy [R53.83]  Yes    Confusion [R41.0]  Yes    Obstructive hydrocephalus [G91.1]  Unknown      Resolved Hospital Problems   No resolved problems to display.           Patient has a history of von Hippel-Lindau syndrome and is on tumor suppressive medication  welireg, CNS hemangioblastomas, craniotomy status post  shunt placement, baseline hemiparesis, being brought to the ED on 08/24/2023 after found down by family.    Acute encephalopathy; suspected HIE,   -Status post shunt revision 8/29, by neurosurgery:Patient does not require follow-up shunt xray for setting confirmation. Follow-up as scheduled. Office will contact patient if follow-up required sooner due to xray findings. Patient to call for any concerns including but not limited to headaches, vision changes, balance difficulties, incontinence of bladder, weakness.    Improved, alert, x3 but forgetful,  requiring frequent reorientation      Acute to subacute infarct: MRI 08/28/2023 showed new punctate focus of diffusion restriction/FLAIR hyperintensity inthe right paracentral lobule most suggestive of acute/subacute infarct.  Neurology evaluated, appreciate recs.  Patient initiated on aspirin 81 mg daily, atorvastatin 40 mg daily.,  Continue         Acute hypercapnic respiratory failure s/p vent till 8/25  -Patient is currently on room air, remained stable     B/L Aspiration pneumonia  -Possibly acute on chronic aspiration  -Status pos IV ceftriaxone x4 days.          Dysphagia/vomiting;  status post core track placement 08/31,   Family wanted to proceed with PEG tube placement.  General surgery consulted, plan for or today for PEG tube placement.         YUDITH: Resolved with IV fluids, monitor.      Hypokalemia/hypophosphatemia: Improved.  Monitor.       Incidental lung nodules- seen on CT 08/24/2023, CT follow-up in 3 months is  recommended.     S/p prior  shunt and required revision-neurosurgery signed off     VHL with craniotomy   - residual left-sided weakness from this   -Patient is on Welireg for VHL to slow down tumor growth.  He has been off of this medication now for a week.  He will need to follow-up with MD Reynoso about when to restart side effects from this medication are hypoxemia and anemia.  Starting dose is 120 but the patient has been intolerant of it in the past and is down to 40 mg.     Mild coagulopathy, possibly related to nutrition, monitor, INR normalized.           SCDs for DVT prophylaxis.  Full code.  Discussed with patient and nursing staff.  Anticipate discharge SNS, versus home health, timing to be determined pending PEG tube placement.  Likely stable to be discharged in 2-3 days      Copied text in this note has been reviewed and is accurate as of 09/04/23.         Dictated utilizing Dragon dictation        Bentley Lovell MD  [facility]  09/04/23  10:31  EDT        Electronically signed by Bentley Lovell MD at 23 1033       Bentley Lovell MD at 23 1006              Name: Braxton Wolfe ADMIT: 2023   : 1965  PCP: Provider, No Known    MRN: [id number] LOS: 10 days   AGE/SEX: 57 y.o. male  ROOM: [facility]     Subjective   Subjective   Overnight patient pulled out his core track.  This morning laying in bed, denies complaints when asked.  Per report patient forgetful, requires frequent reorientation.  Did not sleep much overnight.    Review of Systems  As above  Objective   Objective   Vital Signs  Temp:  [98 °F (36.7 °C)-98.6 °F (37 °C)] 98 °F (36.7 °C)  Heart Rate:  [60-81] 81  Resp:  [16-20] 16  BP: (103-123)/(73-85) 103/73  SpO2:  [92 %-97 %] 97 %  on   ;   Device (Oxygen Therapy): room air  Body mass index is 25.88 kg/m².  Physical Exam    General: Alert, laying in bed, not in distress, cooperative  HEENT: Normocephalic, right scalp dressing in place  CV: Regular rate and rhythm, no murmurs rubs or gallops  Lungs: CTA, no wheezing  Abdomen: Soft, nontender, nondistended  Extremities: No significant peripheral edema   Results Review     I reviewed the patient's new clinical results.  Results from last 7 days   Lab Units 23  0708 23  0559 23  0549 23  0554   WBC 10*3/mm3 8.15 7.90 6.88 7.93   HEMOGLOBIN g/dL 12.4* 11.9* 11.7* 12.7*   PLATELETS 10*3/mm3 314 288 257 252       Results from last 7 days   Lab Units 23  0708 23  0559 23  1515 23  0549 23  1635 23  0544   SODIUM mmol/L 140 143  --  144  --  140   POTASSIUM mmol/L 4.1 4.1 4.1 3.2*   < > 3.3*   CHLORIDE mmol/L 104 108*  --  106  --  106   CO2 mmol/L 28.8 30.5*  --  30.1*  --  25.0   BUN mg/dL 13 11  --  8  --  8   CREATININE mg/dL 0.83 0.85  --  0.87  --  0.91   GLUCOSE mg/dL 102* 137*  --  117*  --  99    < > = values in this interval not displayed.     Estimated Creatinine Clearance: 113.6 mL/min (by C-G formula  based on SCr of 0.83 mg/dL).  Results from last 7 days   Lab Units 09/03/23  0708 09/02/23  0559 09/01/23  0549 08/31/23  0544   ALBUMIN g/dL 3.3* 3.2* 2.9* 2.7*   BILIRUBIN mg/dL  --   --   --  0.6   ALK PHOS U/L  --   --   --  80   AST (SGOT) U/L  --   --   --  27   ALT (SGPT) U/L  --   --   --  26       Results from last 7 days   Lab Units 09/03/23  0708 09/02/23  0559 09/01/23  0549 08/31/23  0544   CALCIUM mg/dL 8.7 8.7 8.0* 7.8*   ALBUMIN g/dL 3.3* 3.2* 2.9* 2.7*   MAGNESIUM mg/dL 2.2 2.1 1.9 2.1   PHOSPHORUS mg/dL 3.2 2.8 2.4* 2.0*           No results found for: COVID19  Glucose   Date/Time Value Ref Range Status   09/03/2023 0637 100 70 - 130 mg/dL Final   09/02/2023 1805 112 70 - 130 mg/dL Final   09/02/2023 0622 130 70 - 130 mg/dL Final   09/02/2023 0000 121 70 - 130 mg/dL Final   09/01/2023 1839 130 70 - 130 mg/dL Final   09/01/2023 1216 116 70 - 130 mg/dL Final   09/01/2023 0810 120 70 - 130 mg/dL Final           XR Abdomen KUB  Clinical: Core check catheter placement     COMPARISON 8/28/2023     FINDINGS: Cortright catheter tip is located within the duodenum, at the  junction of the second and third portions. It has been advanced since  the prior examination. There is contrast demonstrated now within the  colon. The remainder is unremarkable.     This report was finalized on 8/31/2023 2:47 PM by Dr. Tyrel Chavez M.D.     CT Head Without Contrast  Narrative: CT SCAN OF THE HEAD WITHOUT CONTRAST 08/30/2023     CLINICAL HISTORY: Status post revision of  shunt yesterday 08/29/2023.  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  Patient  has a history of Von Hippel-Lindau.      TECHNIQUE: Spiral CT images were obtained from the base of the skull to  the vertex without intravenous contrast. Images were reformatted and  submitted in 1 mm thick axial, sagittal and coronal CT sections with  brain algorithm.      This  is correlated to prior head CTs yesterday 08/29/2023 and head CT  08/24/2023 and a prior MRI of the brain on 08/28/2023 and outside MRIs  of the brain on 04/21/2022 and 01/14/2022.     FINDINGS: The patient has had a large 5 x 7.5 cm midline occipital left  occipital craniectomy.  The craniectomy is covered by metallic mesh and  resection of the posterior midline 1.8 cm segment of the posterior ring  of C1, and by history this occurred back on 04/26/2019 for resection of  cerebellar hemangioblastoma. There is extensive encephalomalacia  involving the majority of the left cerebellar hemisphere and some  loculated CSF lateral to the left cerebellum better demonstrated on  recent MRI of the brain on 08/25/2023. There is also encephalomalacia  throughout the majority of the right cerebellum. There is a markedly  enlarged fourth ventricle measuring 4.1 x 3.4 cm in medial lateral and  anterior posterior dimension. There is tenting of the cervicomedullary  junction posteriorly. Recent MRI of the brain on 08/28/2023 demonstrated  multiple small enhancing cerebellar nodules compatible with multiple  hemangioblastomas that are not able to be appreciated on this  noncontrast head CT. There is a ventriculoperitoneal shunt catheter in  place that courses through the superior right coronal suture through the  superior right frontal lobe parenchyma enters the superior body of the  right lateral ventricle and its distal tip is at the level of the right  foramen of Monro.  The lateral and third ventricles are mildly enlarged.   The temporal horns of the lateral ventricles in particular are large in  size. The lateral and third ventricles have clearly enlarged when  compared to an outside MRI of the brain on 04/21/2021. The lateral and  third ventricles have slightly decreased in size when compared to head  CT 08/24/2023, unchanged in size when compared to yesterday's head CT  08/29/2023. There is confluent rind of low density in the  right frontal  white matter along the margins of the ventriculoperitoneal shunt  catheter with rind of low-density tracking 3 x 3.3 cm, may be white  matter encephalomalacia, while there could be some backtracking of CSF.  Since the head CT on 08/24/2023 there is resolving low-density in the  temporal occipital periventricular white matter compatible with  resolving transependymal extension of CSF. A tiny tubular tract through  the lateral right parietal bone where there was likely previous  placement and removal of a ventriculostomy catheter in the remote past.  I see no midline shift. No acute intracranial hemorrhage is identified.  There are bubbles of air along the port for the ventriculoperitoneal  shunt catheter within the scalp overlying the anterolateral right  parietal bone from recent shunt revision procedure, and the bubbles of  air are new when compared to yesterday afternoon's head CT 08/29/2023 at  4:39 p.m.     Impression: 1. Since yesterday afternoon's head CT on 08/29/2023 at 4:39 p.m., there  has been shunt revision with bubbles of air in the scalp adjacent to the  shunt port overlying the anterior superior lateral right parietal bone.  There is stable position of the  shunt catheter that courses from the  superior right coronal suture through the superior right frontal lobe  parenchyma through the superior body of the right lateral ventricle and  its distal tip is at the right foramen of Monro. The lateral and third  ventricles remain enlarged. They are clearly larger than they were on an  outside MRI of the brain on 04/21/2022. However they are slightly  smaller than they were on a head CT 6 days ago on 08/24/2023. Since the  head CT on 08/24/2023, six days ago, there is resolving low-density in  the temporal occipital periventricular white matter compatible with  resolving transependymal extension of CSF.     2. Back on 04/26/2019 the patient had a large 7.5 x 5 cm midline  occipital to left  occipital craniectomy and it is covered by metallic  mesh and by history resection of cerebellar hemangioblastoma in this  patient with history of Von Hippel-Lindau. There is extensive  encephalomalacia involving the majority of the left cerebellum and also  extensive encephalomalacia throughout good portions of the right  cerebellar hemisphere. MRI of the brain 2 days ago on 08/28/2023  demonstrated multiple tiny enhancing cerebellar nodules compatible with  multiple hemangioblastomas that are unable to be evaluated on this  noncontrast head CT. There is marked dilatation of the fourth ventricle.   Some of it is ex vacuo dilatation due to extensive cerebellar  encephalomalacia although there is posterior tenting of the  cervicomedullary junction and a small size superior aspect of the  aqueduct of Sylvius and the marked dilatation of the fourth ventricle  may be secondary to encystment of the fourth ventricle. The remainder of  the head CT is unremarkable. The results were communicated to Roberto Ritchie MD, from neurosurgery by telephone on 08/30/2023 at 9:50 AM.     Radiation dose reduction techniques were utilized, including automated  exposure control and exposure modulation based on body size.        This report was finalized on 8/31/2023 5:56 AM by Dr. Shahram Quintero M.D.       Scheduled Medications  aspirin, 81 mg, Nasogastric, Daily  atorvastatin, 40 mg, Oral, Nightly  docusate sodium, 100 mg, Oral, BID  famotidine, 20 mg, Intravenous, Daily  folic acid (FOLVITE) IVPB, 1 mg, Intravenous, Daily  mupirocin, 1 application , Topical, Q12H  polyethylene glycol, 17 g, Nasogastric, Daily  sodium chloride, 10 mL, Intravenous, Q12H  thiamine (B-1) IV, 200 mg, Intravenous, Q8H   Followed by  [START ON 9/4/2023] thiamine, 100 mg, Oral, Daily  vitamin D, 50,000 Units, Nasogastric, Q7 Days    Infusions  lactated ringers, 9 mL/hr, Last Rate: 9 mL/hr (08/29/23 1818)    Diet  NPO Diet NPO Type: Strict NPO    I have personally  reviewed     [x]  Laboratory   []  Microbiology   []  Radiology   []  EKG/Telemetry  []  Cardiology/Vascular   []  Pathology    []  Records      Assessment/Plan     Active Hospital Problems    Diagnosis  POA    **Sepsis [A41.9]  Yes    S/P  Polaris SPVA-200 shunt 8/29/23 w/ Dr. Ritchie valve set to 110 [Z98.2]  Not Applicable    Shunt malfunction [T85.618A]  Yes    Cerebral ventriculomegaly [G93.89]  Yes    Lethargy [R53.83]  Yes    Confusion [R41.0]  Yes      Resolved Hospital Problems   No resolved problems to display.           Patient has a history of von Hippel-Lindau syndrome and is on tumor suppressive medication  welireg, CNS hemangioblastomas, craniotomy status post  shunt placement, baseline hemiparesis, being brought to the ED on 08/24/2023 after found down by family.    Acute encephalopathy; suspected HIE,   -Status post shunt revision 8/29, by neurosurgery:Patient does not require follow-up shunt xray for setting confirmation. Follow-up as scheduled. Office will contact patient if follow-up required sooner due to xray findings. Patient to call for any concerns including but not limited to headaches, vision changes, balance difficulties, incontinence of bladder, weakness.    Improved, alert, x3 but forgetful, requiring frequent orientation      Acute to subacute infarct: MRI 08/28/2023 showed new punctate focus of diffusion restriction/FLAIR hyperintensity inthe right paracentral lobule most suggestive of acute/subacute infarct.  Neurology evaluated, appreciate recs.  Patient initiated on aspirin 81 mg daily, atorvastatin 40 mg daily.,  Continue         Acute hypercapnic respiratory failure s/p vent till 8/25  -Patient is currently on room air, remained stable     B/L Aspiration pneumonia  -Possibly acute on chronic aspiration  -Status pos IV ceftriaxone x4 days.          Dysphagia/vomiting;status post core track placement 08/31,     Will need repeat swallow study tomorrow if able, if continues to  aspirate we need to discuss further goals of care including possible PEG tube.  No further episodes of vomiting, continue to monitor.    Patient pulled out his core track, placed new core track for tube feeds           YUDITH: Resolved with IV fluids, monitor.      Hypokalemia/hypophosphatemia: Improved.  Monitor.       Incidental lung nodules- seen on CT 08/24/2023, CT follow-up in 3 months is  recommended.     S/p prior  shunt and required revision-neurosurgery signed off     VHL with craniotomy   - residual left-sided weakness from this   -Patient is on Welireg for VHL to slow down tumor growth.  He has been off of this medication now for a week.  He will need to follow-up with MD Reynoso about when to restart side effects from this medication are hypoxemia and anemia.  Starting dose is 120 but the patient has been intolerant of it in the past and is down to 40 mg.     Mild coagulopathy, possibly related to nutrition, monitor, INR normalized.           SCDs for DVT prophylaxis.  Full code.  Discussed with patient and nursing staff.  Anticipate discharge SNS, versus home health, timing to be determined      Copied text in this note has been reviewed and is accurate as of 09/03/23.         Dictated utilizing Dragon dictation        Bentley Lovell MD  [facility]  09/03/23  10:06 EDT        Electronically signed by Bentley Lovell MD at 09/03/23 1010          Consult Notes (last 72 hours)        Arabella Casarez MD at 09/03/23 1223        Consult Orders    1. Inpatient General Surgery Consult [[id number]] ordered by Bentley Lovell MD at 09/03/23 1133                 General Surgery Consultation    Consulting Physician: Arabella Casarez MD  Referring Physician: Dr. Lovell    Reason for consultation: PEG tube placement    CC: Dysphagia    HPI:   The patient is a very pleasant 57 y.o. 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  He was admitted to the hospital on 8/24/2023 after he was found down by a friend at home.  He was intubated in route and has been liberated from the ventilator but has struggled with persistent dysphagia due to a presumed stroke.  He underwent a  shunt few days ago with Dr. Ritchie for worsening hydrocephalus.  He has had multiple Dobbhoff tubes placed but consistently pulls them out while asleep.  He is awake and alert, but says that when he pulls out the Dobbhoff tubes he is unaware of what he is doing.  I have been consulted for possible PEG tube as he has failed multiple swallow studies and has shown signs of aspiration pneumonia.  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   8/28/2023    tadalafil (CIALIS) 5 MG tablet Take 1 tablet by mouth Daily.       belzutifan (WELIREG) 40 MG tablet Take 1 tablet by mouth Daily.          Allergies: No known drug allergies    Social History: , non-smoker, no regular alcohol use    Family History: Noncontributory    Review of Systems:   Constitutional: denies any weight changes, fatigue, or weakness  Eyes: denies blurred/double vision or scleral icterus  Cardiovascular: denies chest pain, palpitations, or edemas  Respiratory: denies cough, sputum, or dyspnea  Gastrointestinal: Positive for dysphagia; denies melena, hematochezia, nausea, or vomiting  Genitourinary: denies dysuria or hematuria  Endocrine: denies cold intolerance, lethargy, or flushing  Hematologic: denies excessive bruising or bleeding  Musculoskeletal: denies weakness, joint swelling, joint pain, or stiffness  Neurologic: denies seizures,  CVA, paresthesia, or peripheral neuropathy  Skin: denies change in nevi, rashes, masses, or jaundice     All other systems reviewed and were negative.    Physical Exam:   Vitals:    09/03/23 1100   BP: 100/72   Pulse: 88   Resp: 16   Temp: 98.6 °F (37 °C)   SpO2: 97%     Height: 177 cm  Weight: 81 kg  BMI: 25.88  GENERAL: awake and alert, no acute distress  HEENT: normocephalic, atraumatic, no scleral icterus, moist mucous membranes  NECK: Supple, there is no thyromegaly or lymphadenopathy  RESPIRATORY: clear to auscultation, no wheezes, rales or rhonchi, symmetric air entry  CARDIOVASCULAR: regular rate and rhythm    GASTROINTESTINAL: Soft, nontender, nondistended, upper midline scar from previous  shunt is well-healed  MUSCULOSKELETAL: no cyanosis, clubbing, or edema   NEUROLOGIC: alert and oriented, normal speech, follows all commands  SKIN: Moist, warm, no rashes, no jaundice      Diagnostic workup:     Pertinent labs:   Results from last 7 days   Lab Units 09/03/23  0708 09/02/23  0559 09/01/23  0549 08/31/23  0554 08/30/23  0536 08/29/23  0636 08/28/23  1626   WBC 10*3/mm3 8.15 7.90 6.88 7.93 6.53 6.92 7.23   HEMOGLOBIN g/dL 12.4* 11.9* 11.7* 12.7* 12.7* 12.7* 12.7*   HEMATOCRIT % 37.2* 35.2* 35.2* 38.4 37.2* 37.7 37.0*   PLATELETS 10*3/mm3 314 288 257 252 206 157 150     Results from last 7 days   Lab Units 09/03/23  0708 09/02/23  0559 09/01/23  1515 09/01/23  0549 08/31/23  1635 08/31/23  0544   SODIUM mmol/L 140 143  --  144  --  140   POTASSIUM mmol/L 4.1 4.1 4.1 3.2*   < > 3.3*   CHLORIDE mmol/L 104 108*  --  106  --  106   CO2 mmol/L 28.8 30.5*  --  30.1*  --  25.0   BUN mg/dL 13 11  --  8  --  8   CREATININE mg/dL 0.83 0.85  --  0.87  --  0.91   CALCIUM mg/dL 8.7 8.7  --  8.0*  --  7.8*   BILIRUBIN mg/dL  --   --   --   --   --  0.6   ALK PHOS U/L  --   --   --   --   --  80   ALT (SGPT) U/L  --   --   --   --   --  26   AST (SGOT) U/L  --   --   --   --   --  27   GLUCOSE mg/dL 102* 137*  --  117*   --  99    < > = values in this interval not displayed.       IMAGING:  KUB done a few days ago shows the  shunt coiled within the RUQ and it does not cross midline, cortrak is in distal 3rd portion of duodenum    Assessment and plan:     The patient is a 57 y.o. male with persistent oropharyngeal dysphagia likely related to his intracranial tumors, hydrocephalus, etc.    I discussed with the patient and his family at bedside the risks, benefits, and alternatives to a percutaneous endoscopic gastrostomy tube placement.  They understand the risks of the procedure include bleeding, infection around the tube, physiologic drainage around the tube which can become a nuisance, and possible tube placement blindly through intra-abdominal structures such as the small bowel or colon.  Additionally, he is at risk for developing an infection around the  shunt, but I think this is very low risk.  I reviewed his KUB that was done a few days ago which shows the  shunt is within the right upper quadrant away from where the PEG tube would be placed.  Despite the risks of the procedure, he and his family have consented to proceed.  I will put him on the schedule for PEG tube tomorrow with Dr. Sahu.    Arabella Casarez MD  General, Robotic, and Endoscopic Surgery  [facility], [facility]    [address], [address]  [place] [address]  P: [phone]  F: [phone]       Electronically signed by Arabella Casarez MD at 09/03/23 1400

## 2023-09-05 NOTE — DISCHARGE PLACEMENT REQUEST
"Braxton Wolfe (57 y.o. Male)       Date of Birth   1965    Social Security Number       Address   [address] [place] [address]    Home Phone   [phone]    MRN   [id number]       Jehovah's witness   Anabaptist    Marital Status                               Admission Date   8/24/23    Admission Type   Emergency    Admitting Provider   Meek Castillo MD    Attending Provider   Bentley Lovell MD    Department, Room/Bed   [address], E568/1       Discharge Date       Discharge Disposition       Discharge Destination                                 Attending Provider: Bentley Lovell MD    Allergies: No Known Allergies    Isolation: None   Infection: None   Code Status: CPR    Ht: 177.8 cm (70\")   Wt: 77.8 kg (171 lb 8.3 oz)    Admission Cmt: None   Principal Problem: Sepsis [A41.9]                   Active Insurance as of 8/24/2023       Primary Coverage       Payor Plan Insurance Group Employer/Plan Group    [employer]HE[employer] BLUE CROSS AN[employer]M BLUE CROSS BLUE SHIELD PPO [id number]       Payor Plan Address Payor Plan Phone Number Payor Plan Fax Number Effective Dates    [address] [phone]  1/1/2023 - None Entered    [place] [address]         Subscriber Name Subscriber Birth Date Member ID       BRAXTON WOLFE 1965 [id number]                     Emergency Contacts        (Rel.) Home Phone Work Phone Mobile Phone    Yamilet Wolfe (Daughter) -- -- [phone]    Aby Wolfe (Mother) [phone] -- [phone]    Faizan Wolfe (Father) [phone] -- [phone]    Julia Wolfe (Sister) [phone] -- --              Emergency Contact Information       Name Relation Home Work Mobile    Yamilet Wolfe Daughter   [phone]    Aby Wolfe Mother [phone]    Faizan Wolfe Father [phone]    Julia Wolfe Sister [phone]            "

## 2023-09-05 NOTE — PLAN OF CARE
Goal Outcome Evaluation:  Plan of Care Reviewed With: patient        Progress: improving          Pt a/o x4, RA, VSS. Initiated tube feeds at 1130 at 20cc/hr w/ 30ml flush q 1hr. Increased to 40cc/hr @ 1500. Will plan to increase to goal at 55ml/hr by 1900 if pt continues to tolerate. Consult placed to  acute rehab.Will CTM.

## 2023-09-05 NOTE — PROGRESS NOTES
Cc: Dysphagia    S: Postoperative day 1 status post PEG tube placement.  Doing fine, no complaints, minimal abdominal discomfort.    O:   Vitals:    09/05/23 0011 09/05/23 0339 09/05/23 0400 09/05/23 0700   BP: 102/79   118/77   BP Location: Left arm   Right arm   Patient Position: Lying   Lying   Pulse: 83  68 68   Resp: 18   18   Temp: 98.6 °F (37 °C)   98.5 °F (36.9 °C)   TempSrc: Oral   Oral   SpO2: 98%  99% 95%   Weight:  77.8 kg (171 lb 8.3 oz)     Height:          Alert, no acute distress  Breathing comfortable  Abdomen soft, nontender distended, PEG tube in place    White blood cell count 7.2, hemoglobin 12.9    Assessment and plan    Postoperative day 1 status post PEG tube placement    -Can use for water and meds immediately  -Start tube feeds today.  Start feeds at 20 cc/h and advance by 20 cc every 4 hours to goal.  -Nutrition consultation for tube feeds recommendations  -Keep abdominal binder in place     Yoshi Sahu MD, FACS  General, Minimally Invasive and Endoscopic Surgery  [facility]    [address], [address]  [place], [place], [address]  P: [phone]  F: [phone]

## 2023-09-05 NOTE — THERAPY TREATMENT NOTE
Patient Name: Braxton Wolfe  : 1965    MRN: [id number]                              Today's Date: 2023       Admit Date: 2023    Visit Dx:     ICD-10-CM ICD-9-CM   1. Follow-up exam  Z09 V67.9   2. Sepsis with acute renal failure without septic shock, due to unspecified organism, unspecified acute renal failure type  A41.9 038.9    R65.20 995.92    N17.9 584.9   3. Acute respiratory failure, unspecified whether with hypoxia or hypercapnia  J96.00 518.81   4. Acute kidney injury  N17.9 584.9   5. Hypokalemia  E87.6 276.8   6. Hypernatremia  E87.0 276.0   7. Obstructive hydrocephalus  G91.1 331.4   8. Shunt malfunction  T85.618A 996.59   9. 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 Ritchie valve set to 110    Von Hippel-Lindau syndrome    Oropharyngeal dysphagia    Obstructive hydrocephalus     History reviewed. No pertinent past medical history.  Past Surgical History:   Procedure Laterality Date    BRAIN SURGERY       SHUNT INSERTION N/A 2023    Procedure: VENTRICULAR PERITONEAL SHUNT REVISION;  Surgeon: Roberto Ritchie MD;  Location: [facility];  Service: Neurosurgery;  Laterality: N/A;      General Information       Row Name 23 132          Physical Therapy Time and Intention    Document Type therapy note (daily note)  -PH     Mode of Treatment physical therapy  -PH       Row Name 23 1321          General Information    Existing Precautions/Restrictions fall  -PH       Row Name 23 1321          Cognition    Orientation Status (Cognition) oriented x 3  -PH       Row Name 23 132          Safety Issues, Functional Mobility    Safety Issues Affecting Function (Mobility) awareness of need for assistance;judgment;problem-solving;safety precaution  awareness;safety precautions follow-through/compliance;sequencing abilities;at risk behavior observed  -PH     Impairments Affecting Function (Mobility) balance;endurance/activity tolerance;strength  -PH     Comment, Safety Issues/Impairments (Mobility) gt belt and non skid socks donned;  -PH               User Key  (r) = Recorded By, (t) = Taken By, (c) = Cosigned By      Initials Name Provider Type    PH Cheryl Noguera PTA Physical Therapist Assistant                   Mobility       Row Name 09/05/23 1323          Bed Mobility    Bed Mobility supine-sit  -PH     Supine-Sit [place] (Bed Mobility) standby assist  -PH     Sit-Supine [place] (Bed Mobility) standby assist  -PH     Assistive Device (Bed Mobility) bed rails;head of bed elevated  -PH     Comment, (Bed Mobility) pt SBA for sit bal at EOB  -       Row Name 09/05/23 1323          Sit-Stand Transfer    Sit-Stand [place] (Transfers) 2 person assist;verbal cues;nonverbal cues (demo/gesture);minimum assist (75% patient effort)  -     Assistive Device (Sit-Stand Transfers) walker, front-wheeled  -PH     Comment, (Sit-Stand Transfer) cues for B hand placement and for postural correction  -PH       Row Name 09/05/23 1323          Gait/Stairs (Locomotion)    [place] Level (Gait) minimum assist (75% patient effort);2 person assist;verbal cues  -     Assistive Device (Gait) walker, front-wheeled  -PH     Distance in Feet (Gait) 40'  -PH     Deviations/Abnormal Patterns (Gait) angeli decreased;gait speed decreased;stride length decreased;base of support, narrow  -PH     Bilateral Gait Deviations forward flexed posture  -PH     Left Sided Gait Deviations weight shift ability decreased;foot drop/toe drag;decreased knee extension  -PH     [place] Level (Stairs) not tested  -PH     Comment, (Gait/Stairs) many cues for incr L knee flexion to clear L foot; cues to widen DMITRI: pt unsteady w/ no overt LOB; pt stopped and impulsively  released L hand from  to reach down to pull L sock up. Pt educ to keep B hands on fww for safety.  -PH               User Key  (r) = Recorded By, (t) = Taken By, (c) = Cosigned By      Initials Name Provider Type     Cheryl Noguera PTA Physical Therapist Assistant                   Obj/Interventions       Row Name 09/05/23 1325          Motor Skills    Therapeutic Exercise other (see comments)  LAQ, seated march; x 10 reps each  -PH       Row Name 09/05/23 1325          Balance    Balance Assessment sitting static balance;standing static balance  -PH     Static Sitting Balance standby assist  -PH     Dynamic Sitting Balance standby assist  -PH     Static Standing Balance minimal assist;verbal cues;non-verbal cues (demo/gesture);2-person assist  -PH     Position/Device Used, Standing Balance walker, front-wheeled  -PH               User Key  (r) = Recorded By, (t) = Taken By, (c) = Cosigned By      Initials Name Provider Type     Cheryl Noguera PTA Physical Therapist Assistant                   Goals/Plan    No documentation.                  Clinical Impression       Row Name 09/05/23 1327          Pain    Pretreatment Pain Rating 0/10 - no pain  -PH     Posttreatment Pain Rating 0/10 - no pain  -PH     Additional Documentation Pain Scale: Numbers Pre/Post-Treatment (Group)  -PH       Row Name 09/05/23 1326          Plan of Care Review    Plan of Care Reviewed With patient  -PH     Progress improving  -PH     Outcome Evaluation Pt seen by PT this date for tx. Pt sat up to EOB w/ SBA. Pt then stood w/ min A x 2 and use of fww. Pt amb 40' req min A x 2 and use of fww. L foot drop noted as well as narrow DMITRI w/ pt cued several times to correct. Pt stopped at approx 25' and impulsively let go of fww to pull up L sock. Pt educ on imp of keeping B hands on fww for safety. Pt performed B LE ther ex before returning to bed w/ SBA. Discussed w/ parent and parent ordering L AFO after dc. Parent  verbalized interest.  PT will prog as pt adrienne.  -PH       Row Name 09/05/23 1327          Vital Signs    O2 Delivery Pre Treatment room air  -PH     O2 Delivery Intra Treatment room air  -PH     O2 Delivery Post Treatment room air  -PH       Row Name 09/05/23 1327          Positioning and Restraints    Pre-Treatment Position in bed  -PH     Post Treatment Position bed  -PH     In Bed fowlers;call light within reach;encouraged to call for assist;exit alarm on;notified nsg;with family/caregiver  -PH               User Key  (r) = Recorded By, (t) = Taken By, (c) = Cosigned By      Initials Name Provider Type     Cheryl Noguera PTA Physical Therapist Assistant                   Outcome Measures       Row Name 09/05/23 1331          How much help from another person do you currently need...    Turning from your back to your side while in flat bed without using bedrails? 4  -PH     Moving from lying on back to sitting on the side of a flat bed without bedrails? 3  -PH     Moving to and from a bed to a chair (including a wheelchair)? 2  -PH     Standing up from a chair using your arms (e.g., wheelchair, bedside chair)? 2  -PH     Climbing 3-5 steps with a railing? 1  -PH     To walk in hospital room? 2  -PH     AM-PAC 6 Clicks Score (PT) 14  -PH     Highest level of mobility 4 --> Transferred to chair/commode  -PH       Row Name 09/05/23 1331          Functional Assessment    Outcome Measure Options AM-PAC 6 Clicks Basic Mobility (PT)  -PH               User Key  (r) = Recorded By, (t) = Taken By, (c) = Cosigned By      Initials Name Provider Type     Cheryl Noguera PTA Physical Therapist Assistant                                 Physical Therapy Education       Title: PT OT SLP Therapies (In Progress)       Topic: Physical Therapy (In Progress)       Point: Mobility training (In Progress)       Learning Progress Summary             Patient Acceptance, E,TB,D, NR by  at 9/5/2023 1331    Acceptance,  E,TB, VU,DU,NR by CS at 9/3/2023 1157    Acceptance, E,D, VU,NR by EB at 9/1/2023 1140    Acceptance, E,D, VU,NR by EB at 8/31/2023 1607    Acceptance, E,D, VU,NR by EB at 8/30/2023 1621    Acceptance, E,TB, NR by CB at 8/26/2023 1558                         Point: Home exercise program (In Progress)       Learning Progress Summary             Patient Acceptance, E,TB,D, NR by PH at 9/5/2023 1331    Acceptance, E,TB, VU,DU,NR by CS at 9/3/2023 1157    Acceptance, E,D, VU,NR by EB at 8/30/2023 1621                         Point: Body mechanics (In Progress)       Learning Progress Summary             Patient Acceptance, E,TB,D, NR by PH at 9/5/2023 1331    Acceptance, E,TB, VU,DU,NR by CS at 9/3/2023 1157    Acceptance, E,D, VU,NR by EB at 9/1/2023 1140    Acceptance, E,D, VU,NR by EB at 8/31/2023 1607    Acceptance, E,D, VU,NR by EB at 8/30/2023 1621    Acceptance, E,TB, NR by CB at 8/26/2023 1558                         Point: Precautions (In Progress)       Learning Progress Summary             Patient Acceptance, E,TB,D, NR by PH at 9/5/2023 1331    Acceptance, E,TB, VU,DU,NR by CS at 9/3/2023 1157    Acceptance, E,D, VU,NR by EB at 9/1/2023 1140    Acceptance, E,D, VU,NR by EB at 8/31/2023 1607    Acceptance, E,D, VU,NR by EB at 8/30/2023 1621    Acceptance, E,TB, NR by CB at 8/26/2023 1558                                         User Key       Initials Effective Dates Name Provider Type Discipline    EB 02/14/23 -  Ciera Carter, PTA Physical Therapist Assistant PT    PH 06/16/21 -  Cheryl Noguera PTA Physical Therapist Assistant PT    CB 10/22/21 -  Luh Etienne, PT Physical Therapist PT    CS 09/22/22 -  Tesha Bedolla, PT Physical Therapist PT                  PT Recommendation and Plan     Plan of Care Reviewed With: patient  Progress: improving  Outcome Evaluation: Pt seen by PT this date for tx. Pt sat up to EOB w/ SBA. Pt then stood w/ min A x 2 and use of fww. Pt amb 40' req min A x 2 and  use of fww. L foot drop noted as well as narrow DMITRI w/ pt cued several times to correct. Pt stopped at approx 25' and impulsively let go of fww to pull up L sock. Pt educ on imp of keeping B hands on fww for safety. Pt performed B LE ther ex before returning to bed w/ SBA. Discussed w/ parent and parent ordering L AFO after dc. Parent verbalized interest.  PT will prog as pt adrienne.     Time Calculation:         PT Charges       Row Name 09/05/23 1332             Time Calculation    Start Time 1306  -PH      Stop Time 1320  -PH      Time Calculation (min) 14 min  -PH      PT Received On 09/05/23  -PH      PT - Next Appointment 09/06/23  -PH         Timed Charges    64399 - PT Therapeutic Activity Minutes 14  -PH         Total Minutes    Timed Charges Total Minutes 14  -PH       Total Minutes 14  -PH                User Key  (r) = Recorded By, (t) = Taken By, (c) = Cosigned By      Initials Name Provider Type     Cheryl Noguera PTA Physical Therapist Assistant                  Therapy Charges for Today       Code Description Service Date Service Provider Modifiers Qty    [id number]  PT THERAPEUTIC ACT EA 15 MIN 9/5/2023 Cheryl Noguera PTA GP 1            PT G-Codes  Outcome Measure Options: AM-PAC 6 Clicks Basic Mobility (PT)  AM-PAC 6 Clicks Score (PT): 14  Modified Rogers Scale: 4 - Moderately severe disability.  Unable to walk without assistance, and unable to attend to own bodily needs without assistance.  PT Discharge Summary  Anticipated Discharge Disposition (PT): inpatient rehabilitation facility, sub acute care setting    Cheryl Noguera PTA  9/5/2023

## 2023-09-05 NOTE — PROGRESS NOTES
Name: Braxton Wolfe ADMIT: 2023   : 1965  PCP: Provider, No Known    MRN: [id number] LOS: 12 days   AGE/SEX: 57 y.o. male  ROOM: [facility]     Subjective   Subjective   No acute events overnight.  Status post PEG tube placement yesterday.  Laying in bed.  Complaints.    Review of Systems   As above  Objective   Objective   Vital Signs  Temp:  [97.8 °F (36.6 °C)-98.7 °F (37.1 °C)] 98.5 °F (36.9 °C)  Heart Rate:  [66-87] 68  Resp:  [15-20] 18  BP: ()/(62-91) 118/77  SpO2:  [91 %-100 %] 95 %  on  Flow (L/min):  [0-4] 0;   Device (Oxygen Therapy): room air  Body mass index is 24.61 kg/m².  Physical Exam    General: Alert, laying in bed, not in distress,  HEENT: Normocephalic, right scalp incision  clean, intact, no signs of erythema/infection  CV: Regular rate and rhythm, no murmurs rubs or gallops  Lungs: CTA, no wheezing  Abdomen: Soft, nontender, nondistended  Extremities: No significant peripheral edema     Results Review     I reviewed the patient's new clinical results.  Results from last 7 days   Lab Units 23  0604 23  0535 23  0708 23  0559   WBC 10*3/mm3 7.27 7.67 8.15 7.90   HEMOGLOBIN g/dL 12.9* 12.8* 12.4* 11.9*   PLATELETS 10*3/mm3 303 313 314 288     Results from last 7 days   Lab Units 23  0604 23  0535 23  0708 23  0559   SODIUM mmol/L 141 141 140 143   POTASSIUM mmol/L 3.9 3.9 4.1 4.1   CHLORIDE mmol/L 104 106 104 108*   CO2 mmol/L 25.1 26.4 28.8 30.5*   BUN mg/dL 13 14 13 11   CREATININE mg/dL 0.82 0.89 0.83 0.85   GLUCOSE mg/dL 67 78 102* 137*   Estimated Creatinine Clearance: 109.4 mL/min (by C-G formula based on SCr of 0.82 mg/dL).  Results from last 7 days   Lab Units 23  0604 23  0535 23  0708 23  0559 23  0549 23  0544   ALBUMIN g/dL 3.3* 3.3* 3.3* 3.2*   < > 2.7*   BILIRUBIN mg/dL  --   --   --   --   --  0.6   ALK PHOS U/L  --   --   --   --   --  80   AST (SGOT) U/L  --   --   --   --   --   27   ALT (SGPT) U/L  --   --   --   --   --  26    < > = values in this interval not displayed.     Results from last 7 days   Lab Units 09/05/23  0604 09/04/23  0535 09/03/23  0708 09/02/23  0559   CALCIUM mg/dL 8.6 8.6 8.7 8.7   ALBUMIN g/dL 3.3* 3.3* 3.3* 3.2*   MAGNESIUM mg/dL 2.0 2.1 2.2 2.1   PHOSPHORUS mg/dL 2.7 3.3 3.2 2.8         No results found for: COVID19  Glucose   Date/Time Value Ref Range Status   09/05/2023 0558 78 70 - 130 mg/dL Final   09/04/2023 2124 75 70 - 130 mg/dL Final   09/04/2023 1637 78 70 - 130 mg/dL Final   09/04/2023 0602 80 70 - 130 mg/dL Final   09/04/2023 0003 84 70 - 130 mg/dL Final   09/03/2023 0637 100 70 - 130 mg/dL Final   09/02/2023 1805 112 70 - 130 mg/dL Final           XR Abdomen KUB  Clinical: Core check catheter placement     COMPARISON 8/28/2023     FINDINGS: Cortright catheter tip is located within the duodenum, at the  junction of the second and third portions. It has been advanced since  the prior examination. There is contrast demonstrated now within the  colon. The remainder is unremarkable.     This report was finalized on 8/31/2023 2:47 PM by Dr. Tyrel Chavez M.D.     CT Head Without Contrast  Narrative: CT SCAN OF THE HEAD WITHOUT CONTRAST 08/30/2023     CLINICAL HISTORY: Status post revision of  shunt yesterday 08/29/2023.  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  Patient  has a history of Von Hippel-Lindau.      TECHNIQUE: Spiral CT images were obtained from the base of the skull to  the vertex without intravenous contrast. Images were reformatted and  submitted in 1 mm thick axial, sagittal and coronal CT sections with  brain algorithm.      This is correlated to prior head CTs yesterday 08/29/2023 and head CT  08/24/2023 and a prior MRI of the brain on 08/28/2023 and outside MRIs  of the brain on 04/21/2022 and 01/14/2022.     FINDINGS: The patient has had a  large 5 x 7.5 cm midline occipital left  occipital craniectomy.  The craniectomy is covered by metallic mesh and  resection of the posterior midline 1.8 cm segment of the posterior ring  of C1, and by history this occurred back on 04/26/2019 for resection of  cerebellar hemangioblastoma. There is extensive encephalomalacia  involving the majority of the left cerebellar hemisphere and some  loculated CSF lateral to the left cerebellum better demonstrated on  recent MRI of the brain on 08/25/2023. There is also encephalomalacia  throughout the majority of the right cerebellum. There is a markedly  enlarged fourth ventricle measuring 4.1 x 3.4 cm in medial lateral and  anterior posterior dimension. There is tenting of the cervicomedullary  junction posteriorly. Recent MRI of the brain on 08/28/2023 demonstrated  multiple small enhancing cerebellar nodules compatible with multiple  hemangioblastomas that are not able to be appreciated on this  noncontrast head CT. There is a ventriculoperitoneal shunt catheter in  place that courses through the superior right coronal suture through the  superior right frontal lobe parenchyma enters the superior body of the  right lateral ventricle and its distal tip is at the level of the right  foramen of Monro.  The lateral and third ventricles are mildly enlarged.   The temporal horns of the lateral ventricles in particular are large in  size. The lateral and third ventricles have clearly enlarged when  compared to an outside MRI of the brain on 04/21/2021. The lateral and  third ventricles have slightly decreased in size when compared to head  CT 08/24/2023, unchanged in size when compared to yesterday's head CT  08/29/2023. There is confluent rind of low density in the right frontal  white matter along the margins of the ventriculoperitoneal shunt  catheter with rind of low-density tracking 3 x 3.3 cm, may be white  matter encephalomalacia, while there could be some backtracking of  CSF.  Since the head CT on 08/24/2023 there is resolving low-density in the  temporal occipital periventricular white matter compatible with  resolving transependymal extension of CSF. A tiny tubular tract through  the lateral right parietal bone where there was likely previous  placement and removal of a ventriculostomy catheter in the remote past.  I see no midline shift. No acute intracranial hemorrhage is identified.  There are bubbles of air along the port for the ventriculoperitoneal  shunt catheter within the scalp overlying the anterolateral right  parietal bone from recent shunt revision procedure, and the bubbles of  air are new when compared to yesterday afternoon's head CT 08/29/2023 at  4:39 p.m.     Impression: 1. Since yesterday afternoon's head CT on 08/29/2023 at 4:39 p.m., there  has been shunt revision with bubbles of air in the scalp adjacent to the  shunt port overlying the anterior superior lateral right parietal bone.  There is stable position of the  shunt catheter that courses from the  superior right coronal suture through the superior right frontal lobe  parenchyma through the superior body of the right lateral ventricle and  its distal tip is at the right foramen of Monro. The lateral and third  ventricles remain enlarged. They are clearly larger than they were on an  outside MRI of the brain on 04/21/2022. However they are slightly  smaller than they were on a head CT 6 days ago on 08/24/2023. Since the  head CT on 08/24/2023, six days ago, there is resolving low-density in  the temporal occipital periventricular white matter compatible with  resolving transependymal extension of CSF.     2. 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 There is extensive  encephalomalacia involving the  majority of the left cerebellum and also  extensive encephalomalacia throughout good portions of the right  cerebellar hemisphere. MRI of the brain 2 days ago on 08/28/2023  demonstrated multiple tiny enhancing cerebellar nodules compatible with  multiple hemangioblastomas that are unable to be evaluated on this  noncontrast head CT. There is marked dilatation of the fourth ventricle.   Some of it is ex vacuo dilatation due to extensive cerebellar  encephalomalacia although there is posterior tenting of the  cervicomedullary junction and a small size superior aspect of the  aqueduct of Sylvius and the marked dilatation of the fourth ventricle  may be secondary to encystment of the fourth ventricle. The remainder of  the head CT is unremarkable. The results were communicated to Roberto Ritchie MD, from neurosurgery by telephone on 08/30/2023 at 9:50 AM.     Radiation dose reduction techniques were utilized, including automated  exposure control and exposure modulation based on body size.        This report was finalized on 8/31/2023 5:56 AM by Dr. Shahram Quintero M.D.       Scheduled Medications  aspirin, 81 mg, Nasogastric, Daily  atorvastatin, 40 mg, Oral, Nightly  docusate sodium, 100 mg, Oral, BID  folic acid (FOLVITE) IVPB, 1 mg, Intravenous, Daily  mupirocin, 1 application , Topical, Q12H  pantoprazole, 40 mg, Intravenous, Q12H  polyethylene glycol, 17 g, Nasogastric, Daily  sodium chloride, 10 mL, Intravenous, Q12H  thiamine, 100 mg, Oral, Daily  vitamin D, 50,000 Units, Nasogastric, Q7 Days    Infusions  lactated ringers, 9 mL/hr, Last Rate: 9 mL/hr (08/29/23 1818)  lactated ringers, 75 mL/hr, Last Rate: 75 mL/hr (09/05/23 0619)    Diet  NPO Diet NPO Type: Strict NPO    I have personally reviewed     [x]  Laboratory   []  Microbiology   []  Radiology   []  EKG/Telemetry  []  Cardiology/Vascular   []  Pathology    []  Records       Assessment/Plan     Active Hospital Problems    Diagnosis  POA    **Sepsis [A41.9]   Yes    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 Ritchie valve set to 110 [Z98.2]  Not Applicable    Shunt malfunction [T85.618A]  Yes    Cerebral ventriculomegaly [G93.89]  Yes    Lethargy [R53.83]  Yes    Confusion [R41.0]  Yes    Obstructive hydrocephalus [G91.1]  Unknown      Resolved Hospital Problems   No resolved problems to display.           Patient has a history of von Hippel-Lindau syndrome and is on tumor suppressive medication  welireg, CNS hemangioblastomas, craniotomy status post  shunt placement, baseline hemiparesis, being brought to the ED on 08/24/2023 after found down by family.    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 Follow-up as scheduled. Office will contact patient if follow-up required sooner due to xray findings. Patient to call for any concerns including but not limited to headaches, vision changes, balance difficulties, incontinence of bladder, weakness.    Improved, alert, x3 but forgetful, requiring frequent reorientation      Acute to subacute infarct:   MRI 08/28/2023 showed new punctate focus of diffusion restriction/FLAIR hyperintensity inthe right paracentral lobule most suggestive of acute/subacute infarct.  Neurology evaluated, appreciate recs.  Patient initiated on aspirin 81 mg daily, atorvastatin 40 mg daily, Continue         Acute hypercapnic respiratory failure s/p vent till 8/25  -Patient is currently on room air, remained stable     B/L Aspiration pneumonia  -Possibly acute on chronic aspiration  -Status pos IV ceftriaxone x4 days.        Dysphagia/vomiting;  Status post PEG tube placement 09/04//23, to be initiated on tube feeds today.           YUDITH: Resolved with IV fluids, monitor.      Hypokalemia/hypophosphatemia: Improved.  Monitor.        Incidental lung nodules- seen on CT 08/24/2023, CT follow-up in 3 months is  recommended.     S/p prior  shunt and required revision-neurosurgery signed off, recommend neurosurgery follow-up on  9/11/2023 with CT head imaging      VHL with craniotomy   - residual left-sided weakness from this   -Patient is on Welireg for VHL to slow down tumor growth.  He has been off of this medication now for a week.  He will need to follow-up with MD Reynoso about when to restart side effects from this medication are hypoxemia and anemia.  Starting dose is 120 but the patient has been intolerant of it in the past and is down to 40 mg.     Mild coagulopathy, possibly related to nutrition, monitor, INR normalized.           SCDs for DVT prophylaxis.  Full code.  Discussed with patient and nursing staff.  Anticipate discharge SNS, versus home health,   Likely stable to be discharged in 1-2 days if tolerated tube feeds      Copied text in this note has been reviewed and is accurate as of 09/05/23.         Dictated utilizing Dragon dictation        Bentley Lovell MD  [facility]  09/05/23  08:52 EDT

## 2023-09-05 NOTE — PROGRESS NOTES
"Nutrition Services    Patient Name:  Braxton Wolfe  YOB: 1965  MRN: [id number]  Admit Date:  8/24/2023    Assessment Date:  09/05/23    Comment:  Nutrition follow up. S/P PEG placement yesterday, TF's resumed this morning at 20mL/hr with Isosource 1.5 and 30mL/hr free water. Goal is 55mL/hr. Pt without complaints. Last BM 9/4. Labs, meds, skin reviewed. ? DC plan.  Pt may do well with bolus feeds if going home eventually.    RD to follow clinical course, nutrition support needs.    CLINICAL NUTRITION ASSESSMENT      Reason for Assessment Follow-up Protocol     Diagnosis/Problem   Acute encephalopathy. Asp PNA, vomiting, ARF, S/p prior  shunt ,VHL syndrome with CNS hemangioblastomas    Medical/Surgical History History reviewed. No pertinent past medical history.    Past Surgical History:   Procedure Laterality Date    BRAIN SURGERY       SHUNT INSERTION N/A 8/29/2023    Procedure: VENTRICULAR PERITONEAL SHUNT REVISION;  Surgeon: Roberto Ritchie MD;  Location: [facility];  Service: Neurosurgery;  Laterality: N/A;        Encounter Information        Nutrition History:     Food Preferences:    Supplements:    Factors Affecting Intake: altered mental status, swallow impairment     Anthropometrics        Current Height  Current Weight  BMI kg/m2 Height: 177.8 cm (70\")  Weight: 77.8 kg (171 lb 8.3 oz) (09/05/23 0339)  Body mass index is 24.61 kg/m².   Adjusted BMI (if applicable)    BMI Category Overweight (25 - 29.9)       Admission Weight 75.9kg       Ideal Body Weight (IBW) 73kg   Adjusted IBW (if applicable)        Usual Body Weight (UBW) unkown   Weight Trend Unknown       Weight History Wt Readings from Last 30 Encounters:   09/05/23 0339 77.8 kg (171 lb 8.3 oz)   09/04/23 0621 77.7 kg (171 lb 4.8 oz)   09/02/23 0623 81.8 kg (180 lb 5.4 oz)   09/01/23 0638 81 kg (178 lb 9.2 oz)   08/31/23 0505 79.7 kg (175 lb 11.3 oz)   08/30/23 0547 85.7 kg (188 lb 15 oz)   08/29/23 1733 79.4 kg (175 lb) "   08/29/23 0441 79 kg (174 lb 2.6 oz)   08/28/23 0350 76.6 kg (168 lb 14 oz)   08/26/23 0319 79.8 kg (175 lb 14.8 oz)   08/25/23 0906 75.8 kg (167 lb)   08/24/23 1802 75.9 kg (167 lb 5.3 oz)   08/25/23 1852 75.8 kg (167 lb)        Estimated/Assessed Needs        Current Weight  Weight: 77.8 kg (171 lb 8.3 oz) (09/05/23 0339)       Energy Requirements    Weight for Calculation 79.7 kg   Method for Estimation  25 kcal/kg   EST Needs (kcal/day) 1993       Protein Requirements    Weight for Calculation 79.7 kg   EST Protein Needs (g/kg) 1.0 - 1.2 gm/kg   EST Daily Needs (g/day) 80-95       Fluid Requirements     Method for Estimation 1 mL/kcal    EST Needs (mL/day) 2000     Tests/Procedures        Tests/Procedures EGD, Other: PEG 9/4     Labs       Pertinent Labs    Results from last 7 days   Lab Units 09/05/23  0604 09/04/23  0535 09/03/23  0708 08/31/23  1635 08/31/23  0544   SODIUM mmol/L 141 141 140   < > 140   POTASSIUM mmol/L 3.9 3.9 4.1   < > 3.3*   CHLORIDE mmol/L 104 106 104   < > 106   CO2 mmol/L 25.1 26.4 28.8   < > 25.0   BUN mg/dL 13 14 13   < > 8   CREATININE mg/dL 0.82 0.89 0.83   < > 0.91   CALCIUM mg/dL 8.6 8.6 8.7   < > 7.8*   BILIRUBIN mg/dL  --   --   --   --  0.6   ALK PHOS U/L  --   --   --   --  80   ALT (SGPT) U/L  --   --   --   --  26   AST (SGOT) U/L  --   --   --   --  27   GLUCOSE mg/dL 67 78 102*   < > 99    < > = values in this interval not displayed.       Results from last 7 days   Lab Units 09/05/23  0604 09/04/23  0535 09/03/23  0708   MAGNESIUM mg/dL 2.0 2.1 2.2   PHOSPHORUS mg/dL 2.7 3.3 3.2   HEMOGLOBIN g/dL 12.9* 12.8* 12.4*   HEMATOCRIT % 38.5 38.8 37.2*   WBC 10*3/mm3 7.27 7.67 8.15   ALBUMIN g/dL 3.3* 3.3* 3.3*       Results from last 7 days   Lab Units 09/05/23  0604 09/04/23  0535 09/03/23  0708 09/02/23  0559 09/01/23  0549 08/31/23  0554 08/30/23  0536   INR   --   --  1.02 1.14* 1.75* 2.02* 1.33*   PLATELETS 10*3/mm3 303 313 314 288 257 252 206       No results found for:  COVID19  Lab Results   Component Value Date    HGBA1C 5.80 (H) 08/29/2023          Medications           Scheduled Medications aspirin, 81 mg, Nasogastric, Daily  atorvastatin, 40 mg, Oral, Nightly  docusate sodium, 100 mg, Oral, BID  folic acid (FOLVITE) IVPB, 1 mg, Intravenous, Daily  mupirocin, 1 application , Topical, Q12H  pantoprazole, 40 mg, Intravenous, Q12H  polyethylene glycol, 17 g, Nasogastric, Daily  sodium chloride, 10 mL, Intravenous, Q12H  thiamine, 100 mg, Oral, Daily  vitamin D, 50,000 Units, Nasogastric, Q7 Days       Infusions lactated ringers, 9 mL/hr, Last Rate: 9 mL/hr (08/29/23 1818)  lactated ringers, 75 mL/hr, Last Rate: 75 mL/hr (09/05/23 0619)       PRN Medications   benzonatate    [DISCONTINUED] senna-docusate sodium **AND** polyethylene glycol **AND** bisacodyl **AND** bisacodyl    Calcium Replacement - Follow Nurse / BPA Driven Protocol    dextrose    dextrose    glucagon (human recombinant)    HYDROcodone-acetaminophen    melatonin    nitroglycerin    ondansetron    Potassium Replacement - Follow Nurse / BPA Driven Protocol    [COMPLETED] Insert Peripheral IV **AND** sodium chloride    sodium chloride    sodium chloride     Physical Findings          Physical Appearance alert, oriented, room air   Oral/Mouth Cavity WNL   Edema  1+ (trace), 2+ (mild)   Gastrointestinal last bowel movement: 9/4   Skin  surgical incision: head   Tubes/Drains/Lines PEG   NFPE No clinical signs of muscle wasting or fat loss   --  Current Nutrition Orders & Evaluation of Intake       Oral Nutrition     Food Allergies NKFA   Current PO Diet NPO Diet NPO Type: Strict NPO   Supplement n/a   PO Evaluation     % PO Intake/# of Days 0      Enteral Nutrition     Enteral Route PEG    TF Delivery Method Continuous    Enteral Product Isosource 1.5    Modular None    Propofol Rate/Kcal     TF Current Rate (mL) 20    TF Goal Rate (mL) 55    Current Water Flush (mL) 30 q 1 hrs    Current TF Provision  720 kcal, 32 gm  protein, 365 mL free water + 720 mL water flushes         Calories 36 % needs met         Protein  41 % needs met         TF Fluid (mL) 545mL         IV Fluids     Avg Volume Delivered (mL) N/a - just started today    % Goal Volume     TF Residual  no or minimal residual    TF Changes held    TF Tolerance tolerating     PES STATEMENT / NUTRITION DIAGNOSIS      Nutrition Dx Problem  Problem: Inadequate Oral Intake  Etiology: Medical Diagnosis - encephalopathy    Signs/Symptoms: Report of Minimal PO Intake    Comment:    --  NUTRITION INTERVENTION / PLAN OF CARE      Intervention Goal(s) Maintain nutrition status, Reduce/improve symptoms, Meet estimated needs, Disease management/therapy, Initiate feeding/diet, Tolerate PO , and No significant weight loss         RD Intervention/Action Follow Tx Progress, Care plan reviewed, and Recommend/ordered:          Prescription/Orders:       PO Diet       Supplements       Snacks       Enteral Nutrition       Parenteral Nutrition    New Prescription Ordered? Yes      Enteral Prescription:     Enteral Route PEG    TF Delivery Method Continuous    Enteral Product Isosource 1.5    Modular None    Propofol Rate/Kcal     TF Start Rate (mL/hr) 20    TF Goal Rate (mL/hr) 55    Free Water Flush (mL) 30 mL q 1 hrs    TF Provision at Goal: 1998kcal, 89gm protein, 1003mL free water + 720mL water flushes         Calories 99 % needs met         Protein  100 % needs met         Fluid (mL) 1723 mL total     Prescription Ordered Yes         Monitor/Evaluation Per protocol   Discharge Plan/Needs Pending clinical course   Education Will instruct as appropriate   --    RD to follow per protocol.    Electronically signed by:  Chiquita Klein RD  09/05/23 13:44 EDT

## 2023-09-05 NOTE — PLAN OF CARE
Goal Outcome Evaluation:      NICA. TONYA Sal x4, but forgetful. Brief on. Urinal @ bedside. LR @ 75 mL/hr. LUQ Peg tube CDI, abdominal binder, 70cc output. L foot drop. L calf scab, CDI, bactroban ointment, meplex. Plans to start tube feeds through PEG tube today.

## 2023-09-05 NOTE — CASE MANAGEMENT/SOCIAL WORK
Continued Stay Note  [facility]     Patient Name: Braxton Wolfe  MRN: [id number]  Today's Date: 9/5/2023    Admit Date: 8/24/2023    Plan: Rehab   Discharge Plan       Row Name 09/05/23 1602       Plan    Plan Rehab    Patient/Family in Agreement with Plan yes    Plan Comments Met with pts mother and daughter at bedside and discussed need for rehab at discharge.  Family agreeable and request referrals to [facility] [place], [facility], and [facility].  Referrals placed in Epic.  Await determination from all. Will need [place] precert.  ZHOU Li RN                   Discharge Codes    No documentation.                 Expected Discharge Date and Time       Expected Discharge Date Expected Discharge Time    Sep 6, 2023               Yazmin Li RN

## 2023-09-05 NOTE — PROGRESS NOTES
[facility] Acute Inpt Rehab    New referral. Evaluation in progress. Will monitor progress with therapies to determine most appropriate level of rehab for patient when medically stable.    Thank you,  Sweta Barbosa, RN  Rehab Admission Nurse

## 2023-09-06 LAB
ALBUMIN SERPL-MCNC: 3 G/DL (ref 3.5–5.2)
ANION GAP SERPL CALCULATED.3IONS-SCNC: 7 MMOL/L (ref 5–15)
BUN SERPL-MCNC: 13 MG/DL (ref 6–20)
BUN/CREAT SERPL: 18.8 (ref 7–25)
CALCIUM SPEC-SCNC: 8.5 MG/DL (ref 8.6–10.5)
CHLORIDE SERPL-SCNC: 104 MMOL/L (ref 98–107)
CO2 SERPL-SCNC: 29 MMOL/L (ref 22–29)
CREAT SERPL-MCNC: 0.69 MG/DL (ref 0.76–1.27)
DEPRECATED RDW RBC AUTO: 42 FL (ref 37–54)
EGFRCR SERPLBLD CKD-EPI 2021: 107.9 ML/MIN/1.73
ERYTHROCYTE [DISTWIDTH] IN BLOOD BY AUTOMATED COUNT: 13.1 % (ref 12.3–15.4)
GLUCOSE BLDC GLUCOMTR-MCNC: 112 MG/DL (ref 70–130)
GLUCOSE BLDC GLUCOMTR-MCNC: 121 MG/DL (ref 70–130)
GLUCOSE BLDC GLUCOMTR-MCNC: 124 MG/DL (ref 70–130)
GLUCOSE BLDC GLUCOMTR-MCNC: 125 MG/DL (ref 70–130)
GLUCOSE BLDC GLUCOMTR-MCNC: 145 MG/DL (ref 70–130)
GLUCOSE SERPL-MCNC: 129 MG/DL (ref 65–99)
HCT VFR BLD AUTO: 34.1 % (ref 37.5–51)
HGB BLD-MCNC: 11.4 G/DL (ref 13–17.7)
LAB AP CASE REPORT: NORMAL
LAB AP DIAGNOSIS COMMENT: NORMAL
MAGNESIUM SERPL-MCNC: 2.2 MG/DL (ref 1.6–2.6)
MCH RBC QN AUTO: 29.8 PG (ref 26.6–33)
MCHC RBC AUTO-ENTMCNC: 33.4 G/DL (ref 31.5–35.7)
MCV RBC AUTO: 89.3 FL (ref 79–97)
PATH REPORT.FINAL DX SPEC: NORMAL
PATH REPORT.GROSS SPEC: NORMAL
PHOSPHATE SERPL-MCNC: 3 MG/DL (ref 2.5–4.5)
PLATELET # BLD AUTO: 305 10*3/MM3 (ref 140–450)
PMV BLD AUTO: 9.1 FL (ref 6–12)
POTASSIUM SERPL-SCNC: 3.7 MMOL/L (ref 3.5–5.2)
RBC # BLD AUTO: 3.82 10*6/MM3 (ref 4.14–5.8)
SODIUM SERPL-SCNC: 140 MMOL/L (ref 136–145)
WBC NRBC COR # BLD: 8.42 10*3/MM3 (ref 3.4–10.8)

## 2023-09-06 PROCEDURE — 83735 ASSAY OF MAGNESIUM: CPT | Performed by: SURGERY

## 2023-09-06 PROCEDURE — 85027 COMPLETE CBC AUTOMATED: CPT | Performed by: SURGERY

## 2023-09-06 PROCEDURE — 36415 COLL VENOUS BLD VENIPUNCTURE: CPT | Performed by: SURGERY

## 2023-09-06 PROCEDURE — 94760 N-INVAS EAR/PLS OXIMETRY 1: CPT

## 2023-09-06 PROCEDURE — 80069 RENAL FUNCTION PANEL: CPT | Performed by: SURGERY

## 2023-09-06 PROCEDURE — 94761 N-INVAS EAR/PLS OXIMETRY MLT: CPT

## 2023-09-06 PROCEDURE — 94799 UNLISTED PULMONARY SVC/PX: CPT

## 2023-09-06 PROCEDURE — 97530 THERAPEUTIC ACTIVITIES: CPT

## 2023-09-06 PROCEDURE — 82948 REAGENT STRIP/BLOOD GLUCOSE: CPT

## 2023-09-06 PROCEDURE — 99231 SBSQ HOSP IP/OBS SF/LOW 25: CPT | Performed by: SURGERY

## 2023-09-06 RX ORDER — ACETAMINOPHEN 500 MG
1000 TABLET ORAL EVERY 8 HOURS PRN
Status: DISCONTINUED | OUTPATIENT
Start: 2023-09-06 | End: 2023-09-08 | Stop reason: HOSPADM

## 2023-09-06 RX ADMIN — Medication 10 ML: at 21:06

## 2023-09-06 RX ADMIN — Medication 10 ML: at 12:31

## 2023-09-06 RX ADMIN — PANTOPRAZOLE SODIUM 40 MG: 40 INJECTION, POWDER, FOR SOLUTION INTRAVENOUS at 08:12

## 2023-09-06 RX ADMIN — MUPIROCIN 1 APPLICATION: 20 OINTMENT TOPICAL at 21:05

## 2023-09-06 RX ADMIN — FOLIC ACID 1 MG: 5 INJECTION, SOLUTION INTRAMUSCULAR; INTRAVENOUS; SUBCUTANEOUS at 09:13

## 2023-09-06 RX ADMIN — ASPIRIN 81 MG: 81 TABLET, CHEWABLE ORAL at 08:12

## 2023-09-06 RX ADMIN — ATORVASTATIN CALCIUM 40 MG: 20 TABLET, FILM COATED ORAL at 21:05

## 2023-09-06 RX ADMIN — PANTOPRAZOLE SODIUM 40 MG: 40 INJECTION, POWDER, FOR SOLUTION INTRAVENOUS at 21:05

## 2023-09-06 RX ADMIN — ACETAMINOPHEN 1000 MG: 500 TABLET ORAL at 16:54

## 2023-09-06 RX ADMIN — Medication 100 MG: at 08:12

## 2023-09-06 RX ADMIN — MUPIROCIN 1 APPLICATION: 20 OINTMENT TOPICAL at 08:12

## 2023-09-06 NOTE — CASE MANAGEMENT/SOCIAL WORK
Continued Stay Note  [facility]     Patient Name: Braxton Wolfe  MRN: [id number]  Today's Date: 9/6/2023    Admit Date: 8/24/2023    Plan: SNF   Discharge Plan       Row Name 09/06/23 1606       Plan    Plan SNF    Patient/Family in Agreement with Plan yes    Plan Comments Met with pt and mother at bedside.  Family now feels pt would benefit from subacute rehab.  Referrals still pending for [facility].  CCP continues to follow.  ZHOU Li RN                   Discharge Codes    No documentation.                 Expected Discharge Date and Time       Expected Discharge Date Expected Discharge Time    Sep 6, 2023               Yazmin Li, RN

## 2023-09-06 NOTE — PLAN OF CARE
Goal Outcome Evaluation:  Plan of Care Reviewed With: patient        Progress: no change  Outcome Evaluation: Pt seen by PT this date for tx. Pt req mod I for bed mobility and was SBA at EOB. Pt L ankle wrapped to promote neutral positioning w/ L foot drop noted in 9/5 session. Pt stood req CGA and use of fww. Pt then amb approx 50' req min A and use of fww. Pt slow and unsteady w/ cues for upright posture and avoid visual contact for advancement of L LE. Pt performed ther ex for strengtheing. PT will prog as pt adrienne. Rec IPR to address deficits.      Anticipated Discharge Disposition (PT): inpatient rehabilitation facility

## 2023-09-06 NOTE — PROGRESS NOTES
[facility] Acute Inpt Rehab    Will need OT evaluation to complete workup.      Asked for permission to talk with patient in front of mom.  Patient agreed.  Discussed acute vs subacute rehab.  Patient agreeable to 3 hours of therapy per day.  Mom states she will be able to stay with patient 24/7 once discharged from rehab.  Will discuss with Dr. Camacho.    Thank you,  Sweta Barbosa, RN  Rehab Admission Nurse

## 2023-09-06 NOTE — PLAN OF CARE
Goal Outcome Evaluation:  Plan of Care Reviewed With: patient        Progress: improving     Pt a/o x4, RA, VSS. TF @ 55. No concerns throughout shift. Still awaiting rehab approval.

## 2023-09-06 NOTE — PROGRESS NOTES
Postop day 2 status post PEG tube placement    S: No issues, tolerating tube feeds, no abdominal pain    O:   Vitals:    09/05/23 2300 09/06/23 0618 09/06/23 0738 09/06/23 1037   BP: 110/79  122/86 102/76   BP Location: Right arm  Right arm Right arm   Patient Position: Lying  Lying Lying   Pulse: 80      Resp: 18  18 16   Temp:   98.3 °F (36.8 °C) 98.5 °F (36.9 °C)   TempSrc:   Oral Oral   SpO2: 97%      Weight:  78.1 kg (172 lb 2.9 oz)     Height:          Alert, no acute distress  Abdomen soft, nontender nontender, PEG tube in place with tube feeds running    Assessment and plan    Postoperative day 2 status post PEG tube placement.  Tolerating tube feeds without any problem at the goal.    Patient to follow-up in my office whenever ready to have the PEG tube removed.  He will need to wait at least 2 months.  We will sign off, call with questions

## 2023-09-06 NOTE — PROGRESS NOTES
Name: Braxton Wolfe ADMIT: 2023   : 1965  PCP: Provider, No Known    MRN: [id number] LOS: 13 days   AGE/SEX: 57 y.o. male  ROOM: [facility]     Subjective   Subjective   No new events overnight.  Laying in bed.  Alert, oriented x3, denies complaints.  Tolerating tube feeds.      Review of Systems   As above  Objective   Objective   Vital Signs  Temp:  [98.3 °F (36.8 °C)-99.1 °F (37.3 °C)] 98.5 °F (36.9 °C)  Heart Rate:  [68-80] 80  Resp:  [16-18] 17  BP: (102-122)/(76-86) 102/76  SpO2:  [92 %-97 %] 97 %  on   ;   Device (Oxygen Therapy): room air  Body mass index is 24.71 kg/m².  Physical Exam    General: Alert, laying in bed, not in distress,  HEENT: Normocephalic, right scalp incision  clean, intact, no signs of erythema/infection  CV: Regular rate and rhythm, no murmurs rubs or gallops  Lungs: CTA, no wheezing  Abdomen: Soft, nontender, nondistended  Extremities: No significant peripheral edema     Results Review     I reviewed the patient's new clinical results.  Results from last 7 days   Lab Units 23  0554 23  0604 23  0535 23  0708   WBC 10*3/mm3 8.42 7.27 7.67 8.15   HEMOGLOBIN g/dL 11.4* 12.9* 12.8* 12.4*   PLATELETS 10*3/mm3 305 303 313 314       Results from last 7 days   Lab Units 23  0554 23  0604 23  0535 23  0708   SODIUM mmol/L 140 141 141 140   POTASSIUM mmol/L 3.7 3.9 3.9 4.1   CHLORIDE mmol/L 104 104 106 104   CO2 mmol/L 29.0 25.1 26.4 28.8   BUN mg/dL 13 13 14 13   CREATININE mg/dL 0.69* 0.82 0.89 0.83   GLUCOSE mg/dL 129* 67 78 102*     Estimated Creatinine Clearance: 130.5 mL/min (A) (by C-G formula based on SCr of 0.69 mg/dL (L)).  Results from last 7 days   Lab Units 23  0554 23  0604 23  0535 23  0708 23  0549 23  0544   ALBUMIN g/dL 3.0* 3.3* 3.3* 3.3*   < > 2.7*   BILIRUBIN mg/dL  --   --   --   --   --  0.6   ALK PHOS U/L  --   --   --   --   --  80   AST (SGOT) U/L  --   --   --   --   --   27   ALT (SGPT) U/L  --   --   --   --   --  26    < > = values in this interval not displayed.       Results from last 7 days   Lab Units 09/06/23  0554 09/05/23  0604 09/04/23  0535 09/03/23  0708   CALCIUM mg/dL 8.5* 8.6 8.6 8.7   ALBUMIN g/dL 3.0* 3.3* 3.3* 3.3*   MAGNESIUM mg/dL 2.2 2.0 2.1 2.2   PHOSPHORUS mg/dL 3.0 2.7 3.3 3.2           No results found for: COVID19  Glucose   Date/Time Value Ref Range Status   09/06/2023 1123 121 70 - 130 mg/dL Final   09/06/2023 0619 145 (H) 70 - 130 mg/dL Final   09/05/2023 0558 78 70 - 130 mg/dL Final   09/04/2023 2124 75 70 - 130 mg/dL Final   09/04/2023 1637 78 70 - 130 mg/dL Final   09/04/2023 0602 80 70 - 130 mg/dL Final   09/04/2023 0003 84 70 - 130 mg/dL Final           XR Abdomen KUB  Clinical: Core check catheter placement     COMPARISON 8/28/2023     FINDINGS: Cortright catheter tip is located within the duodenum, at the  junction of the second and third portions. It has been advanced since  the prior examination. There is contrast demonstrated now within the  colon. The remainder is unremarkable.     This report was finalized on 8/31/2023 2:47 PM by Dr. Tyrel Chavez M.D.     CT Head Without Contrast  Narrative: CT SCAN OF THE HEAD WITHOUT CONTRAST 08/30/2023     CLINICAL HISTORY: Status post revision of  shunt yesterday 08/29/2023.  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  Patient  has a history of Von Hippel-Lindau.      TECHNIQUE: Spiral CT images were obtained from the base of the skull to  the vertex without intravenous contrast. Images were reformatted and  submitted in 1 mm thick axial, sagittal and coronal CT sections with  brain algorithm.      This is correlated to prior head CTs yesterday 08/29/2023 and head CT  08/24/2023 and a prior MRI of the brain on 08/28/2023 and outside MRIs  of the brain on 04/21/2022 and 01/14/2022.     FINDINGS: The patient has  had a large 5 x 7.5 cm midline occipital left  occipital craniectomy.  The craniectomy is covered by metallic mesh and  resection of the posterior midline 1.8 cm segment of the posterior ring  of C1, and by history this occurred back on 04/26/2019 for resection of  cerebellar hemangioblastoma. There is extensive encephalomalacia  involving the majority of the left cerebellar hemisphere and some  loculated CSF lateral to the left cerebellum better demonstrated on  recent MRI of the brain on 08/25/2023. There is also encephalomalacia  throughout the majority of the right cerebellum. There is a markedly  enlarged fourth ventricle measuring 4.1 x 3.4 cm in medial lateral and  anterior posterior dimension. There is tenting of the cervicomedullary  junction posteriorly. Recent MRI of the brain on 08/28/2023 demonstrated  multiple small enhancing cerebellar nodules compatible with multiple  hemangioblastomas that are not able to be appreciated on this  noncontrast head CT. There is a ventriculoperitoneal shunt catheter in  place that courses through the superior right coronal suture through the  superior right frontal lobe parenchyma enters the superior body of the  right lateral ventricle and its distal tip is at the level of the right  foramen of Monro.  The lateral and third ventricles are mildly enlarged.   The temporal horns of the lateral ventricles in particular are large in  size. The lateral and third ventricles have clearly enlarged when  compared to an outside MRI of the brain on 04/21/2021. The lateral and  third ventricles have slightly decreased in size when compared to head  CT 08/24/2023, unchanged in size when compared to yesterday's head CT  08/29/2023. There is confluent rind of low density in the right frontal  white matter along the margins of the ventriculoperitoneal shunt  catheter with rind of low-density tracking 3 x 3.3 cm, may be white  matter encephalomalacia, while there could be some  backtracking of CSF.  Since the head CT on 08/24/2023 there is resolving low-density in the  temporal occipital periventricular white matter compatible with  resolving transependymal extension of CSF. A tiny tubular tract through  the lateral right parietal bone where there was likely previous  placement and removal of a ventriculostomy catheter in the remote past.  I see no midline shift. No acute intracranial hemorrhage is identified.  There are bubbles of air along the port for the ventriculoperitoneal  shunt catheter within the scalp overlying the anterolateral right  parietal bone from recent shunt revision procedure, and the bubbles of  air are new when compared to yesterday afternoon's head CT 08/29/2023 at  4:39 p.m.     Impression: 1. Since yesterday afternoon's head CT on 08/29/2023 at 4:39 p.m., there  has been shunt revision with bubbles of air in the scalp adjacent to the  shunt port overlying the anterior superior lateral right parietal bone.  There is stable position of the  shunt catheter that courses from the  superior right coronal suture through the superior right frontal lobe  parenchyma through the superior body of the right lateral ventricle and  its distal tip is at the right foramen of Monro. The lateral and third  ventricles remain enlarged. They are clearly larger than they were on an  outside MRI of the brain on 04/21/2022. However they are slightly  smaller than they were on a head CT 6 days ago on 08/24/2023. Since the  head CT on 08/24/2023, six days ago, there is resolving low-density in  the temporal occipital periventricular white matter compatible with  resolving transependymal extension of CSF.     2. 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 There is extensive  encephalomalacia  involving the majority of the left cerebellum and also  extensive encephalomalacia throughout good portions of the right  cerebellar hemisphere. MRI of the brain 2 days ago on 08/28/2023  demonstrated multiple tiny enhancing cerebellar nodules compatible with  multiple hemangioblastomas that are unable to be evaluated on this  noncontrast head CT. There is marked dilatation of the fourth ventricle.   Some of it is ex vacuo dilatation due to extensive cerebellar  encephalomalacia although there is posterior tenting of the  cervicomedullary junction and a small size superior aspect of the  aqueduct of Sylvius and the marked dilatation of the fourth ventricle  may be secondary to encystment of the fourth ventricle. The remainder of  the head CT is unremarkable. The results were communicated to Roberto Ritchie MD, from neurosurgery by telephone on 08/30/2023 at 9:50 AM.     Radiation dose reduction techniques were utilized, including automated  exposure control and exposure modulation based on body size.        This report was finalized on 8/31/2023 5:56 AM by Dr. Shahram Quintero M.D.       Scheduled Medications  aspirin, 81 mg, Nasogastric, Daily  atorvastatin, 40 mg, Oral, Nightly  docusate sodium, 100 mg, Oral, BID  folic acid (FOLVITE) IVPB, 1 mg, Intravenous, Daily  mupirocin, 1 application , Topical, Q12H  pantoprazole, 40 mg, Intravenous, Q12H  polyethylene glycol, 17 g, Nasogastric, Daily  sodium chloride, 10 mL, Intravenous, Q12H  thiamine, 100 mg, Oral, Daily  vitamin D, 50,000 Units, Nasogastric, Q7 Days    Infusions  lactated ringers, 9 mL/hr, Last Rate: 9 mL/hr (08/29/23 1818)  lactated ringers, 75 mL/hr, Last Rate: 75 mL/hr (09/05/23 0619)    Diet  NPO Diet NPO Type: Strict NPO    I have personally reviewed     [x]  Laboratory   []  Microbiology   []  Radiology   []  EKG/Telemetry  []  Cardiology/Vascular   []  Pathology    []  Records       Assessment/Plan     Active Hospital Problems    Diagnosis  POA     **Sepsis [A41.9]  Yes    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 Ritchie valve set to 110 [Z98.2]  Not Applicable    Shunt malfunction [T85.618A]  Yes    Cerebral ventriculomegaly [G93.89]  Yes    Lethargy [R53.83]  Yes    Confusion [R41.0]  Yes    Obstructive hydrocephalus [G91.1]  Unknown      Resolved Hospital Problems   No resolved problems to display.           Patient has a history of von Hippel-Lindau syndrome and is on tumor suppressive medication  welireg, CNS hemangioblastomas, craniotomy status post  shunt placement, baseline hemiparesis, being brought to the ED on 08/24/2023 after found down by family.    Acute encephalopathy;  Suspected hypoxemia need encephalopathy  -Status post shunt revision 8/29, by neurosurgery:Patient does not require follow-up shunt xray for setting confirmation. Follow-up as scheduled. Office will contact patient if follow-up required sooner due to xray findings. Patient to call for any concerns including but not limited to headaches, vision changes, balance difficulties, incontinence of bladder, weakness.    Improved, alert, x3 but forgetful, requiring frequent reorientation  -Patient did have stroke on MRI, however unclear if medication Welireg contributed to presenting symptoms.  One of the side effects of Welireg is hypoxia.  Patient has been on medication for 2 years, previously was on 120 mg daily however due to fogginess dose was decreased to 40 mg daily.  -Awaiting call from MD Reynoso to discuss current presentation and whether medication should be continued going forward.        Acute to subacute infarct:   MRI 08/28/2023 showed new punctate focus of diffusion restriction/FLAIR hyperintensity inthe right paracentral lobule most suggestive of acute/subacute infarct.  Neurology evaluated, appreciate recs.  Patient initiated on aspirin 81 mg daily, atorvastatin 40 mg  daily, Continue      Von Hippel-Lindau syndrome with craniotomy   - residual left-sided weakness from this   -Patient is on Welireg for VHL to slow down tumor growth.  Patient has been off the medication since admission.  Starting dose is 120 but the patient has been intolerant of it in the past and is down to 40 mg.  Contacted MD Edgardo Maxwell, contact #[phone] extension [phone].  Left name and my cell phone number and awaiting call to discuss current presentation and whether this medication should be resumed.  Discussed with Daughter Yamilet, explained that I do do not have the expertise and have not had experience with much  Welireg previously to give recommendations whether medication should be continued going forward or not without discussing with prescribing physician, and  did explain that I will discuss with Dr. Maxwell about current presentation and whether medication side effect could be related to presented symptoms.,          S/p prior  shunt and required revision-neurosurgery signed off, recommend neurosurgery follow-up on  9/11/2023 with CT head imaging            Acute hypercapnic respiratory failure s/p vent till 8/25  -Patient is currently on room air, remained stable     B/L Aspiration pneumonia  -Possibly acute on chronic aspiration  -Status pos IV ceftriaxone x4 days.       Dysphagia/vomiting;  Status post PEG tube placement 09/04//23, initiated on diet 09/05/2023, tolerating tube feeds.  Need to follow-up outpatient with general surgery.  Expected               YUDITH: Resolved with IV fluids, monitor.      Hypokalemia/hypophosphatemia: Improved.  Monitor.       Incidental lung nodules- seen on CT 08/24/2023, CT follow-up in 3 months is  recommended.  Discussed with daughter Yamilet Wolfe , 09/06/2023, will need repeat CT scan download around end of November.          Mild coagulopathy, possibly related to nutrition, INR normalized.       Discussed with RN  Discussed with  daughter  Discussed with mother  Discussed with MD Reynoso clinical staff, spent at least 25 minutes on the phone.  Awaiting call from Dr. Maxwell          SCDs for DVT prophylaxis.  Full code.  Discussed with patient and nursing staff.  Anticipate discharge SNS, versus home health, stable to be discharged to rehab.    Copied text in this note has been reviewed and is accurate as of 09/06/23.         Dictated utilizing Dragon dictation        Bentley Lovell MD  [facility]  09/06/23  13:34 EDT

## 2023-09-06 NOTE — THERAPY TREATMENT NOTE
Patient Name: Braxton Wolfe  : 1965    MRN: [id number]                              Today's Date: 2023       Admit Date: 2023    Visit Dx:     ICD-10-CM ICD-9-CM   1. Follow-up exam  Z09 V67.9   2. Sepsis with acute renal failure without septic shock, due to unspecified organism, unspecified acute renal failure type  A41.9 038.9    R65.20 995.92    N17.9 584.9   3. Acute respiratory failure, unspecified whether with hypoxia or hypercapnia  J96.00 518.81   4. Acute kidney injury  N17.9 584.9   5. Hypokalemia  E87.6 276.8   6. Hypernatremia  E87.0 276.0   7. Obstructive hydrocephalus  G91.1 331.4   8. Shunt malfunction  T85.618A 996.59   9. 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 Ritchie valve set to 110    Von Hippel-Lindau syndrome    Oropharyngeal dysphagia    Obstructive hydrocephalus     History reviewed. No pertinent past medical history.  Past Surgical History:   Procedure Laterality Date    BRAIN SURGERY       SHUNT INSERTION N/A 2023    Procedure: VENTRICULAR PERITONEAL SHUNT REVISION;  Surgeon: Roberto Ritchie MD;  Location: [facility];  Service: Neurosurgery;  Laterality: N/A;      General Information       Row Name 23 1029          Physical Therapy Time and Intention    Document Type therapy note (daily note)  -PH     Mode of Treatment physical therapy  -PH       Row Name 23 1029          General Information    Existing Precautions/Restrictions fall  -PH     Barriers to Rehab medically complex;previous functional deficit  -PH       Row Name 23 1029          Cognition    Orientation Status (Cognition) oriented x 3  -PH       Row Name 23 1029          Safety Issues, Functional Mobility    Safety Issues Affecting Function (Mobility)  impulsivity;insight into deficits/self-awareness  -     Impairments Affecting Function (Mobility) balance;endurance/activity tolerance;strength  -PH     Comment, Safety Issues/Impairments (Mobility) gt belt and non skid socks donned; L foot wrapped w/ ace bandage to maintain neutral positioning w/ L foot drop at BL  -PH               User Key  (r) = Recorded By, (t) = Taken By, (c) = Cosigned By      Initials Name Provider Type    PH Cheryl Noguera PTA Physical Therapist Assistant                   Mobility       Row Name 09/06/23 1030          Bed Mobility    Bed Mobility supine-sit  -PH     Supine-Sit [place] (Bed Mobility) modified independence  -PH     Sit-Supine [place] (Bed Mobility) modified independence  -PH     Comment, (Bed Mobility) pt SBA at EOB for sit bal; pt required a few cues to remain seated w/ pt impulsively attempting to stand.  -       Row Name 09/06/23 1030          Transfers    Comment, (Transfers) L foot wrapped to promote neutral positioning.  -       Row Name 09/06/23 1030          Sit-Stand Transfer    Sit-Stand [place] (Transfers) verbal cues;nonverbal cues (demo/gesture);contact guard;minimum assist (75% patient effort)  -PH     Assistive Device (Sit-Stand Transfers) walker, front-wheeled  -PH     Comment, (Sit-Stand Transfer) cues for B hand placement and for upright posture  -       Row Name 09/06/23 1030          Gait/Stairs (Locomotion)    [place] Level (Gait) 2 person assist;minimum assist (75% patient effort)  -     Assistive Device (Gait) walker, front-wheeled  -     Distance in Feet (Gait) 50'  -PH     Deviations/Abnormal Patterns (Gait) gait speed decreased;angeli decreased;stride length decreased;ataxic;base of support, narrow  -PH     Bilateral Gait Deviations forward flexed posture;heel strike decreased  -PH     [place] Level (Stairs) not tested  -PH     Comment, (Gait/Stairs) slow and unsteady w/ cues to widen DMITRI and for slow  turns. No overt LOB. Cues for postural correction and to move L LE w/o visual contact.  -PH               User Key  (r) = Recorded By, (t) = Taken By, (c) = Cosigned By      Initials Name Provider Type     Cheryl Noguera PTA Physical Therapist Assistant                   Obj/Interventions       Row Name 09/06/23 1033          Motor Skills    Therapeutic Exercise other (see comments)  LAQ, seated march; x 10 reps all; cues to remain at EOB  -PH       Row Name 09/06/23 1033          Balance    Balance Assessment sitting static balance;standing static balance  -PH     Static Sitting Balance standby assist  -PH     Static Standing Balance contact guard  -PH     Position/Device Used, Standing Balance walker, front-wheeled  -PH               User Key  (r) = Recorded By, (t) = Taken By, (c) = Cosigned By      Initials Name Provider Type     Cheryl Noguera PTA Physical Therapist Assistant                   Goals/Plan    No documentation.                  Clinical Impression       Row Name 09/06/23 1033          Pain    Pretreatment Pain Rating 0/10 - no pain  -PH     Posttreatment Pain Rating 0/10 - no pain  -PH     Additional Documentation Pain Scale: Numbers Pre/Post-Treatment (Group)  -PH       Row Name 09/06/23 1033          Plan of Care Review    Plan of Care Reviewed With patient  -PH     Progress no change  -PH     Outcome Evaluation Pt seen by PT this date for tx. Pt req mod I for bed mobility and was SBA at EOB. Pt L ankle wrapped to promote neutral positioning w/ L foot drop noted in 9/5 session. Pt stood req CGA and use of fww. Pt then amb approx 50' req min A and use of fww. Pt slow and unsteady w/ cues for upright posture and avoid visual contact for advancement of L LE. Pt performed ther ex for strengtheing. PT will prog as pt adrienne. Rec IPR to address deficits.  -PH       Row Name 09/06/23 1033          Positioning and Restraints    Pre-Treatment Position in bed  -PH     Post Treatment  Position bed  -PH     In Bed fowlers;call light within reach;encouraged to call for assist;exit alarm on;notified nsg  -PH               User Key  (r) = Recorded By, (t) = Taken By, (c) = Cosigned By      Initials Name Provider Type    Cheryl Cannon PTA Physical Therapist Assistant                   Outcome Measures       Row Name 09/06/23 1036          How much help from another person do you currently need...    Turning from your back to your side while in flat bed without using bedrails? 4  -PH     Moving from lying on back to sitting on the side of a flat bed without bedrails? 4  -PH     Moving to and from a bed to a chair (including a wheelchair)? 2  -PH     Standing up from a chair using your arms (e.g., wheelchair, bedside chair)? 3  -PH     Climbing 3-5 steps with a railing? 1  -PH     To walk in hospital room? 2  -PH     AM-PAC 6 Clicks Score (PT) 16  -PH     Highest level of mobility 5 --> Static standing  -PH       Row Name 09/06/23 1036          Functional Assessment    Outcome Measure Options AM-PAC 6 Clicks Basic Mobility (PT)  -PH               User Key  (r) = Recorded By, (t) = Taken By, (c) = Cosigned By      Initials Name Provider Type    Cheryl Cannon PTA Physical Therapist Assistant                                 Physical Therapy Education       Title: PT OT SLP Therapies (Done)       Topic: Physical Therapy (Done)       Point: Mobility training (Done)       Learning Progress Summary             Patient Acceptance, E,D,TB, DU,NR by PH at 9/6/2023 1037    Acceptance, E,TB,D, NR by PH at 9/5/2023 1331    Acceptance, E,TB, VU,DU,NR by CS at 9/3/2023 1157    Acceptance, E,D, VU,NR by EB at 9/1/2023 1140    Acceptance, E,D, VU,NR by EB at 8/31/2023 1607    Acceptance, E,D, VU,NR by EB at 8/30/2023 1621    Acceptance, E,TB, NR by CB at 8/26/2023 1558                         Point: Home exercise program (Done)       Learning Progress Summary             Patient Acceptance,  E,D,TB, DU,NR by PH at 9/6/2023 1037    Acceptance, E,TB,D, NR by PH at 9/5/2023 1331    Acceptance, E,TB, VU,DU,NR by CS at 9/3/2023 1157    Acceptance, E,D, VU,NR by EB at 8/30/2023 1621                         Point: Body mechanics (Done)       Learning Progress Summary             Patient Acceptance, E,D,TB, DU,NR by PH at 9/6/2023 1037    Acceptance, E,TB,D, NR by PH at 9/5/2023 1331    Acceptance, E,TB, VU,DU,NR by CS at 9/3/2023 1157    Acceptance, E,D, VU,NR by EB at 9/1/2023 1140    Acceptance, E,D, VU,NR by EB at 8/31/2023 1607    Acceptance, E,D, VU,NR by EB at 8/30/2023 1621    Acceptance, E,TB, NR by CB at 8/26/2023 1558                         Point: Precautions (Done)       Learning Progress Summary             Patient Acceptance, E,D,TB, DU,NR by PH at 9/6/2023 1037    Acceptance, E,TB,D, NR by PH at 9/5/2023 1331    Acceptance, E,TB, VU,DU,NR by CS at 9/3/2023 1157    Acceptance, E,D, VU,NR by EB at 9/1/2023 1140    Acceptance, E,D, VU,NR by EB at 8/31/2023 1607    Acceptance, E,D, VU,NR by EB at 8/30/2023 1621    Acceptance, E,TB, NR by CB at 8/26/2023 1558                                         User Key       Initials Effective Dates Name Provider Type Discipline    EB 02/14/23 -  Ciera Carter PTA Physical Therapist Assistant PT    PH 06/16/21 -  Cheryl Noguera PTA Physical Therapist Assistant PT    CB 10/22/21 -  Luh Etienne, PT Physical Therapist PT    CS 09/22/22 -  Tesha Bedolla, PT Physical Therapist PT                  PT Recommendation and Plan     Plan of Care Reviewed With: patient  Progress: no change  Outcome Evaluation: Pt seen by PT this date for tx. Pt req mod I for bed mobility and was SBA at EOB. Pt L ankle wrapped to promote neutral positioning w/ L foot drop noted in 9/5 session. Pt stood req CGA and use of fww. Pt then amb approx 50' req min A and use of fww. Pt slow and unsteady w/ cues for upright posture and avoid visual contact for advancement of L LE. Pt  performed ther ex for strengtheing. PT will prog as pt adrienne. Rec IPR to address deficits.     Time Calculation:         PT Charges       Row Name 09/06/23 1037             Time Calculation    Start Time 0846  -PH      Stop Time 0859  -PH      Time Calculation (min) 13 min  -PH      PT Received On 09/06/23  -PH      PT - Next Appointment 09/07/23  -PH         Timed Charges    21317 - PT Therapeutic Exercise Minutes 3  -PH      09504 - PT Therapeutic Activity Minutes 10  -PH         Total Minutes    Timed Charges Total Minutes 13  -PH       Total Minutes 13  -PH                User Key  (r) = Recorded By, (t) = Taken By, (c) = Cosigned By      Initials Name Provider Type    PH Cheryl Noguera, PTA Physical Therapist Assistant                  Therapy Charges for Today       Code Description Service Date Service Provider Modifiers Qty    12047929475 HC PT THERAPEUTIC ACT EA 15 MIN 9/5/2023 Cheryl Noguera, PTA GP 1    69406339750 HC PT THER SUPP EA 15 MIN 9/5/2023 Cheryl Noguera, PTA GP 1    85498977652 HC PT THERAPEUTIC ACT EA 15 MIN 9/6/2023 Cheryl Noguera, PTA GP 1    33142642318 HC PT THER SUPP EA 15 MIN 9/6/2023 Cheryl Noguera, PTA GP 1            PT G-Codes  Outcome Measure Options: AM-PAC 6 Clicks Basic Mobility (PT)  AM-PAC 6 Clicks Score (PT): 16  Modified [place] Scale: 4 - Moderately severe disability.  Unable to walk without assistance, and unable to attend to own bodily needs without assistance.  PT Discharge Summary  Anticipated Discharge Disposition (PT): inpatient rehabilitation facility    Cheryl Noguera PTA  9/6/2023

## 2023-09-07 LAB
ANION GAP SERPL CALCULATED.3IONS-SCNC: 6.5 MMOL/L (ref 5–15)
BUN SERPL-MCNC: 13 MG/DL (ref 6–20)
BUN/CREAT SERPL: 16.5 (ref 7–25)
CALCIUM SPEC-SCNC: 8.7 MG/DL (ref 8.6–10.5)
CHLORIDE SERPL-SCNC: 104 MMOL/L (ref 98–107)
CO2 SERPL-SCNC: 27.5 MMOL/L (ref 22–29)
CREAT SERPL-MCNC: 0.79 MG/DL (ref 0.76–1.27)
DEPRECATED RDW RBC AUTO: 43 FL (ref 37–54)
EGFRCR SERPLBLD CKD-EPI 2021: 103.6 ML/MIN/1.73
ERYTHROCYTE [DISTWIDTH] IN BLOOD BY AUTOMATED COUNT: 13.2 % (ref 12.3–15.4)
GLUCOSE BLDC GLUCOMTR-MCNC: 115 MG/DL (ref 70–130)
GLUCOSE BLDC GLUCOMTR-MCNC: 116 MG/DL (ref 70–130)
GLUCOSE BLDC GLUCOMTR-MCNC: 117 MG/DL (ref 70–130)
GLUCOSE BLDC GLUCOMTR-MCNC: 120 MG/DL (ref 70–130)
GLUCOSE SERPL-MCNC: 120 MG/DL (ref 65–99)
HCT VFR BLD AUTO: 36.6 % (ref 37.5–51)
HGB BLD-MCNC: 12.2 G/DL (ref 13–17.7)
MCH RBC QN AUTO: 29.5 PG (ref 26.6–33)
MCHC RBC AUTO-ENTMCNC: 33.3 G/DL (ref 31.5–35.7)
MCV RBC AUTO: 88.6 FL (ref 79–97)
PLATELET # BLD AUTO: 304 10*3/MM3 (ref 140–450)
PMV BLD AUTO: 9.1 FL (ref 6–12)
POTASSIUM SERPL-SCNC: 4.1 MMOL/L (ref 3.5–5.2)
RBC # BLD AUTO: 4.13 10*6/MM3 (ref 4.14–5.8)
SODIUM SERPL-SCNC: 138 MMOL/L (ref 136–145)
WBC NRBC COR # BLD: 6.09 10*3/MM3 (ref 3.4–10.8)

## 2023-09-07 PROCEDURE — 97166 OT EVAL MOD COMPLEX 45 MIN: CPT

## 2023-09-07 PROCEDURE — 94799 UNLISTED PULMONARY SVC/PX: CPT

## 2023-09-07 PROCEDURE — 94760 N-INVAS EAR/PLS OXIMETRY 1: CPT

## 2023-09-07 PROCEDURE — 97530 THERAPEUTIC ACTIVITIES: CPT

## 2023-09-07 PROCEDURE — 85027 COMPLETE CBC AUTOMATED: CPT | Performed by: SURGERY

## 2023-09-07 PROCEDURE — 82948 REAGENT STRIP/BLOOD GLUCOSE: CPT

## 2023-09-07 PROCEDURE — 80048 BASIC METABOLIC PNL TOTAL CA: CPT | Performed by: STUDENT IN AN ORGANIZED HEALTH CARE EDUCATION/TRAINING PROGRAM

## 2023-09-07 PROCEDURE — 94761 N-INVAS EAR/PLS OXIMETRY MLT: CPT

## 2023-09-07 PROCEDURE — 97535 SELF CARE MNGMENT TRAINING: CPT

## 2023-09-07 RX ORDER — FOLIC ACID 1 MG/1
1 TABLET ORAL DAILY
Status: DISCONTINUED | OUTPATIENT
Start: 2023-09-07 | End: 2023-09-08 | Stop reason: HOSPADM

## 2023-09-07 RX ORDER — DOCUSATE SODIUM 50 MG/5 ML
100 LIQUID (ML) ORAL 2 TIMES DAILY
Status: DISCONTINUED | OUTPATIENT
Start: 2023-09-07 | End: 2023-09-08 | Stop reason: HOSPADM

## 2023-09-07 RX ADMIN — MUPIROCIN 1 APPLICATION: 20 OINTMENT TOPICAL at 09:45

## 2023-09-07 RX ADMIN — ATORVASTATIN CALCIUM 40 MG: 20 TABLET, FILM COATED ORAL at 21:08

## 2023-09-07 RX ADMIN — DOCUSATE SODIUM 100 MG: 50 LIQUID ORAL at 21:09

## 2023-09-07 RX ADMIN — PANTOPRAZOLE SODIUM 40 MG: 40 INJECTION, POWDER, FOR SOLUTION INTRAVENOUS at 09:44

## 2023-09-07 RX ADMIN — FOLIC ACID 1 MG: 5 INJECTION, SOLUTION INTRAMUSCULAR; INTRAVENOUS; SUBCUTANEOUS at 12:31

## 2023-09-07 RX ADMIN — FOLIC ACID 1 MG: 1 TABLET ORAL at 21:08

## 2023-09-07 RX ADMIN — Medication 10 ML: at 09:46

## 2023-09-07 RX ADMIN — Medication 100 MG: at 09:45

## 2023-09-07 RX ADMIN — MUPIROCIN 1 APPLICATION: 20 OINTMENT TOPICAL at 21:09

## 2023-09-07 RX ADMIN — Medication 10 ML: at 21:09

## 2023-09-07 RX ADMIN — ASPIRIN 81 MG: 81 TABLET, CHEWABLE ORAL at 09:44

## 2023-09-07 NOTE — PLAN OF CARE
Goal Outcome Evaluation:  Plan of Care Reviewed With: patient        Progress: no change  Outcome Evaluation: Pt is a 56 y/o male admitted to [facility] for Sepsis, YUDITH; severe encephalopathy req endotracheal intubation 8/24-8/26 and ventricular peritoneal shunt revision 8/29 after being found down at home. Pt (I) at baseline w/ ADLs and mob and lives alone. Pt was DEP for foot wrap by PT to maintain neutral position in preparation for xfers and ADLs. Pt presents w/ L side deficits reported by pt as newly occured. Pt MOD I for bed mob but re increased time. Pt CGA for STS from EOB w/ RWX. MIN A x2 for fxnl mob w/ RWX, VCs for sequencing, spacing w/ RWX and manuvering obstacles. Pt performed washing face and oral care w/ SET UP while standing supported at sink CGA at times for balance. VCs req for proprioceptive awareness d/t pt bumping head into mirror 2x. Pt impulsive at times and demos decreased safety awareness/ lack of insight into deficits. Skilled OT req to address deficits in generalized weakness, impaired cognition, decreased endurance, and impaired balance. Pt DC rec to rehab, specific setting pending progress. OT will continue to monitor.      Anticipated Discharge Disposition (OT): inpatient rehabilitation facility, skilled nursing facility

## 2023-09-07 NOTE — CASE MANAGEMENT/SOCIAL WORK
Continued Stay Note  [facility]     Patient Name: Braxton Wolfe  MRN: [id number]  Today's Date: 9/7/2023    Admit Date: 8/24/2023    Plan: Lesa Barrios-precert obtained   Discharge Plan       Row Name 09/07/23 1507       Plan    Plan Lesa Denis-precert obtained    Patient/Family in Agreement with Plan yes    Plan Comments Precert obtained for Lesa Barrios.  Anuja valdez/heraclio cueto arranged for tomorrow at 11am.  MD notified.  Pharmacy updated.  ZHOU Li RN                                                Discharge Codes    No documentation.                 Expected Discharge Date and Time       Expected Discharge Date Expected Discharge Time    Sep 6, 2023               Yazmin Li, SOPHIE

## 2023-09-07 NOTE — PLAN OF CARE
Goal Outcome Evaluation:   VSS. A&OX4. No c/o pain or discomfort. Tube feeds infusing. D/C planning in process. Call light in reach.

## 2023-09-07 NOTE — PLAN OF CARE
Goal Outcome Evaluation:  Plan of Care Reviewed With: patient        Progress: no change  Outcome Evaluation: Pt is a 58 y/o male admitted to [facility] for Sepsis, YUDITH; severe encephalopathy req endotracheal intubation 8/24-8/26 and ventricular peritoneal shunt revision 8/29 after being found down at home. Pt (I) at baseline w/ ADLs and mob and lives alone. Pt was DEP for foot wrap by PT to maintain neutral position in preparation for xfers and ADLs. Pt presents w/ L side deficits reported by pt as newly occured. Pt MOD I for bed mob but re increased time. Pt CGA for STS from EOB w/ RWX. MIN A x2 for fxnl mob w/ RWX, VCs for sequencing, spacing w/ RWX and manuvering obstacles. Pt performed washing face and oral care w/ SET UP while standing supported at sink CGA at times for balance. VCs req for proprioceptive awareness d/t pt bumping head into mirror 2x. Pt impulsive at times and demos decreased safety awareness/ lack of insight into deficits. Skilled OT req to address deficits in generalized weakness, impaired cognition, decreased endurance, and impaired balance. Pt DC rec to rehab, specific setting pending progress.      Anticipated Discharge Disposition (OT): inpatient rehabilitation facility, skilled nursing facility

## 2023-09-07 NOTE — THERAPY EVALUATION
Patient Name: Braxton Wolfe  : 1965    MRN: [id number]                              Today's Date: 2023       Admit Date: 2023    Visit Dx:     ICD-10-CM ICD-9-CM   1. Follow-up exam  Z09 V67.9   2. Sepsis with acute renal failure without septic shock, due to unspecified organism, unspecified acute renal failure type  A41.9 038.9    R65.20 995.92    N17.9 584.9   3. Acute respiratory failure, unspecified whether with hypoxia or hypercapnia  J96.00 518.81   4. Acute kidney injury  N17.9 584.9   5. Hypokalemia  E87.6 276.8   6. Hypernatremia  E87.0 276.0   7. Obstructive hydrocephalus  G91.1 331.4   8. Shunt malfunction  T85.618A 996.59   9. 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 Ritchie valve set to 110    Von Hippel-Lindau syndrome    Oropharyngeal dysphagia    Obstructive hydrocephalus     History reviewed. No pertinent past medical history.  Past Surgical History:   Procedure Laterality Date    BRAIN SURGERY      ENDOSCOPY W/ PEG TUBE PLACEMENT N/A 2023    Procedure: ESOPHAGOGASTRODUODENOSCOPY WITH PERCUTANEOUS ENDOSCOPIC GASTROSTOMY TUBE INSERTION;  Surgeon: Yoshi Sahu MD;  Location: [facility] OR;  Service: General;  Laterality: N/A;     SHUNT INSERTION N/A 2023    Procedure: VENTRICULAR PERITONEAL SHUNT REVISION;  Surgeon: Roberto Ritchie MD;  Location: [facility] OR;  Service: Neurosurgery;  Laterality: N/A;      General Information       Row Name 23 0954          OT Time and Intention    Document Type evaluation  co-tx d/t need for two skilled hands and increased need for cueing for safety to maximize therapy potential and safety.  -PP     Mode of Treatment physical therapy;occupational therapy;co-treatment  -PP       Row Name 23 0954           General Information    Patient Profile Reviewed yes  -PP     Prior Level of Function independent:;ADL's;all household mobility  -PP     Existing Precautions/Restrictions fall  -PP     Barriers to Rehab medically complex;previous functional deficit  -PP       Row Name 09/07/23 0954          Living Environment    People in Home alone  -PP       Row Name 09/07/23 0954          Cognition    Orientation Status (Cognition) oriented x 3  -PP       Row Name 09/07/23 0954          Safety Issues, Functional Mobility    Safety Issues Affecting Function (Mobility) impulsivity;insight into deficits/self-awareness;problem-solving;judgment  -PP     Impairments Affecting Function (Mobility) balance;endurance/activity tolerance;strength;cognition  -PP     Cognitive Impairments, Mobility Safety/Performance impulsivity;insight into deficits/self-awareness;judgment;problem-solving/reasoning  -PP               User Key  (r) = Recorded By, (t) = Taken By, (c) = Cosigned By      Initials Name Provider Type    PP Yessenia Rosales OT Occupational Therapist                     Mobility/ADL's       Row Name 09/07/23 1012          Bed Mobility    Bed Mobility supine-sit  -PP     Supine-Sit Capac (Bed Mobility) modified independence  -PP     Sit-Supine Capac (Bed Mobility) not tested  -PP     Assistive Device (Bed Mobility) bed rails  -PP     Comment, (Bed Mobility) Pt SBA for sitting balance EOB. VC for processing and safety awareness d/t mild impulsivity. Pt left UIC.  -PP       Row Name 09/07/23 1012          Transfers    Comment, (Transfers) L foot wrapped to promote neutral positioning for xfers and fxnl mob  -PP       Row Name 09/07/23 1012          Bed-Chair Transfer    Bed-Chair Capac (Transfers) 2 person assist;minimum assist (75% patient effort);1 person assist  -PP     Comment, (Bed-Chair Transfer) VCs for correct hand and foot placement  -PP       Row Name 09/07/23 1012          Sit-Stand Transfer     Sit-Stand [place] (Transfers) contact guard;verbal cues  -PP     Assistive Device (Sit-Stand Transfers) walker, front-wheeled  -PP     Comment, (Sit-Stand Transfer) VCs for correct technique, and upright posture  -PP       Row Name 09/07/23 1012          Functional Mobility    Functional Mobility- Ind. Level minimum assist (75% patient effort);2 person assist required;1 person + 1 person to manage equipment  -PP     Functional Mobility- Comment ambulating from bed to hallway to chair, VCs for correct spacing w/ RWX, sequencing, postural correction d/t R lean and to manuver obstacles  -PP       Row Name 09/07/23 1012          Activities of Daily Living    BADL Assessment/Intervention lower body dressing;grooming  -PP       Row Name 09/07/23 1012          Lower Body Dressing Assessment/Training    [place] Level (Lower Body Dressing) doff;don;socks;set up  -PP     Position (Lower Body Dressing) other (see comments);supported sitting  sitting UIC  -PP       Row Name 09/07/23 1012          Grooming Assessment/Training    [place] Level (Grooming) wash face, hands;oral care regimen;set up;verbal cues;nonverbal cues (demo/gesture)  -PP     Position (Grooming) supported standing  -PP     Comment, (Grooming) VC/TCs for proprioceptive awareness d/t pt bumping head on mirror 2x  -PP               User Key  (r) = Recorded By, (t) = Taken By, (c) = Cosigned By      Initials Name Provider Type    PP Yessenia Rosales, OT Occupational Therapist                   Obj/Interventions       Row Name 09/07/23 1145          Sensory Assessment (Somatosensory)    Sensory Assessment (Somatosensory) UE sensation intact  -PP       Row Name 09/07/23 1145          Vision Assessment/Intervention    Visual Impairment/Limitations WFL  -PP       Row Name 09/07/23 1145          Range of Motion Comprehensive    General Range of Motion bilateral upper extremity ROM WFL  -PP       Row Name 09/07/23 1145          Strength Comprehensive  (MMT)    Comment, General Manual Muscle Testing (MMT) Assessment B UE grossly 4/5, some generalized weakness noted  -PP       Row Name 09/07/23 1145          Balance    Balance Assessment sitting static balance;standing static balance;sit to stand dynamic balance;standing dynamic balance  -PP     Static Sitting Balance standby assist  -PP     Dynamic Sitting Balance standby assist  -PP     Position, Sitting Balance unsupported;sitting edge of bed  -PP     Sit to Stand Dynamic Balance contact guard  -PP     Static Standing Balance contact guard  -PP     Dynamic Standing Balance moderate assist;2-person assist  -PP     Position/Device Used, Standing Balance walker, front-wheeled  -PP     Balance Interventions sitting;standing;sit to stand;supported;static;dynamic  -PP     Comment, Balance R lean noted during ambulation req VC/TCs for correction, but no overt LOB  -PP               User Key  (r) = Recorded By, (t) = Taken By, (c) = Cosigned By      Initials Name Provider Type    PP Hooker, Yessenia, OT Occupational Therapist                   Goals/Plan       Row Name 09/07/23 1212          Transfer Goal 1 (OT)    Activity/Assistive Device (Transfer Goal 1, OT) transfers, all  -PP     [place] Level/Cues Needed (Transfer Goal 1, OT) modified independence  -PP     Time Frame (Transfer Goal 1, OT) 2 weeks  -PP     Strategies/Barriers (Transfers Goal 1, OT) w/ least restrictive device  -PP     Progress/Outcome (Transfer Goal 1, OT) goal ongoing  -PP       Row Name 09/07/23 1212          Dressing Goal 1 (OT)    Activity/Device (Dressing Goal 1, OT) dressing skills, all  -PP     [place]/Cues Needed (Dressing Goal 1, OT) modified independence  -PP     Time Frame (Dressing Goal 1, OT) short term goal (STG);2 weeks  -PP     Progress/Outcome (Dressing Goal 1, OT) goal ongoing  -PP       Row Name 09/07/23 1212          Toileting Goal 1 (OT)    Activity/Device (Toileting Goal 1, OT) toileting skills, all  -PP      [place] Level/Cues Needed (Toileting Goal 1, OT) modified independence  -PP     Time Frame (Toileting Goal 1, OT) short term goal (STG);2 weeks  -PP     Progress/Outcome (Toileting Goal 1, OT) goal ongoing  -PP       Row Name 09/07/23 1212          Grooming Goal 1 (OT)    Activity/Device (Grooming Goal 1, OT) grooming skills, all  -PP     [place] (Grooming Goal 1, OT) modified independence  -PP     Time Frame (Grooming Goal 1, OT) short term goal (STG);2 weeks  -PP     Strategies/Barriers (Grooming Goal 1, OT) standing supported at sink  -PP     Progress/Outcome (Grooming Goal 1, OT) goal ongoing  -PP       Row Name 09/07/23 1212          Therapy Assessment/Plan (OT)    Planned Therapy Interventions (OT) activity tolerance training;BADL retraining;functional balance retraining;ROM/therapeutic exercise;transfer/mobility retraining;neuromuscular control/coordination retraining  -PP               User Key  (r) = Recorded By, (t) = Taken By, (c) = Cosigned By      Initials Name Provider Type    PP Yessenia Rosales, DIONNE Occupational Therapist                   Clinical Impression       Row Name 09/07/23 1156          Pain Assessment    Pretreatment Pain Rating 0/10 - no pain  -PP     Posttreatment Pain Rating 0/10 - no pain  -PP     Pre/Posttreatment Pain Comment Pt has no c/o pain at this time  -PP       Row Name 09/07/23 1155          Plan of Care Review    Plan of Care Reviewed With patient  -PP     Progress no change  -PP     Outcome Evaluation Pt is a 56 y/o male admitted to [facility] for Sepsis, YUDITH; severe encephalopathy req endotracheal intubation 8/24-8/26 and ventricular peritoneal shunt revision 8/29 after being found down at home. Pt (I) at baseline w/ ADLs and mob and lives alone. Pt was DEP for foot wrap by PT to maintain neutral position in preparation for xfers and ADLs. Pt presents w/ L side deficits reported by pt as newly occured. Pt MOD I for bed mob but re increased time. Pt CGA for STS from EOB  w/ RWX. MIN A x2 for fxnl mob w/ RWX, VCs for sequencing, spacing w/ RWX and manuvering obstacles. Pt performed washing face and oral care w/ SET UP while standing supported at sink CGA at times for balance. VCs req for proprioceptive awareness d/t pt bumping head into mirror 2x. Pt impulsive at times and demos decreased safety awareness/ lack of insight into deficits. Skilled OT req to address deficits in generalized weakness, impaired cognition, decreased endurance, and impaired balance. Pt DC rec to rehab, specific setting pending progress. OT will continue to monitor.  -PP       Row Name 09/07/23 1154          Therapy Assessment/Plan (OT)    Rehab Potential (OT) fair, will monitor progress closely  -PP     Criteria for Skilled Therapeutic Interventions Met (OT) yes;skilled treatment is necessary  -PP     Therapy Frequency (OT) 5 times/wk  -PP       Row Name 09/07/23 1154          Therapy Plan Review/Discharge Plan (OT)    Anticipated Discharge Disposition (OT) inpatient rehabilitation facility;skilled nursing facility  -PP       Row Name 09/07/23 1154          Vital Signs    O2 Delivery Pre Treatment room air  -PP     O2 Delivery Intra Treatment room air  -PP     O2 Delivery Post Treatment room air  -PP     Pre Patient Position Supine  -PP     Intra Patient Position Standing  -PP     Post Patient Position Sitting  -PP       Row Name 09/07/23 1154          Positioning and Restraints    Pre-Treatment Position in bed  -PP     Post Treatment Position chair  -PP     In Chair reclined;call light within reach;encouraged to call for assist;exit alarm on;notified nsg  -PP               User Key  (r) = Recorded By, (t) = Taken By, (c) = Cosigned By      Initials Name Provider Type    PP Yessenia Rosales, OT Occupational Therapist                   Outcome Measures       Row Name 09/07/23 1213          How much help from another is currently needed...    Putting on and taking off regular lower body clothing? 3  -PP      Bathing (including washing, rinsing, and drying) 2  -PP     Toileting (which includes using toilet bed pan or urinal) 2  -PP     Putting on and taking off regular upper body clothing 3  -PP     Taking care of personal grooming (such as brushing teeth) 3  -PP     Eating meals 4  -PP     AM-PAC 6 Clicks Score (OT) 17  -PP       Row Name 09/07/23 0911          How much help from another person do you currently need...    Turning from your back to your side while in flat bed without using bedrails? 4  -PH     Moving from lying on back to sitting on the side of a flat bed without bedrails? 4  -PH     Moving to and from a bed to a chair (including a wheelchair)? 3  -PH     Standing up from a chair using your arms (e.g., wheelchair, bedside chair)? 3  -PH     Climbing 3-5 steps with a railing? 2  -PH     To walk in hospital room? 3  -PH     AM-PAC 6 Clicks Score (PT) 19  -PH     Highest level of mobility 6 --> Walked 10 steps or more  -PH       Row Name 09/07/23 1213          Modified [place] Scale    Modified [place] Scale 4 - Moderately severe disability.  Unable to walk without assistance, and unable to attend to own bodily needs without assistance.  -PP       Row Name 09/07/23 1213 09/07/23 0911       Functional Assessment    Outcome Measure Options AM-PAC 6 Clicks Daily Activity (OT)  -PP AM-PAC 6 Clicks Basic Mobility (PT)  -PH              User Key  (r) = Recorded By, (t) = Taken By, (c) = Cosigned By      Initials Name Provider Type    Cheryl Cannon PTA Physical Therapist Assistant    PP Yessenia Rosales, OT Occupational Therapist                      OT Recommendation and Plan  Planned Therapy Interventions (OT): activity tolerance training, BADL retraining, functional balance retraining, ROM/therapeutic exercise, transfer/mobility retraining, neuromuscular control/coordination retraining  Therapy Frequency (OT): 5 times/wk  Plan of Care Review  Plan of Care Reviewed With: patient  Progress: no  change  Outcome Evaluation: Pt is a 58 y/o male admitted to [facility] for Sepsis, YUDITH; severe encephalopathy req endotracheal intubation 8/24-8/26 and ventricular peritoneal shunt revision 8/29 after being found down at home. Pt (I) at baseline w/ ADLs and mob and lives alone. Pt was DEP for foot wrap by PT to maintain neutral position in preparation for xfers and ADLs. Pt presents w/ L side deficits reported by pt as newly occured. Pt MOD I for bed mob but re increased time. Pt CGA for STS from EOB w/ RWX. MIN A x2 for fxnl mob w/ RWX, VCs for sequencing, spacing w/ RWX and manuvering obstacles. Pt performed washing face and oral care w/ SET UP while standing supported at sink CGA at times for balance. VCs req for proprioceptive awareness d/t pt bumping head into mirror 2x. Pt impulsive at times and demos decreased safety awareness/ lack of insight into deficits. Skilled OT req to address deficits in generalized weakness, impaired cognition, decreased endurance, and impaired balance. Pt DC rec to rehab, specific setting pending progress. OT will continue to monitor.     Time Calculation:   Evaluation Complexity (OT)  Review Occupational Profile/Medical/Therapy History Complexity: expanded/moderate complexity  Assessment, Occupational Performance/Identification of Deficit Complexity: 3-5 performance deficits  Clinical Decision Making Complexity (OT): detailed assessment/moderate complexity  Overall Complexity of Evaluation (OT): moderate complexity     Time Calculation- OT       Row Name 09/07/23 1215             Time Calculation- OT    OT Start Time 0829  -PP      OT Stop Time 0848  -PP      OT Time Calculation (min) 19 min  -PP      Total Timed Code Minutes- OT 10 minute(s)  -PP         Timed Charges    14200 - OT Self Care/Mgmt Minutes 10  -PP         Untimed Charges    OT Eval/Re-eval Minutes 9  -PP         Total Minutes    Timed Charges Total Minutes 10  -PP      Untimed Charges Total Minutes 9  -PP       Total  Minutes 19  -PP                User Key  (r) = Recorded By, (t) = Taken By, (c) = Cosigned By      Initials Name Provider Type    PP Yessenia Rosales, OT Occupational Therapist                  Therapy Charges for Today       Code Description Service Date Service Provider Modifiers Qty    [id number]  OT SELF CARE/MGMT/TRAIN EA 15 MIN 9/7/2023 Yessenia Rosales OT GO 1    [id number]  OT EVAL MOD COMPLEXITY 2 9/7/2023 Yessenia Rosales OT GO 1                 Yessenia Rosales OT  9/7/2023

## 2023-09-07 NOTE — PAYOR COMM NOTE
"David Wolfe (57 y.o. Male)     PLEASE SEE ATTACHED FOR CONTINUED INPT STAY DAYS    REF #   [id number]     PLEASE CALL VILMA PICHARDO RN/ DEPT @ [phone]   OR [phone]    THANK YOU  VILMA PICHARDO RN  [facility]        Date of Birth   1965    Social Security Number       Address   [address] [place] [address]    Home Phone   [phone]    MRN   [id number]       Mormon   Mormonism    Marital Status                               Admission Date   8/24/23    Admission Type   Emergency    Admitting Provider   Meek Castillo MD    Attending Provider   Darron Fletcher MD    Department, Room/Bed   [facility], E568/1       Discharge Date       Discharge Disposition       Discharge Destination                                 Attending Provider: Darron Fletcher MD    Allergies: No Known Allergies    Isolation: None   Infection: None   Code Status: CPR    Ht: 177.8 cm (70\")   Wt: 76.9 kg (169 lb 8.5 oz)    Admission Cmt: None   Principal Problem: Sepsis [A41.9]                   Active Insurance as of 8/24/2023       Primary Coverage       Payor Plan Insurance Group Employer/Plan Group    ANTHEM BLUE CROSS ANTHEM BLUE CROSS BLUE SHIELD PPO [id number]       Payor Plan Address Payor Plan Phone Number Payor Plan Fax Number Effective Dates    [address] [phone]  1/1/2023 - None Entered    [place] [address]         Subscriber Name Subscriber Birth Date Member ID       DAVID WOLFE 1965 [id number]                     Emergency Contacts        (Rel.) Home Phone Work Phone Mobile Phone    Yamilet Wolfe (Daughter) -- -- [phone]    Aby Wolfe (Mother) [phone] -- [phone]    Faizan Wolfe (Father) [phone] -- [phone]    Julia Wolfe (Sister) [phone] -- --              [place]: [facility] [id number]  Tax ID [id number]     Physician Progress Notes (last 72 " hours)        Bentley Lovell MD at 23 1334              Name: Braxton Wolfe ADMIT: 2023   : 1965  PCP: Provider, No Known    MRN: [id number] LOS: 13 days   AGE/SEX: 57 y.o. male  ROOM: [facility]     Subjective   Subjective   No new events overnight.  Laying in bed.  Alert, oriented x3, denies complaints.  Tolerating tube feeds.      Review of Systems  As above  Objective   Objective   Vital Signs  Temp:  [98.3 °F (36.8 °C)-99.1 °F (37.3 °C)] 98.5 °F (36.9 °C)  Heart Rate:  [68-80] 80  Resp:  [16-18] 17  BP: (102-122)/(76-86) 102/76  SpO2:  [92 %-97 %] 97 %  on   ;   Device (Oxygen Therapy): room air  Body mass index is 24.71 kg/m².  Physical Exam    General: Alert, laying in bed, not in distress,  HEENT: Normocephalic, right scalp incision  clean, intact, no signs of erythema/infection  CV: Regular rate and rhythm, no murmurs rubs or gallops  Lungs: CTA, no wheezing  Abdomen: Soft, nontender, nondistended  Extremities: No significant peripheral edema     Results Review     I reviewed the patient's new clinical results.  Results from last 7 days   Lab Units 23  0554 23  0604 23  0535 23  0708   WBC 10*3/mm3 8.42 7.27 7.67 8.15   HEMOGLOBIN g/dL 11.4* 12.9* 12.8* 12.4*   PLATELETS 10*3/mm3 305 303 313 314       Results from last 7 days   Lab Units 23  0554 23  0604 23  0535 23  0708   SODIUM mmol/L 140 141 141 140   POTASSIUM mmol/L 3.7 3.9 3.9 4.1   CHLORIDE mmol/L 104 104 106 104   CO2 mmol/L 29.0 25.1 26.4 28.8   BUN mg/dL 13 13 14 13   CREATININE mg/dL 0.69* 0.82 0.89 0.83   GLUCOSE mg/dL 129* 67 78 102*     Estimated Creatinine Clearance: 130.5 mL/min (A) (by C-G formula based on SCr of 0.69 mg/dL (L)).  Results from last 7 days   Lab Units 23  0554 23  0604 23  0535 23  0708 23  0549 23  0544   ALBUMIN g/dL 3.0* 3.3* 3.3* 3.3*   < > 2.7*   BILIRUBIN mg/dL  --   --   --   --   --  0.6   ALK PHOS U/L  --    --   --   --   --  80   AST (SGOT) U/L  --   --   --   --   --  27   ALT (SGPT) U/L  --   --   --   --   --  26    < > = values in this interval not displayed.       Results from last 7 days   Lab Units 09/06/23  0554 09/05/23  0604 09/04/23  0535 09/03/23  0708   CALCIUM mg/dL 8.5* 8.6 8.6 8.7   ALBUMIN g/dL 3.0* 3.3* 3.3* 3.3*   MAGNESIUM mg/dL 2.2 2.0 2.1 2.2   PHOSPHORUS mg/dL 3.0 2.7 3.3 3.2           No results found for: COVID19  Glucose   Date/Time Value Ref Range Status   09/06/2023 1123 121 70 - 130 mg/dL Final   09/06/2023 0619 145 (H) 70 - 130 mg/dL Final   09/05/2023 0558 78 70 - 130 mg/dL Final   09/04/2023 2124 75 70 - 130 mg/dL Final   09/04/2023 1637 78 70 - 130 mg/dL Final   09/04/2023 0602 80 70 - 130 mg/dL Final   09/04/2023 0003 84 70 - 130 mg/dL Final           XR Abdomen KUB  Clinical: Core check catheter placement     COMPARISON 8/28/2023     FINDINGS: Cortright catheter tip is located within the duodenum, at the  junction of the second and third portions. It has been advanced since  the prior examination. There is contrast demonstrated now within the  colon. The remainder is unremarkable.     This report was finalized on 8/31/2023 2:47 PM by Dr. Tyrel Chavez M.D.     CT Head Without Contrast  Narrative: CT SCAN OF THE HEAD WITHOUT CONTRAST 08/30/2023     CLINICAL HISTORY: Status post revision of  shunt yesterday 08/29/2023.  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  Patient  has a history of Von Hippel-Lindau.      TECHNIQUE: Spiral CT images were obtained from the base of the skull to  the vertex without intravenous contrast. Images were reformatted and  submitted in 1 mm thick axial, sagittal and coronal CT sections with  brain algorithm.      This is correlated to prior head CTs yesterday 08/29/2023 and head CT  08/24/2023 and a prior MRI of the brain on 08/28/2023 and outside MRIs  of the  brain on 04/21/2022 and 01/14/2022.     FINDINGS: The patient has had a large 5 x 7.5 cm midline occipital left  occipital craniectomy.  The craniectomy is covered by metallic mesh and  resection of the posterior midline 1.8 cm segment of the posterior ring  of C1, and by history this occurred back on 04/26/2019 for resection of  cerebellar hemangioblastoma. There is extensive encephalomalacia  involving the majority of the left cerebellar hemisphere and some  loculated CSF lateral to the left cerebellum better demonstrated on  recent MRI of the brain on 08/25/2023. There is also encephalomalacia  throughout the majority of the right cerebellum. There is a markedly  enlarged fourth ventricle measuring 4.1 x 3.4 cm in medial lateral and  anterior posterior dimension. There is tenting of the cervicomedullary  junction posteriorly. Recent MRI of the brain on 08/28/2023 demonstrated  multiple small enhancing cerebellar nodules compatible with multiple  hemangioblastomas that are not able to be appreciated on this  noncontrast head CT. There is a ventriculoperitoneal shunt catheter in  place that courses through the superior right coronal suture through the  superior right frontal lobe parenchyma enters the superior body of the  right lateral ventricle and its distal tip is at the level of the right  foramen of Monro.  The lateral and third ventricles are mildly enlarged.   The temporal horns of the lateral ventricles in particular are large in  size. The lateral and third ventricles have clearly enlarged when  compared to an outside MRI of the brain on 04/21/2021. The lateral and  third ventricles have slightly decreased in size when compared to head  CT 08/24/2023, unchanged in size when compared to yesterday's head CT  08/29/2023. There is confluent rind of low density in the right frontal  white matter along the margins of the ventriculoperitoneal shunt  catheter with rind of low-density tracking 3 x 3.3 cm, may be  white  matter encephalomalacia, while there could be some backtracking of CSF.  Since the head CT on 08/24/2023 there is resolving low-density in the  temporal occipital periventricular white matter compatible with  resolving transependymal extension of CSF. A tiny tubular tract through  the lateral right parietal bone where there was likely previous  placement and removal of a ventriculostomy catheter in the remote past.  I see no midline shift. No acute intracranial hemorrhage is identified.  There are bubbles of air along the port for the ventriculoperitoneal  shunt catheter within the scalp overlying the anterolateral right  parietal bone from recent shunt revision procedure, and the bubbles of  air are new when compared to yesterday afternoon's head CT 08/29/2023 at  4:39 p.m.     Impression: 1. Since yesterday afternoon's head CT on 08/29/2023 at 4:39 p.m., there  has been shunt revision with bubbles of air in the scalp adjacent to the  shunt port overlying the anterior superior lateral right parietal bone.  There is stable position of the  shunt catheter that courses from the  superior right coronal suture through the superior right frontal lobe  parenchyma through the superior body of the right lateral ventricle and  its distal tip is at the right foramen of Monro. The lateral and third  ventricles remain enlarged. They are clearly larger than they were on an  outside MRI of the brain on 04/21/2022. However they are slightly  smaller than they were on a head CT 6 days ago on 08/24/2023. Since the  head CT on 08/24/2023, six days ago, there is resolving low-density in  the temporal occipital periventricular white matter compatible with  resolving transependymal extension of CSF.     2. Back on 04/26/2019 the patient had a large 7.5 x 5 cm midline  occipital to left occipital craniectomy and it is covered by metallic  mesh and by history resection of cerebellar hemangioblastoma in this  patient with history  of Von Hippel-Lindau. There is extensive  encephalomalacia involving the majority of the left cerebellum and also  extensive encephalomalacia throughout good portions of the right  cerebellar hemisphere. MRI of the brain 2 days ago on 08/28/2023  demonstrated multiple tiny enhancing cerebellar nodules compatible with  multiple hemangioblastomas that are unable to be evaluated on this  noncontrast head CT. There is marked dilatation of the fourth ventricle.   Some of it is ex vacuo dilatation due to extensive cerebellar  encephalomalacia although there is posterior tenting of the  cervicomedullary junction and a small size superior aspect of the  aqueduct of Sylvius and the marked dilatation of the fourth ventricle  may be secondary to encystment of the fourth ventricle. The remainder of  the head CT is unremarkable. The results were communicated to Roberto Ritchie MD, from neurosurgery by telephone on 08/30/2023 at 9:50 AM.     Radiation dose reduction techniques were utilized, including automated  exposure control and exposure modulation based on body size.        This report was finalized on 8/31/2023 5:56 AM by Dr. Shahram Quintero M.D.       Scheduled Medications  aspirin, 81 mg, Nasogastric, Daily  atorvastatin, 40 mg, Oral, Nightly  docusate sodium, 100 mg, Oral, BID  folic acid (FOLVITE) IVPB, 1 mg, Intravenous, Daily  mupirocin, 1 application , Topical, Q12H  pantoprazole, 40 mg, Intravenous, Q12H  polyethylene glycol, 17 g, Nasogastric, Daily  sodium chloride, 10 mL, Intravenous, Q12H  thiamine, 100 mg, Oral, Daily  vitamin D, 50,000 Units, Nasogastric, Q7 Days    Infusions  lactated ringers, 9 mL/hr, Last Rate: 9 mL/hr (08/29/23 1818)  lactated ringers, 75 mL/hr, Last Rate: 75 mL/hr (09/05/23 0619)    Diet  NPO Diet NPO Type: Strict NPO    I have personally reviewed     [x]  Laboratory   []  Microbiology   []  Radiology   []  EKG/Telemetry  []  Cardiology/Vascular   []  Pathology    []   Records      Assessment/Plan     Active Hospital Problems    Diagnosis  POA    **Sepsis [A41.9]  Yes    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 Ritchie valve set to 110 [Z98.2]  Not Applicable    Shunt malfunction [T85.618A]  Yes    Cerebral ventriculomegaly [G93.89]  Yes    Lethargy [R53.83]  Yes    Confusion [R41.0]  Yes    Obstructive hydrocephalus [G91.1]  Unknown      Resolved Hospital Problems   No resolved problems to display.           Patient has a history of von Hippel-Lindau syndrome and is on tumor suppressive medication  welireg, CNS hemangioblastomas, craniotomy status post  shunt placement, baseline hemiparesis, being brought to the ED on 08/24/2023 after found down by family.    Acute encephalopathy;  Suspected hypoxemia need encephalopathy  -Status post shunt revision 8/29, by neurosurgery:Patient does not require follow-up shunt xray for setting confirmation. Follow-up as scheduled. Office will contact patient if follow-up required sooner due to xray findings. Patient to call for any concerns including but not limited to headaches, vision changes, balance difficulties, incontinence of bladder, weakness.    Improved, alert, x3 but forgetful, requiring frequent reorientation  -Patient did have stroke on MRI, however unclear if medication Welireg contributed to presenting symptoms.  One of the side effects of Welireg is hypoxia.  Patient has been on medication for 2 years, previously was on 120 mg daily however due to fogginess dose was decreased to 40 mg daily.  -Awaiting call from MD Reynoso to discuss current presentation and whether medication should be continued going forward.        Acute to subacute infarct:   MRI 08/28/2023 showed new punctate focus of diffusion restriction/FLAIR hyperintensity inthe right paracentral lobule most suggestive of acute/subacute infarct.  Neurology evaluated,  appreciate recs.  Patient initiated on aspirin 81 mg daily, atorvastatin 40 mg daily, Continue      Von Hippel-Lindau syndrome with craniotomy   - residual left-sided weakness from this   -Patient is on Welireg for VHL to slow down tumor growth.  Patient has been off the medication since admission.  Starting dose is 120 but the patient has been intolerant of it in the past and is down to 40 mg.  Contacted MD Edgardo Maxwell, contact #[phone] extension [phone].  Left name and my cell phone number and awaiting call to discuss current presentation and whether this medication should be resumed.  Discussed with Daughter Yamilet, explained that I do do not have the expertise and have not had experience with much  Welireg previously to give recommendations whether medication should be continued going forward or not without discussing with prescribing physician, and  did explain that I will discuss with Dr. Maxwell about current presentation and whether medication side effect could be related to presented symptoms.,          S/p prior  shunt and required revision-neurosurgery signed off, recommend neurosurgery follow-up on  9/11/2023 with CT head imaging            Acute hypercapnic respiratory failure s/p vent till 8/25  -Patient is currently on room air, remained stable     B/L Aspiration pneumonia  -Possibly acute on chronic aspiration  -Status pos IV ceftriaxone x4 days.       Dysphagia/vomiting;  Status post PEG tube placement 09/04//23, initiated on diet 09/05/2023, tolerating tube feeds.  Need to follow-up outpatient with general surgery.  Expected               YUDITH: Resolved with IV fluids, monitor.      Hypokalemia/hypophosphatemia: Improved.  Monitor.       Incidental lung nodules- seen on CT 08/24/2023, CT follow-up in 3 months is  recommended.  Discussed with daughter YanethYamilet , 09/06/2023, will need repeat CT scan download around end of November.          Mild coagulopathy, possibly related to  nutrition, INR normalized.       Discussed with RN  Discussed with daughter  Discussed with mother  Discussed with MD Reynoso clinical staff, spent at least 25 minutes on the phone.  Awaiting call from Dr. Maxwell          SCDs for DVT prophylaxis.  Full code.  Discussed with patient and nursing staff.  Anticipate discharge SNS, versus home health, stable to be discharged to rehab.    Copied text in this note has been reviewed and is accurate as of 09/06/23.         Dictated utilizing Dragon dictation        Bentley Lovell MD  [facility]  09/06/23  13:34 EDT        Electronically signed by Bentley Lovell MD at 09/07/23 0956       Yoshi Sahu MD at 09/06/23 1047          Postop day 2 status post PEG tube placement    S: No issues, tolerating tube feeds, no abdominal pain    O:   Vitals:    09/05/23 2300 09/06/23 0618 09/06/23 0738 09/06/23 1037   BP: 110/79  122/86 102/76   BP Location: Right arm  Right arm Right arm   Patient Position: Lying  Lying Lying   Pulse: 80      Resp: 18  18 16   Temp:   98.3 °F (36.8 °C) 98.5 °F (36.9 °C)   TempSrc:   Oral Oral   SpO2: 97%      Weight:  78.1 kg (172 lb 2.9 oz)     Height:          Alert, no acute distress  Abdomen soft, nontender nontender, PEG tube in place with tube feeds running    Assessment and plan    Postoperative day 2 status post PEG tube placement.  Tolerating tube feeds without any problem at the goal.    Patient to follow-up in my office whenever ready to have the PEG tube removed.  He will need to wait at least 2 months.  We will sign off, call with questions    Electronically signed by Yoshi Sahu MD at 09/06/23 1048       Yoshi Sahu MD at 09/05/23 0904          Cc: Dysphagia    S: Postoperative day 1 status post PEG tube placement.  Doing fine, no complaints, minimal abdominal discomfort.    O:   Vitals:    09/05/23 0011 09/05/23 0339 09/05/23 0400 09/05/23 0700   BP: 102/79    118/77   BP Location: Left arm   Right arm   Patient Position: Lying   Lying   Pulse: 83  68 68   Resp: 18   18   Temp: 98.6 °F (37 °C)   98.5 °F (36.9 °C)   TempSrc: Oral   Oral   SpO2: 98%  99% 95%   Weight:  77.8 kg (171 lb 8.3 oz)     Height:          Alert, no acute distress  Breathing comfortable  Abdomen soft, nontender distended, PEG tube in place    White blood cell count 7.2, hemoglobin 12.9    Assessment and plan    Postoperative day 1 status post PEG tube placement    -Can use for water and meds immediately  -Start tube feeds today.  Start feeds at 20 cc/h and advance by 20 cc every 4 hours to goal.  -Nutrition consultation for tube feeds recommendations  -Keep abdominal binder in place     Yoshi Sahu MD, FACS  General, Minimally Invasive and Endoscopic Surgery  [facility]    [address], [address]  [place], [place], [address]  P: [phone]  F: [phone]            Electronically signed by Yoshi Sahu MD at 23 09       Bentley Lovell MD at 23 0848              Name: Braxton Wolfe ADMIT: 2023   : 1965  PCP: Provider, No Known    MRN: [id number] LOS: 12 days   AGE/SEX: 57 y.o. male  ROOM: [facility]     Subjective   Subjective   No acute events overnight.  Status post PEG tube placement yesterday.  Laying in bed.  Complaints.    Review of Systems  As above  Objective   Objective   Vital Signs  Temp:  [97.8 °F (36.6 °C)-98.7 °F (37.1 °C)] 98.5 °F (36.9 °C)  Heart Rate:  [66-87] 68  Resp:  [15-20] 18  BP: ()/(62-91) 118/77  SpO2:  [91 %-100 %] 95 %  on  Flow (L/min):  [0-4] 0;   Device (Oxygen Therapy): room air  Body mass index is 24.61 kg/m².  Physical Exam    General: Alert, laying in bed, not in distress,  HEENT: Normocephalic, right scalp incision  clean, intact, no signs of erythema/infection  CV: Regular rate and rhythm, no murmurs rubs or gallops  Lungs: CTA, no wheezing  Abdomen: Soft, nontender,  nondistended  Extremities: No significant peripheral edema     Results Review     I reviewed the patient's new clinical results.  Results from last 7 days   Lab Units 09/05/23 0604 09/04/23 0535 09/03/23  0708 09/02/23  0559   WBC 10*3/mm3 7.27 7.67 8.15 7.90   HEMOGLOBIN g/dL 12.9* 12.8* 12.4* 11.9*   PLATELETS 10*3/mm3 303 313 314 288     Results from last 7 days   Lab Units 09/05/23 0604 09/04/23  0535 09/03/23  0708 09/02/23  0559   SODIUM mmol/L 141 141 140 143   POTASSIUM mmol/L 3.9 3.9 4.1 4.1   CHLORIDE mmol/L 104 106 104 108*   CO2 mmol/L 25.1 26.4 28.8 30.5*   BUN mg/dL 13 14 13 11   CREATININE mg/dL 0.82 0.89 0.83 0.85   GLUCOSE mg/dL 67 78 102* 137*   Estimated Creatinine Clearance: 109.4 mL/min (by C-G formula based on SCr of 0.82 mg/dL).  Results from last 7 days   Lab Units 09/05/23 0604 09/04/23 0535 09/03/23  0708 09/02/23  0559 09/01/23  0549 08/31/23  0544   ALBUMIN g/dL 3.3* 3.3* 3.3* 3.2*   < > 2.7*   BILIRUBIN mg/dL  --   --   --   --   --  0.6   ALK PHOS U/L  --   --   --   --   --  80   AST (SGOT) U/L  --   --   --   --   --  27   ALT (SGPT) U/L  --   --   --   --   --  26    < > = values in this interval not displayed.     Results from last 7 days   Lab Units 09/05/23 0604 09/04/23 0535 09/03/23 0708 09/02/23  0559   CALCIUM mg/dL 8.6 8.6 8.7 8.7   ALBUMIN g/dL 3.3* 3.3* 3.3* 3.2*   MAGNESIUM mg/dL 2.0 2.1 2.2 2.1   PHOSPHORUS mg/dL 2.7 3.3 3.2 2.8         No results found for: COVID19  Glucose   Date/Time Value Ref Range Status   09/05/2023 0558 78 70 - 130 mg/dL Final   09/04/2023 2124 75 70 - 130 mg/dL Final   09/04/2023 1637 78 70 - 130 mg/dL Final   09/04/2023 0602 80 70 - 130 mg/dL Final   09/04/2023 0003 84 70 - 130 mg/dL Final   09/03/2023 0637 100 70 - 130 mg/dL Final   09/02/2023 1805 112 70 - 130 mg/dL Final           XR Abdomen KUB  Clinical: Core check catheter placement     COMPARISON 8/28/2023     FINDINGS: Cortright catheter tip is located within the duodenum, at  the  junction of the second and third portions. It has been advanced since  the prior examination. There is contrast demonstrated now within the  colon. The remainder is unremarkable.     This report was finalized on 8/31/2023 2:47 PM by Dr. Tyrel Chavez M.D.     CT Head Without Contrast  Narrative: CT SCAN OF THE HEAD WITHOUT CONTRAST 08/30/2023     CLINICAL HISTORY: Status post revision of  shunt yesterday 08/29/2023.  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  Patient  has a history of Von Hippel-Lindau.      TECHNIQUE: Spiral CT images were obtained from the base of the skull to  the vertex without intravenous contrast. Images were reformatted and  submitted in 1 mm thick axial, sagittal and coronal CT sections with  brain algorithm.      This is correlated to prior head CTs yesterday 08/29/2023 and head CT  08/24/2023 and a prior MRI of the brain on 08/28/2023 and outside MRIs  of the brain on 04/21/2022 and 01/14/2022.     FINDINGS: The patient has had a large 5 x 7.5 cm midline occipital left  occipital craniectomy.  The craniectomy is covered by metallic mesh and  resection of the posterior midline 1.8 cm segment of the posterior ring  of C1, and by history this occurred back on 04/26/2019 for resection of  cerebellar hemangioblastoma. There is extensive encephalomalacia  involving the majority of the left cerebellar hemisphere and some  loculated CSF lateral to the left cerebellum better demonstrated on  recent MRI of the brain on 08/25/2023. There is also encephalomalacia  throughout the majority of the right cerebellum. There is a markedly  enlarged fourth ventricle measuring 4.1 x 3.4 cm in medial lateral and  anterior posterior dimension. There is tenting of the cervicomedullary  junction posteriorly. Recent MRI of the brain on 08/28/2023 demonstrated  multiple small enhancing cerebellar nodules compatible  with multiple  hemangioblastomas that are not able to be appreciated on this  noncontrast head CT. There is a ventriculoperitoneal shunt catheter in  place that courses through the superior right coronal suture through the  superior right frontal lobe parenchyma enters the superior body of the  right lateral ventricle and its distal tip is at the level of the right  foramen of Monro.  The lateral and third ventricles are mildly enlarged.   The temporal horns of the lateral ventricles in particular are large in  size. The lateral and third ventricles have clearly enlarged when  compared to an outside MRI of the brain on 04/21/2021. The lateral and  third ventricles have slightly decreased in size when compared to head  CT 08/24/2023, unchanged in size when compared to yesterday's head CT  08/29/2023. There is confluent rind of low density in the right frontal  white matter along the margins of the ventriculoperitoneal shunt  catheter with rind of low-density tracking 3 x 3.3 cm, may be white  matter encephalomalacia, while there could be some backtracking of CSF.  Since the head CT on 08/24/2023 there is resolving low-density in the  temporal occipital periventricular white matter compatible with  resolving transependymal extension of CSF. A tiny tubular tract through  the lateral right parietal bone where there was likely previous  placement and removal of a ventriculostomy catheter in the remote past.  I see no midline shift. No acute intracranial hemorrhage is identified.  There are bubbles of air along the port for the ventriculoperitoneal  shunt catheter within the scalp overlying the anterolateral right  parietal bone from recent shunt revision procedure, and the bubbles of  air are new when compared to yesterday afternoon's head CT 08/29/2023 at  4:39 p.m.     Impression: 1. Since yesterday afternoon's head CT on 08/29/2023 at 4:39 p.m., there  has been shunt revision with bubbles of air in the scalp adjacent  to the  shunt port overlying the anterior superior lateral right parietal bone.  There is stable position of the  shunt catheter that courses from the  superior right coronal suture through the superior right frontal lobe  parenchyma through the superior body of the right lateral ventricle and  its distal tip is at the right foramen of Monro. The lateral and third  ventricles remain enlarged. They are clearly larger than they were on an  outside MRI of the brain on 04/21/2022. However they are slightly  smaller than they were on a head CT 6 days ago on 08/24/2023. Since the  head CT on 08/24/2023, six days ago, there is resolving low-density in  the temporal occipital periventricular white matter compatible with  resolving transependymal extension of CSF.     2. 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 There is extensive  encephalomalacia involving the majority of the left cerebellum and also  extensive encephalomalacia throughout good portions of the right  cerebellar hemisphere. MRI of the brain 2 days ago on 08/28/2023  demonstrated multiple tiny enhancing cerebellar nodules compatible with  multiple hemangioblastomas that are unable to be evaluated on this  noncontrast head CT. There is marked dilatation of the fourth ventricle.   Some of it is ex vacuo dilatation due to extensive cerebellar  encephalomalacia although there is posterior tenting of the  cervicomedullary junction and a small size superior aspect of the  aqueduct of Sylvius and the marked dilatation of the fourth ventricle  may be secondary to encystment of the fourth ventricle. The remainder of  the head CT is unremarkable. The results were communicated to Roberto Ritchie MD, from neurosurgery by telephone on 08/30/2023 at 9:50 AM.     Radiation  dose reduction techniques were utilized, including automated  exposure control and exposure modulation based on body size.        This report was finalized on 8/31/2023 5:56 AM by Dr. Shahram Quintero M.D.       Scheduled Medications  aspirin, 81 mg, Nasogastric, Daily  atorvastatin, 40 mg, Oral, Nightly  docusate sodium, 100 mg, Oral, BID  folic acid (FOLVITE) IVPB, 1 mg, Intravenous, Daily  mupirocin, 1 application , Topical, Q12H  pantoprazole, 40 mg, Intravenous, Q12H  polyethylene glycol, 17 g, Nasogastric, Daily  sodium chloride, 10 mL, Intravenous, Q12H  thiamine, 100 mg, Oral, Daily  vitamin D, 50,000 Units, Nasogastric, Q7 Days    Infusions  lactated ringers, 9 mL/hr, Last Rate: 9 mL/hr (08/29/23 1818)  lactated ringers, 75 mL/hr, Last Rate: 75 mL/hr (09/05/23 0619)    Diet  NPO Diet NPO Type: Strict NPO    I have personally reviewed     [x]  Laboratory   []  Microbiology   []  Radiology   []  EKG/Telemetry  []  Cardiology/Vascular   []  Pathology    []  Records      Assessment/Plan     Active Hospital Problems    Diagnosis  POA    **Sepsis [A41.9]  Yes    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 Ritchie valve set to 110 [Z98.2]  Not Applicable    Shunt malfunction [T85.618A]  Yes    Cerebral ventriculomegaly [G93.89]  Yes    Lethargy [R53.83]  Yes    Confusion [R41.0]  Yes    Obstructive hydrocephalus [G91.1]  Unknown      Resolved Hospital Problems   No resolved problems to display.           Patient has a history of von Hippel-Lindau syndrome and is on tumor suppressive medication  welireg, CNS hemangioblastomas, craniotomy status post  shunt placement, baseline hemiparesis, being brought to the ED on 08/24/2023 after found down by family.    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  Follow-up as scheduled. Office will contact patient if follow-up required sooner due to xray findings. Patient to call for any concerns including but not limited to headaches, vision changes, balance difficulties, incontinence of bladder, weakness.    Improved, alert, x3 but forgetful, requiring frequent reorientation      Acute to subacute infarct:   MRI 08/28/2023 showed new punctate focus of diffusion restriction/FLAIR hyperintensity inthe right paracentral lobule most suggestive of acute/subacute infarct.  Neurology evaluated, appreciate recs.  Patient initiated on aspirin 81 mg daily, atorvastatin 40 mg daily, Continue         Acute hypercapnic respiratory failure s/p vent till 8/25  -Patient is currently on room air, remained stable     B/L Aspiration pneumonia  -Possibly acute on chronic aspiration  -Status pos IV ceftriaxone x4 days.        Dysphagia/vomiting;  Status post PEG tube placement 09/04//23, to be initiated on tube feeds today.           YUDITH: Resolved with IV fluids, monitor.      Hypokalemia/hypophosphatemia: Improved.  Monitor.       Incidental lung nodules- seen on CT 08/24/2023, CT follow-up in 3 months is  recommended.     S/p prior  shunt and required revision-neurosurgery signed off, recommend neurosurgery follow-up on  9/11/2023 with CT head imaging      VHL with craniotomy   - residual left-sided weakness from this   -Patient is on Welireg for VHL to slow down tumor growth.  He has been off of this medication now for a week.  He will need to follow-up with MD Reynoso about when to restart side effects from this medication are hypoxemia and anemia.  Starting dose is 120 but the patient has been intolerant of it in the past and is down to 40 mg.     Mild coagulopathy, possibly related to nutrition, monitor, INR normalized.           SCDs for DVT prophylaxis.  Full code.  Discussed with patient and nursing staff.  Anticipate discharge SNS, versus home health,   Likely stable to be  discharged in 1-2 days if tolerated tube feeds      Copied text in this note has been reviewed and is accurate as of 09/05/23.         Dictated utilizing Dragon dictation        Bentley Lovell MD  [facility]  09/05/23  08:52 EDT        Electronically signed by Bentley Lovell MD at 09/05/23 [id number]

## 2023-09-07 NOTE — PROGRESS NOTES
[facility]    Noted Lesa Barrios with precert. Left voice mail for CCP to confirm before we sign off. Will follow up 9/8.    Pamella Rios RN  Rehab Admissions Coordinator  [phone]

## 2023-09-07 NOTE — PROGRESS NOTES
Name: Braxton Wolfe ADMIT: 2023   : 1965  PCP: Provider, No Known    MRN: [id number] LOS: 14 days   AGE/SEX: 57 y.o. male  ROOM: [facility]     Subjective   Subjective   No events or complaints.       Objective   Objective   Vital Signs  Temp:  [97.1 °F (36.2 °C)-98.4 °F (36.9 °C)] 97.8 °F (36.6 °C)  Heart Rate:  [63-72] 63  Resp:  [18] 18  BP: ()/(69-74) 115/74  SpO2:  [95 %-97 %] 95 %  on   ;   Device (Oxygen Therapy): room air  Body mass index is 24.33 kg/m².  Physical Exam  Vitals reviewed.   Constitutional:       General: He is not in acute distress.     Appearance: He is not ill-appearing.   Cardiovascular:      Rate and Rhythm: Normal rate and regular rhythm.   Pulmonary:      Effort: No respiratory distress.      Breath sounds: Normal breath sounds.   Abdominal:      General: There is no distension.      Palpations: Abdomen is soft.      Tenderness: There is no abdominal tenderness.   Musculoskeletal:      Right lower leg: No edema.      Left lower leg: No edema.   Skin:     General: Skin is warm and dry.   Neurological:      Mental Status: He is alert and oriented to person, place, and time.     Results Review     I reviewed the patient's new clinical results.  Results from last 7 days   Lab Units 23  0739 23  0554 23  0604 23  0535   WBC 10*3/mm3 6.09 8.42 7.27 7.67   HEMOGLOBIN g/dL 12.2* 11.4* 12.9* 12.8*   PLATELETS 10*3/mm3 304 305 303 313     Results from last 7 days   Lab Units 23  0739 23  0554 23  0604 23  0535   SODIUM mmol/L 138 140 141 141   POTASSIUM mmol/L 4.1 3.7 3.9 3.9   CHLORIDE mmol/L 104 104 104 106   CO2 mmol/L 27.5 29.0 25.1 26.4   BUN mg/dL 13 13 13 14   CREATININE mg/dL 0.79 0.69* 0.82 0.89   GLUCOSE mg/dL 120* 129* 67 78   EGFR mL/min/1.73 103.6 107.9 102.5 100.0     Results from last 7 days   Lab Units 23  0554 23  0604 23  0535 23  0708   ALBUMIN g/dL 3.0* 3.3* 3.3* 3.3*     Results from  last 7 days   Lab Units 09/07/23  0739 09/06/23  0554 09/05/23  0604 09/04/23  0535 09/03/23  0708   CALCIUM mg/dL 8.7 8.5* 8.6 8.6 8.7   ALBUMIN g/dL  --  3.0* 3.3* 3.3* 3.3*   MAGNESIUM mg/dL  --  2.2 2.0 2.1 2.2   PHOSPHORUS mg/dL  --  3.0 2.7 3.3 3.2       Glucose   Date/Time Value Ref Range Status   09/07/2023 1148 117 70 - 130 mg/dL Final   09/07/2023 0612 115 70 - 130 mg/dL Final   09/06/2023 2358 124 70 - 130 mg/dL Final   09/06/2023 2104 125 70 - 130 mg/dL Final   09/06/2023 1841 112 70 - 130 mg/dL Final   09/06/2023 1123 121 70 - 130 mg/dL Final   09/06/2023 0619 145 (H) 70 - 130 mg/dL Final       No radiology results for the last day    I have personally reviewed all medications:  Scheduled Medications  aspirin, 81 mg, Nasogastric, Daily  atorvastatin, 40 mg, Oral, Nightly  docusate sodium, 100 mg, Oral, BID  folic acid (FOLVITE) IVPB, 1 mg, Intravenous, Daily  [START ON 9/8/2023] lansoprazole, 15 mg, Oral, Q AM  mupirocin, 1 application , Topical, Q12H  polyethylene glycol, 17 g, Nasogastric, Daily  sodium chloride, 10 mL, Intravenous, Q12H  thiamine, 100 mg, Oral, Daily  vitamin D, 50,000 Units, Nasogastric, Q7 Days    Infusions  lactated ringers, 9 mL/hr, Last Rate: 9 mL/hr (08/29/23 1818)  lactated ringers, 75 mL/hr, Last Rate: 75 mL/hr (09/05/23 0619)    Diet  NPO Diet NPO Type: Strict NPO    I have personally reviewed:  [x]  Laboratory   [x]  Microbiology   [x]  Radiology   []  EKG/Telemetry  []  Cardiology/Vascular   []  Pathology    []  Records       Assessment/Plan     Active Hospital Problems    Diagnosis  POA    **Sepsis [A41.9]  Yes    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 Ritchie valve set to 110 [Z98.2]  Not Applicable    Shunt malfunction [T85.618A]  Yes    Cerebral ventriculomegaly [G93.89]  Yes    Lethargy [R53.83]  Yes    Confusion [R41.0]  Yes    Obstructive hydrocephalus [G91.1]  Unknown       Resolved Hospital Problems   No resolved problems to display.       57 y.o. male with h/o VHL, craniotomy s/p VPS admitted after being found down, s/p shunt revision and eventual PEG placement    Reviewed chart, reasons for events of admission not completely clear, suspected to have hypoxemia related encephalopathy, also with small infarct  - ASA/statin per neuro  - Encephalopathy clearing  - d/w Dr. Maxwell, his physician at [facility], who states he has not seen patient in 1 year as he did not come for f/u. He felt unlikely the med was to blame for hypoxia given previous tolerance as this does not typically happen later in the course. He did recommend holding med until strength has recovered at least so likely will not resume until leaving rehab.    Resp failure and aspiration pneumonia s/p Rx. Unable to pass swallow so PEG tube placed and he is tolerating TFs at goal.    Repeat CT 3 months for pulm nodules.    SCDs  D/w CCP. Anticipate d/c tomorrow to SNF when transport available      Darron Fletcher MD  [facility]  09/07/23  17:53 EDT

## 2023-09-07 NOTE — PROGRESS NOTES
Pathology report from upper endoscopy reviewed by me    Final Diagnosis   1. Gastroesophageal Junction, Biopsy:                A. Acute erosive esophagitis with polarizable material (see comment).               B. No columnar mucosa is present for microscopic evaluation.     He will need to be on Protonix 40 mg daily for at least 2 months whenever he gets discharge home.  He will also need to follow-up in my office in 2 months for follow-up

## 2023-09-07 NOTE — THERAPY TREATMENT NOTE
Patient Name: Braxton Wolfe  : 1965    MRN: [id number]                              Today's Date: 2023       Admit Date: 2023    Visit Dx:     ICD-10-CM ICD-9-CM   1. Follow-up exam  Z09 V67.9   2. Sepsis with acute renal failure without septic shock, due to unspecified organism, unspecified acute renal failure type  A41.9 038.9    R65.20 995.92    N17.9 584.9   3. Acute respiratory failure, unspecified whether with hypoxia or hypercapnia  J96.00 518.81   4. Acute kidney injury  N17.9 584.9   5. Hypokalemia  E87.6 276.8   6. Hypernatremia  E87.0 276.0   7. Obstructive hydrocephalus  G91.1 331.4   8. Shunt malfunction  T85.618A 996.59   9. 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 Ritchie valve set to 110    Von Hippel-Lindau syndrome    Oropharyngeal dysphagia    Obstructive hydrocephalus     History reviewed. No pertinent past medical history.  Past Surgical History:   Procedure Laterality Date    BRAIN SURGERY      ENDOSCOPY W/ PEG TUBE PLACEMENT N/A 2023    Procedure: ESOPHAGOGASTRODUODENOSCOPY WITH PERCUTANEOUS ENDOSCOPIC GASTROSTOMY TUBE INSERTION;  Surgeon: Yoshi Sahu MD;  Location: [facility];  Service: General;  Laterality: N/A;     SHUNT INSERTION N/A 2023    Procedure: VENTRICULAR PERITONEAL SHUNT REVISION;  Surgeon: Roberto Ritchie MD;  Location: [facility] OR;  Service: Neurosurgery;  Laterality: N/A;      General Information       Row Name 23          Physical Therapy Time and Intention    Document Type therapy note (daily note)  -PH     Mode of Treatment physical therapy;occupational therapy;co-treatment  -PH       Row Name 23          General Information    Existing Precautions/Restrictions fall  -PH       Row Name  09/07/23 0902          Cognition    Orientation Status (Cognition) oriented x 3  -PH       Row Name 09/07/23 0902          Safety Issues, Functional Mobility    Impairments Affecting Function (Mobility) balance;endurance/activity tolerance;strength;cognition  -PH     Cognitive Impairments, Mobility Safety/Performance insight into deficits/self-awareness;judgment;problem-solving/reasoning;safety precaution awareness;impulsivity  -PH     Comment, Safety Issues/Impairments (Mobility) gt belt and non skid socks donned; L foot wrapped w/ ace bandage to maintain neutral positioning w/ L foot drop at BL  -PH               User Key  (r) = Recorded By, (t) = Taken By, (c) = Cosigned By      Initials Name Provider Type    PH Cheryl Noguera PTA Physical Therapist Assistant                   Mobility       Row Name 09/07/23 0903          Bed Mobility    Bed Mobility supine-sit  -PH     Supine-Sit [place] (Bed Mobility) modified independence  -PH     Assistive Device (Bed Mobility) bed rails  -PH     Comment, (Bed Mobility) Pt SBA at EOB for sit bal; L foot wrapped to maintain neutral positioning; pt req extra time to process and mildly impulsive during session  -PH       Row Name 09/07/23 0903          Sit-Stand Transfer    Sit-Stand [place] (Transfers) contact guard;verbal cues  -PH     Assistive Device (Sit-Stand Transfers) walker, front-wheeled  -PH       Row Name 09/07/23 0903          Gait/Stairs (Locomotion)    [place] Level (Gait) minimum assist (75% patient effort);verbal cues  -PH     Assistive Device (Gait) walker, front-wheeled  -PH     Distance in Feet (Gait) 55'' - 40' to sink then approx 15  -PH     Deviations/Abnormal Patterns (Gait) angeli decreased;gait speed decreased;stride length decreased  -PH     Bilateral Gait Deviations forward flexed posture;heel strike decreased  -PH     Left Sided Gait Deviations leans left;foot drop/toe drag  -PH     Comment, (Gait/Stairs) slow w/ incr in  unsteadiness today during amb; cues and assist to avoid obstacles; cues for slow, wide turns and for forward gaze w/ pt often looking at foot for placement. No overt LOB; pt limited in distance by fatigue  -PH               User Key  (r) = Recorded By, (t) = Taken By, (c) = Cosigned By      Initials Name Provider Type     Cheryl Noguera PTA Physical Therapist Assistant                   Obj/Interventions       Row Name 09/07/23 0907          Motor Skills    Therapeutic Exercise other (see comments)  LAQ, seated march; x 10 reps  -PH       Row Name 09/07/23 0907          Balance    Balance Assessment sitting static balance;standing static balance  -PH     Static Sitting Balance standby assist  -PH     Static Standing Balance contact guard  -PH     Position/Device Used, Standing Balance walker, front-wheeled  -PH               User Key  (r) = Recorded By, (t) = Taken By, (c) = Cosigned By      Initials Name Provider Type     Cheryl Noguera PTA Physical Therapist Assistant                   Goals/Plan    No documentation.                  Clinical Impression       Row Name 09/07/23 0907          Pain    Pretreatment Pain Rating 0/10 - no pain  -PH     Posttreatment Pain Rating 0/10 - no pain  -PH     Additional Documentation Pain Scale: Numbers Pre/Post-Treatment (Group)  -PH       Row Name 09/07/23 0907          Plan of Care Review    Plan of Care Reviewed With patient  -PH     Progress no change  -PH     Outcome Evaluation Pt seen by PT / OT this AM for co-tx. Pt's L foot wrapped in ace bandage to maintain neutral position w/ L foot drop noted. Pt req mod I for supine to sit. Pt req incr time to process throughout session. Pt stood req CGA and use of fww. Pt freq leaning L and unsteady  during amb which incr w/ distance. Pt cued and req mod to max A to avoid obstacles. Further cues for slow and wide turn. Pt amb 50' w/ min A and use fo fww. Pt UIC at end of session w/ alarm set. PT will prog as  pt adrienne.  -PH       Row Name 09/07/23 0907          Vital Signs    O2 Delivery Pre Treatment room air  -PH     O2 Delivery Intra Treatment room air  -PH     O2 Delivery Post Treatment room air  -PH       Row Name 09/07/23 0907          Positioning and Restraints    Pre-Treatment Position in bed  -PH     Post Treatment Position chair  -PH     In Chair reclined;call light within reach;encouraged to call for assist;exit alarm on;notified nsg  -PH               User Key  (r) = Recorded By, (t) = Taken By, (c) = Cosigned By      Initials Name Provider Type     Cheryl Noguera PTA Physical Therapist Assistant                   Outcome Measures       Row Name 09/07/23 0911          How much help from another person do you currently need...    Turning from your back to your side while in flat bed without using bedrails? 4  -PH     Moving from lying on back to sitting on the side of a flat bed without bedrails? 4  -PH     Moving to and from a bed to a chair (including a wheelchair)? 3  -PH     Standing up from a chair using your arms (e.g., wheelchair, bedside chair)? 3  -PH     Climbing 3-5 steps with a railing? 2  -PH     To walk in hospital room? 3  -PH     AM-PAC 6 Clicks Score (PT) 19  -PH     Highest level of mobility 6 --> Walked 10 steps or more  -PH       Row Name 09/07/23 0911          Functional Assessment    Outcome Measure Options AM-PAC 6 Clicks Basic Mobility (PT)  -PH               User Key  (r) = Recorded By, (t) = Taken By, (c) = Cosigned By      Initials Name Provider Type     Cheryl Noguera PTA Physical Therapist Assistant                                 Physical Therapy Education       Title: PT OT SLP Therapies (Done)       Topic: Physical Therapy (Done)       Point: Mobility training (Done)       Learning Progress Summary             Patient Acceptance, E,TB,D, VU,NR by  at 9/7/2023 0911    Acceptance, E,D,TB, DU,NR by PH at 9/6/2023 1037    Acceptance, E,TB,D, NR by  at  9/5/2023 1331    Acceptance, E,TB, VU,DU,NR by CS at 9/3/2023 1157    Acceptance, E,D, VU,NR by EB at 9/1/2023 1140    Acceptance, E,D, VU,NR by EB at 8/31/2023 1607    Acceptance, E,D, VU,NR by EB at 8/30/2023 1621    Acceptance, E,TB, NR by CB at 8/26/2023 1558                         Point: Home exercise program (Done)       Learning Progress Summary             Patient Acceptance, E,TB,D, VU,NR by PH at 9/7/2023 0911    Acceptance, E,D,TB, DU,NR by PH at 9/6/2023 1037    Acceptance, E,TB,D, NR by PH at 9/5/2023 1331    Acceptance, E,TB, VU,DU,NR by CS at 9/3/2023 1157    Acceptance, E,D, VU,NR by EB at 8/30/2023 1621                         Point: Body mechanics (Done)       Learning Progress Summary             Patient Acceptance, E,TB,D, VU,NR by PH at 9/7/2023 0911    Acceptance, E,D,TB, DU,NR by PH at 9/6/2023 1037    Acceptance, E,TB,D, NR by PH at 9/5/2023 1331    Acceptance, E,TB, VU,DU,NR by CS at 9/3/2023 1157    Acceptance, E,D, VU,NR by EB at 9/1/2023 1140    Acceptance, E,D, VU,NR by EB at 8/31/2023 1607    Acceptance, E,D, VU,NR by EB at 8/30/2023 1621    Acceptance, E,TB, NR by CB at 8/26/2023 1558                         Point: Precautions (Done)       Learning Progress Summary             Patient Acceptance, E,TB,D, VU,NR by PH at 9/7/2023 0911    Acceptance, E,D,TB, DU,NR by PH at 9/6/2023 1037    Acceptance, E,TB,D, NR by PH at 9/5/2023 1331    Acceptance, E,TB, VU,DU,NR by CS at 9/3/2023 1157    Acceptance, E,D, VU,NR by EB at 9/1/2023 1140    Acceptance, E,D, VU,NR by EB at 8/31/2023 1607    Acceptance, E,D, VU,NR by EB at 8/30/2023 1621    Acceptance, E,TB, NR by CB at 8/26/2023 1558                                         User Key       Initials Effective Dates Name Provider Type Discipline    EB 02/14/23 -  Ciera Carter PTA Physical Therapist Assistant PT    PH 06/16/21 -  Cheryl Noguera PTA Physical Therapist Assistant PT    CB 10/22/21 -  Luh Etienne, BRAD Physical Therapist  PT    CS 09/22/22 -  Tesha Bedolla, PT Physical Therapist PT                  PT Recommendation and Plan     Plan of Care Reviewed With: patient  Progress: no change  Outcome Evaluation: Pt seen by PT / OT this AM for co-tx. Pt's L foot wrapped in ace bandage to maintain neutral position w/ L foot drop noted. Pt req mod I for supine to sit. Pt req incr time to process throughout session. Pt stood req CGA and use of fww. Pt freq leaning L and unsteady  during amb which incr w/ distance. Pt cued and req mod to max A to avoid obstacles. Further cues for slow and wide turn. Pt amb 50' w/ min A and use fo fww. Pt UIC at end of session w/ alarm set. PT will prog as pt adrienne.     Time Calculation:         PT Charges       Row Name 09/07/23 0912             Time Calculation    Start Time 0829  -PH      Stop Time 0849  -PH      Time Calculation (min) 20 min  -PH      PT Received On 09/07/23  -PH      PT - Next Appointment 09/08/23  -PH         Timed Charges    69426 - PT Therapeutic Exercise Minutes 2  -PH      70795 - PT Therapeutic Activity Minutes 18  -PH         Total Minutes    Timed Charges Total Minutes 20  -PH       Total Minutes 20  -PH                User Key  (r) = Recorded By, (t) = Taken By, (c) = Cosigned By      Initials Name Provider Type    PH Cheryl Noguera PTA Physical Therapist Assistant                  Therapy Charges for Today       Code Description Service Date Service Provider Modifiers Qty    86542031341 HC PT THERAPEUTIC ACT EA 15 MIN 9/6/2023 Cheryl Noguera, PTA GP 1    73292069304 HC PT THER SUPP EA 15 MIN 9/6/2023 Cheryl Noguera, PTA GP 1    23873284063 HC PT THERAPEUTIC ACT EA 15 MIN 9/7/2023 Cheryl Noguera PTA GP 1            PT G-Codes  Outcome Measure Options: AM-PAC 6 Clicks Basic Mobility (PT)  AM-PAC 6 Clicks Score (PT): 19  Modified Gay Scale: 4 - Moderately severe disability.  Unable to walk without assistance, and unable to attend to own bodily  needs without assistance.  PT Discharge Summary  Anticipated Discharge Disposition (PT): inpatient rehabilitation facility    Cheryl Noguera, PTA  9/7/2023

## 2023-09-07 NOTE — CASE MANAGEMENT/SOCIAL WORK
Continued Stay Note  [facility]     Patient Name: Braxton Wolfe  MRN: [id number]  Today's Date: 9/7/2023    Admit Date: 8/24/2023    Plan: [facility] pending precert   Discharge Plan       Row Name 09/07/23 1321       Plan    Plan [facility] pending precert    Patient/Family in Agreement with Plan yes    Plan Comments Pt accepted to [facility] and precert started.  Await determination from [facility].  ZHOU Li RN                   Discharge Codes    No documentation.                 Expected Discharge Date and Time       Expected Discharge Date Expected Discharge Time    Sep 6, 2023               Yazmin Li RN

## 2023-09-07 NOTE — PLAN OF CARE
Goal Outcome Evaluation:  Plan of Care Reviewed With: patient        Progress: no change  Outcome Evaluation: Pt seen by PT / OT this AM for co-tx. Pt's L foot wrapped in ace bandage to maintain neutral position w/ L foot drop noted. Pt req mod I for supine to sit. Pt req incr time to process throughout session. Pt stood req CGA and use of fww. Pt freq leaning L and unsteady  during amb which incr w/ distance. Pt cued and req mod to max A to avoid obstacles. Further cues for slow and wide turn. Pt amb 50' w/ min A and use fo fww. Pt UIC at end of session w/ alarm set. PT will prog as pt adrienne.      Anticipated Discharge Disposition (PT): inpatient rehabilitation facility

## 2023-09-07 NOTE — PROGRESS NOTES
"Nutrition Services    Patient Name:  Braxton Wolfe  YOB: 1965  MRN: [id number]  Admit Date:  8/24/2023    Assessment Date:  09/07/23    Comment:  Nutrition follow up. TF's at goal of 55mL/hr with Isosource 1.5 and 30mL/hr free water.  Pt without complaints. Last BM 9/5. Doesn't feel like he needs to go. Labs, meds, skin reviewed. Has been working with therapies. DC plan reviewed - likely to subacute rehab. Will keep TF regimen the same since we don't know their therapy schedule.    RD to follow clinical course, nutrition support needs.    CLINICAL NUTRITION ASSESSMENT      Reason for Assessment Follow-up Protocol     Diagnosis/Problem   Acute encephalopathy. Asp PNA, vomiting, ARF, S/p prior  shunt ,VHL syndrome with CNS hemangioblastomas      Encounter Information        Nutrition History:     Food Preferences:    Supplements:    Factors Affecting Intake: swallow impairment     Anthropometrics        Current Height  Current Weight  BMI kg/m2 Height: 177.8 cm (70\")  Weight: 76.9 kg (169 lb 8.5 oz) (09/07/23 0609)  Body mass index is 24.33 kg/m².   Adjusted BMI (if applicable)    BMI Category Overweight (25 - 29.9)       Admission Weight 75.9kg       Ideal Body Weight (IBW) 73kg   Adjusted IBW (if applicable)        Usual Body Weight (UBW) unkown   Weight Trend Unknown         Estimated/Assessed Needs        Current Weight  Weight: 76.9 kg (169 lb 8.5 oz) (09/07/23 0609)       Energy Requirements    Weight for Calculation 79.7 kg   Method for Estimation  25 kcal/kg   EST Needs (kcal/day) 1993       Protein Requirements    Weight for Calculation 79.7 kg   EST Protein Needs (g/kg) 1.0 - 1.2 gm/kg   EST Daily Needs (g/day) 80-95       Fluid Requirements     Method for Estimation 1 mL/kcal    EST Needs (mL/day) 2000     Tests/Procedures        Tests/Procedures EGD, Other: PEG 9/4     Labs       Pertinent Labs    Results from last 7 days   Lab Units 09/07/23  0739 09/06/23  0554 09/05/23  0604   SODIUM " mmol/L 138 140 141   POTASSIUM mmol/L 4.1 3.7 3.9   CHLORIDE mmol/L 104 104 104   CO2 mmol/L 27.5 29.0 25.1   BUN mg/dL 13 13 13   CREATININE mg/dL 0.79 0.69* 0.82   CALCIUM mg/dL 8.7 8.5* 8.6   GLUCOSE mg/dL 120* 129* 67       Results from last 7 days   Lab Units 09/07/23  0739 09/06/23  0554 09/05/23  0604 09/04/23  0535   MAGNESIUM mg/dL  --  2.2 2.0 2.1   PHOSPHORUS mg/dL  --  3.0 2.7 3.3   HEMOGLOBIN g/dL 12.2* 11.4* 12.9* 12.8*   HEMATOCRIT % 36.6* 34.1* 38.5 38.8   WBC 10*3/mm3 6.09 8.42 7.27 7.67   ALBUMIN g/dL  --  3.0* 3.3* 3.3*       Results from last 7 days   Lab Units 09/07/23  0739 09/06/23  0554 09/05/23  0604 09/04/23  0535 09/03/23  0708 09/02/23  0559 09/01/23  0549   INR   --   --   --   --  1.02 1.14* 1.75*   PLATELETS 10*3/mm3 304 305 303 313 314 288 257       No results found for: COVID19  Lab Results   Component Value Date    HGBA1C 5.80 (H) 08/29/2023          Medications           Scheduled Medications aspirin, 81 mg, Nasogastric, Daily  atorvastatin, 40 mg, Oral, Nightly  docusate sodium, 100 mg, Oral, BID  folic acid (FOLVITE) IVPB, 1 mg, Intravenous, Daily  mupirocin, 1 application , Topical, Q12H  pantoprazole, 40 mg, Intravenous, Q12H  polyethylene glycol, 17 g, Nasogastric, Daily  sodium chloride, 10 mL, Intravenous, Q12H  thiamine, 100 mg, Oral, Daily  vitamin D, 50,000 Units, Nasogastric, Q7 Days       Infusions lactated ringers, 9 mL/hr, Last Rate: 9 mL/hr (08/29/23 1818)  lactated ringers, 75 mL/hr, Last Rate: 75 mL/hr (09/05/23 0619)       PRN Medications   acetaminophen    benzonatate    [DISCONTINUED] senna-docusate sodium **AND** polyethylene glycol **AND** bisacodyl **AND** bisacodyl    Calcium Replacement - Follow Nurse / BPA Driven Protocol    dextrose    dextrose    glucagon (human recombinant)    melatonin    nitroglycerin    ondansetron    Potassium Replacement - Follow Nurse / BPA Driven Protocol    [COMPLETED] Insert Peripheral IV **AND** sodium chloride    sodium  chloride    sodium chloride     Physical Findings          Physical Appearance alert, oriented, room air   Oral/Mouth Cavity WNL   Edema  no edema   Gastrointestinal last bowel movement: 9/5   Skin  surgical incision: head   Tubes/Drains/Lines PEG   NFPE No clinical signs of muscle wasting or fat loss   --  Current Nutrition Orders & Evaluation of Intake       Oral Nutrition     Food Allergies NKFA   Current PO Diet NPO Diet NPO Type: Strict NPO   Supplement n/a   PO Evaluation     % PO Intake/# of Days 0      Enteral Nutrition     Enteral Route PEG    TF Delivery Method Continuous    Enteral Product Isosource 1.5    Modular None    Propofol Rate/Kcal     TF Current Rate (mL) 55    TF Goal Rate (mL) 55    Current Water Flush (mL) 30 q 1 hrs    Current TF Provision  1980kcal, 89gm protein, 1003mL free water + 720mL water flushes         Calories 99 % needs met         Protein   100% needs met         TF Fluid (mL) 1723mL         IV Fluids     Avg Volume Delivered (mL) Not well documented    % Goal Volume     TF Residual  no or minimal residual    TF Changes rate increased    TF Tolerance tolerating     PES STATEMENT / NUTRITION DIAGNOSIS      Nutrition Dx Problem  Problem: Inadequate Oral Intake  Etiology: Medical Diagnosis - encephalopathy    Signs/Symptoms: Report of Minimal PO Intake    Comment:    --  NUTRITION INTERVENTION / PLAN OF CARE      Intervention Goal(s) Maintain nutrition status, Reduce/improve symptoms, Meet estimated needs, Disease management/therapy, Maintain TF/PN, Tolerate TF/PN at goal, and No significant weight loss         RD Intervention/Action Follow Tx Progress and Care plan reviewed         Prescription/Orders:       PO Diet       Supplements       Snacks       Enteral Nutrition       Parenteral Nutrition    New Prescription Ordered? Continue same per protocol         Monitor/Evaluation Per protocol   Discharge Plan/Needs Pending clinical course   Education Will instruct as appropriate    --    RD to follow per protocol.    Electronically signed by:  Chiquita Klein RD  09/07/23 12:24 EDT

## 2023-09-08 VITALS
HEART RATE: 77 BPM | BODY MASS INDEX: 24.9 KG/M2 | TEMPERATURE: 98.7 F | HEIGHT: 70 IN | WEIGHT: 173.94 LBS | OXYGEN SATURATION: 94 % | DIASTOLIC BLOOD PRESSURE: 71 MMHG | SYSTOLIC BLOOD PRESSURE: 110 MMHG | RESPIRATION RATE: 17 BRPM

## 2023-09-08 PROBLEM — R41.0 CONFUSION: Status: RESOLVED | Noted: 2023-08-29 | Resolved: 2023-09-08

## 2023-09-08 PROBLEM — J69.0 ASPIRATION PNEUMONIA: Status: ACTIVE | Noted: 2023-09-08

## 2023-09-08 PROBLEM — T85.618A SHUNT MALFUNCTION: Status: RESOLVED | Noted: 2023-08-29 | Resolved: 2023-09-08

## 2023-09-08 PROBLEM — J96.01 ACUTE RESPIRATORY FAILURE WITH HYPOXIA: Status: ACTIVE | Noted: 2023-09-08

## 2023-09-08 PROBLEM — J96.01 ACUTE RESPIRATORY FAILURE WITH HYPOXIA: Status: RESOLVED | Noted: 2023-09-08 | Resolved: 2023-09-08

## 2023-09-08 PROBLEM — A41.9 SEPSIS: Status: RESOLVED | Noted: 2023-08-24 | Resolved: 2023-09-08

## 2023-09-08 PROBLEM — G91.1 OBSTRUCTIVE HYDROCEPHALUS: Status: RESOLVED | Noted: 2023-08-24 | Resolved: 2023-09-08

## 2023-09-08 PROBLEM — J69.0 ASPIRATION PNEUMONIA: Status: RESOLVED | Noted: 2023-09-08 | Resolved: 2023-09-08

## 2023-09-08 PROBLEM — R53.83 LETHARGY: Status: RESOLVED | Noted: 2023-08-29 | Resolved: 2023-09-08

## 2023-09-08 PROBLEM — G93.89 CEREBRAL VENTRICULOMEGALY: Status: RESOLVED | Noted: 2023-08-29 | Resolved: 2023-09-08

## 2023-09-08 PROCEDURE — 94799 UNLISTED PULMONARY SVC/PX: CPT

## 2023-09-08 PROCEDURE — 94761 N-INVAS EAR/PLS OXIMETRY MLT: CPT

## 2023-09-08 PROCEDURE — 94760 N-INVAS EAR/PLS OXIMETRY 1: CPT

## 2023-09-08 RX ORDER — ASPIRIN 81 MG/1
81 TABLET, CHEWABLE ORAL DAILY
Start: 2023-09-09

## 2023-09-08 RX ORDER — ATORVASTATIN CALCIUM 40 MG/1
40 TABLET, FILM COATED ORAL NIGHTLY
Qty: 90 TABLET
Start: 2023-09-08

## 2023-09-08 RX ORDER — DOCUSATE SODIUM 50 MG/5 ML
100 LIQUID (ML) ORAL 2 TIMES DAILY
Start: 2023-09-08

## 2023-09-08 RX ORDER — ERGOCALCIFEROL 1.25 MG/1
50000 CAPSULE ORAL
Qty: 5 CAPSULE
Start: 2023-09-09

## 2023-09-08 RX ORDER — FOLIC ACID 1 MG/1
1 TABLET ORAL DAILY
Start: 2023-09-09

## 2023-09-08 RX ADMIN — ASPIRIN 81 MG: 81 TABLET, CHEWABLE ORAL at 08:44

## 2023-09-08 RX ADMIN — Medication 10 ML: at 08:44

## 2023-09-08 RX ADMIN — DOCUSATE SODIUM 100 MG: 50 LIQUID ORAL at 08:44

## 2023-09-08 RX ADMIN — LANSOPRAZOLE 15 MG: 15 TABLET, ORALLY DISINTEGRATING ORAL at 05:14

## 2023-09-08 RX ADMIN — MUPIROCIN 1 APPLICATION: 20 OINTMENT TOPICAL at 08:44

## 2023-09-08 RX ADMIN — FOLIC ACID 1 MG: 1 TABLET ORAL at 08:44

## 2023-09-08 NOTE — PLAN OF CARE
Problem: Adult Inpatient Plan of Care  Goal: Plan of Care Review  Outcome: Met  Goal: Patient-Specific Goal (Individualized)  Outcome: Met  Goal: Absence of Hospital-Acquired Illness or Injury  Outcome: Met  Intervention: Identify and Manage Fall Risk  Recent Flowsheet Documentation  Taken 9/8/2023 0848 by Yo Gomez RN  Safety Promotion/Fall Prevention: room organization consistent  Intervention: Prevent Skin Injury  Recent Flowsheet Documentation  Taken 9/8/2023 0848 by Yo Gomez RN  Body Position: sitting up in bed  Intervention: Prevent and Manage VTE (Venous Thromboembolism) Risk  Recent Flowsheet Documentation  Taken 9/8/2023 0848 by Yo Gomez RN  Activity Management: activity encouraged  VTE Prevention/Management: patient refused intervention  Intervention: Prevent Infection  Recent Flowsheet Documentation  Taken 9/8/2023 0848 by Yo Gomez RN  Infection Prevention:   single patient room provided   rest/sleep promoted  Goal: Optimal Comfort and Wellbeing  Outcome: Met  Intervention: Provide Person-Centered Care  Recent Flowsheet Documentation  Taken 9/8/2023 0848 by Yo Gomez RN  Trust Relationship/Rapport:   care explained   thoughts/feelings acknowledged   reassurance provided  Goal: Readiness for Transition of Care  Outcome: Met   Goal Outcome Evaluation:  Plan of Care Reviewed With: patient

## 2023-09-08 NOTE — DISCHARGE SUMMARY
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 Ritchie valve set to 110 [Z98.2]  Not Applicable      Resolved Hospital Problems    Diagnosis Date Resolved POA    Aspiration pneumonia [J69.0] 2023 Yes    Acute respiratory failure with hypoxia [J96.01] 2023 Yes    Shunt malfunction [T85.618A] 2023 Yes    Cerebral ventriculomegaly [G93.89] 2023 Yes    Lethargy [R53.83] 2023 Yes    Confusion [R41.0] 2023 Yes    Sepsis [A41.9] 2023 Yes    Obstructive hydrocephalus [G91.1] 2023 Unknown        Hospital Course     Mr. Wolfe is a 57 y.o. 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  Please see the admitting history and physical for further details.  He was found laying in his bed, unresponsive and covered in dried vomitus.  He was intubated in the emergency room as he was not protecting his airway.  Imaging included x-ray showing perihilar infiltrates and eventually CT confirmed dense bibasilar consolidation consistent with aspiration pneumonia.  He was admitted to ICU on antibiotics and mechanical ventilation.  He was actually able to be extubated a couple of days later.  Etiology of the vomiting was uncertain but it did not recur.  Suspicion was that patient may have been chronically aspirating based on his history of craniotomy with some neurologic deficits but obviously had an acute episode as well.  After extubation his sats returned  to normal and he has had no further pulmonary issues.  There was incidental notation of some lung nodules with plans for 3-month outpatient follow-up.    He was very encephalopathic so neurology was consulted and imaging performed including MRI of the brain showing punctate focus in the right paracentral lobe consistent with acute/subacute infarct as well as increase in size of the lateral and third ventricles compared to a prior MRI in 2022.  Multiple unchanged small enhancing intra-axial cerebellar nodules were seen.  EEG was performed showing diffuse delta and theta slowing with no interictal epileptiform discharges or seizures, consistent with moderately severe encephalopathy.  Neurosurgery was consulted and performed revision of ventriculoperitoneal shunt valve and adjusted settings.  They will follow him up in the office 9/11 with repeat imaging.  Neurology recommended aspirin and statin due to the punctate infarct.    He was not able to pass swallow study so surgery was consulted and placed a PEG tube.  He is tolerating tube feeds at goal rate.  He is working with PT and OT but is going to require skilled rehab.  This has been arranged for today.    I did discuss with his treating physician at [facility] regarding his Welireg that he takes for von Hippel-Lindau.  It has been documented to cause hypoxia in the past but he felt very unlikely to have caused this particular episode since patient has been on the medication for quite some time and this is not typically an effect that occurs late in the course.  However he has not seen the patient for over a year as patient did not follow-up as planned.  For now he recommended not using the Welireg while patient is recuperating and recommended restarting whenever he is closer to his usual baseline.  Also it seems that imaging does not show any growth in the nodules in the brain so likely safe to hold the medication for the time being.      Day of  Discharge     Subjective:  No complaints, wants to discharge today    Physical Exam:  Temp:  [97.8 °F (36.6 °C)-98.7 °F (37.1 °C)] 98.7 °F (37.1 °C)  Heart Rate:  [53-77] 77  Resp:  [14-18] 17  BP: (108-115)/(71-79) 110/71  Body mass index is 24.96 kg/m².  Physical Exam  Vitals reviewed.   Constitutional:       General: He is not in acute distress.     Appearance: He is not ill-appearing.   Cardiovascular:      Rate and Rhythm: Normal rate and regular rhythm.   Pulmonary:      Effort: No respiratory distress.      Breath sounds: Normal breath sounds.   Abdominal:      General: There is no distension.      Palpations: Abdomen is soft.      Tenderness: There is no abdominal tenderness.   Musculoskeletal:      Right lower leg: No edema.      Left lower leg: No edema.   Skin:     General: Skin is warm and dry.   Neurological:      Mental Status: He is alert.   Psychiatric:         Mood and Affect: Mood normal.       Consultants     Consult Orders (all) (From admission, onward)       Start     Ordered    09/05/23 1132  Inpatient Rehab Admission Consult  Once        Provider:  (Not yet assigned)    09/05/23 1131    09/03/23 1323  Inpatient Nutrition Consult  Once        Comments: Cortrak insertion   Provider:  (Not yet assigned)    09/03/23 1323    09/03/23 1133  Inpatient General Surgery Consult  Once,   Status:  Canceled        Specialty:  General Surgery  Provider:  (Not yet assigned)    09/03/23 1132    09/03/23 1133  Inpatient General Surgery Consult  Once        Comments: Family requested peg tube placement   Specialty:  General Surgery  Provider:  Dulce Garay MD    09/03/23 1133    08/31/23 1248  Inpatient Consult to Nutrition Services  Once        Provider:  (Not yet assigned)    08/31/23 1248    08/29/23 1431  Inpatient Neurosurgery Consult  Once        Specialty:  Neurosurgery  Provider:  Roberto Ritchie MD    08/29/23 1431    08/27/23 0937  Inpatient Internal Medicine Consult  Once        Specialty:  Internal  Medicine  Provider:  Heath Hooper MD    08/27/23 0937    08/26/23 1101  Inpatient Internal Medicine Consult  Once,   Status:  Canceled        Specialty:  Internal Medicine  Provider:  Heath Hooper MD    08/26/23 1101    08/25/23 0646  Inpatient Nephrology Consult  Once        Specialty:  Nephrology  Provider:  Angel Cerda MD    08/25/23 0645    08/24/23 2214  Inpatient Neurology Consult General  Once        Specialty:  Neurology  Provider:  Ajit Sequeira MD    08/24/23 2213    08/24/23 1856  Pulmonology (on-call MD unless specified)  Once        Specialty:  Pulmonary Disease  Provider:  (Not yet assigned)    08/24/23 1855                  Procedures     ESOPHAGOGASTRODUODENOSCOPY WITH PERCUTANEOUS ENDOSCOPIC GASTROSTOMY TUBE INSERTION 9/4    Revision of  shunt valve 8/29    Imaging Results (All)       Procedure Component Value Units Date/Time    XR Abdomen KUB [[id number]] Collected: 08/31/23 1447     Updated: 08/31/23 1450    Narrative:      Clinical: Core check catheter placement     COMPARISON 8/28/2023     FINDINGS: Cortright catheter tip is located within the duodenum, at the  junction of the second and third portions. It has been advanced since  the prior examination. There is contrast demonstrated now within the  colon. The remainder is unremarkable.     This report was finalized on 8/31/2023 2:47 PM by Dr. Tyrel Chavez M.D.       CT Head Without Contrast [[id number]] Collected: 08/30/23 1211     Updated: 08/31/23 0559    Narrative:      CT SCAN OF THE HEAD WITHOUT CONTRAST 08/30/2023     CLINICAL HISTORY: Status post revision of  shunt yesterday 08/29/2023.  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  Patient  has a history of Von Hippel-Lindau.      TECHNIQUE: Spiral CT images were obtained from the base of the skull to  the vertex without intravenous contrast. Images were  reformatted and  submitted in 1 mm thick axial, sagittal and coronal CT sections with  brain algorithm.      This is correlated to prior head CTs yesterday 08/29/2023 and head CT  08/24/2023 and a prior MRI of the brain on 08/28/2023 and outside MRIs  of the brain on 04/21/2022 and 01/14/2022.     FINDINGS: The patient has had a large 5 x 7.5 cm midline occipital left  occipital craniectomy.  The craniectomy is covered by metallic mesh and  resection of the posterior midline 1.8 cm segment of the posterior ring  of C1, and by history this occurred back on 04/26/2019 for resection of  cerebellar hemangioblastoma. There is extensive encephalomalacia  involving the majority of the left cerebellar hemisphere and some  loculated CSF lateral to the left cerebellum better demonstrated on  recent MRI of the brain on 08/25/2023. There is also encephalomalacia  throughout the majority of the right cerebellum. There is a markedly  enlarged fourth ventricle measuring 4.1 x 3.4 cm in medial lateral and  anterior posterior dimension. There is tenting of the cervicomedullary  junction posteriorly. Recent MRI of the brain on 08/28/2023 demonstrated  multiple small enhancing cerebellar nodules compatible with multiple  hemangioblastomas that are not able to be appreciated on this  noncontrast head CT. There is a ventriculoperitoneal shunt catheter in  place that courses through the superior right coronal suture through the  superior right frontal lobe parenchyma enters the superior body of the  right lateral ventricle and its distal tip is at the level of the right  foramen of Monro.  The lateral and third ventricles are mildly enlarged.   The temporal horns of the lateral ventricles in particular are large in  size. The lateral and third ventricles have clearly enlarged when  compared to an outside MRI of the brain on 04/21/2021. The lateral and  third ventricles have slightly decreased in size when compared to head  CT 08/24/2023,  unchanged in size when compared to yesterday's head CT  08/29/2023. There is confluent rind of low density in the right frontal  white matter along the margins of the ventriculoperitoneal shunt  catheter with rind of low-density tracking 3 x 3.3 cm, may be white  matter encephalomalacia, while there could be some backtracking of CSF.  Since the head CT on 08/24/2023 there is resolving low-density in the  temporal occipital periventricular white matter compatible with  resolving transependymal extension of CSF. A tiny tubular tract through  the lateral right parietal bone where there was likely previous  placement and removal of a ventriculostomy catheter in the remote past.  I see no midline shift. No acute intracranial hemorrhage is identified.  There are bubbles of air along the port for the ventriculoperitoneal  shunt catheter within the scalp overlying the anterolateral right  parietal bone from recent shunt revision procedure, and the bubbles of  air are new when compared to yesterday afternoon's head CT 08/29/2023 at  4:39 p.m.       Impression:      1. Since yesterday afternoon's head CT on 08/29/2023 at 4:39 p.m., there  has been shunt revision with bubbles of air in the scalp adjacent to the  shunt port overlying the anterior superior lateral right parietal bone.  There is stable position of the  shunt catheter that courses from the  superior right coronal suture through the superior right frontal lobe  parenchyma through the superior body of the right lateral ventricle and  its distal tip is at the right foramen of Monro. The lateral and third  ventricles remain enlarged. They are clearly larger than they were on an  outside MRI of the brain on 04/21/2022. However they are slightly  smaller than they were on a head CT 6 days ago on 08/24/2023. Since the  head CT on 08/24/2023, six days ago, there is resolving low-density in  the temporal occipital periventricular white matter compatible with  resolving  transependymal extension of CSF.     2. 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 There is extensive  encephalomalacia involving the majority of the left cerebellum and also  extensive encephalomalacia throughout good portions of the right  cerebellar hemisphere. MRI of the brain 2 days ago on 08/28/2023  demonstrated multiple tiny enhancing cerebellar nodules compatible with  multiple hemangioblastomas that are unable to be evaluated on this  noncontrast head CT. There is marked dilatation of the fourth ventricle.   Some of it is ex vacuo dilatation due to extensive cerebellar  encephalomalacia although there is posterior tenting of the  cervicomedullary junction and a small size superior aspect of the  aqueduct of Sylvius and the marked dilatation of the fourth ventricle  may be secondary to encystment of the fourth ventricle. The remainder of  the head CT is unremarkable. The results were communicated to Roberto Ritchie MD, from neurosurgery by telephone on 08/30/2023 at 9:50 AM.     Radiation dose reduction techniques were utilized, including automated  exposure control and exposure modulation based on body size.        This report was finalized on 8/31/2023 5:56 AM by Dr. Shahram Quintero M.D.       SLP FEES - Fiberoptic Endo Eval Swallow [[id number]] Resulted: 08/30/23 0917     Updated: 08/30/23 0917    Narrative:      This procedure was auto-finalized with no dictation required.    XR Shunt Series [[id number]] Collected: 08/30/23 0832     Updated: 08/30/23 0837    Narrative:      XR SHUNT SERIES-     INDICATIONS:  shunt revision     TECHNIQUE: FLUOROSCOPIC ASSISTANCE IN THE OPERATING ROOM.     FINDINGS:     1 intraoperative fluoroscopic spot view was obtained during the  shunt  revision procedure and recorded to  the PACS for review, partly  demonstrating the  shunt catheter. Please see operative report for  full details.     Fluoroscopy time: 18 seconds     Radiation exposure: Dose area product: 101.22 uGy x m(2)          Impression:         As described.     This report was finalized on 8/30/2023 8:34 AM by Dr. Adria Damon M.D.       FL Shunt Series Programable [[id number]] Collected: 08/30/23 0807     Updated: 08/30/23 0814    Narrative:      FL SHUNT SERIES PROGRAMABLE-     INDICATIONS: Hydrocephalus        TECHNIQUE: SHUNT SERIES WITH FLUOROSCOPIC EVALUATION of shunt valve     COMPARISON: None available     FINDINGS:     10 images were obtained.     Frontal and lateral views at the level of the head, neck, chest,  abdomen, reveal right frontal ventriculoperitoneal shunt catheter  extending into the upper pelvis near the midline. The catheter is partly  obscured by enteric contrast material that is present in the colon, but  otherwise appears intact.     A Codman Hakim nonprogrammable shunt valve is apparent.     Fluoroscopy time: 0.2 minutes     Radiation exposure: Dose area product: 113.44 uGy x m(2)          Impression:         As described.     This report was finalized on 8/30/2023 8:11 AM by Dr. Adria Damon M.D.       CT Head Without Contrast [[id number]] Collected: 08/29/23 1748     Updated: 08/29/23 1759    Narrative:      EXAM:  CT HEAD WO CONTRAST-     HISTORY: Ventriculomegaly, sepsis, preoperative planning.     COMPARISON: MRI brain 8/28/2023     TECHNIQUE: Noncontrast images of the brain were obtained. Reformatted  images were reviewed. Radiation dose reduction techniques were utilized,  including automated exposure control and exposure modulation based on  body size.     FINDINGS:       There is a lateral right frontal hector hole with a  shunt extending  through the hector hole. The tip of the shunt terminates in the posterior  frontal horn of the right lateral ventricle near the foramen of  San,  similar to 8/28/2023. There is hypoattenuation along the the catheter  tract, better assessed on recent MRI. The ventricles are not  significantly changed in size or configuration.  There are postsurgical changes from left occipital craniotomy with mesh  reconstruction. There is encephalomalacia/gliosis throughout the left  greater than right cerebellar hemispheres with marked dilatation of the  fourth ventricle, also similar to prior.   Focal hypoattenuation in the posterior right corona radiata is also not  significantly changed. No acute intracranial hemorrhage or pathologic  extra-axial collection is identified.  There is no midline shift or mass  effect.  The basal cisterns are patent.    There is mild mucosal thickening of the left sphenoid sinus with small  moderate aerated secretions. There are moderate left and small right  mastoid effusions. There is nonunion of the posterior arch of C1, which  could be developmental or due to old trauma or surgery.          Impression:      Postsurgical changes, as above, with a right frontal approach  shunt,  which is similar to prior. No significant change in ventricle size or  configuration compared to 8/28/2023.     This report was finalized on 8/29/2023 5:56 PM by Dr. Nata Briceño M.D.       FL Video Swallow Single Contrast [[id number]] Collected: 08/29/23 0744     Updated: 08/29/23 0749    Narrative:      VIDEO SWALLOWING EXAMINATION BY SPEECH PATHOLOGY     Clinical: Dysphasia     Video swallowing examination performed under the direction of speech  pathology. Imaging reviewed by radiologist who concurs with the  findings.     Speech pathology summary:  Radiologist present Dr. Chavez. Deep  nontransient penetration to the vocal cords with thin liquids.  Nontransient penetration intermittently with nectar thick liquid, honey  thick liquid, and puree.        FLUOROSCOPY TIME: 3 minutes 35 seconds, 5224 images.     This report was finalized on 8/29/2023  7:45 AM by Dr. Tyrel Chavez M.D.       MRI Brain With & Without Contrast [[id number]] Collected: 08/28/23 1741     Updated: 08/28/23 1759    Narrative:      MRI OF THE BRAIN WITH AND WITHOUT CONTRAST     CLINICAL HISTORY: Von Hippel-Lindau with cerebellar hemangioblastoma,  prior craniotomy and  shunt placement. Recent ICU admission with  persistent confusion and very mild weakness on the left side.     TECHNIQUE: MRI of the brain was obtained with sagittal pre and  postgadolinium T1, axial pre-gadolinium and postgadolinium, axial  postgadolinium MPRAGE, coronal postgadolinium T1,  axial FLAIR, axial  T2, axial susceptibility weighted, and axial diffusion-weighted images.     COMPARISON: MR brain 4/21/2022. CT head 8/24/2023.        FINDINGS:     New punctate focus of diffusion restriction with associated T2/FLAIR  hyperintensity in the right paracentral lobule (series 5 image 20). No  associated SWI hypointensity or enhancement. No significant mass effect  or midline shift.     Stable right frontal approach ventriculostomy catheter with tip in the  right lateral ventricle near the foramen of Jayy. Lateral and third  ventricles have increased in size from 2022 MRI but are unchanged from  recent CT head. Mildly increased T2/FLAIR hyperintensity surrounding the  right frontal approach ventriculostomy catheter. No new periventricular  T2/FLAIR hyperintensity aside from surrounding the catheter. No midline  shift or herniation. Unchanged ex vacuo dilatation of the fourth  ventricle.     Left suboccipital craniectomy for left cerebellar tumor resection.  Unchanged encephalomalacia and T2/FLAIR white matter hyperintensities in  the left greater than right cerebellar lobes. Unchanged multiple  bilateral enhancing intra-axial cerebellar nodules, the largest in the  inferior right cerebellum measuring 1.1 x 0.8 cm (series 10 image 5).     Preserved vascular flow voids. Unchanged bilateral mastoid air cell  effusions.  Symmetric posterior nasopharyngeal soft tissues.          Impression:      1. New punctate focus of diffusion restriction/FLAIR hyperintensity in  the right paracentral lobule most suggestive of acute/subacute infarct.  No hemorrhagic transformation.  2. Stable right frontal approach ventriculostomy catheter with tip in  the right lateral ventricle near the foramen of San. Lateral and  third ventricles have increased in size from 2022 MRI. Mildly increased  pericatheter T2/FLAIR hyperintensity.  3. Left suboccipital craniectomy for left cerebellar tumor resection.  Multiple unchanged small enhancing intra-axial cerebellar nodules  measuring up to 1.1 cm.        Above critical findings were discussed with Dr. Huertas at 5:54 p.m.  on 8/28/2023.     This report was finalized on 8/28/2023 5:56 PM by Dr. Darron Jaramillo M.D.       MRI Outside Films [[id number]] Resulted: 08/28/23 1559     Updated: 08/28/23 1600    Narrative:      This procedure was auto-finalized with no dictation required.    MRI Outside Films [[id number]] Resulted: 08/28/23 1556     Updated: 08/28/23 1558    Narrative:      This procedure was auto-finalized with no dictation required.    XR Abdomen KUB [[id number]] Collected: 08/28/23 0542     Updated: 08/28/23 0542    Narrative:        Patient: DAVID CERNA  Time Out: 05:41  Exam(s): XR ABDOMEN     EXAM:    XR Abdomen, 2 Views    CLINICAL HISTORY:     Reason for exam: FEEDING TUBE PLACEMENT.    TECHNIQUE:    Frontal view of the abdomen pelvis with upright view of the abdomen.    COMPARISON:    No relevant prior studies available.    FINDINGS:    Lower thorax:  Bibasilar infiltrates noted.    Intraperitoneal space:  No free air.    Gastrointestinal tract:  Unremarkable.  No dilation.    Bones joints:  Unremarkable.    Tubes, lines and devices:  Distal end of an enteric tube tip overlies   the region of the gastric antrum proximal duodenum.  The visualized bowel   gas is unremarkable-without  evidence of obstruction.    IMPRESSION:       1.  Enteric tube tip at the region of the distal stomach proximal   duodenum.    2.  Bibasilar infiltrates noted.      Impression:          Electronically signed by Norma Jaramillo MD on 08-28-23 at 0541    XR Abdomen KUB [[id number]] Collected: 08/27/23 2344     Updated: 08/27/23 2353    Narrative:      KUB     HISTORY: Feeding tube placement.     COMPARISON: CT abdomen and pelvis 08/24/2020.     FINDINGS: Feeding tube has been placed and the tip is in the gastric  fundus. Bowel gas pattern in the upper abdomen is nonobstructive. The  lower abdomen and pelvis are not included on the field-of-view.       Impression:      Feeding tube tip is in the stomach.        This report was finalized on 8/27/2023 11:50 PM by Dr. Marcelo De M.D.       XR Chest 1 View [[id number]] Collected: 08/25/23 0641     Updated: 08/25/23 0644    Narrative:      XR CHEST 1 VW-8/25/2023     HISTORY: Central line placement.     Right internal jugular central venous catheter seen with its tip  overlying the SVC. No pneumothorax is seen. Endotracheal tube is seen in  good position. Heart size is within normal limits. There is some mild  patchy atelectasis or infiltrate in the right lower lung. Left lung  appears clear.       Impression:      1. Central venous catheter tip overlying the SVC.  2. No pneumothorax is seen.        This report was finalized on 8/25/2023 6:41 AM by Dr. Sriram Ray M.D.       XR Chest 1 View [[id number]] Collected: 08/25/23 0515     Updated: 08/25/23 0520    Narrative:      SINGLE VIEW OF THE CHEST     HISTORY: Respiratory failure     COMPARISON: August 24, 2023     FINDINGS:  Endotracheal tube terminates in satisfactory position. There is a  right-sided ventriculoperitoneal shunt. Heart size is within normal  limits. Bibasilar consolidation is noted. No pneumothorax is seen. Trace  left pleural effusion is suspected.       Impression:      Persistent bibasilar  consolidation.     This report was finalized on 8/25/2023 5:17 AM by Dr. Charlee Medina M.D.       CT Chest Without Contrast Diagnostic [[id number]] Collected: 08/24/23 2225     Updated: 08/24/23 2237    Narrative:      CT OF THE CHEST WITHOUT CONTRAST; CT OF THE ABDOMEN AND PELVIS WITHOUT  CONTRAST     HISTORY: Found down. Concern for aspiration.     COMPARISON: None available.     TECHNIQUE: Axial CT imaging was obtained from the thoracic inlet through  the symphysis pubis. No IV contrast was administered.     FINDINGS:  CT OF THE CHEST: Endotracheal tube terminates in satisfactory position.  Thyroid gland is unremarkable. Thoracic aorta is normal in caliber.  Mediastinal lymph nodes do not appear pathologically enlarged. There is  a subpleural nodule noted within the right upper lobe, measuring up to 8  mm. There are some reticular nodular infiltrates which are noted within  the right upper lobe. There is dense dependent consolidation with air  bronchograms noted within both lower lobes. There may be trace bilateral  pleural effusions. No acute osseous abnormalities are seen. The patient  does have a right-sided ventriculoperitoneal shunt.     CT OF THE ABDOMEN AND PELVIS: The stomach, duodenum, spleen, adrenal  glands, and gallbladder are all grossly unremarkable. There are  low-attenuation lesions which are identified within the pancreas. They  are poorly assessed on this unenhanced exam. 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 They were described  on prior report from June 17, 2020, but again are poorly assessed on  this exam. No suspicious hepatic lesions are seen. No hydronephrosis is  seen on either side. I do not see any renal masses on this unenhanced  exam. No distal ureteral or bladder stones are seen. Prostate gland is  within normal limits. There is no bowel obstruction. Ventricular  peritoneal shunt terminates within the left lower quadrant. The appendix  is  normal. No acute osseous abnormalities are seen. There is mild  colonic diverticulosis.       Impression:         1. Dense dependent consolidation bilaterally., Given history, aspiration  is a concern. Patient is also noted to have some mild reticulonodular  infiltrates within the right upper lobe, as well as an 8 mm subpleural  nodule within the right upper lobe. CT follow-up in 3 months is  recommended.  2. 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  However, they are incompletely assessed in the absence of contrast  material and prior studies for comparison.           Radiation dose reduction techniques were utilized, including automated  exposure control and exposure modulation based on body size.        This report was finalized on 8/24/2023 10:34 PM by Dr. Charlee Medina M.D.       CT Abdomen Pelvis Without Contrast [[id number]] Collected: 08/24/23 2225     Updated: 08/24/23 2237    Narrative:      CT OF THE CHEST WITHOUT CONTRAST; CT OF THE ABDOMEN AND PELVIS WITHOUT  CONTRAST     HISTORY: Found down. Concern for aspiration.     COMPARISON: None available.     TECHNIQUE: Axial CT imaging was obtained from the thoracic inlet through  the symphysis pubis. No IV contrast was administered.     FINDINGS:  CT OF THE CHEST: Endotracheal tube terminates in satisfactory position.  Thyroid gland is unremarkable. Thoracic aorta is normal in caliber.  Mediastinal lymph nodes do not appear pathologically enlarged. There is  a subpleural nodule noted within the right upper lobe, measuring up to 8  mm. There are some reticular nodular infiltrates which are noted within  the right upper lobe. There is dense dependent consolidation with air  bronchograms noted within both lower lobes. There may be trace bilateral  pleural effusions. No acute osseous abnormalities are seen. The patient  does have a right-sided ventriculoperitoneal  shunt.     CT OF THE ABDOMEN AND PELVIS: The stomach, duodenum, spleen, adrenal  glands, and gallbladder are all grossly unremarkable. There are  low-attenuation lesions which are identified within the pancreas. They  are poorly assessed on this unenhanced exam. 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 They were described  on prior report from June 17, 2020, but again are poorly assessed on  this exam. No suspicious hepatic lesions are seen. No hydronephrosis is  seen on either side. I do not see any renal masses on this unenhanced  exam. No distal ureteral or bladder stones are seen. Prostate gland is  within normal limits. There is no bowel obstruction. Ventricular  peritoneal shunt terminates within the left lower quadrant. The appendix  is normal. No acute osseous abnormalities are seen. There is mild  colonic diverticulosis.       Impression:         1. Dense dependent consolidation bilaterally., Given history, aspiration  is a concern. Patient is also noted to have some mild reticulonodular  infiltrates within the right upper lobe, as well as an 8 mm subpleural  nodule within the right upper lobe. CT follow-up in 3 months is  recommended.  2. 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  However, they are incompletely assessed in the absence of contrast  material and prior studies for comparison.           Radiation dose reduction techniques were utilized, including automated  exposure control and exposure modulation based on body size.        This report was finalized on 8/24/2023 10:34 PM by Dr. Charlee Medina M.D.       CT Head Without Contrast [[id number]] Collected: 08/24/23 2216     Updated: 08/24/23 2224    Narrative:      CT OF THE HEAD WITHOUT CONTRAST     HISTORY: Unresponsiveness     COMPARISON: None available.     TECHNIQUE: Axial CT imaging was obtained through the  brain. No IV  contrast was administered.     FINDINGS:  Unfortunately, patient's prior imaging is not available for comparison.  The patient has a right frontal ventriculoperitoneal shunt, which  terminates within the right lateral ventricle. There is encephalomalacia  noted within the right frontal lobe, adjacent to the course of the  catheter. There is ventricular dilatation. Patient may have some  additional mild encephalomalacia within the right parietal lobe. There  is some decreased attenuation surrounding the posterior and temporal  horns of the lateral ventricles. Some of this may be related to small  vessel disease. Given dilatation of the ventricular system, some  transependymal flow of CSF is not excluded. There are areas of  encephalomalacia noted within both cerebellar hemispheres. The patient  is also noted to have marked dilatation of the fourth ventricle, which  has been described on prior imaging. The patient is status post  suboccipital craniectomy. There is partial opacification of the mastoid  air cells bilaterally. Mucosal thickening is noted within the ethmoid  and sphenoid sinuses. There are air-fluid levels within the sphenoid  sinuses. Correlation with any evidence of sinusitis is recommended.       Impression:         1. No acute intracranial hemorrhage.  2. Unfortunately, this patient's prior imaging is not available for  comparison. There is ventriculomegaly. I'm uncertain if this has  increased when compared to prior imaging. There is some decreased  attenuation surrounding the posterior and temporal horns of the lateral  ventricles bilaterally. Some transependymal flow of CSF cannot be  excluded..     Radiation dose reduction techniques were utilized, including automated  exposure control and exposure modulation based on body size.        This report was finalized on 8/24/2023 10:21 PM by Dr. Charlee Medina M.D.       XR Chest 1 View [[id number]] Collected: 08/24/23 1123     Updated:  08/24/23 1913    Narrative:      CHEST SINGLE VIEW     HISTORY: Respiratory failure. Patient was found down.     COMPARISON: None.      FINDINGS: There has been intubation and the ET tube tip is 2.2 cm above  the melanie and this could be withdrawn 2 cm for better position. The  heart size appears normal. There is no evidence for perihilar edema or  pneumothorax or focal airspace disease.  There is some limitation as the  lung apices, particular on the right and the right costophrenic angle,  are not included on the field-of-view. There is shunt tubing overlying  the right thorax.       Impression:      Limited exam demonstrates intubation with ET tube tip 2.2 cm  above the melanie and this could be withdrawn 2 cm for better position.  No further evidence for active disease in the chest.     This report was finalized on 8/24/2023 7:10 PM by Dr. Marcelo De M.D.             Results for orders placed during the hospital encounter of 08/24/23    Duplex Carotid Ultrasound CAR    Interpretation Summary    Right internal carotid artery demonstrates normal flow without evidence of hemodynamically significant stenosis.    Left internal carotid artery demonstrates normal flow without evidence of hemodynamically significant stenosis.    Results for orders placed during the hospital encounter of 08/24/23    Adult Transthoracic Echo Complete W/ Cont if Necessary Per Protocol    Interpretation Summary    The study is technically difficult for diagnosis.    Left ventricular ejection fraction appears to be 56 - 60%.    Left ventricular diastolic function was indeterminate.    Estimated right ventricular systolic pressure from tricuspid regurgitation is normal (<35 mmHg).    There is a trivial pericardial effusion. There is no evidence of cardiac tamponade.    Pertinent Labs     Results from last 7 days   Lab Units 09/07/23  0739 09/06/23  0554 09/05/23  0604 09/04/23  0535   WBC 10*3/mm3 6.09 8.42 7.27 7.67   HEMOGLOBIN g/dL  12.2* 11.4* 12.9* 12.8*   PLATELETS 10*3/mm3 304 305 303 313     Results from last 7 days   Lab Units 09/07/23 0739 09/06/23  0554 09/05/23  0604 09/04/23  0535   SODIUM mmol/L 138 140 141 141   POTASSIUM mmol/L 4.1 3.7 3.9 3.9   CHLORIDE mmol/L 104 104 104 106   CO2 mmol/L 27.5 29.0 25.1 26.4   BUN mg/dL 13 13 13 14   CREATININE mg/dL 0.79 0.69* 0.82 0.89   GLUCOSE mg/dL 120* 129* 67 78   EGFR mL/min/1.73 103.6 107.9 102.5 100.0     Results from last 7 days   Lab Units 09/06/23 0554 09/05/23  0604 09/04/23  0535 09/03/23  0708   ALBUMIN g/dL 3.0* 3.3* 3.3* 3.3*     Results from last 7 days   Lab Units 09/07/23 0739 09/06/23 0554 09/05/23 0604 09/04/23  0535 09/03/23  0708   CALCIUM mg/dL 8.7 8.5* 8.6 8.6 8.7   ALBUMIN g/dL  --  3.0* 3.3* 3.3* 3.3*   MAGNESIUM mg/dL  --  2.2 2.0 2.1 2.2   PHOSPHORUS mg/dL  --  3.0 2.7 3.3 3.2               Invalid input(s): LDLCALC          Test Results Pending at Discharge       Discharge Details        Discharge Medications        New Medications        Instructions Start Date   aspirin 81 MG chewable tablet   81 mg, Per G Tube, Daily   Start Date: September 9, 2023     atorvastatin 40 MG tablet  Commonly known as: LIPITOR   40 mg, Per G Tube, Nightly      docusate sodium 50 mg/5 mL liquid  Commonly known as: COLACE   100 mg, Per G Tube, 2 Times Daily      folic acid 1 MG tablet  Commonly known as: FOLVITE   1 mg, Per G Tube, Daily   Start Date: September 9, 2023     lansoprazole 15 MG Tablet Delayed Release Dispersible disintegrating tablet  Commonly known as: PREVACID SOLUTAB   15 mg, Oral, Every Early Morning   Start Date: September 9, 2023     vitamin D 1.25 MG (64623 UT) capsule capsule  Commonly known as: ERGOCALCIFEROL   50,000 Units, Per G Tube, Every 7 Days   Start Date: September 9, 2023            Stop These Medications      belzutifan 40 MG tablet  Commonly known as: WELIREG     metoprolol tartrate 25 MG tablet  Commonly known as: LOPRESSOR     tadalafil 5 MG  tablet  Commonly known as: CIALIS              No Known Allergies    Discharge Disposition:  Skilled Nursing Facility (DC - External)      Discharge Diet:  Diet Order   Procedures    NPO Diet NPO Type: Strict NPO       Discharge Activity:       CODE STATUS:    Code Status and Medical Interventions:   Ordered at: 08/24/23 2139     Code Status (Patient has no pulse and is not breathing):    CPR (Attempt to Resuscitate)     Medical Interventions (Patient has pulse or is breathing):    Full Support       Future Appointments   Date Time Provider Department Center   9/11/2023 10:00 AM [facility]   9/11/2023 11:20 AM Maverick Truong APRN [facility]     Additional Instructions for the Follow-ups that You Need to Schedule       Discharge Follow-up with Specialty: Neurology and neurosurgery per their recommendation   As directed      Specialty: Neurology and neurosurgery per their recommendation               Contact information for follow-up providers       Yoshi Sahu MD. Schedule an appointment as soon as possible for a visit in 2 month(s).    Specialty: General Surgery  Contact information:  [address]  [facility] [id number] [phone]               Provider, No Known Follow up.    Contact information:  [facility] [id number] [phone]                       Contact information for after-discharge care       Destination       [facility]    Service: Skilled Nursing  Contact information:  [address] [facility] [address]  [phone]                                   Time Spent on Discharge:  Greater than 30 minutes      Darron Fletcher MD  [facility]  09/08/23  09:45 EDT

## 2023-09-08 NOTE — CASE MANAGEMENT/SOCIAL WORK
Case Management Discharge Note      Final Note: Discharged to [facility] for rehab.  Transported by [facility] w/c hector Li RN         Selected Continued Care - Discharged on 9/8/2023 Admission date: 8/24/2023 - Discharge disposition: Skilled Nursing Facility (DC - External)      Destination Coordination complete.      Service Provider Selected Services Address Phone Fax Patient Preferred    [facility] [address] [place], [place] [address] [phone] --              Durable Medical Equipment    No services have been selected for the patient.                Dialysis/Infusion    No services have been selected for the patient.                Home Medical Care    No services have been selected for the patient.                Therapy    No services have been selected for the patient.                Community Resources    No services have been selected for the patient.                Community & DME    No services have been selected for the patient.                    Transportation Services  W/C Van: [facility]    Final Discharge Disposition Code: 03 - skilled nursing facility (SNF)

## 2023-09-08 NOTE — PAYOR COMM NOTE
"David Wolfe (57 y.o. Male)      CONTINUED STAY CLINICALS AND DISCHARGE SUMMARY: REF# [id number]     REPLY: ALBA MORELOS RN/CCP--[phone]/ UR DEPT FAX: [phone]    [facility]       Date of Birth   1965    Social Security Number       Address   [address] [place] [address]    Home Phone   [phone]    MRN   [id number]       Restorationism   Sabianist    Marital Status                               Admission Date   8/24/23    Admission Type   Emergency    Admitting Provider   Meek Castillo MD    Attending Provider       Department, Room/Bed   [facility], E568/1       Discharge Date   9/8/2023    Discharge Disposition   Skilled Nursing Facility (DC - External)    Discharge Destination                                 Attending Provider: (none)   Allergies: No Known Allergies    Isolation: None   Infection: None   Code Status: CPR    Ht: 177.8 cm (70\")   Wt: 78.9 kg (173 lb 15.1 oz)    Admission Cmt: None   Principal Problem: None                  Active Insurance as of 8/24/2023       Primary Coverage       Payor Plan Insurance Group Employer/Plan Group    [employer] BLUE CROSS [employer] BLUE [employer]OSS BLUE [employer]IELD PPO [id number]       Payor Plan Address Payor Plan Phone Number Payor Plan Fax Number Effective Dates    [address] [phone]  1/1/2023 - None Entered    [place] [address]         Subscriber Name Subscriber Birth Date Member ID       DAVID WOLFE 1965 [id number]                     Emergency Contacts        (Rel.) Home Phone Work Phone Mobile Phone    Yamilet Wolfe (Daughter) -- -- [phone]    Aby Wolfe (Mother) [phone] -- [phone]    Faizan Wolfe (Father) [phone] -- [phone]    Julia Wolfe (Sister) [phone] -- --              Vital Signs (last 2 days) before discharge       Date/Time Temp Temp src Pulse Resp BP Patient Position SpO2    09/08/23 0853 -- -- -- " 17 -- -- --    09/08/23 0700 98.7 (37.1) Oral -- 16 110/71 Lying --    09/08/23 0000 -- -- 77 -- 108/79 -- 94    09/07/23 2357 97.8 (36.6) Oral 53 18 108/79 Lying 95    09/07/23 2028 98.1 (36.7) Oral 70 14 108/77 Lying 93    09/07/23 1500 97.8 (36.6) Oral 63 18 115/74 Lying 95    09/07/23 1100 98.4 (36.9) Oral 72 18 113/74 Lying 96    09/07/23 1058 -- -- -- 18 -- -- 97    09/07/23 0700 97.5 (36.4) Oral 63 18 112/72 Lying --    09/06/23 2346 97.1 (36.2) Oral 69 18 98/69 Lying --    09/06/23 2024 98 (36.7) Oral 68 18 115/71 Lying --    09/06/23 1534 98.1 (36.7) Oral 70 16 114/70 Lying --    09/06/23 1133 -- -- -- 17 -- -- --    09/06/23 1037 98.5 (36.9) Oral -- 16 102/76 Lying --    09/06/23 0738 98.3 (36.8) Oral -- 18 122/86 Lying --       Intake & Output (last 2 days)         09/06 0701  09/07 0700 09/07 0701  09/08 0700 09/08 0701  09/09 0700    I.V. (mL/kg)       Other  211     NG/ 418     Total Intake(mL/kg) 632 (8.2) 629 (8)     Urine (mL/kg/hr) 1075 (0.6) 1850 (1)     Emesis/NG output       Total Output 1075 1850     Net -[phone]                  Lines, Drains & Airways       Active LDAs       Name Placement date Placement time Site Days    Gastrostomy/Enterostomy 1 20 Fr. LUQ 09/04/23  1134  LUQ  4                      Medication Administration Report for Braxton Wolfe as of 09/08/23 1326     Legend:    Given Hold Not Given Due Canceled Entry Other Actions    Time Time (Time) Time Time-Action         Discontinued     Completed     Future     MAR Hold     Linked             Medications 09/07/23 09/08/23      acetaminophen (TYLENOL) tablet 1,000 mg  Dose: 1,000 mg  Freq: Every 8 Hours PRN Route: PO  PRN Reason: Mild Pain  Start: 09/06/23 1637   Admin Instructions:   Based on patient request - if ordered for moderate or severe pain, provider allows for administration of a medication prescribed for a lower pain scale.  Based on patient request - if ordered for moderate or severe pain, provider allows  for administration of a medication prescribed for a lower pain scale.    Do not exceed 4 grams of acetaminophen in a 24 hr period. Max dose of 2gm for AST/ALT greater than 120 units/L.    If given for pain, use the following pain scale:   Mild Pain = Pain Score of 1-3, CPOT 1-2  Moderate Pain = Pain Score of 4-6, CPOT 3-4  Severe Pain = Pain Score of 7-10, CPOT 5-8         aspirin chewable tablet 81 mg  Dose: 81 mg  Freq: Daily Route: NG  Start: 09/01/23 1245   Admin Instructions:   Herbal/drug interaction: Avoid use with ginkgo biloba. Based on patient request - if ordered for moderate or severe pain, provider allows for administration of a medication prescribed for a lower pain scale.  Do not exceed 4 grams of aspirin in a 24 hr period.    If given for pain, use the following pain scale:   Mild Pain = Pain Score of 1-3, CPOT 1-2  Moderate Pain = Pain Score of 4-6, CPOT 3-4  Severe Pain = Pain Score of 7-10, CPOT 5-8    0944-Given            0844-Given               atorvastatin (LIPITOR) tablet 40 mg  Dose: 40 mg  Freq: Nightly Route: PO  Start: 08/30/23 2100   Admin Instructions:   Avoid grapefruit juice.    2108-Given            2100               benzonatate (TESSALON) capsule 100 mg  Dose: 100 mg  Freq: 3 Times Daily PRN Route: PO  PRN Reason: Cough  Start: 08/27/23 1520   Admin Instructions:   Swallow whole.  Do not crush, chew, or open capsule.         polyethylene glycol (MIRALAX) packet 17 g  Dose: 17 g  Freq: Daily Route: NG  Start: 09/01/23 1245   Admin Instructions:   Use if no bowel movement after 12 hours. Mix in 6-8 ounces of water.  Hold for loose stools.   Use 4-8 ounces of water, tea, or juice for each 17 gram dose.    (0944)-Not Given            (0844)-Not Given              And  bisacodyl (DULCOLAX) EC tablet 5 mg  Dose: 5 mg  Freq: Daily PRN Route: PO  PRN Reason: Constipation  PRN Comment: Use if polyethylene glycol is ineffective  Start: 09/01/23 1150   Admin Instructions:   Use if no bowel  "movement after 12 hours.  Swallow whole. Do not crush, split, or chew tablet.        And  bisacodyl (DULCOLAX) suppository 10 mg  Dose: 10 mg  Freq: Daily PRN Route: RE  PRN Reason: Constipation  PRN Comment: Use if bisacodyl oral is ineffective  Start: 09/01/23 1150   Admin Instructions:   Use if no bowel movement after 12 hours.  Hold for diarrhea         Calcium Replacement - Follow Nurse / BPA Driven Protocol  Freq: As Needed Route: XX  PRN Reason: Other  Start: 08/29/23 1518   Admin Instructions:   Open Order & Select \"[facility] Electrolyte Replacement Protocol Algorithm\" to View Details         dextrose (D50W) (25 g/50 mL) IV injection 25 g  Dose: 25 g  Freq: Every 15 Minutes PRN Route: IV  PRN Reason: Low Blood Sugar  PRN Comment: Blood Sugar Less Than 70  Start: 08/26/23 2038   Admin Instructions:   Blood sugar less than 70; patient has IV access - Unresponsive, NPO or Unable To Safely Swallow         dextrose (GLUTOSE) oral gel 15 g  Dose: 15 g  Freq: Every 15 Minutes PRN Route: PO  PRN Reason: Low Blood Sugar  PRN Comment: Blood sugar less than 70  Start: 08/26/23 2038   Admin Instructions:   BS<70, Patient Alert, Is not NPO, Can safely swallow.         docusate sodium (COLACE) liquid 100 mg  Dose: 100 mg  Freq: 2 Times Daily Route: PER G TUBE  Start: 09/07/23 2100    2109-Given            0844-Given     2100              folic acid (FOLVITE) tablet 1 mg  Dose: 1 mg  Freq: Daily Route: PER G TUBE  Start: 09/07/23 1900    2108-Given            0844-Given               glucagon (GLUCAGEN) injection 1 mg  Dose: 1 mg  Freq: Every 15 Minutes PRN Route: IM  PRN Reason: Low Blood Sugar  PRN Comment: Blood Glucose Less Than 70  Start: 08/26/23 2038   Admin Instructions:   Blood Glucose Less Than 70 - Patient Without IV Access - Unresponsive, NPO or Unable To Safely Swallow  Reconstitute powder for injection by adding 1 mL of -supplied sterile diluent or sterile water for injection to a vial containing 1 mg " "of the drug, to provide solutions containing 1 mg/mL. Shake vial gently to dissolve.         lactated ringers infusion  Rate: 9 mL/hr Dose: 9 mL/hr  Freq: Continuous Route: IV  Start: 08/29/23 1742         lansoprazole (PREVACID SOLUTAB) disintegrating tablet Tablet Delayed Release Dispersible 15 mg  Dose: 15 mg  Freq: Every Early Morning Route: PO  Start: 09/08/23 0600   Admin Instructions:   For tube administration, place 15 mg tablet in syringe, draw up 4 mL water, and mix (do not crush).Or place 30 mg tablet in syringe, draw up 10 mL water, & mix (do not crush). ** Flush tube with 10 mL **     0514-Given               melatonin tablet 3 mg  Dose: 3 mg  Freq: Nightly PRN Route: PO  PRN Reason: Sleep  Start: 09/03/23 0804         mupirocin (BACTROBAN) 2 % ointment 1 application   Dose: 1 application   Freq: Every 12 Hours Scheduled Route: TOP  Start: 08/31/23 2100   Admin Instructions:   Apply to behind left Knee         0945-Given     2109-Given           0844-Given     2100              nitroglycerin (NITROSTAT) SL tablet 0.4 mg  Dose: 0.4 mg  Freq: Every 5 Minutes PRN Route: SL  PRN Reason: Chest Pain  PRN Comment: Only if SBP Greater Than 100  Start: 08/24/23 2132   Admin Instructions:   If Pain Unrelieved After 3 Doses Notify MD         ondansetron (ZOFRAN) injection 4 mg  Dose: 4 mg  Freq: Every 6 Hours PRN Route: IV  PRN Reasons: Nausea,Vomiting  Start: 08/24/23 2132   Admin Instructions:   If BOTH ondansetron (ZOFRAN) and promethazine (PHENERGAN) are ordered use ondansetron first and THEN promethazine IF ondansetron is ineffective.         Potassium Replacement - Follow Nurse / BPA Driven Protocol  Freq: As Needed Route: XX  PRN Reason: Other  Start: 08/28/23 2350   Admin Instructions:   Open Order & Select \"[facility] Electrolyte Replacement Protocol Algorithm\" to View Details         sodium chloride 0.9 % flush 10 mL  Dose: 10 mL  Freq: As Needed Route: IV  PRN Reason: Line Care  Start: 08/24/23 2132         " sodium chloride 0.9 % flush 10 mL  Dose: 10 mL  Freq: Every 12 Hours Scheduled Route: IV  Start: 08/24/23 2155    0946-Given     2109-Given           0844-Given     2100              sodium chloride 0.9 % flush 10 mL  Dose: 10 mL  Freq: As Needed Route: IV  PRN Reason: Line Care  Start: 08/24/23 1802         sodium chloride 0.9 % infusion 40 mL  Dose: 40 mL  Freq: As Needed Route: IV  PRN Reason: Line Care  Start: 08/24/23 2132   Admin Instructions:   Following administration of an IV intermittent medication, flush line with 40mL NS at 100mL/hr.         vitamin D (ERGOCALCIFEROL) capsule 50,000 Units  Dose: 50,000 Units  Freq: Every 7 Days Route: NG  Start: 09/02/23 0800        Completed Medications  Medications 09/07/23 09/08/23       adenosine (ADENOCARD) 6 MG/2ML injection  - ADS Override Pull  Start: 08/25/23 0557   End: 08/25/23 0608   Admin Instructions:   Created by cabinet override  Give by RAPID IV push followed by 20ml RAPID saline flush. Consider utilizing stopcock valve to reduce time between adenosine and flush.         adenosine (ADENOCARD) 6 MG/2ML injection  - ADS Override Pull  Start: 08/25/23 0554   End: 08/25/23 0609   Admin Instructions:   Created by cabinet override  Give by RAPID IV push followed by 20ml RAPID saline flush. Consider utilizing stopcock valve to reduce time between adenosine and flush.         barium sulfate (VARIBAR THIN LIQUID) oral suspension 55 mL  Dose: 55 mL  Freq: Once in Imaging Route: PO  Start: 08/28/23 1030   End: 08/28/23 0930   Admin Instructions:      Shake well before administration.         Barium Sulfate 60 % cream 1 teaspoon(s)  Dose: 1 teaspoon(s)  Freq: Once in Imaging Route: PO  Start: 08/28/23 1030   End: 08/28/23 0930         barium sulfate oral suspension 4 mL  Dose: 4 mL  Freq: Once in Imaging Route: PO  Start: 08/28/23 1030   End: 08/28/23 0930   Admin Instructions:      Mix with water according to package insert.  Shake well before administration.          barium sulfate oral suspension 50 mL  Dose: 50 mL  Freq: Once in Imaging Route: PO  Start: 08/28/23 1030   End: 08/28/23 0930   Admin Instructions:      Shake well before administration.         barium sulfate oral suspension 50 mL  Dose: 50 mL  Freq: Once in Imaging Route: PO  Start: 08/28/23 1030   End: 08/28/23 0930   Admin Instructions:      Shake well before administration.         calcium gluconate 3,000 mg in sodium chloride 0.9 % 100 mL IVPB  Dose: 3,000 mg  Freq: Once Route: IV  Start: 08/24/23 2300   End: 08/25/23 0109         calcium gluconate injection 1 g  Dose: 1 g  Freq: Once Route: IV  Start: 08/30/23 1100   End: 08/30/23 1347         ceFAZolin in dextrose (ANCEF) IVPB solution 2,000 mg  Dose: 2,000 mg  Freq: Once Route: IV  Indications of Use: PERIOPERATIVE PHARMACOPROPHYLAXIS  Start: 09/04/23 0939   End: 09/04/23 1138   Admin Instructions:   Caution: Look alike/sound alike drug alert         ceFAZolin in dextrose (ANCEF) IVPB solution 2,000 mg  Dose: 2,000 mg  Freq: Every 8 Hours Route: IV  Indications of Use: PERIOPERATIVE PHARMACOPROPHYLAXIS  Start: 08/30/23 0200   End: 08/31/23 2309   Admin Instructions:   Caution: Look alike/sound alike drug alert         ceFAZolin in dextrose (ANCEF) IVPB solution 2,000 mg  Dose: 2,000 mg  Freq: Once Route: IV  Indications of Use: PERIOPERATIVE PHARMACOPROPHYLAXIS  Start: 08/29/23 1745   End: 08/29/23 1846   Admin Instructions:   Caution: Look alike/sound alike drug alert         cefTRIAXone (ROCEPHIN) 1,000 mg in sodium chloride 0.9 % 100 mL IVPB-VTB  Dose: 1,000 mg  Freq: Every 24 Hours Route: IV  Indications of Use: PNEUMONIA  Start: 08/26/23 1200   End: 08/29/23 1258   Admin Instructions:   Caution: Look alike/sound alike drug alert         etomidate (AMIDATE) injection 23 mg  Dose: 23 mg  Freq: Once Route: IV  Start: 08/24/23 1818   End: 08/24/23 1803         fentaNYL citrate (PF) (SUBLIMAZE) injection 100 mcg  Dose: 100 mcg  Freq: Once Route:  IV  Start: 08/24/23 1844   End: 08/24/23 1834   Admin Instructions:   Use filter needle to withdraw dose from ampule. Based on patient request - if ordered for moderate or severe pain, provider allows for administration of a medication prescribed for a lower pain scale.  If given for pain, use the following pain scale:  Mild Pain = Pain Score of 1-3, CPOT 1-2  Moderate Pain = Pain Score of 4-6, CPOT 3-4  Severe Pain = Pain Score of 7-10, CPOT 5-8         gadobenate dimeglumine (MULTIHANCE) injection 16 mL  Dose: 16 mL  Freq: Once in Imaging Route: IV  Start: 08/28/23 1445   End: 08/28/23 1352   Admin Instructions:   Vesicant; admin as rapid bolus; flush with 5 mL NS after admin or 20 mL for renal or aortoiliofemoral vasculature         lactated ringers bolus 1,000 mL  Dose: 1,000 mL  Freq: Once Route: IV  Start: 08/24/23 1818   End: 08/24/23 1834         lactated ringers bolus 2,277 mL  Dose: 30 mL/kg  Weight Dosing Info: 75.9 kg  Freq: Once Route: IV  Start: 08/24/23 1901   End: 08/24/23 1957   Admin Instructions:   Volume of This Order May Need to Be Adjusted to Account For Fluids Already Given.  Total of Bolus(es) Should Be Close to 30 mL/kg Without Going Under         magnesium sulfate 4g/100mL (PREMIX) infusion  Dose: 4 g  Freq: Once Route: IV  Start: 08/24/23 2300   End: 08/25/23 0050         midazolam (VERSED) injection 2 mg  Dose: 2 mg  Freq: Once Route: IV  Start: 08/24/23 1844   End: 08/24/23 1834   Admin Instructions:              perflutren (DEFINITY) 8.476 mg in Sodium Chloride (PF) 0.9 % 10 mL injection  Dose: 2 mL  Freq: Once in Imaging Route: IV  Start: 08/25/23 1000   End: 08/25/23 0907   Admin Instructions:   Mix 1.3 mL of mechanically activated Definity with 8.7 mL of normal saline. A total of 2 mL of the resulting Definity solution was administered.         piperacillin-tazobactam (ZOSYN) 3.375 g in iso-osmotic dextrose 50 ml (premix)  Dose: 3.375 g  Freq: Once Route: IV  Indications of Use:  SEPSIS  Start: 08/24/23 1833   End: 08/24/23 2024   Admin Instructions:   Refrigerate         potassium chloride (KLOR-CON) packet 40 mEq  Dose: 40 mEq  Freq: Every 4 Hours Route: PO  Start: 09/01/23 0715   End: 09/01/23 1548   Admin Instructions:   For use with a feeding tube. Mix in at least 4 oz. of liquid.         potassium chloride 10 mEq in 100 mL IVPB  Dose: 10 mEq  Freq: Every 1 Hour Route: IV  Start: 08/31/23 0730   End: 08/31/23 1144   Admin Instructions:   OUTPATIENT/NON-MONITORED UNITS: Potassium Chloride standard bolus infusion rate is a maximum of 10 mEq/hr on unmonitored patients    MONITORED UNITS: Potassium Chloride standard bolus infusion rate is a maximum of 20 mEq/hr on ECG monitored patients ONLY           potassium chloride 10 mEq in 100 mL IVPB  Dose: 10 mEq  Freq: Every 1 Hour Route: IV  Start: 08/30/23 0745   End: 08/30/23 1446   Admin Instructions:   OUTPATIENT/NON-MONITORED UNITS: Potassium Chloride standard bolus infusion rate is a maximum of 10 mEq/hr on unmonitored patients    MONITORED UNITS: Potassium Chloride standard bolus infusion rate is a maximum of 20 mEq/hr on ECG monitored patients ONLY           potassium chloride 10 mEq in 100 mL IVPB  Dose: 10 mEq  Freq: Every 1 Hour Route: IV  Start: 08/29/23 0001   End: 08/29/23 0328   Admin Instructions:   Per MD two doses of IV potassium 10 mEq once and recheck in AM per protocol  OUTPATIENT/NON-MONITORED UNITS: Potassium Chloride standard bolus infusion rate is a maximum of 10 mEq/hr on unmonitored patients    MONITORED UNITS: Potassium Chloride standard bolus infusion rate is a maximum of 20 mEq/hr on ECG monitored patients ONLY           sodium phosphates 15 mmol in 250 mL 0.9% sodium chloride IVPB  Dose: 15 mmol  Freq: Once Route: IV  Start: 09/01/23 0900   End: 09/01/23 1328         sodium phosphates 15 mmol in 250 mL 0.9% sodium chloride IVPB  Dose: 15 mmol  Freq: Once Route: IV  Start: 08/31/23 0830   End: 08/31/23 7778          thiamine (B-1) 500 mg in sodium chloride 0.9 % 100 mL IVPB  Dose: 500 mg  Freq: Every 8 Hours Scheduled Route: IV  Start: 08/28/23 1400   End: 08/31/23 0601   Admin Instructions:   Doses of up to 250mg, give over 1-2 minutes (IV push). Doses 250mg and above, give over 30 minutes.        Followed by  thiamine (B-1) injection 200 mg  Dose: 200 mg  Freq: Every 8 Hours Scheduled Route: IV  Start: 08/31/23 1400   End: 09/04/23 1359   Admin Instructions:   Doses of up to 250mg, give over 1-2 minutes (IV push). Doses 250mg and above, give over 30 minutes.        Followed by  thiamine (VITAMIN B-1) tablet 100 mg  Dose: 100 mg  Freq: Daily Route: PO  Start: 09/04/23 1400   End: 09/07/23 1802    0945-Given               Discontinued Medications  Medications 09/07/23 09/08/23       aluminum-magnesium hydroxide-simethicone (MAALOX MAX) 400-400-40 MG/5ML suspension 15 mL  Dose: 15 mL  Freq: Every 6 Hours PRN Route: PO  PRN Reason: Heartburn  Start: 08/24/23 2132   End: 08/29/23 1519   Admin Instructions:   Maximum 60 mL in 24 hours.         aspirin chewable tablet 81 mg  Dose: 81 mg  Freq: Daily Route: PO  Start: 08/29/23 1545   End: 08/29/23 1510   Admin Instructions:   Herbal/drug interaction: Avoid use with ginkgo biloba. Based on patient request - if ordered for moderate or severe pain, provider allows for administration of a medication prescribed for a lower pain scale.  Do not exceed 4 grams of aspirin in a 24 hr period.    If given for pain, use the following pain scale:   Mild Pain = Pain Score of 1-3, CPOT 1-2  Moderate Pain = Pain Score of 4-6, CPOT 3-4  Severe Pain = Pain Score of 7-10, CPOT 5-8         aspirin EC tablet 81 mg  Dose: 81 mg  Freq: Daily Route: PO  Start: 08/31/23 0900   End: 09/01/23 1150   Admin Instructions:   Swallow tablet whole.  Do not crush, chew, or split. Based on patient request - if ordered for moderate or severe pain, provider allows for administration of a medication prescribed for a lower  pain scale.  Do not exceed 4 grams of aspirin in a 24 hr period.    If given for pain, use the following pain scale:   Mild Pain = Pain Score of 1-3, CPOT 1-2  Moderate Pain = Pain Score of 4-6, CPOT 3-4  Severe Pain = Pain Score of 7-10, CPOT 5-8         sennosides-docusate (PERICOLACE) 8.6-50 MG per tablet 2 tablet  Dose: 2 tablet  Freq: 2 Times Daily Route: PO  Start: 08/24/23 2155   End: 09/01/23 1151   Admin Instructions:   HOLD MEDICATION IF PATIENT HAS HAD BOWEL MOVEMENT. Start bowel management regimen if patient has not had a bowel movement after 12 hours.        And  polyethylene glycol (MIRALAX) packet 17 g  Dose: 17 g  Freq: Daily PRN Route: PO  PRN Reason: Constipation  PRN Comment: Use if senna-docusate is ineffective  Start: 08/24/23 2132   End: 09/01/23 1151   Admin Instructions:   Use if no bowel movement after 12 hours. Mix in 6-8 ounces of water.  Use 4-8 ounces of water, tea, or juice for each 17 gram dose.        And  bisacodyl (DULCOLAX) EC tablet 5 mg  Dose: 5 mg  Freq: Daily PRN Route: PO  PRN Reason: Constipation  PRN Comment: Use if polyethylene glycol is ineffective  Start: 08/24/23 2132   End: 09/01/23 1151   Admin Instructions:   Use if no bowel movement after 12 hours.  Swallow whole. Do not crush, split, or chew tablet.        And  bisacodyl (DULCOLAX) suppository 10 mg  Dose: 10 mg  Freq: Daily PRN Route: RE  PRN Reason: Constipation  PRN Comment: Use if bisacodyl oral is ineffective  Start: 08/24/23 2132   End: 09/01/23 1151   Admin Instructions:   Use if no bowel movement after 12 hours.  Hold for diarrhea         bupivacaine-EPINEPHrine PF (MARCAINE w/EPI) 0.5% [id number] injection  Freq: As Needed  Start: 08/29/23 1935   End: 08/29/23 2001         calcitriol (CALCIJEX) injection 1 mcg  Dose: 1 mcg  Freq: Once per day on Mon Wed Fri Route: IV  Start: 08/30/23 1230   End: 09/01/23 2037         chlorhexidine (PERIDEX) 0.12 % solution 15 mL  Dose: 15 mL  Freq: Every 12 Hours Scheduled  Route: MT  Start: 08/24/23 2155   End: 08/26/23 0743   Admin Instructions:   Use Chlorhexidine Provided in Oral Care Kit & Swab Mouth As Directed     Do not swallow.         dextrose (D5W) 5 % infusion  Rate: 50 mL/hr Dose: 50 mL/hr  Freq: Continuous Route: IV  Start: 08/28/23 0100   End: 08/31/23 1423         dextrose (D5W) 5 % infusion  Rate: 100 mL/hr Dose: 100 mL/hr  Freq: Continuous Route: IV  Start: 08/27/23 1115   End: 08/27/23 2159         diphenhydrAMINE (BENADRYL) injection 12.5 mg  Dose: 12.5 mg  Freq: Every 15 Minutes PRN Route: IV  PRN Reason: Itching  PRN Comment: May repeat x 1  Start: 09/04/23 1154   End: 09/04/23 1237   Admin Instructions:   Caution: Look alike/sound alike drug alert. This med may be ordered in other forms and routes. Before giving verify the last time the drug was given by any route/form.           diphenhydrAMINE (BENADRYL) injection 12.5 mg  Dose: 12.5 mg  Freq: Every 15 Minutes PRN Route: IV  PRN Reason: Itching  PRN Comment: May repeat x 1  Start: 08/29/23 1945   End: 08/29/23 2034   Admin Instructions:   25 mg may be given IV push over less than 1 minute.  Caution: Look alike/sound alike drug alert. This med may be ordered in other forms and routes. Before giving verify the last time the drug was given by any route/form.           diphenhydrAMINE (BENADRYL) injection 12.5 mg  Dose: 12.5 mg  Freq: Once As Needed Route: IV  PRN Comment: Nausea/Vomiting  Start: 08/29/23 1945   End: 08/29/23 2034   Admin Instructions:   Caution: Look alike/sound alike drug alert. This med may be ordered in other forms and routes. Before giving verify the last time the drug was given by any route/form.           docusate sodium (COLACE) capsule 100 mg  Dose: 100 mg  Freq: 2 Times Daily Route: PO  Start: 08/29/23 2130   End: 09/07/23 2039   Admin Instructions:   Swallow whole.  Do not open, crush, or chew capsule.    (3817)-Not Given                droperidol (INAPSINE) injection 0.625 mg  Dose:  0.625 mg  Freq: Every 20 Minutes PRN Route: IV  PRN Reasons: Nausea,Vomiting  Indications of Use: NAUSEA AND VOMITING  Start: 09/04/23 1154   End: 09/04/23 1237   Admin Instructions:   Use ondansetron first; proceed to droperidol for nausea refractory to ondansetron.        Or  droperidol (INAPSINE) injection 0.625 mg  Dose: 0.625 mg  Freq: Every 20 Minutes PRN Route: IM  PRN Reasons: Nausea,Vomiting  Indications of Use: NAUSEA AND VOMITING  Start: 09/04/23 1154   End: 09/04/23 1237   Admin Instructions:   Use ondansetron first; proceed to droperidol for nausea refractory to ondansetron.         enalaprilat (VASOTEC) injection 2.5 mg  Dose: 2.5 mg  Freq: Once As Needed Route: IV  PRN Reason: High Blood Pressure  PRN Comment: for systolic blood pressure greater than 180 mmHg or diastolic blood pressure greater than 105 mmHg  Start: 08/29/23 1945   End: 08/29/23 2034   Admin Instructions:   Hold for SBP less than 100, DBP less than 60  Give slow IV push over 5 minutes.         ePHEDrine injection 5 mg  Dose: 5 mg  Freq: Once As Needed Route: IV  PRN Comment: symptomatic hypotension - Notify attending anesthesiologist if this needs to be given  Start: 09/04/23 1154   End: 09/04/23 1237   Admin Instructions:   Caution: Look alike/sound alike drug alert   Dilute with NS to 5-10 mg/mL.  Central line preferred, if unavailable use large bore IV access with frequent nurse monitoring of IV site.         famotidine (PEPCID) injection 20 mg  Dose: 20 mg  Freq: Daily Route: IV  Start: 08/26/23 0900   End: 09/04/23 1237   Admin Instructions:   Dose adjusted for renal function per P&T approved policy    Give IV push over 2 minutes.         famotidine (PEPCID) injection 20 mg  Dose: 20 mg  Freq: 2 Times Daily Route: IV  Start: 08/24/23 2200   End: 08/25/23 1509   Admin Instructions:   Give IV push over 2 minutes.         fentaNYL citrate (PF) (SUBLIMAZE) injection 25 mcg  Dose: 25 mcg  Freq: Every 5 Minutes PRN Route: IV  PRN  Reason: Severe Pain  Start: 09/04/23 1154   End: 09/04/23 1237   Admin Instructions:   Maximum total dose of fentanyl is 200 mcg.  If given for pain, use the following pain scale:  Mild Pain = Pain Score of 1-3, CPOT 1-2  Moderate Pain = Pain Score of 4-6, CPOT 3-4  Severe Pain = Pain Score of 7-10, CPOT 5-8         fentaNYL citrate (PF) (SUBLIMAZE) injection 25 mcg  Dose: 25 mcg  Freq: Every 5 Minutes PRN Route: IV  PRN Reason: Severe Pain  Start: 08/29/23 1945   End: 08/29/23 2034   Admin Instructions:   Maximum total dose of fentaNYL is 100 mcg. Based on patient request - if ordered for moderate or severe pain, provider allows for administration of a medication prescribed for a lower pain scale.  If given for pain, use the following pain scale:  Mild Pain = Pain Score of 1-3, CPOT 1-2  Moderate Pain = Pain Score of 4-6, CPOT 3-4  Severe Pain = Pain Score of 7-10, CPOT 5-8         fentaNYL citrate (PF) (SUBLIMAZE) injection 50 mcg  Dose: 50 mcg  Freq: Every 1 Hour PRN Route: IV  PRN Reason: Severe Pain  Start: 08/24/23 2220   End: 08/26/23 1100   Admin Instructions:   Use filter needle to withdraw dose from ampule. Based on patient request - if ordered for moderate or severe pain, provider allows for administration of a medication prescribed for a lower pain scale.  If given for pain, use the following pain scale:  Mild Pain = Pain Score of 1-3, CPOT 1-2  Moderate Pain = Pain Score of 4-6, CPOT 3-4  Severe Pain = Pain Score of 7-10, CPOT 5-8         flumazenil (ROMAZICON) injection 0.2 mg  Dose: 0.2 mg  Freq: As Needed Route: IV  PRN Comment: for benzodiazepine induced unresponsiveness or sedation  Indications of Use: BENZODIAZEPINE-INDUCED SEDATION  Start: 09/04/23 1154   End: 09/04/23 1237   Admin Instructions:   Notify Anesthesia if given  ** give IV over 15-30 seconds **         folic acid 1 mg in sodium chloride 0.9 % 50 mL IVPB  Dose: 1 mg  Freq: Daily Route: IV  Start: 08/28/23 1400   End: 09/07/23 1801    Admin Instructions:   Protect from light.    1231-New Bag                gelatin absorbable (GELFOAM) sponge  Freq: As Needed  Start: 08/29/23 1919   End: 08/29/23 2001         hydrALAZINE (APRESOLINE) injection 5 mg  Dose: 5 mg  Freq: Every 10 Minutes PRN Route: IV  PRN Reason: High Blood Pressure  PRN Comment: for systolic blood pressure greater than 180 mmHg or diastolic blood pressure greater than 105 mmHg  Start: 09/04/23 1154   End: 09/04/23 1237   Admin Instructions:   Up to 20 mg. If labetalol and hydralazine are both ordered, use labetalol first.  Caution: Look alike/sound alike drug alert         HYDROcodone-acetaminophen (NORCO) 5-325 MG per tablet 1 tablet  Dose: 1 tablet  Freq: Once As Needed Route: PO  PRN Reason: Moderate Pain  Start: 09/04/23 1154   End: 09/04/23 1237   Admin Instructions:   Based on patient request - if ordered for moderate or severe pain, provider allows for administration of a medication prescribed for a lower pain scale.  [MACIE]    Do not exceed 4 grams of acetaminophen in a 24 hr period. Max dose of 2gm for AST/ALT greater than 120 units/L        If given for pain, use the following pain scale:   Mild Pain = Pain Score of 1-3, CPOT 1-2  Moderate Pain = Pain Score of 4-6, CPOT 3-4  Severe Pain = Pain Score of 7-10, CPOT 5-8         HYDROcodone-acetaminophen (NORCO) 5-325 MG per tablet 1 tablet  Dose: 1 tablet  Freq: Every 6 Hours PRN Route: PO  PRN Reason: Moderate Pain  Start: 08/29/23 2036   End: 09/05/23 2035   Admin Instructions:   Based on patient request - if ordered for moderate or severe pain, provider allows for administration of a medication prescribed for a lower pain scale.  [MACIE]    Do not exceed 4 grams of acetaminophen in a 24 hr period. Max dose of 2gm for AST/ALT greater than 120 units/L        If given for pain, use the following pain scale:   Mild Pain = Pain Score of 1-3, CPOT 1-2  Moderate Pain = Pain Score of 4-6, CPOT 3-4  Severe Pain = Pain Score of 7-10,  CPOT 5-8         HYDROcodone-acetaminophen (NORCO) 7.5-325 MG per tablet 1 tablet  Dose: 1 tablet  Freq: Every 4 Hours PRN Route: PO  PRN Reason: Severe Pain  Start: 09/04/23 1154   End: 09/04/23 1237   Admin Instructions:   Based on patient request - if ordered for moderate or severe pain, provider allows for administration of a medication prescribed for a lower pain scale.  [MACIE]    Do not exceed 4 grams of acetaminophen in a 24 hr period. Max dose of 2gm for AST/ALT greater than 120 units/L        If given for pain, use the following pain scale:   Mild Pain = Pain Score of 1-3, CPOT 1-2  Moderate Pain = Pain Score of 4-6, CPOT 3-4  Severe Pain = Pain Score of 7-10, CPOT 5-8         HYDROmorphone (DILAUDID) injection 0.25 mg  Dose: 0.25 mg  Freq: Every 5 Minutes PRN Route: IV  PRN Reason: Moderate Pain  Start: 09/04/23 1154   End: 09/04/23 1237   Admin Instructions:   Maximum total dose of hydromorphone is 2 mg.  If given for pain, use the following pain scale:  Mild Pain = Pain Score of 1-3, CPOT 1-2  Moderate Pain = Pain Score of 4-6, CPOT 3-4  Severe Pain = Pain Score of 7-10, CPOT 5-8         HYDROmorphone (DILAUDID) injection 0.5 mg  Dose: 0.5 mg  Freq: Every 5 Minutes PRN Route: IV  PRN Reason: Moderate Pain  Start: 08/29/23 1945   End: 08/29/23 2034   Admin Instructions:   Maximum total dose of HYDROmorphone is 2 mg. Based on patient request - if ordered for moderate or severe pain, provider allows for administration of a medication prescribed for a lower pain scale.  If given for pain, use the following pain scale:  Mild Pain = Pain Score of 1-3, CPOT 1-2  Moderate Pain = Pain Score of 4-6, CPOT 3-4  Severe Pain = Pain Score of 7-10, CPOT 5-8         ipratropium-albuterol (DUO-NEB) nebulizer solution 3 mL  Dose: 3 mL  Freq: Once As Needed Route: NEBULIZATION  PRN Reasons: Wheezing,Shortness of Air  PRN Comment: bronchospasm  Start: 09/04/23 1154   End: 09/04/23 1237   Admin Instructions:   Notify  Anesthesia if minineb is given         ipratropium-albuterol (DUO-NEB) nebulizer solution 3 mL  Dose: 3 mL  Freq: Once As Needed Route: NEBULIZATION  PRN Reasons: Wheezing,Shortness of Air  PRN Comment: bronchospasm  Start: 08/29/23 1945   End: 08/29/23 2034   Admin Instructions:   Include Respiratory Treatment Education         labetalol (NORMODYNE,TRANDATE) injection 5 mg  Dose: 5 mg  Freq: Every 5 Minutes PRN Route: IV  PRN Reason: High Blood Pressure  PRN Comment: for systolic blood pressure greater than 180 mmHg or diastolic blood pressure greater than 105 mmHg  Start: 09/04/23 1154   End: 09/04/23 1237   Admin Instructions:   Hold for heart rate less than 60.    If labetalol and hydralazine are both ordered, use labetalol first. Maximum dose of labetalol is 20mg IV while in PACU, notify anesthesiologist for further orders if needed.  Give IV Push over 2 minutes.         labetalol (NORMODYNE,TRANDATE) injection 5 mg  Dose: 5 mg  Freq: Every 5 Minutes PRN Route: IV  PRN Reason: High Blood Pressure  PRN Comment: for systolic blood pressure greater than 180 mmHg or diastolic blood pressure greater than 105 mmHg  Start: 08/29/23 1945   End: 08/29/23 2034   Admin Instructions:   Hold for heart rate less than 60.  Give IV Push over 2 minutes.         lactated ringers bolus 2,000 mL  Dose: 2,000 mL  Freq: Once Route: IV  Start: 08/24/23 2300   End: 08/25/23 1036         lactated ringers infusion  Rate: 75 mL/hr Dose: 75 mL/hr  Freq: Continuous Route: IV  Start: 09/03/23 1530   End: 09/07/23 1801         lactated ringers infusion  Rate: 100 mL/hr Dose: 100 mL/hr  Freq: Continuous Route: IV  Start: 08/26/23 1015   End: 08/27/23 1021         lactated ringers infusion  Rate: 150 mL/hr Dose: 150 mL/hr  Freq: Continuous Route: IV  Start: 08/25/23 1130   End: 08/26/23 1110         lidocaine (XYLOCAINE) 1 % injection 0.5 mL  Dose: 0.5 mL  Freq: Once As Needed Route: ID  PRN Comment: IV Start  Start: 08/29/23 1739   End:  08/29/23 2000         naloxone (NARCAN) injection 0.2 mg  Dose: 0.2 mg  Freq: As Needed Route: IV  PRN Reasons: Opioid Reversal,Respiratory Depression  PRN Comment: unresponsiveness, decrease oxygen saturation  Indications of Use: ACUTE RESPIRATORY FAILURE,OPIOID-INDUCED RESPIRATORY DEPRESSION  Start: 09/04/23 1154   End: 09/04/23 1237   Admin Instructions:   Notify Anesthesia if given         naloxone (NARCAN) injection 0.4 mg  Dose: 0.4 mg  Freq: As Needed Route: IV  PRN Reason: Opioid Reversal  PRN Comment: unresponsiveness, decrease oxygen saturation  Start: 08/29/23 1945   End: 08/29/23 2034         norepinephrine (LEVOPHED) 1 MG/ML injection  - ADS Override Pull  Start: 08/25/23 0553   End: 08/25/23 1759   Admin Instructions:   Created by cabinet override  Central line preferred, if unavailable use large bore IV access with frequent nurse monitoring of IV site.         Norepinephrine-Sodium Chloride (LEVOPHED) 8-0.9 MG/250ML-% infusion solution  - ADS Override Pull  Start: 08/25/23 0555   End: 08/25/23 1759   Admin Instructions:   Created by cabinet override  Concentration 8 mg/250 mL          Central line preferred, if unavailable use large bore IV access with frequent nurse monitoring of IV site.         ondansetron (ZOFRAN) injection 4 mg  Dose: 4 mg  Freq: Once As Needed Route: IV  PRN Reasons: Nausea,Vomiting  Indications of Use: POSTOPERATIVE NAUSEA AND VOMITING  Start: 09/04/23 1154   End: 09/04/23 1237   Admin Instructions:   If BOTH ondansetron (ZOFRAN) and promethazine (PHENERGAN) are ordered use ondansetron first and THEN promethazine IF ondansetron is ineffective.         ondansetron (ZOFRAN) injection 4 mg  Dose: 4 mg  Freq: Once As Needed Route: IV  PRN Reasons: Nausea,Vomiting  PRN Comment: If not given pre-op/intra-op  Start: 08/29/23 1945   End: 08/29/23 2034   Admin Instructions:   If BOTH ondansetron (ZOFRAN) and promethazine (PHENERGAN) are ordered use ondansetron first and THEN  promethazine IF ondansetron is ineffective.         oxyCODONE (ROXICODONE) immediate release tablet 5 mg  Dose: 5 mg  Freq: Once As Needed Route: PO  PRN Reason: Moderate Pain  Start: 08/29/23 1945   End: 08/29/23 2034   Admin Instructions:   Based on patient request - if ordered for moderate or severe pain, provider allows for administration of a medication prescribed for a lower pain scale.        If given for pain, use the following pain scale:  Mild Pain = Pain Score of 1-3, CPOT 1-2  Moderate Pain = Pain Score of 4-6, CPOT 3-4  Severe Pain = Pain Score of 7-10, CPOT 5-8         pantoprazole (PROTONIX) injection 40 mg  Dose: 40 mg  Freq: Every 12 Hours Scheduled Route: IV  Indications of Use: Gastrointestinal (GI) Bleed  Start: 09/04/23 1330   End: 09/07/23 1524   Admin Instructions:   Dilute with 10 mL of 0.9% NaCl and give IV push over 2 minutes.    0944-Given                Pharmacy to Dose enoxaparin (LOVENOX)  Freq: Continuous PRN Route: XX  PRN Reason: Consult  Indications of Use: PROPHYLAXIS OF VENOUS THROMBOEMBOLISM  Start: 09/05/23 1633   End: 09/05/23 1633         piperacillin-tazobactam (ZOSYN) 3.375 g in iso-osmotic dextrose 50 ml (premix)  Dose: 3.375 g  Freq: Every 8 Hours Route: IV  Indications of Use: SEPSIS  Start: 08/24/23 2300   End: 08/26/23 1103   Admin Instructions:   Refrigerate         potassium chloride 10 mEq in 100 mL IVPB  Dose: 10 mEq  Freq: Every 1 Hour Route: IV  Start: 08/29/23 0900   End: 08/29/23 1459   Admin Instructions:   OUTPATIENT/NON-MONITORED UNITS: Potassium Chloride standard bolus infusion rate is a maximum of 10 mEq/hr on unmonitored patients    MONITORED UNITS: Potassium Chloride standard bolus infusion rate is a maximum of 20 mEq/hr on ECG monitored patients ONLY           potassium chloride 10 mEq in 100 mL IVPB  Dose: 10 mEq  Freq: Every 1 Hour Route: IV  Start: 08/24/23 2300   End: 08/25/23 0306   Admin Instructions:   OUTPATIENT/NON-MONITORED UNITS: Potassium  Chloride standard bolus infusion rate is a maximum of 10 mEq/hr on unmonitored patients    MONITORED UNITS: Potassium Chloride standard bolus infusion rate is a maximum of 20 mEq/hr on ECG monitored patients ONLY           promethazine (PHENERGAN) suppository 25 mg  Dose: 25 mg  Freq: Once As Needed Route: RE  PRN Reasons: Nausea,Vomiting  Start: 09/04/23 1154   End: 09/04/23 1237   Admin Instructions:   If BOTH ondansetron (ZOFRAN) and promethazine (PHENERGAN) are ordered use ondansetron first and THEN promethazine IF ondansetron is ineffective.        Or  promethazine (PHENERGAN) tablet 25 mg  Dose: 25 mg  Freq: Once As Needed Route: PO  PRN Reasons: Nausea,Vomiting  Start: 09/04/23 1154   End: 09/04/23 1237   Admin Instructions:   If BOTH ondansetron (ZOFRAN) and promethazine (PHENERGAN) are ordered use ondansetron first and THEN promethazine IF ondansetron is ineffective.             propofol (DIPRIVAN) infusion 10 mg/mL 100 mL  Rate: 2.28-22.77 mL/hr Dose: 5-50 mcg/kg/min  Weight Dosing Info: 75.9 kg  Freq: Titrated Route: IV  Start: 08/24/23 2315   End: 08/26/23 0743   Admin Instructions:   Do not administer through the same IV catheter with blood or plasma. Begin infusion at 5 mcg/kg/min and titrate up or down by 5-10 mcg/kg/min every 3-5 minutes to maintain RASS goal. Maximum rate 50 mcg/kg/min. Notify MD if not at RASS goal and requiring more than 50 mcg/kg/min. Infuse into dedicated line, change vial and tube every 12 hours. Patient must be intubated.   Order specific questions:   Titration Indication: Sedation           sodium chloride (NS) irrigation solution  Freq: As Needed  Start: 08/29/23 1933   End: 08/29/23 2001         sodium chloride 0.9 % flush 3 mL  Dose: 3 mL  Freq: Every 12 Hours Scheduled Route: IV  Start: 08/29/23 2100   End: 08/29/23 2000         sodium chloride 0.9 % flush 3-10 mL  Dose: 3-10 mL  Freq: As Needed Route: IV  PRN Reason: Line Care  Start: 08/29/23 1739   End: 08/29/23 2000          sodium chloride 0.9 % solution  Freq: As Needed  Start: 08/29/23 1932   End: 08/29/23 2001         sodium chloride 10 mL with ceFAZolin 2 g irrigation  Freq: As Needed  Start: 08/29/23 1933   End: 08/29/23 2001         thiamine (B-1) injection 200 mg  Dose: 200 mg  Freq: Daily Route: IV  Start: 08/27/23 1730   End: 08/28/23 1132   Admin Instructions:   Doses of up to 250mg, give over 1-2 minutes (IV push). Doses 250mg and above, give over 30 minutes.         thrombin (THROMBIN-JMI) spray kit  Freq: As Needed  Start: 08/29/23 1931   End: 08/29/23 2001         vitamin D (ERGOCALCIFEROL) capsule 50,000 Units  Dose: 50,000 Units  Freq: Every 7 Days Route: PO  Start: 08/30/23 0900   End: 08/30/23 1135                    Lab Results (last 48 hours)       Procedure Component Value Units Date/Time    POC Glucose Once [[id number]]  (Normal) Collected: 09/07/23 2357    Specimen: Blood Updated: 09/07/23 2358     Glucose 120 mg/dL     POC Glucose Once [[id number]]  (Normal) Collected: 09/07/23 1755    Specimen: Blood Updated: 09/07/23 1756     Glucose 116 mg/dL     POC Glucose Once [[id number]]  (Normal) Collected: 09/07/23 1148    Specimen: Blood Updated: 09/07/23 1150     Glucose 117 mg/dL     Basic Metabolic Panel [[id number]]  (Abnormal) Collected: 09/07/23 0739    Specimen: Blood Updated: 09/07/23 0817     Glucose 120 mg/dL      BUN 13 mg/dL      Creatinine 0.79 mg/dL      Sodium 138 mmol/L      Potassium 4.1 mmol/L      Chloride 104 mmol/L      CO2 27.5 mmol/L      Calcium 8.7 mg/dL      BUN/Creatinine Ratio 16.5     Anion Gap 6.5 mmol/L      eGFR 103.6 mL/min/1.73     Narrative:      GFR Normal >60  Chronic Kidney Disease <60  Kidney Failure <15      CBC (No Diff) [[id number]]  (Abnormal) Collected: 09/07/23 0739    Specimen: Blood Updated: 09/07/23 0758     WBC 6.09 10*3/mm3      RBC 4.13 10*6/mm3      Hemoglobin 12.2 g/dL      Hematocrit 36.6 %      MCV 88.6 fL      MCH 29.5 pg      MCHC 33.3 g/dL      RDW 13.2 %       RDW-SD 43.0 fl      MPV 9.1 fL      Platelets 304 10*3/mm3     POC Glucose Once [[id number]]  (Normal) Collected: 09/07/23 0612    Specimen: Blood Updated: 09/07/23 0614     Glucose 115 mg/dL     POC Glucose Once [[id number]]  (Normal) Collected: 09/06/23 2358    Specimen: Blood Updated: 09/06/23 2359     Glucose 124 mg/dL     POC Glucose Once [[id number]]  (Normal) Collected: 09/06/23 2104    Specimen: Blood Updated: 09/06/23 2105     Glucose 125 mg/dL     POC Glucose Once [[id number]]  (Normal) Collected: 09/06/23 1841    Specimen: Blood Updated: 09/06/23 1843     Glucose 112 mg/dL     Tissue Pathology Exam [[id number]] Collected: 09/04/23 1138    Specimen: Tissue from Stomach Updated: 09/06/23 1508     Case Report --     Surgical Pathology Report                         Case: [id number]                                  Authorizing Provider:  Yoshi Sahu,  Collected:           09/04/2023 11:38 AM                                 MD                                                                           Ordering Location:     [facility]  Received:            09/05/2023 08:56 AM                                 MAIN OR                                                                      Pathologist:           Samra Muñoz MD                                                          Specimen:    Stomach, GE junctioon                                                                       Final Diagnosis --     1. Gastroesophageal Junction, Biopsy:    A. Acute erosive esophagitis with polarizable material (see comment).   B. No columnar mucosa is present for microscopic evaluation.    jab/marie        Comment --     The findings could suggest a pill esophagitis in the appropriate clinical and endoscopic context.        Gross Description --     1. Stomach.  The specimen is received in formalin labeled with the patient's name and further designated 'GE junction biopsy' is a small fragment of  gray-tan tissue. The specimen is submitted for embedding as received.              Imaging Results (Last 48 Hours)       ** No results found for the last 48 hours. **          ECG/EMG Results (last 48 hours)       Procedure Component Value Units Date/Time    SCANNED - TELEMETRY   [[id number]] Resulted: 08/24/23     Updated: 09/06/23 1651    SCANNED - TELEMETRY   [[id number]] Resulted: 08/24/23     Updated: 09/06/23 2106    SCANNED - TELEMETRY   [[id number]] Resulted: 08/24/23     Updated: 09/07/23 0455    SCANNED - TELEMETRY   [[id number]] Resulted: 08/24/23     Updated: 09/07/23 0902    SCANNED - TELEMETRY   [[id number]] Resulted: 08/24/23     Updated: 09/07/23 1531    SCANNED - TELEMETRY   [[id number]] Resulted: 08/24/23     Updated: 09/08/23 0254    SCANNED - TELEMETRY   [[id number]] Resulted: 08/24/23     Updated: 09/08/23 0340    SCANNED - TELEMETRY   [[id number]] Resulted: 08/24/23     Updated: 09/08/23 0925             Operative/Procedure Notes (all)                 Procedures signed by Yoshi Sahu MD at 09/05/23 1213   Version 1 of 1       Procedure Orders    1. UPPER GI ENDOSCOPY [[id number]] ordered by Yoshi Sahu MD at 09/04/23 1021               [Media Unavailable] Scan on 9/4/2023 1021 by Yoshi Sahu MD: EGD          Electronically signed by Yoshi Sahu MD at 09/05/23 1213       Yoshi Sahu MD at 09/04/23 1126  Version 1 of 1         Preoperative diagnosis:   Dysphagia  Postoperative diagnosis:   Same    Procedure: EGD+ PEG insertion and cold forceps biopsy of the esophagus.  Attending surgeon: Yoshi Sahu MD  Anesthesia: MAC  Specimens: GE junction biopsy  Blood loss: Minimal  Drains: 20 [place] push PEG.    Indications:   Patient is a 57 y.o. y.o. year old man who has dysphagia has failed swallow evaluation.  He is here today for PEG tube placement.  Risk and benefits of procedure including bleeding, infection, perforation, hollow  viscus injury discussed in detail with the patient that verbalized understanding and agreed with the plan     Description of procedure: He is taken to the operative room and positioned on the stretcher in the supine position. A surgical pause was performed. After graduated amounts of sedation were administered, the flexible endoscope was inserted into the mouth through bite-block.     It was advanced slowly into the esophagus and stomach.  Stomach was insufflated.      A suitable location for placement of the PEG tube was identified by transillumination with the endoscopic light source and careful ballottement of the abdominal wall.  A spinal needle was used to percutaneously enter the stomach after prepping the skin and anesthetizing with 2% lidocaine.  A guidewire was passed.  It was grasped with the endoscopic snare.  The guidewire, snare and endoscope were withdrawn through the patient's mouth.  A 20 [place] push style PEG tube was advanced over the guidewire.  It was seated at 3 cm from the bumper according to the markings on the tube.  The retention flange and feeding tube adapter were attached.  Confirmation of the position of the PEG tube was performed by endoscopy.  The scope was introduced through the patient's mouth all the way to the stomach under direct vision.  There was evidence of esophagitis with bleeding.  Cold forceps biopsies were obtained.  Irrigation was performed.  There was minimal bleeding after that.  The bumper was found inside the stomach.  The scope was withdrawn slowly     He tolerated the procedure very well, and is returned to the recovery area in stable condition.     Instructions:   -Please connect PEG tube to gravity bag  -Can use for water and meds immediately  -Start tube feeds in 24 hours.  Start feeds at 20 cc/h and advance by 20 cc every 4 hours to goal.  -Nutrition consultation for tube feeds recommendations  -Keep abdominal binder in place   -Protonix IV twice daily for at  least 3 days, switch to p.o. when able to    Electronically signed by Yoshi Sahu MD at 23 1149          Physician Progress Notes (last 48 hours)        Darron Fletcher MD at [id number]              Name: Braxton Wolfe ADMIT: 2023   : 1965  PCP: Provider, No Known    MRN: [id number] LOS: 14 days   AGE/SEX: 57 y.o. male  ROOM: [facility]     Subjective   Subjective   No events or complaints.      Objective   Objective   Vital Signs  Temp:  [97.1 °F (36.2 °C)-98.4 °F (36.9 °C)] 97.8 °F (36.6 °C)  Heart Rate:  [63-72] 63  Resp:  [18] 18  BP: ()/(69-74) 115/74  SpO2:  [95 %-97 %] 95 %  on   ;   Device (Oxygen Therapy): room air  Body mass index is 24.33 kg/m².  Physical Exam  Vitals reviewed.   Constitutional:       General: He is not in acute distress.     Appearance: He is not ill-appearing.   Cardiovascular:      Rate and Rhythm: Normal rate and regular rhythm.   Pulmonary:      Effort: No respiratory distress.      Breath sounds: Normal breath sounds.   Abdominal:      General: There is no distension.      Palpations: Abdomen is soft.      Tenderness: There is no abdominal tenderness.   Musculoskeletal:      Right lower leg: No edema.      Left lower leg: No edema.   Skin:     General: Skin is warm and dry.   Neurological:      Mental Status: He is alert and oriented to person, place, and time.     Results Review     I reviewed the patient's new clinical results.  Results from last 7 days   Lab Units 23  0739 23  0554 23  0604 23  0535   WBC 10*3/mm3 6.09 8.42 7.27 7.67   HEMOGLOBIN g/dL 12.2* 11.4* 12.9* 12.8*   PLATELETS 10*3/mm3 304 305 303 313     Results from last 7 days   Lab Units 23  0739 23  0554 23  0604 23  0535   SODIUM mmol/L 138 140 141 141   POTASSIUM mmol/L 4.1 3.7 3.9 3.9   CHLORIDE mmol/L 104 104 104 106   CO2 mmol/L 27.5 29.0 25.1 26.4   BUN mg/dL 13 13 13 14   CREATININE mg/dL 0.79 0.69* 0.82 0.89    GLUCOSE mg/dL 120* 129* 67 78   EGFR mL/min/1.73 103.6 107.9 102.5 100.0     Results from last 7 days   Lab Units 09/06/23  0554 09/05/23  0604 09/04/23  0535 09/03/23  0708   ALBUMIN g/dL 3.0* 3.3* 3.3* 3.3*     Results from last 7 days   Lab Units 09/07/23  0739 09/06/23  0554 09/05/23  0604 09/04/23  0535 09/03/23  0708   CALCIUM mg/dL 8.7 8.5* 8.6 8.6 8.7   ALBUMIN g/dL  --  3.0* 3.3* 3.3* 3.3*   MAGNESIUM mg/dL  --  2.2 2.0 2.1 2.2   PHOSPHORUS mg/dL  --  3.0 2.7 3.3 3.2       Glucose   Date/Time Value Ref Range Status   09/07/2023 1148 117 70 - 130 mg/dL Final   09/07/2023 0612 115 70 - 130 mg/dL Final   09/06/2023 2358 124 70 - 130 mg/dL Final   09/06/2023 2104 125 70 - 130 mg/dL Final   09/06/2023 1841 112 70 - 130 mg/dL Final   09/06/2023 1123 121 70 - 130 mg/dL Final   09/06/2023 0619 145 (H) 70 - 130 mg/dL Final       No radiology results for the last day    I have personally reviewed all medications:  Scheduled Medications  aspirin, 81 mg, Nasogastric, Daily  atorvastatin, 40 mg, Oral, Nightly  docusate sodium, 100 mg, Oral, BID  folic acid (FOLVITE) IVPB, 1 mg, Intravenous, Daily  [START ON 9/8/2023] lansoprazole, 15 mg, Oral, Q AM  mupirocin, 1 application , Topical, Q12H  polyethylene glycol, 17 g, Nasogastric, Daily  sodium chloride, 10 mL, Intravenous, Q12H  thiamine, 100 mg, Oral, Daily  vitamin D, 50,000 Units, Nasogastric, Q7 Days    Infusions  lactated ringers, 9 mL/hr, Last Rate: 9 mL/hr (08/29/23 1818)  lactated ringers, 75 mL/hr, Last Rate: 75 mL/hr (09/05/23 0619)    Diet  NPO Diet NPO Type: Strict NPO    I have personally reviewed:  [x]  Laboratory   [x]  Microbiology   [x]  Radiology   []  EKG/Telemetry  []  Cardiology/Vascular   []  Pathology    []  Records      Assessment/Plan     Active Hospital Problems    Diagnosis  POA    **Sepsis [A41.9]  Yes    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 Ritchie valve set to 110 [Z98.2]  Not Applicable    Shunt malfunction [T85.618A]  Yes    Cerebral ventriculomegaly [G93.89]  Yes    Lethargy [R53.83]  Yes    Confusion [R41.0]  Yes    Obstructive hydrocephalus [G91.1]  Unknown      Resolved Hospital Problems   No resolved problems to display.       57 y.o. male with h/o VHL, craniotomy s/p VPS admitted after being found down, s/p shunt revision and eventual PEG placement    Reviewed chart, reasons for events of admission not completely clear, suspected to have hypoxemia related encephalopathy, also with small infarct  - ASA/statin per neuro  - Encephalopathy clearing  - d/w Dr. Maxwell, his physician at [facility], who states he has not seen patient in 1 year as he did not come for f/u. He felt unlikely the med was to blame for hypoxia given previous tolerance as this does not typically happen later in the course. He did recommend holding med until strength has recovered at least so likely will not resume until leaving rehab.    Resp failure and aspiration pneumonia s/p Rx. Unable to pass swallow so PEG tube placed and he is tolerating TFs at goal.    Repeat CT 3 months for pulm nodules.    SCDs  D/w CCP. Anticipate d/c tomorrow to SNF when transport available      Darron Fletcher MD  [facility]  09/07/23  17:53 EDT      Electronically signed by Darron Fletcher MD at 09/07/23 1800       Yoshi Sahu MD at 09/07/23 8386          Pathology report from upper endoscopy reviewed by me    Final Diagnosis   1. Gastroesophageal Junction, Biopsy:                A. Acute erosive esophagitis with polarizable material (see comment).               B. No columnar mucosa is present for microscopic evaluation.     He will need to be on Protonix 40 mg daily for at least 2 months whenever he gets discharge home.  He will also need to follow-up in my office in 2 months for follow-up    Electronically signed by Yoshi Sahu  MD Mat at 23 1520       Bentley Lovell MD at 23 1334              Name: Braxton Wolfe ADMIT: 2023   : 1965  PCP: Provider, No Known    MRN: [id number] LOS: 13 days   AGE/SEX: 57 y.o. male  ROOM: [facility]     Subjective   Subjective   No new events overnight.  Laying in bed.  Alert, oriented x3, denies complaints.  Tolerating tube feeds.      Review of Systems  As above  Objective   Objective   Vital Signs  Temp:  [98.3 °F (36.8 °C)-99.1 °F (37.3 °C)] 98.5 °F (36.9 °C)  Heart Rate:  [68-80] 80  Resp:  [16-18] 17  BP: (102-122)/(76-86) 102/76  SpO2:  [92 %-97 %] 97 %  on   ;   Device (Oxygen Therapy): room air  Body mass index is 24.71 kg/m².  Physical Exam    General: Alert, laying in bed, not in distress,  HEENT: Normocephalic, right scalp incision  clean, intact, no signs of erythema/infection  CV: Regular rate and rhythm, no murmurs rubs or gallops  Lungs: CTA, no wheezing  Abdomen: Soft, nontender, nondistended  Extremities: No significant peripheral edema     Results Review     I reviewed the patient's new clinical results.  Results from last 7 days   Lab Units 23  0554 23  0604 23  0535 23  0708   WBC 10*3/mm3 8.42 7.27 7.67 8.15   HEMOGLOBIN g/dL 11.4* 12.9* 12.8* 12.4*   PLATELETS 10*3/mm3 305 303 313 314       Results from last 7 days   Lab Units 23  0554 23  0604 23  0535 23  0708   SODIUM mmol/L 140 141 141 140   POTASSIUM mmol/L 3.7 3.9 3.9 4.1   CHLORIDE mmol/L 104 104 106 104   CO2 mmol/L 29.0 25.1 26.4 28.8   BUN mg/dL 13 13 14 13   CREATININE mg/dL 0.69* 0.82 0.89 0.83   GLUCOSE mg/dL 129* 67 78 102*     Estimated Creatinine Clearance: 130.5 mL/min (A) (by C-G formula based on SCr of 0.69 mg/dL (L)).  Results from last 7 days   Lab Units 23  0554 23  0604 23  0535 23  0708 23  0549 23  0544   ALBUMIN g/dL 3.0* 3.3* 3.3* 3.3*   < > 2.7*   BILIRUBIN mg/dL  --   --   --   --   --  0.6    ALK PHOS U/L  --   --   --   --   --  80   AST (SGOT) U/L  --   --   --   --   --  27   ALT (SGPT) U/L  --   --   --   --   --  26    < > = values in this interval not displayed.       Results from last 7 days   Lab Units 09/06/23  0554 09/05/23  0604 09/04/23  0535 09/03/23  0708   CALCIUM mg/dL 8.5* 8.6 8.6 8.7   ALBUMIN g/dL 3.0* 3.3* 3.3* 3.3*   MAGNESIUM mg/dL 2.2 2.0 2.1 2.2   PHOSPHORUS mg/dL 3.0 2.7 3.3 3.2           No results found for: COVID19  Glucose   Date/Time Value Ref Range Status   09/06/2023 1123 121 70 - 130 mg/dL Final   09/06/2023 0619 145 (H) 70 - 130 mg/dL Final   09/05/2023 0558 78 70 - 130 mg/dL Final   09/04/2023 2124 75 70 - 130 mg/dL Final   09/04/2023 1637 78 70 - 130 mg/dL Final   09/04/2023 0602 80 70 - 130 mg/dL Final   09/04/2023 0003 84 70 - 130 mg/dL Final           XR Abdomen KUB  Clinical: Core check catheter placement     COMPARISON 8/28/2023     FINDINGS: Cortright catheter tip is located within the duodenum, at the  junction of the second and third portions. It has been advanced since  the prior examination. There is contrast demonstrated now within the  colon. The remainder is unremarkable.     This report was finalized on 8/31/2023 2:47 PM by Dr. Tyrel Chavez M.D.     CT Head Without Contrast  Narrative: CT SCAN OF THE HEAD WITHOUT CONTRAST 08/30/2023     CLINICAL HISTORY: Status post revision of  shunt yesterday 08/29/2023.  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  Patient  has a history of Von Hippel-Lindau.      TECHNIQUE: Spiral CT images were obtained from the base of the skull to  the vertex without intravenous contrast. Images were reformatted and  submitted in 1 mm thick axial, sagittal and coronal CT sections with  brain algorithm.      This is correlated to prior head CTs yesterday 08/29/2023 and head CT  08/24/2023 and a prior MRI of the brain on 08/28/2023 and  outside MRIs  of the brain on 04/21/2022 and 01/14/2022.     FINDINGS: The patient has had a large 5 x 7.5 cm midline occipital left  occipital craniectomy.  The craniectomy is covered by metallic mesh and  resection of the posterior midline 1.8 cm segment of the posterior ring  of C1, and by history this occurred back on 04/26/2019 for resection of  cerebellar hemangioblastoma. There is extensive encephalomalacia  involving the majority of the left cerebellar hemisphere and some  loculated CSF lateral to the left cerebellum better demonstrated on  recent MRI of the brain on 08/25/2023. There is also encephalomalacia  throughout the majority of the right cerebellum. There is a markedly  enlarged fourth ventricle measuring 4.1 x 3.4 cm in medial lateral and  anterior posterior dimension. There is tenting of the cervicomedullary  junction posteriorly. Recent MRI of the brain on 08/28/2023 demonstrated  multiple small enhancing cerebellar nodules compatible with multiple  hemangioblastomas that are not able to be appreciated on this  noncontrast head CT. There is a ventriculoperitoneal shunt catheter in  place that courses through the superior right coronal suture through the  superior right frontal lobe parenchyma enters the superior body of the  right lateral ventricle and its distal tip is at the level of the right  foramen of Monro.  The lateral and third ventricles are mildly enlarged.   The temporal horns of the lateral ventricles in particular are large in  size. The lateral and third ventricles have clearly enlarged when  compared to an outside MRI of the brain on 04/21/2021. The lateral and  third ventricles have slightly decreased in size when compared to head  CT 08/24/2023, unchanged in size when compared to yesterday's head CT  08/29/2023. There is confluent rind of low density in the right frontal  white matter along the margins of the ventriculoperitoneal shunt  catheter with rind of low-density tracking  3 x 3.3 cm, may be white  matter encephalomalacia, while there could be some backtracking of CSF.  Since the head CT on 08/24/2023 there is resolving low-density in the  temporal occipital periventricular white matter compatible with  resolving transependymal extension of CSF. A tiny tubular tract through  the lateral right parietal bone where there was likely previous  placement and removal of a ventriculostomy catheter in the remote past.  I see no midline shift. No acute intracranial hemorrhage is identified.  There are bubbles of air along the port for the ventriculoperitoneal  shunt catheter within the scalp overlying the anterolateral right  parietal bone from recent shunt revision procedure, and the bubbles of  air are new when compared to yesterday afternoon's head CT 08/29/2023 at  4:39 p.m.     Impression: 1. Since yesterday afternoon's head CT on 08/29/2023 at 4:39 p.m., there  has been shunt revision with bubbles of air in the scalp adjacent to the  shunt port overlying the anterior superior lateral right parietal bone.  There is stable position of the  shunt catheter that courses from the  superior right coronal suture through the superior right frontal lobe  parenchyma through the superior body of the right lateral ventricle and  its distal tip is at the right foramen of Monro. The lateral and third  ventricles remain enlarged. They are clearly larger than they were on an  outside MRI of the brain on 04/21/2022. However they are slightly  smaller than they were on a head CT 6 days ago on 08/24/2023. Since the  head CT on 08/24/2023, six days ago, there is resolving low-density in  the temporal occipital periventricular white matter compatible with  resolving transependymal extension of CSF.     2. Back on 04/26/2019 the patient had a large 7.5 x 5 cm midline  occipital to left occipital craniectomy and it is covered by metallic  mesh and by history resection of cerebellar hemangioblastoma in  this  patient with history of Von Hippel-Lindau. There is extensive  encephalomalacia involving the majority of the left cerebellum and also  extensive encephalomalacia throughout good portions of the right  cerebellar hemisphere. MRI of the brain 2 days ago on 08/28/2023  demonstrated multiple tiny enhancing cerebellar nodules compatible with  multiple hemangioblastomas that are unable to be evaluated on this  noncontrast head CT. There is marked dilatation of the fourth ventricle.   Some of it is ex vacuo dilatation due to extensive cerebellar  encephalomalacia although there is posterior tenting of the  cervicomedullary junction and a small size superior aspect of the  aqueduct of Sylvius and the marked dilatation of the fourth ventricle  may be secondary to encystment of the fourth ventricle. The remainder of  the head CT is unremarkable. The results were communicated to Roberto Ritchie MD, from neurosurgery by telephone on 08/30/2023 at 9:50 AM.     Radiation dose reduction techniques were utilized, including automated  exposure control and exposure modulation based on body size.        This report was finalized on 8/31/2023 5:56 AM by Dr. Shahram Quintero M.D.       Scheduled Medications  aspirin, 81 mg, Nasogastric, Daily  atorvastatin, 40 mg, Oral, Nightly  docusate sodium, 100 mg, Oral, BID  folic acid (FOLVITE) IVPB, 1 mg, Intravenous, Daily  mupirocin, 1 application , Topical, Q12H  pantoprazole, 40 mg, Intravenous, Q12H  polyethylene glycol, 17 g, Nasogastric, Daily  sodium chloride, 10 mL, Intravenous, Q12H  thiamine, 100 mg, Oral, Daily  vitamin D, 50,000 Units, Nasogastric, Q7 Days    Infusions  lactated ringers, 9 mL/hr, Last Rate: 9 mL/hr (08/29/23 1818)  lactated ringers, 75 mL/hr, Last Rate: 75 mL/hr (09/05/23 0619)    Diet  NPO Diet NPO Type: Strict NPO    I have personally reviewed     [x]  Laboratory   []  Microbiology   []  Radiology   []  EKG/Telemetry  []  Cardiology/Vascular   []  Pathology    []   Records      Assessment/Plan     Active Hospital Problems    Diagnosis  POA    **Sepsis [A41.9]  Yes    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 Ritchie valve set to 110 [Z98.2]  Not Applicable    Shunt malfunction [T85.618A]  Yes    Cerebral ventriculomegaly [G93.89]  Yes    Lethargy [R53.83]  Yes    Confusion [R41.0]  Yes    Obstructive hydrocephalus [G91.1]  Unknown      Resolved Hospital Problems   No resolved problems to display.           Patient has a history of von Hippel-Lindau syndrome and is on tumor suppressive medication  welireg, CNS hemangioblastomas, craniotomy status post  shunt placement, baseline hemiparesis, being brought to the ED on 08/24/2023 after found down by family.    Acute encephalopathy;  Suspected hypoxemia need encephalopathy  -Status post shunt revision 8/29, by neurosurgery:Patient does not require follow-up shunt xray for setting confirmation. Follow-up as scheduled. Office will contact patient if follow-up required sooner due to xray findings. Patient to call for any concerns including but not limited to headaches, vision changes, balance difficulties, incontinence of bladder, weakness.    Improved, alert, x3 but forgetful, requiring frequent reorientation  -Patient did have stroke on MRI, however unclear if medication Welireg contributed to presenting symptoms.  One of the side effects of Welireg is hypoxia.  Patient has been on medication for 2 years, previously was on 120 mg daily however due to fogginess dose was decreased to 40 mg daily.  -Awaiting call from MD Reynoso to discuss current presentation and whether medication should be continued going forward.        Acute to subacute infarct:   MRI 08/28/2023 showed new punctate focus of diffusion restriction/FLAIR hyperintensity inthe right paracentral lobule most suggestive of acute/subacute infarct.  Neurology evaluated,  appreciate recs.  Patient initiated on aspirin 81 mg daily, atorvastatin 40 mg daily, Continue      Von Hippel-Lindau syndrome with craniotomy   - residual left-sided weakness from this   -Patient is on Welireg for VHL to slow down tumor growth.  Patient has been off the medication since admission.  Starting dose is 120 but the patient has been intolerant of it in the past and is down to 40 mg.  Contacted MD Edgardo Maxwell, contact #[phone] extension [phone].  Left name and my cell phone number and awaiting call to discuss current presentation and whether this medication should be resumed.  Discussed with Daughter Yamilet, explained that I do do not have the expertise and have not had experience with much  Welireg previously to give recommendations whether medication should be continued going forward or not without discussing with prescribing physician, and  did explain that I will discuss with Dr. Maxwell about current presentation and whether medication side effect could be related to presented symptoms.,          S/p prior  shunt and required revision-neurosurgery signed off, recommend neurosurgery follow-up on  9/11/2023 with CT head imaging            Acute hypercapnic respiratory failure s/p vent till 8/25  -Patient is currently on room air, remained stable     B/L Aspiration pneumonia  -Possibly acute on chronic aspiration  -Status pos IV ceftriaxone x4 days.       Dysphagia/vomiting;  Status post PEG tube placement 09/04//23, initiated on diet 09/05/2023, tolerating tube feeds.  Need to follow-up outpatient with general surgery.  Expected               YUDITH: Resolved with IV fluids, monitor.      Hypokalemia/hypophosphatemia: Improved.  Monitor.       Incidental lung nodules- seen on CT 08/24/2023, CT follow-up in 3 months is  recommended.  Discussed with daughter YanethYamilet , 09/06/2023, will need repeat CT scan download around end of November.          Mild coagulopathy, possibly related to  nutrition, INR normalized.       Discussed with RN  Discussed with daughter  Discussed with mother  Discussed with MD Reynoso clinical staff, spent at least 25 minutes on the phone.  Awaiting call from Dr. Maxwell          SCDs for DVT prophylaxis.  Full code.  Discussed with patient and nursing staff.  Anticipate discharge SNS, versus home health, stable to be discharged to rehab.    Copied text in this note has been reviewed and is accurate as of 23.         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 Ritchie valve set to 110 [Z98.2]  Not Applicable      Resolved Hospital Problems    Diagnosis Date Resolved POA    Aspiration pneumonia [J69.0] 2023 Yes    Acute respiratory failure with hypoxia [J96.01] 2023 Yes    Shunt malfunction [T85.618A] 2023 Yes    Cerebral ventriculomegaly [G93.89] 2023 Yes    Lethargy [R53.83] 2023 Yes    Confusion [R41.0] 2023 Yes    Sepsis [A41.9] 2023 Yes    Obstructive hydrocephalus [G91.1] 2023 Unknown        Hospital Course     Mr. Wolfe is a 57 y.o. 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  Please see  the admitting history and physical for further details.  He was found laying in his bed, unresponsive and covered in dried vomitus.  He was intubated in the emergency room as he was not protecting his airway.  Imaging included x-ray showing perihilar infiltrates and eventually CT confirmed dense bibasilar consolidation consistent with aspiration pneumonia.  He was admitted to ICU on antibiotics and mechanical ventilation.  He was actually able to be extubated a couple of days later.  Etiology of the vomiting was uncertain but it did not recur.  Suspicion was that patient may have been chronically aspirating based on his history of craniotomy with some neurologic deficits but obviously had an acute episode as well.  After extubation his sats returned to normal and he has had no further pulmonary issues.  There was incidental notation of some lung nodules with plans for 3-month outpatient follow-up.    He was very encephalopathic so neurology was consulted and imaging performed including MRI of the brain showing punctate focus in the right paracentral lobe consistent with acute/subacute infarct as well as increase in size of the lateral and third ventricles compared to a prior MRI in 2022.  Multiple unchanged small enhancing intra-axial cerebellar nodules were seen.  EEG was performed showing diffuse delta and theta slowing with no interictal epileptiform discharges or seizures, consistent with moderately severe encephalopathy.  Neurosurgery was consulted and performed revision of ventriculoperitoneal shunt valve and adjusted settings.  They will follow him up in the office 9/11 with repeat imaging.  Neurology recommended aspirin and statin due to the punctate infarct.    He was not able to pass swallow study so surgery was consulted and placed a PEG tube.  He is tolerating tube feeds at goal rate.  He is working with PT and OT but is going to require skilled rehab.  This has been arranged for today.    I did discuss  with his treating physician at [facility] Dr. Maxwell regarding his Welireg that he takes for von Hippel-Lindau.  It has been documented to cause hypoxia in the past but he felt very unlikely to have caused this particular episode since patient has been on the medication for quite some time and this is not typically an effect that occurs late in the course.  However he has not seen the patient for over a year as patient did not follow-up as planned.  For now he recommended not using the Welireg while patient is recuperating and recommended restarting whenever he is closer to his usual baseline.  Also it seems that imaging does not show any growth in the nodules in the brain so likely safe to hold the medication for the time being.      Day of Discharge     Subjective:  No complaints, wants to discharge today    Physical Exam:  Temp:  [97.8 °F (36.6 °C)-98.7 °F (37.1 °C)] 98.7 °F (37.1 °C)  Heart Rate:  [53-77] 77  Resp:  [14-18] 17  BP: (108-115)/(71-79) 110/71  Body mass index is 24.96 kg/m².  Physical Exam  Vitals reviewed.   Constitutional:       General: He is not in acute distress.     Appearance: He is not ill-appearing.   Cardiovascular:      Rate and Rhythm: Normal rate and regular rhythm.   Pulmonary:      Effort: No respiratory distress.      Breath sounds: Normal breath sounds.   Abdominal:      General: There is no distension.      Palpations: Abdomen is soft.      Tenderness: There is no abdominal tenderness.   Musculoskeletal:      Right lower leg: No edema.      Left lower leg: No edema.   Skin:     General: Skin is warm and dry.   Neurological:      Mental Status: He is alert.   Psychiatric:         Mood and Affect: Mood normal.       Consultants     Consult Orders (all) (From admission, onward)       Start     Ordered    09/05/23 1132  Inpatient Rehab Admission Consult  Once        Provider:  (Not yet assigned)    09/05/23 1131    09/03/23 1323  Inpatient Nutrition Consult  Once        Comments:  Cortrak insertion   Provider:  (Not yet assigned)    09/03/23 1323    09/03/23 1133  Inpatient General Surgery Consult  Once,   Status:  Canceled        Specialty:  General Surgery  Provider:  (Not yet assigned)    09/03/23 1132    09/03/23 1133  Inpatient General Surgery Consult  Once        Comments: Family requested peg tube placement   Specialty:  General Surgery  Provider:  [place] Garay MD    09/03/23 1133    08/31/23 1248  Inpatient Consult to Nutrition Services  Once        Provider:  (Not yet assigned)    08/31/23 1248    08/29/23 1431  Inpatient Neurosurgery Consult  Once        Specialty:  Neurosurgery  Provider:  Roberto Ritchie MD    08/29/23 1431    08/27/23 0937  Inpatient Internal Medicine Consult  Once        Specialty:  Internal Medicine  Provider:  Heath Hooper MD    08/27/23 0937    08/26/23 1101  Inpatient Internal Medicine Consult  Once,   Status:  Canceled        Specialty:  Internal Medicine  Provider:  Heath Hooper MD    08/26/23 1101    08/25/23 0646  Inpatient Nephrology Consult  Once        Specialty:  Nephrology  Provider:  Angel Cerda MD    08/25/23 0645    08/24/23 2214  Inpatient Neurology Consult General  Once        Specialty:  Neurology  Provider:  Ajit Sequeira MD    08/24/23 2213    08/24/23 1856  Pulmonology (on-call MD unless specified)  Once        Specialty:  Pulmonary Disease  Provider:  (Not yet assigned)    08/24/23 1855                  Procedures     ESOPHAGOGASTRODUODENOSCOPY WITH PERCUTANEOUS ENDOSCOPIC GASTROSTOMY TUBE INSERTION 9/4    Revision of  shunt valve 8/29    Imaging Results (All)       Procedure Component Value Units Date/Time    XR Abdomen KUB [[id number]] Collected: 08/31/23 1447     Updated: 08/31/23 1450    Narrative:      Clinical: Core check catheter placement     COMPARISON 8/28/2023     FINDINGS: Cortright catheter tip is located within the duodenum, at the  junction of the second and third portions. It has been  advanced since  the prior examination. There is contrast demonstrated now within the  colon. The remainder is unremarkable.     This report was finalized on 8/31/2023 2:47 PM by Dr. Tyrel Chavez M.D.       CT Head Without Contrast [[id number]] Collected: 08/30/23 1211     Updated: 08/31/23 0559    Narrative:      CT SCAN OF THE HEAD WITHOUT CONTRAST 08/30/2023     CLINICAL HISTORY: Status post revision of  shunt yesterday 08/29/2023.  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  Patient  has a history of Von Hippel-Lindau.      TECHNIQUE: Spiral CT images were obtained from the base of the skull to  the vertex without intravenous contrast. Images were reformatted and  submitted in 1 mm thick axial, sagittal and coronal CT sections with  brain algorithm.      This is correlated to prior head CTs yesterday 08/29/2023 and head CT  08/24/2023 and a prior MRI of the brain on 08/28/2023 and outside MRIs  of the brain on 04/21/2022 and 01/14/2022.     FINDINGS: The patient has had a large 5 x 7.5 cm midline occipital left  occipital craniectomy.  The craniectomy is covered by metallic mesh and  resection of the posterior midline 1.8 cm segment of the posterior ring  of C1, and by history this occurred back on 04/26/2019 for resection of  cerebellar hemangioblastoma. There is extensive encephalomalacia  involving the majority of the left cerebellar hemisphere and some  loculated CSF lateral to the left cerebellum better demonstrated on  recent MRI of the brain on 08/25/2023. There is also encephalomalacia  throughout the majority of the right cerebellum. There is a markedly  enlarged fourth ventricle measuring 4.1 x 3.4 cm in medial lateral and  anterior posterior dimension. There is tenting of the cervicomedullary  junction posteriorly. Recent MRI of the brain on 08/28/2023 demonstrated  multiple small enhancing cerebellar nodules  compatible with multiple  hemangioblastomas that are not able to be appreciated on this  noncontrast head CT. There is a ventriculoperitoneal shunt catheter in  place that courses through the superior right coronal suture through the  superior right frontal lobe parenchyma enters the superior body of the  right lateral ventricle and its distal tip is at the level of the right  foramen of Monro.  The lateral and third ventricles are mildly enlarged.   The temporal horns of the lateral ventricles in particular are large in  size. The lateral and third ventricles have clearly enlarged when  compared to an outside MRI of the brain on 04/21/2021. The lateral and  third ventricles have slightly decreased in size when compared to head  CT 08/24/2023, unchanged in size when compared to yesterday's head CT  08/29/2023. There is confluent rind of low density in the right frontal  white matter along the margins of the ventriculoperitoneal shunt  catheter with rind of low-density tracking 3 x 3.3 cm, may be white  matter encephalomalacia, while there could be some backtracking of CSF.  Since the head CT on 08/24/2023 there is resolving low-density in the  temporal occipital periventricular white matter compatible with  resolving transependymal extension of CSF. A tiny tubular tract through  the lateral right parietal bone where there was likely previous  placement and removal of a ventriculostomy catheter in the remote past.  I see no midline shift. No acute intracranial hemorrhage is identified.  There are bubbles of air along the port for the ventriculoperitoneal  shunt catheter within the scalp overlying the anterolateral right  parietal bone from recent shunt revision procedure, and the bubbles of  air are new when compared to yesterday afternoon's head CT 08/29/2023 at  4:39 p.m.       Impression:      1. Since yesterday afternoon's head CT on 08/29/2023 at 4:39 p.m., there  has been shunt revision with bubbles of air in  the scalp adjacent to the  shunt port overlying the anterior superior lateral right parietal bone.  There is stable position of the  shunt catheter that courses from the  superior right coronal suture through the superior right frontal lobe  parenchyma through the superior body of the right lateral ventricle and  its distal tip is at the right foramen of Monro. The lateral and third  ventricles remain enlarged. They are clearly larger than they were on an  outside MRI of the brain on 04/21/2022. However they are slightly  smaller than they were on a head CT 6 days ago on 08/24/2023. Since the  head CT on 08/24/2023, six days ago, there is resolving low-density in  the temporal occipital periventricular white matter compatible with  resolving transependymal extension of CSF.     2. 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 There is extensive  encephalomalacia involving the majority of the left cerebellum and also  extensive encephalomalacia throughout good portions of the right  cerebellar hemisphere. MRI of the brain 2 days ago on 08/28/2023  demonstrated multiple tiny enhancing cerebellar nodules compatible with  multiple hemangioblastomas that are unable to be evaluated on this  noncontrast head CT. There is marked dilatation of the fourth ventricle.   Some of it is ex vacuo dilatation due to extensive cerebellar  encephalomalacia although there is posterior tenting of the  cervicomedullary junction and a small size superior aspect of the  aqueduct of Sylvius and the marked dilatation of the fourth ventricle  may be secondary to encystment of the fourth ventricle. The remainder of  the head CT is unremarkable. The results were communicated to Roberto Ritchie MD, from neurosurgery by telephone on 08/30/2023 at 9:50  AM.     Radiation dose reduction techniques were utilized, including automated  exposure control and exposure modulation based on body size.        This report was finalized on 8/31/2023 5:56 AM by Dr. Shahram Quintero M.D.       SL[facility] FEES - Fiberoptic Endo Eval Swallow [[id number]] Resulted: 08/30/23 0917     Updated: 08/30/23 0917    Narrative:      This procedure was auto-finalized with no dictation required.    XR Shunt Series [[id number]] Collected: 08/30/23 0832     Updated: 08/30/23 0837    Narrative:      XR SHUNT SERIES-     INDICATIONS:  shunt revision     TECHNIQUE: FLUOROSCOPIC ASSISTANCE IN THE OPERATING ROOM.     FINDINGS:     1 intraoperative fluoroscopic spot view was obtained during the  shunt  revision procedure and recorded to the PACS for review, partly  demonstrating the  shunt catheter. Please see operative report for  full details.     Fluoroscopy time: 18 seconds     Radiation exposure: Dose area product: 101.22 uGy x m(2)          Impression:         As described.     This report was finalized on 8/30/2023 8:34 AM by Dr. Adria Damon M.D.       FL Shunt Series Programable [[id number]] Collected: 08/30/23 0807     Updated: 08/30/23 0814    Narrative:      FL SHUNT SERIES PROGRAMABLE-     INDICATIONS: Hydrocephalus        TECHNIQUE: SHUNT SERIES WITH FLUOROSCOPIC EVALUATION of shunt valve     COMPARISON: None available     FINDINGS:     10 images were obtained.     Frontal and lateral views at the level of the head, neck, chest,  abdomen, reveal right frontal ventriculoperitoneal shunt catheter  extending into the upper pelvis near the midline. The catheter is partly  obscured by enteric contrast material that is present in the colon, but  otherwise appears intact.     A Codman Hakim nonprogrammable shunt valve is apparent.     Fluoroscopy time: 0.2 minutes     Radiation exposure: Dose area product: 113.44 uGy x m(2)          Impression:         As described.     This report was finalized  on 8/30/2023 8:11 AM by Dr. Adria Damon M.D.       CT Head Without Contrast [[id number]] Collected: 08/29/23 1748     Updated: 08/29/23 1759    Narrative:      EXAM:  CT HEAD WO CONTRAST-     HISTORY: Ventriculomegaly, sepsis, preoperative planning.     COMPARISON: MRI brain 8/28/2023     TECHNIQUE: Noncontrast images of the brain were obtained. Reformatted  images were reviewed. Radiation dose reduction techniques were utilized,  including automated exposure control and exposure modulation based on  body size.     FINDINGS:       There is a lateral right frontal hector hole with a  shunt extending  through the hector hole. The tip of the shunt terminates in the posterior  frontal horn of the right lateral ventricle near the foramen of San,  similar to 8/28/2023. There is hypoattenuation along the the catheter  tract, better assessed on recent MRI. The ventricles are not  significantly changed in size or configuration.  There are postsurgical changes from left occipital craniotomy with mesh  reconstruction. There is encephalomalacia/gliosis throughout the left  greater than right cerebellar hemispheres with marked dilatation of the  fourth ventricle, also similar to prior.   Focal hypoattenuation in the posterior right corona radiata is also not  significantly changed. No acute intracranial hemorrhage or pathologic  extra-axial collection is identified.  There is no midline shift or mass  effect.  The basal cisterns are patent.    There is mild mucosal thickening of the left sphenoid sinus with small  moderate aerated secretions. There are moderate left and small right  mastoid effusions. There is nonunion of the posterior arch of C1, which  could be developmental or due to old trauma or surgery.          Impression:      Postsurgical changes, as above, with a right frontal approach  shunt,  which is similar to prior. No significant change in ventricle size or  configuration compared to 8/28/2023.     This  report was finalized on 8/29/2023 5:56 PM by Dr. Nata Briceño M.D.       FL Video Swallow Single Contrast [[id number]] Collected: 08/29/23 0744     Updated: 08/29/23 0749    Narrative:      VIDEO SWALLOWING EXAMINATION BY SPEECH PATHOLOGY     Clinical: Dysphasia     Video swallowing examination performed under the direction of speech  pathology. Imaging reviewed by radiologist who concurs with the  findings.     Speech pathology summary:  Radiologist present Dr. Chavez. Deep  nontransient penetration to the vocal cords with thin liquids.  Nontransient penetration intermittently with nectar thick liquid, honey  thick liquid, and puree.        FLUOROSCOPY TIME: 3 minutes 35 seconds, 5224 images.     This report was finalized on 8/29/2023 7:45 AM by Dr. yTrel Chavez M.D.       MRI Brain With & Without Contrast [[id number]] Collected: 08/28/23 1741     Updated: 08/28/23 1759    Narrative:      MRI OF THE BRAIN WITH AND WITHOUT CONTRAST     CLINICAL HISTORY: Von Hippel-Lindau with cerebellar hemangioblastoma,  prior craniotomy and  shunt placement. Recent ICU admission with  persistent confusion and very mild weakness on the left side.     TECHNIQUE: MRI of the brain was obtained with sagittal pre and  postgadolinium T1, axial pre-gadolinium and postgadolinium, axial  postgadolinium MPRAGE, coronal postgadolinium T1,  axial FLAIR, axial  T2, axial susceptibility weighted, and axial diffusion-weighted images.     COMPARISON: MR brain 4/21/2022. CT head 8/24/2023.        FINDINGS:     New punctate focus of diffusion restriction with associated T2/FLAIR  hyperintensity in the right paracentral lobule (series 5 image 20). No  associated SWI hypointensity or enhancement. No significant mass effect  or midline shift.     Stable right frontal approach ventriculostomy catheter with tip in the  right lateral ventricle near the foramen of Jayy. Lateral and third  ventricles have increased in size from 2022 MRI but are  unchanged from  recent CT head. Mildly increased T2/FLAIR hyperintensity surrounding the  right frontal approach ventriculostomy catheter. No new periventricular  T2/FLAIR hyperintensity aside from surrounding the catheter. No midline  shift or herniation. Unchanged ex vacuo dilatation of the fourth  ventricle.     Left suboccipital craniectomy for left cerebellar tumor resection.  Unchanged encephalomalacia and T2/FLAIR white matter hyperintensities in  the left greater than right cerebellar lobes. Unchanged multiple  bilateral enhancing intra-axial cerebellar nodules, the largest in the  inferior right cerebellum measuring 1.1 x 0.8 cm (series 10 image 5).     Preserved vascular flow voids. Unchanged bilateral mastoid air cell  effusions. Symmetric posterior nasopharyngeal soft tissues.          Impression:      1. New punctate focus of diffusion restriction/FLAIR hyperintensity in  the right paracentral lobule most suggestive of acute/subacute infarct.  No hemorrhagic transformation.  2. Stable right frontal approach ventriculostomy catheter with tip in  the right lateral ventricle near the foramen of San. Lateral and  third ventricles have increased in size from 2022 MRI. Mildly increased  pericatheter T2/FLAIR hyperintensity.  3. Left suboccipital craniectomy for left cerebellar tumor resection.  Multiple unchanged small enhancing intra-axial cerebellar nodules  measuring up to 1.1 cm.        Above critical findings were discussed with Dr. Huertas at 5:54 p.m.  on 8/28/2023.     This report was finalized on 8/28/2023 5:56 PM by Dr. Darron Jaramillo M.D.       MRI Outside Films [[id number]] Resulted: 08/28/23 1559     Updated: 08/28/23 1600    Narrative:      This procedure was auto-finalized with no dictation required.    MRI Outside Films [[id number]] Resulted: 08/28/23 1556     Updated: 08/28/23 1558    Narrative:      This procedure was auto-finalized with no dictation required.    XR Abdomen KUB  [[id number]] Collected: 08/28/23 0542     Updated: 08/28/23 0542    Narrative:        Patient: DAVID CERNA  Time Out: 05:41  Exam(s): XR ABDOMEN     EXAM:    XR Abdomen, 2 Views    CLINICAL HISTORY:     Reason for exam: FEEDING TUBE PLACEMENT.    TECHNIQUE:    Frontal view of the abdomen pelvis with upright view of the abdomen.    COMPARISON:    No relevant prior studies available.    FINDINGS:    Lower thorax:  Bibasilar infiltrates noted.    Intraperitoneal space:  No free air.    Gastrointestinal tract:  Unremarkable.  No dilation.    Bones joints:  Unremarkable.    Tubes, lines and devices:  Distal end of an enteric tube tip overlies   the region of the gastric antrum proximal duodenum.  The visualized bowel   gas is unremarkable-without evidence of obstruction.    IMPRESSION:       1.  Enteric tube tip at the region of the distal stomach proximal   duodenum.    2.  Bibasilar infiltrates noted.      Impression:          Electronically signed by Norma Jaramillo MD on 08-28-23 at 0541    XR Abdomen KUB [[id number]] Collected: 08/27/23 2344     Updated: 08/27/23 2353    Narrative:      KUB     HISTORY: Feeding tube placement.     COMPARISON: CT abdomen and pelvis 08/24/2020.     FINDINGS: Feeding tube has been placed and the tip is in the gastric  fundus. Bowel gas pattern in the upper abdomen is nonobstructive. The  lower abdomen and pelvis are not included on the field-of-view.       Impression:      Feeding tube tip is in the stomach.        This report was finalized on 8/27/2023 11:50 PM by Dr. Marcelo De M.D.       XR Chest 1 View [[id number]] Collected: 08/25/23 0641     Updated: 08/25/23 0644    Narrative:      XR CHEST 1 VW-8/25/2023     HISTORY: Central line placement.     Right internal jugular central venous catheter seen with its tip  overlying the SVC. No pneumothorax is seen. Endotracheal tube is seen in  good position. Heart size is within normal limits. There is some mild  patchy atelectasis  or infiltrate in the right lower lung. Left lung  appears clear.       Impression:      1. Central venous catheter tip overlying the SVC.  2. No pneumothorax is seen.        This report was finalized on 8/25/2023 6:41 AM by Dr. Sriram Ray M.D.       XR Chest 1 View [[id number]] Collected: 08/25/23 0515     Updated: 08/25/23 0520    Narrative:      SINGLE VIEW OF THE CHEST     HISTORY: Respiratory failure     COMPARISON: August 24, 2023     FINDINGS:  Endotracheal tube terminates in satisfactory position. There is a  right-sided ventriculoperitoneal shunt. Heart size is within normal  limits. Bibasilar consolidation is noted. No pneumothorax is seen. Trace  left pleural effusion is suspected.       Impression:      Persistent bibasilar consolidation.     This report was finalized on 8/25/2023 5:17 AM by Dr. Charlee Medina M.D.       CT Chest Without Contrast Diagnostic [[id number]] Collected: 08/24/23 2225     Updated: 08/24/23 2237    Narrative:      CT OF THE CHEST WITHOUT CONTRAST; CT OF THE ABDOMEN AND PELVIS WITHOUT  CONTRAST     HISTORY: Found down. Concern for aspiration.     COMPARISON: None available.     TECHNIQUE: Axial CT imaging was obtained from the thoracic inlet through  the symphysis pubis. No IV contrast was administered.     FINDINGS:  CT OF THE CHEST: Endotracheal tube terminates in satisfactory position.  Thyroid gland is unremarkable. Thoracic aorta is normal in caliber.  Mediastinal lymph nodes do not appear pathologically enlarged. There is  a subpleural nodule noted within the right upper lobe, measuring up to 8  mm. There are some reticular nodular infiltrates which are noted within  the right upper lobe. There is dense dependent consolidation with air  bronchograms noted within both lower lobes. There may be trace bilateral  pleural effusions. No acute osseous abnormalities are seen. The patient  does have a right-sided ventriculoperitoneal shunt.     CT OF THE ABDOMEN AND PELVIS:  The stomach, duodenum, spleen, adrenal  glands, and gallbladder are all grossly unremarkable. There are  low-attenuation lesions which are identified within the pancreas. They  are poorly assessed on this unenhanced exam. 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 They were described  on prior report from June 17, 2020, but again are poorly assessed on  this exam. No suspicious hepatic lesions are seen. No hydronephrosis is  seen on either side. I do not see any renal masses on this unenhanced  exam. No distal ureteral or bladder stones are seen. Prostate gland is  within normal limits. There is no bowel obstruction. Ventricular  peritoneal shunt terminates within the left lower quadrant. The appendix  is normal. No acute osseous abnormalities are seen. There is mild  colonic diverticulosis.       Impression:         1. Dense dependent consolidation bilaterally., Given history, aspiration  is a concern. Patient is also noted to have some mild reticulonodular  infiltrates within the right upper lobe, as well as an 8 mm subpleural  nodule within the right upper lobe. CT follow-up in 3 months is  recommended.  2. 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  However, they are incompletely assessed in the absence of contrast  material and prior studies for comparison.           Radiation dose reduction techniques were utilized, including automated  exposure control and exposure modulation based on body size.        This report was finalized on 8/24/2023 10:34 PM by Dr. Charlee Medina M.D.       CT Abdomen Pelvis Without Contrast [[id number]] Collected: 08/24/23 2225     Updated: 08/24/23 2237    Narrative:      CT OF THE CHEST WITHOUT CONTRAST; CT OF THE ABDOMEN AND PELVIS WITHOUT  CONTRAST     HISTORY: Found down. Concern for aspiration.     COMPARISON: None available.     TECHNIQUE: Axial CT  imaging was obtained from the thoracic inlet through  the symphysis pubis. No IV contrast was administered.     FINDINGS:  CT OF THE CHEST: Endotracheal tube terminates in satisfactory position.  Thyroid gland is unremarkable. Thoracic aorta is normal in caliber.  Mediastinal lymph nodes do not appear pathologically enlarged. There is  a subpleural nodule noted within the right upper lobe, measuring up to 8  mm. There are some reticular nodular infiltrates which are noted within  the right upper lobe. There is dense dependent consolidation with air  bronchograms noted within both lower lobes. There may be trace bilateral  pleural effusions. No acute osseous abnormalities are seen. The patient  does have a right-sided ventriculoperitoneal shunt.     CT OF THE ABDOMEN AND PELVIS: The stomach, duodenum, spleen, adrenal  glands, and gallbladder are all grossly unremarkable. There are  low-attenuation lesions which are identified within the pancreas. They  are poorly assessed on this unenhanced exam. 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 They were described  on prior report from June 17, 2020, but again are poorly assessed on  this exam. No suspicious hepatic lesions are seen. No hydronephrosis is  seen on either side. I do not see any renal masses on this unenhanced  exam. No distal ureteral or bladder stones are seen. Prostate gland is  within normal limits. There is no bowel obstruction. Ventricular  peritoneal shunt terminates within the left lower quadrant. The appendix  is normal. No acute osseous abnormalities are seen. There is mild  colonic diverticulosis.       Impression:         1. Dense dependent consolidation bilaterally., Given history, aspiration  is a concern. Patient is also noted to have some mild reticulonodular  infiltrates within the right upper lobe, as well as an 8 mm subpleural  nodule within the right upper lobe. CT follow-up in 3 months  is  recommended.  2. 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  However, they are incompletely assessed in the absence of contrast  material and prior studies for comparison.           Radiation dose reduction techniques were utilized, including automated  exposure control and exposure modulation based on body size.        This report was finalized on 8/24/2023 10:34 PM by Dr. Charlee Medina M.D.       CT Head Without Contrast [[id number]] Collected: 08/24/23 2216     Updated: 08/24/23 2224    Narrative:      CT OF THE HEAD WITHOUT CONTRAST     HISTORY: Unresponsiveness     COMPARISON: None available.     TECHNIQUE: Axial CT imaging was obtained through the brain. No IV  contrast was administered.     FINDINGS:  Unfortunately, patient's prior imaging is not available for comparison.  The patient has a right frontal ventriculoperitoneal shunt, which  terminates within the right lateral ventricle. There is encephalomalacia  noted within the right frontal lobe, adjacent to the course of the  catheter. There is ventricular dilatation. Patient may have some  additional mild encephalomalacia within the right parietal lobe. There  is some decreased attenuation surrounding the posterior and temporal  horns of the lateral ventricles. Some of this may be related to small  vessel disease. Given dilatation of the ventricular system, some  transependymal flow of CSF is not excluded. There are areas of  encephalomalacia noted within both cerebellar hemispheres. The patient  is also noted to have marked dilatation of the fourth ventricle, which  has been described on prior imaging. The patient is status post  suboccipital craniectomy. There is partial opacification of the mastoid  air cells bilaterally. Mucosal thickening is noted within the ethmoid  and sphenoid sinuses. There are air-fluid levels within the sphenoid  sinuses.  Correlation with any evidence of sinusitis is recommended.       Impression:         1. No acute intracranial hemorrhage.  2. Unfortunately, this patient's prior imaging is not available for  comparison. There is ventriculomegaly. I'm uncertain if this has  increased when compared to prior imaging. There is some decreased  attenuation surrounding the posterior and temporal horns of the lateral  ventricles bilaterally. Some transependymal flow of CSF cannot be  excluded..     Radiation dose reduction techniques were utilized, including automated  exposure control and exposure modulation based on body size.        This report was finalized on 8/24/2023 10:21 PM by Dr. Charlee Medina M.D.       XR Chest 1 View [[id number]] Collected: 08/24/23 1834     Updated: 08/24/23 1913    Narrative:      CHEST SINGLE VIEW     HISTORY: Respiratory failure. Patient was found down.     COMPARISON: None.      FINDINGS: There has been intubation and the ET tube tip is 2.2 cm above  the melanie and this could be withdrawn 2 cm for better position. The  heart size appears normal. There is no evidence for perihilar edema or  pneumothorax or focal airspace disease.  There is some limitation as the  lung apices, particular on the right and the right costophrenic angle,  are not included on the field-of-view. There is shunt tubing overlying  the right thorax.       Impression:      Limited exam demonstrates intubation with ET tube tip 2.2 cm  above the melanie and this could be withdrawn 2 cm for better position.  No further evidence for active disease in the chest.     This report was finalized on 8/24/2023 7:10 PM by Dr. Marcelo De M.D.             Results for orders placed during the hospital encounter of 08/24/23    Duplex Carotid Ultrasound CAR    Interpretation Summary    Right internal carotid artery demonstrates normal flow without evidence of hemodynamically significant stenosis.    Left internal carotid artery demonstrates  normal flow without evidence of hemodynamically significant stenosis.    Results for orders placed during the hospital encounter of 08/24/23    Adult Transthoracic Echo Complete W/ Cont if Necessary Per Protocol    Interpretation Summary    The study is technically difficult for diagnosis.    Left ventricular ejection fraction appears to be 56 - 60%.    Left ventricular diastolic function was indeterminate.    Estimated right ventricular systolic pressure from tricuspid regurgitation is normal (<35 mmHg).    There is a trivial pericardial effusion. There is no evidence of cardiac tamponade.    Pertinent Labs     Results from last 7 days   Lab Units 09/07/23  0739 09/06/23  0554 09/05/23  0604 09/04/23  0535   WBC 10*3/mm3 6.09 8.42 7.27 7.67   HEMOGLOBIN g/dL 12.2* 11.4* 12.9* 12.8*   PLATELETS 10*3/mm3 304 305 303 313     Results from last 7 days   Lab Units 09/07/23  0739 09/06/23  0554 09/05/23  0604 09/04/23  0535   SODIUM mmol/L 138 140 141 141   POTASSIUM mmol/L 4.1 3.7 3.9 3.9   CHLORIDE mmol/L 104 104 104 106   CO2 mmol/L 27.5 29.0 25.1 26.4   BUN mg/dL 13 13 13 14   CREATININE mg/dL 0.79 0.69* 0.82 0.89   GLUCOSE mg/dL 120* 129* 67 78   EGFR mL/min/1.73 103.6 107.9 102.5 100.0     Results from last 7 days   Lab Units 09/06/23  0554 09/05/23  0604 09/04/23  0535 09/03/23  0708   ALBUMIN g/dL 3.0* 3.3* 3.3* 3.3*     Results from last 7 days   Lab Units 09/07/23  0739 09/06/23  0554 09/05/23  0604 09/04/23  0535 09/03/23  0708   CALCIUM mg/dL 8.7 8.5* 8.6 8.6 8.7   ALBUMIN g/dL  --  3.0* 3.3* 3.3* 3.3*   MAGNESIUM mg/dL  --  2.2 2.0 2.1 2.2   PHOSPHORUS mg/dL  --  3.0 2.7 3.3 3.2               Invalid input(s): LDLCALC          Test Results Pending at Discharge       Discharge Details        Discharge Medications        New Medications        Instructions Start Date   aspirin 81 MG chewable tablet   81 mg, Per G Tube, Daily   Start Date: September 9, 2023     atorvastatin 40 MG tablet  Commonly known as:  LIPITOR   40 mg, Per G Tube, Nightly      docusate sodium 50 mg/5 mL liquid  Commonly known as: COLACE   100 mg, Per G Tube, 2 Times Daily      folic acid 1 MG tablet  Commonly known as: FOLVITE   1 mg, Per G Tube, Daily   Start Date: September 9, 2023     lansoprazole 15 MG Tablet Delayed Release Dispersible disintegrating tablet  Commonly known as: PREVACID SOLUTAB   15 mg, Oral, Every Early Morning   Start Date: September 9, 2023     vitamin D 1.25 MG (29885 UT) capsule capsule  Commonly known as: ERGOCALCIFEROL   50,000 Units, Per G Tube, Every 7 Days   Start Date: September 9, 2023            Stop These Medications      belzutifan 40 MG tablet  Commonly known as: WELIREG     metoprolol tartrate 25 MG tablet  Commonly known as: LOPRESSOR     tadalafil 5 MG tablet  Commonly known as: CIALIS              No Known Allergies    Discharge Disposition:  Skilled Nursing Facility (DC - External)      Discharge Diet:  Diet Order   Procedures    NPO Diet NPO Type: Strict NPO       Discharge Activity:       CODE STATUS:    Code Status and Medical Interventions:   Ordered at: 08/24/23 2139     Code Status (Patient has no pulse and is not breathing):    CPR (Attempt to Resuscitate)     Medical Interventions (Patient has pulse or is breathing):    Full Support       Future Appointments   Date Time Provider Department Center   9/11/2023 10:00 AM [facility]   9/11/2023 11:20 AM Maverick Truong APRN [facility]     Additional Instructions for the Follow-ups that You Need to Schedule       Discharge Follow-up with Specialty: Neurology and neurosurgery per their recommendation   As directed      Specialty: Neurology and neurosurgery per their recommendation               Contact information for follow-up providers       Yoshi Sahu MD. Schedule an appointment as soon as possible for a visit in 2 month(s).    Specialty: General Surgery  Contact information:  [address]  [place]  [place] [address]  [phone]               Provider, No Known Follow up.    Contact information:  [facility] [place] [address]  [phone]                       Contact information for after-discharge care       Destination       [facility] .    Service: Skilled Nursing  Contact information:  [address] [facility] [address]  [phone]                                   Time Spent on Discharge:  Greater than 30 minutes      Charito Fletcher MD  [facility]  09/08/23  09:45 EDT              Electronically signed by Charito Fletcher MD at 09/08/23 0958       Discharge Order (From admission, onward)       Start     Ordered    09/08/23 0858  Discharge patient  Once        Expected Discharge Date: 09/08/23   Discharge Disposition: Skilled Nursing Facility (DC - External)   Physician of Record for Attribution - Please select from Treatment Team: CHARITO FLETCHER [[id number]]   Review needed by CMO to determine Physician of Record: No      Question Answer Comment   Physician of Record for Attribution - Please select from Treatment Team CHARITO FLETCHER    Review needed by CMO to determine Physician of Record No        09/08/23 0857

## 2023-09-11 ENCOUNTER — TELEPHONE (OUTPATIENT)
Dept: NEUROSURGERY | Facility: CLINIC | Age: 58
End: 2023-09-11

## 2023-09-11 NOTE — TELEPHONE ENCOUNTER
Per Nurse Pineda rescheduled patient CT and f/u due to patient's facility needed more time to transport patient to hospital for CT and f/u appointment. Spoke to patient daughter per Nurse Pineda and rescheduled with her the patient's appointment to 09/14/2023 at 11:30 am. CT will be 09/14/2023 at 10:15 am.

## 2023-09-12 NOTE — TELEPHONE ENCOUNTER
Caller: Yamilet Wolfe    Relationship to patient: Emergency Contact    Best call back number: [phone]    Patient is needing: PATIENTS DAUGHTER CALLED, NEEDING TO RESCHEDULE PATIENTS CT AND POST OP. PATIENTS DAUGHTER IS REQUESTING THEY BE MOVED TO FRIDAY 09/15/23. ATTEMPTED WT, PER CHANDU, CREATE ENCOUNTER. PLEASE CALL PATIENTS DAUGHTER TO ADVISE.    THANK YOU

## 2023-09-14 ENCOUNTER — HOSPITAL ENCOUNTER (OUTPATIENT)
Dept: CT IMAGING | Facility: HOSPITAL | Age: 58
Discharge: HOME | End: 2023-09-14
Payer: COMMERCIAL

## 2023-09-14 ENCOUNTER — OFFICE VISIT (OUTPATIENT)
Dept: NEUROSURGERY | Facility: CLINIC | Age: 58
End: 2023-09-14
Payer: COMMERCIAL

## 2023-09-14 VITALS
BODY MASS INDEX: 24.77 KG/M2 | WEIGHT: 173 LBS | SYSTOLIC BLOOD PRESSURE: 116 MMHG | HEIGHT: 70 IN | DIASTOLIC BLOOD PRESSURE: 82 MMHG

## 2023-09-14 DIAGNOSIS — G93.89 CEREBRAL VENTRICULOMEGALY: ICD-10-CM

## 2023-09-14 DIAGNOSIS — Q85.83 VON HIPPEL-LINDAU SYNDROME: Primary | ICD-10-CM

## 2023-09-14 DIAGNOSIS — Z98.2 S/P VP SHUNT: ICD-10-CM

## 2023-09-14 DIAGNOSIS — G91.2 NORMAL PRESSURE HYDROCEPHALUS: ICD-10-CM

## 2023-09-14 PROCEDURE — 70450 CT HEAD/BRAIN W/O DYE: CPT

## 2023-09-14 NOTE — PROGRESS NOTES
Subjective   Patient ID: Braxton Wolfe is a 58 y.o. male is here today for follow-up.    History of Present Illness    The following portions of the patient's history were reviewed and updated as appropriate: allergies, current medications, past family history, past medical history, past social history, past surgical history, and problem list.    Mr. Wolfe is a patient of Dr. Ritchie's with a history of normal pressure hydrocephalus who was found down recently and was hospitalized with renal failure, metabolic acidosis, and acute/subacute right cerebral infarct as well as ventriculomegaly with significant lethargy and confusion.  Dr. Collins performed a  shunt revision and put in a Sophysa Polaris SPVA-200 that was originally set at 110 and then decreased a day later to 70 where it has remained until now.  Mr. Wolfe is presenting today for follow-up with CT head imaging.  Review of Systems   Constitutional:  Negative for fever.   Eyes:  Negative for visual disturbance.   Gastrointestinal:         No new bowel issues.  Bowel issues have improved since shunt revision   Genitourinary:  Negative for enuresis.   Neurological:  Positive for weakness (At baseline from previous surgeries). Negative for dizziness, facial asymmetry, speech difficulty, light-headedness, numbness and headaches.   Psychiatric/Behavioral:  Negative for confusion.      Objective   Physical Exam  Vitals reviewed.   Eyes:      Pupils: Pupils are equal, round, and reactive to light.   Neurological:      Coordination: Finger-nose-finger test: Ataxic on the left upper extremity.   Psychiatric:         Speech: Speech normal.      Neurologic Exam     Mental Status   Oriented to person.   Oriented to place.   Disoriented to day. Oriented to year.   Attention: normal. Concentration: normal.   Speech: speech is normal   Knowledge: consistent with education.     Cranial Nerves     CN II   Visual fields full to confrontation.     CN III, IV, VI   Pupils  are equal, round, and reactive to light.  Right pupil: Size: 2 mm. Shape: regular.   Left pupil: Size: 2 mm. Shape: regular. Reactivity: brisk.   Nystagmus: none   Upgaze: abnormal  Downgaze: normal    CN V   Facial sensation intact.     CN VII   Right facial weakness: none  Left facial weakness: none    CN VIII   CN VIII normal.     CN XI   Right sternocleidomastoid strength: normal  Left sternocleidomastoid strength: normal    CN XII   CN XII normal.   Patient bilateral eyes unable to move upward.  All other cardinal directions intact.     Motor Exam   Right arm pronator drift: absent  Left arm pronator drift: absentStrength in baseline in right upper and lower extremity.  Has some baseline weakness in the left upper and lower extremity..  No drift in bilateral lower extremities.  Baseline ataxia in left upper and lower extremity     Sensory Exam   Light touch normal.     Gait, Coordination, and Reflexes     Gait  Gait: (Gait deferred.  Patient in wheelchair)    Coordination   Finger-nose-finger test: Ataxic on the left upper extremity.    Assessment & Plan   Independent Review of Radiographic Studies:    CT head without contrast 9/14/2023:  In comparison with recent CT head on 8/30/2023,  shunt catheter appears to be intact.  There is been significant decrease in lateral and third ventricle size with right lateral ventricle now small and somewhat collapse as is the third ventricle.  There is a slightly small left lateral ventricle.  There has been resolution of periventricular white matter transependymal CSF.  No other significant changes besides these.  I discussed these findings with Dr. Ritchie.      Medical Decision Making:    Mr. Wolfe is a very pleasant gentleman who just recently had a shunt revision/change with new Sophysa Polaris SPVA-200 set at 70 for ventriculomegaly with chronic history of normal pressure hydrocephalus.  The patient appears to be doing well today and making significant improvement.   He continues to use wheelchair but his speech has improved greatly as well as alertness.  He continues to have symptoms of para knots with bilateral lid retraction and difficulties with bilateral ocular upgaze.  He continues to work with therapies at his rehab facility and is unsure how much longer he will be there.  We will follow-up with Mr. Wolfe in 3 months for follow-up visit to check on his status.        No follow-ups on file.  Status post  shunt  Normal pressure hydrocephalus  Von Hippel-Lindau syndrome

## 2023-10-03 ENCOUNTER — TELEPHONE (OUTPATIENT)
Dept: NEUROSURGERY | Facility: CLINIC | Age: 58
End: 2023-10-03
Payer: COMMERCIAL

## 2023-10-03 NOTE — TELEPHONE ENCOUNTER
PATIENT'S MOTHER CALLED AND STATES THEY PLAN TO MOVE PATIENT TO [facility] AND WOULD LIKE TO KNOW IF DR. ROSE WOULD RECOMMEND SOMEONE TO TAKE OVER HIS CARE WHEN HE MOVES.       PLEASE CALL ESTEPHANIE WITH ANY ADVICE.    THANK YOU!

## 2023-10-30 DIAGNOSIS — Q85.83 VHL (VON HIPPEL-LINDAU SYNDROME): Primary | ICD-10-CM

## 2025-02-11 ENCOUNTER — TELEPHONE (OUTPATIENT)
Dept: NEUROSURGERY | Facility: CLINIC | Age: 60
End: 2025-02-11
Payer: COMMERCIAL

## 2025-02-11 NOTE — TELEPHONE ENCOUNTER
Neurology called wanting a letter stating that the shunt is compatible for a MRI.  They received the OP notes but it does not say the shunt is compatible.  They want a letter stating it is.  Please fax to [phone] attn: Zander Orona.

## 2025-02-12 NOTE — TELEPHONE ENCOUNTER
I faxed over the documentation to to Braxton's neurologist with confirmation that his shunt is MRI compatible and Roseline RENNER signed off on this.

## (undated) DEVICE — TUBING, SUCTION, 1/4" X 10', STRAIGHT: Brand: MEDLINE

## (undated) DEVICE — MEDI-VAC YANKAUER SUCTION HANDLE W/BULBOUS TIP: Brand: CARDINAL HEALTH

## (undated) DEVICE — SUT SILK 2/0 TIES 18IN A185H

## (undated) DEVICE — MARKR SKIN W/RULR 2TP

## (undated) DEVICE — CONTAINER,SPECIMEN,OR STERILE,4OZ: Brand: MEDLINE

## (undated) DEVICE — NDL HYPO PRECISIONGLIDE REG 25G 1 1/2

## (undated) DEVICE — BITEBLOCK OMNI BLOC

## (undated) DEVICE — GLV SURG SENSICARE PI PF LF 7 GRN STRL

## (undated) DEVICE — PCH SURG INVISISHIELD FLD/COL W/DRN/PRT 20X6IN

## (undated) DEVICE — SPNG GZ WOVN 4X4IN 12PLY 10/BX STRL

## (undated) DEVICE — ELECTRD BLD EZ CLN MOD XLNG 2.75IN

## (undated) DEVICE — DRSNG WND GZ PAD BORDERED 4X8IN STRL

## (undated) DEVICE — PK NEURO SPINE 40

## (undated) DEVICE — DRSNG WND BORDR/ADHS NONADHR/GZ LF 4X4IN STRL

## (undated) DEVICE — STPLR SKIN VISISTAT WD 35CT

## (undated) DEVICE — APPL CHLORAPREP HI/LITE 26ML ORNG

## (undated) DEVICE — SENSR O2 OXIMAX FNGR A/ 18IN NONSTR

## (undated) DEVICE — INTRO EP CLASSICSHEATH SPLT STD 12.5F 13CM

## (undated) DEVICE — DRP SLUSH WARMR MACH CIR 44X44IN

## (undated) DEVICE — SUT MNCRYL 3/0 PS2 18IN MCP497G

## (undated) DEVICE — SINGLE-USE BIOPSY FORCEPS: Brand: RADIAL JAW 4

## (undated) DEVICE — DRP C/ARM 41X74IN

## (undated) DEVICE — ANTIBACTERIAL UNDYED BRAIDED (POLYGLACTIN 910), SYNTHETIC ABSORBABLE SUTURE: Brand: COATED VICRYL

## (undated) DEVICE — SUT MNCRYL PLS ANTIB UD 4/0 PS2 18IN

## (undated) DEVICE — CODMAN® BACTISEAL® VENTRICULAR CATHETER AND DISTAL CATHETER KIT WITH BACTISEAL SHUNT SYSTEM: Brand: CODMAN® BACTISEAL®

## (undated) DEVICE — Device

## (undated) DEVICE — SCANLAN® SUTURE BOOT™ INSTRUMENT JAW COVERS - ORIGINAL YELLOW, STANDARD PKG (5 PAIR/CARTRIDGE, 1 CARTRIDGE/PKG): Brand: SCANLAN® SUTURE BOOT™ INSTRUMENT JAW COVERS

## (undated) DEVICE — 3M™ IOBAN™ 2 ANTIMICROBIAL INCISE DRAPE 6650EZ: Brand: IOBAN™ 2

## (undated) DEVICE — LABEL SHEET CUSTOM 2X2 YELLOW: Brand: MEDLINE INDUSTRIES, INC.

## (undated) DEVICE — PATIENT TRACKER 9734887 NON-INVASIVE

## (undated) DEVICE — ADAPT CLN BIOGUARD AIR/H2O DISP

## (undated) DEVICE — CANN O2 ETCO2 FITS ALL CONN CO2 SMPL A/ 7IN DISP LF

## (undated) DEVICE — BNDR ABD PREMIUM/UNIV 10IN 27TO48IN

## (undated) DEVICE — DRSNG GZ PETROLTM XEROFORM CURAD 1X8IN STRL

## (undated) DEVICE — PERITONEAL INTRODUCER SHEATH, 70 CM (27.5 INCHES): Brand: NATUS®

## (undated) DEVICE — GLV SURG SENSICARE W/ALOE PF LF 7 STRL

## (undated) DEVICE — SOL ISO/ALC 70PCT 4OZ

## (undated) DEVICE — SHEET, DRAPE, SPLIT, STERILE: Brand: MEDLINE

## (undated) DEVICE — SMOKE EVACUATION TUBING WITH 7/8 IN TO 1/4 IN REDUCER: Brand: BUFFALO FILTER

## (undated) DEVICE — ADHS SKIN SURG TISS VISC PREMIERPRO EXOFIN HI/VISC FAST/DRY

## (undated) DEVICE — BNDR ABD 4PANEL 12IN MED/LG

## (undated) DEVICE — KT ORCA ORCAPOD DISP STRL

## (undated) DEVICE — CONN TBG Y 5 IN 1 LF STRL

## (undated) DEVICE — PATIENT RETURN ELECTRODE, SINGLE-USE, CONTACT QUALITY MONITORING, ADULT, WITH 9FT CORD, FOR PATIENTS WEIGING OVER 33LBS. (15KG): Brand: MEGADYNE